# Patient Record
Sex: MALE | Race: WHITE | NOT HISPANIC OR LATINO
[De-identification: names, ages, dates, MRNs, and addresses within clinical notes are randomized per-mention and may not be internally consistent; named-entity substitution may affect disease eponyms.]

---

## 2017-03-01 ENCOUNTER — RECORD ABSTRACTING (OUTPATIENT)
Age: 62
End: 2017-03-01

## 2017-03-01 DIAGNOSIS — N28.9 DISORDER OF KIDNEY AND URETER, UNSPECIFIED: ICD-10-CM

## 2017-03-01 DIAGNOSIS — Z78.9 OTHER SPECIFIED HEALTH STATUS: ICD-10-CM

## 2017-03-01 DIAGNOSIS — Z87.718 PERSONAL HISTORY OF OTHER SPECIFIED (CORRECTED) CONGENITAL MALFORMATIONS OF GENITOURINARY SYSTEM: ICD-10-CM

## 2017-03-01 DIAGNOSIS — R09.89 OTHER SPECIFIED SYMPTOMS AND SIGNS INVOLVING THE CIRCULATORY AND RESPIRATORY SYSTEMS: ICD-10-CM

## 2017-03-01 DIAGNOSIS — Z99.2 DEPENDENCE ON RENAL DIALYSIS: ICD-10-CM

## 2017-03-01 RX ORDER — PREDNISONE 5 MG/1
5 TABLET ORAL
Refills: 0 | Status: ACTIVE | COMMUNITY

## 2017-03-01 RX ORDER — AMIODARONE HYDROCHLORIDE 100 MG/1
100 TABLET ORAL
Refills: 0 | Status: ACTIVE | COMMUNITY

## 2017-04-04 ENCOUNTER — APPOINTMENT (OUTPATIENT)
Dept: ELECTROPHYSIOLOGY | Facility: CLINIC | Age: 62
End: 2017-04-04

## 2017-07-05 ENCOUNTER — APPOINTMENT (OUTPATIENT)
Dept: ELECTROPHYSIOLOGY | Facility: CLINIC | Age: 62
End: 2017-07-05
Payer: MEDICARE

## 2017-07-05 DIAGNOSIS — Z86.79 PERSONAL HISTORY OF OTHER DISEASES OF THE CIRCULATORY SYSTEM: ICD-10-CM

## 2017-07-05 DIAGNOSIS — I47.2 VENTRICULAR TACHYCARDIA: ICD-10-CM

## 2017-07-05 PROCEDURE — 93297 REM INTERROG DEV EVAL ICPMS: CPT

## 2017-07-05 PROCEDURE — 93295 DEV INTERROG REMOTE 1/2/MLT: CPT

## 2017-07-05 PROCEDURE — 93296 REM INTERROG EVL PM/IDS: CPT

## 2017-08-17 ENCOUNTER — INPATIENT (INPATIENT)
Facility: HOSPITAL | Age: 62
LOS: 6 days | Discharge: HOME | End: 2017-08-24
Attending: HOSPITALIST | Admitting: INTERNAL MEDICINE
Payer: MEDICARE

## 2017-08-17 VITALS — WEIGHT: 143.08 LBS | HEIGHT: 61 IN

## 2017-08-17 DIAGNOSIS — I47.1 SUPRAVENTRICULAR TACHYCARDIA: ICD-10-CM

## 2017-08-17 DIAGNOSIS — E27.40 UNSPECIFIED ADRENOCORTICAL INSUFFICIENCY: ICD-10-CM

## 2017-08-17 DIAGNOSIS — I50.20 UNSPECIFIED SYSTOLIC (CONGESTIVE) HEART FAILURE: ICD-10-CM

## 2017-08-17 DIAGNOSIS — Z94.0 KIDNEY TRANSPLANT STATUS: Chronic | ICD-10-CM

## 2017-08-17 DIAGNOSIS — Z95.810 PRESENCE OF AUTOMATIC (IMPLANTABLE) CARDIAC DEFIBRILLATOR: Chronic | ICD-10-CM

## 2017-08-17 DIAGNOSIS — I25.10 ATHEROSCLEROTIC HEART DISEASE OF NATIVE CORONARY ARTERY WITHOUT ANGINA PECTORIS: ICD-10-CM

## 2017-08-17 DIAGNOSIS — L03.115 CELLULITIS OF RIGHT LOWER LIMB: ICD-10-CM

## 2017-08-17 DIAGNOSIS — I10 ESSENTIAL (PRIMARY) HYPERTENSION: ICD-10-CM

## 2017-08-17 DIAGNOSIS — Z98.890 OTHER SPECIFIED POSTPROCEDURAL STATES: Chronic | ICD-10-CM

## 2017-08-17 DIAGNOSIS — I77.0 ARTERIOVENOUS FISTULA, ACQUIRED: Chronic | ICD-10-CM

## 2017-08-17 DIAGNOSIS — E03.9 HYPOTHYROIDISM, UNSPECIFIED: ICD-10-CM

## 2017-08-17 DIAGNOSIS — N18.6 END STAGE RENAL DISEASE: ICD-10-CM

## 2017-08-17 LAB
ALBUMIN SERPL ELPH-MCNC: 3.5 G/DL — SIGNIFICANT CHANGE UP (ref 3.3–5)
ALP SERPL-CCNC: 94 U/L — SIGNIFICANT CHANGE UP (ref 40–120)
ALT FLD-CCNC: 27 U/L — SIGNIFICANT CHANGE UP (ref 12–78)
ANION GAP SERPL CALC-SCNC: 11 MMOL/L — SIGNIFICANT CHANGE UP (ref 5–17)
AST SERPL-CCNC: 21 U/L — SIGNIFICANT CHANGE UP (ref 15–37)
BASOPHILS # BLD AUTO: 0.1 K/UL — SIGNIFICANT CHANGE UP (ref 0–0.2)
BASOPHILS NFR BLD AUTO: 0.8 % — SIGNIFICANT CHANGE UP (ref 0–2)
BILIRUB SERPL-MCNC: 0.8 MG/DL — SIGNIFICANT CHANGE UP (ref 0.2–1.2)
BUN SERPL-MCNC: 48 MG/DL — HIGH (ref 7–23)
CALCIUM SERPL-MCNC: 9.6 MG/DL — SIGNIFICANT CHANGE UP (ref 8.5–10.1)
CHLORIDE SERPL-SCNC: 101 MMOL/L — SIGNIFICANT CHANGE UP (ref 96–108)
CO2 SERPL-SCNC: 26 MMOL/L — SIGNIFICANT CHANGE UP (ref 22–31)
CREAT SERPL-MCNC: 4.75 MG/DL — HIGH (ref 0.5–1.3)
EOSINOPHIL # BLD AUTO: 0.1 K/UL — SIGNIFICANT CHANGE UP (ref 0–0.5)
EOSINOPHIL NFR BLD AUTO: 1 % — SIGNIFICANT CHANGE UP (ref 0–6)
GLUCOSE SERPL-MCNC: 133 MG/DL — HIGH (ref 70–99)
HCT VFR BLD CALC: 34.6 % — LOW (ref 39–50)
HGB BLD-MCNC: 11.1 G/DL — LOW (ref 13–17)
INR BLD: 2.43 RATIO — HIGH (ref 0.88–1.16)
LYMPHOCYTES # BLD AUTO: 1.2 K/UL — SIGNIFICANT CHANGE UP (ref 1–3.3)
LYMPHOCYTES # BLD AUTO: 11.8 % — LOW (ref 13–44)
MCHC RBC-ENTMCNC: 32.2 GM/DL — SIGNIFICANT CHANGE UP (ref 32–36)
MCHC RBC-ENTMCNC: 32.6 PG — SIGNIFICANT CHANGE UP (ref 27–34)
MCV RBC AUTO: 101.2 FL — HIGH (ref 80–100)
MONOCYTES # BLD AUTO: 0.6 K/UL — SIGNIFICANT CHANGE UP (ref 0–0.9)
MONOCYTES NFR BLD AUTO: 6 % — SIGNIFICANT CHANGE UP (ref 2–14)
NEUTROPHILS # BLD AUTO: 8 K/UL — HIGH (ref 1.8–7.4)
NEUTROPHILS NFR BLD AUTO: 80.4 % — HIGH (ref 43–77)
PLATELET # BLD AUTO: 116 K/UL — LOW (ref 150–400)
POTASSIUM SERPL-MCNC: 4.3 MMOL/L — SIGNIFICANT CHANGE UP (ref 3.5–5.3)
POTASSIUM SERPL-SCNC: 4.3 MMOL/L — SIGNIFICANT CHANGE UP (ref 3.5–5.3)
PROT SERPL-MCNC: 7.2 GM/DL — SIGNIFICANT CHANGE UP (ref 6–8.3)
PROTHROM AB SERPL-ACNC: 26.7 SEC — HIGH (ref 9.8–12.7)
RBC # BLD: 3.42 M/UL — LOW (ref 4.2–5.8)
RBC # FLD: 14.8 % — HIGH (ref 10.3–14.5)
SODIUM SERPL-SCNC: 138 MMOL/L — SIGNIFICANT CHANGE UP (ref 135–145)
WBC # BLD: 10 K/UL — SIGNIFICANT CHANGE UP (ref 3.8–10.5)
WBC # FLD AUTO: 10 K/UL — SIGNIFICANT CHANGE UP (ref 3.8–10.5)

## 2017-08-17 PROCEDURE — 99285 EMERGENCY DEPT VISIT HI MDM: CPT

## 2017-08-17 RX ORDER — CINACALCET 30 MG/1
30 TABLET, FILM COATED ORAL
Qty: 0 | Refills: 0 | Status: DISCONTINUED | OUTPATIENT
Start: 2017-08-17 | End: 2017-08-24

## 2017-08-17 RX ORDER — CEFTRIAXONE 500 MG/1
1 INJECTION, POWDER, FOR SOLUTION INTRAMUSCULAR; INTRAVENOUS ONCE
Qty: 0 | Refills: 0 | Status: COMPLETED | OUTPATIENT
Start: 2017-08-17 | End: 2017-08-17

## 2017-08-17 RX ORDER — VANCOMYCIN HCL 1 G
1000 VIAL (EA) INTRAVENOUS ONCE
Qty: 0 | Refills: 0 | Status: COMPLETED | OUTPATIENT
Start: 2017-08-17 | End: 2017-08-17

## 2017-08-17 RX ORDER — LOSARTAN POTASSIUM 100 MG/1
25 TABLET, FILM COATED ORAL DAILY
Qty: 0 | Refills: 0 | Status: DISCONTINUED | OUTPATIENT
Start: 2017-08-17 | End: 2017-08-19

## 2017-08-17 RX ORDER — HEPARIN SODIUM 5000 [USP'U]/ML
5000 INJECTION INTRAVENOUS; SUBCUTANEOUS EVERY 12 HOURS
Qty: 0 | Refills: 0 | Status: DISCONTINUED | OUTPATIENT
Start: 2017-08-17 | End: 2017-08-18

## 2017-08-17 RX ORDER — CEFEPIME 1 G/1
1000 INJECTION, POWDER, FOR SOLUTION INTRAMUSCULAR; INTRAVENOUS EVERY 24 HOURS
Qty: 0 | Refills: 0 | Status: DISCONTINUED | OUTPATIENT
Start: 2017-08-17 | End: 2017-08-18

## 2017-08-17 RX ORDER — MORPHINE SULFATE 50 MG/1
4 CAPSULE, EXTENDED RELEASE ORAL ONCE
Qty: 0 | Refills: 0 | Status: DISCONTINUED | OUTPATIENT
Start: 2017-08-17 | End: 2017-08-17

## 2017-08-17 RX ORDER — SEVELAMER CARBONATE 2400 MG/1
2400 POWDER, FOR SUSPENSION ORAL
Qty: 0 | Refills: 0 | Status: DISCONTINUED | OUTPATIENT
Start: 2017-08-17 | End: 2017-08-24

## 2017-08-17 RX ORDER — WARFARIN SODIUM 2.5 MG/1
2.5 TABLET ORAL ONCE
Qty: 0 | Refills: 0 | Status: COMPLETED | OUTPATIENT
Start: 2017-08-17 | End: 2017-08-17

## 2017-08-17 RX ORDER — ACETAMINOPHEN 500 MG
325 TABLET ORAL EVERY 4 HOURS
Qty: 0 | Refills: 0 | Status: DISCONTINUED | OUTPATIENT
Start: 2017-08-17 | End: 2017-08-24

## 2017-08-17 RX ORDER — HYDROMORPHONE HYDROCHLORIDE 2 MG/ML
0.5 INJECTION INTRAMUSCULAR; INTRAVENOUS; SUBCUTANEOUS EVERY 6 HOURS
Qty: 0 | Refills: 0 | Status: DISCONTINUED | OUTPATIENT
Start: 2017-08-17 | End: 2017-08-19

## 2017-08-17 RX ORDER — AMIODARONE HYDROCHLORIDE 400 MG/1
100 TABLET ORAL DAILY
Qty: 0 | Refills: 0 | Status: DISCONTINUED | OUTPATIENT
Start: 2017-08-17 | End: 2017-08-24

## 2017-08-17 RX ORDER — DOCUSATE SODIUM 100 MG
100 CAPSULE ORAL THREE TIMES A DAY
Qty: 0 | Refills: 0 | Status: DISCONTINUED | OUTPATIENT
Start: 2017-08-17 | End: 2017-08-24

## 2017-08-17 RX ORDER — CARVEDILOL PHOSPHATE 80 MG/1
3.12 CAPSULE, EXTENDED RELEASE ORAL DAILY
Qty: 0 | Refills: 0 | Status: DISCONTINUED | OUTPATIENT
Start: 2017-08-17 | End: 2017-08-24

## 2017-08-17 RX ORDER — OXYCODONE AND ACETAMINOPHEN 5; 325 MG/1; MG/1
1 TABLET ORAL EVERY 4 HOURS
Qty: 0 | Refills: 0 | Status: DISCONTINUED | OUTPATIENT
Start: 2017-08-17 | End: 2017-08-24

## 2017-08-17 RX ORDER — ONDANSETRON 8 MG/1
4 TABLET, FILM COATED ORAL ONCE
Qty: 0 | Refills: 0 | Status: DISCONTINUED | OUTPATIENT
Start: 2017-08-17 | End: 2017-08-17

## 2017-08-17 RX ORDER — ONDANSETRON 8 MG/1
4 TABLET, FILM COATED ORAL EVERY 6 HOURS
Qty: 0 | Refills: 0 | Status: DISCONTINUED | OUTPATIENT
Start: 2017-08-17 | End: 2017-08-24

## 2017-08-17 RX ORDER — CARVEDILOL PHOSPHATE 80 MG/1
6.25 CAPSULE, EXTENDED RELEASE ORAL AT BEDTIME
Qty: 0 | Refills: 0 | Status: DISCONTINUED | OUTPATIENT
Start: 2017-08-17 | End: 2017-08-24

## 2017-08-17 RX ORDER — ONDANSETRON 8 MG/1
4 TABLET, FILM COATED ORAL ONCE
Qty: 0 | Refills: 0 | Status: COMPLETED | OUTPATIENT
Start: 2017-08-17 | End: 2017-08-17

## 2017-08-17 RX ADMIN — MORPHINE SULFATE 4 MILLIGRAM(S): 50 CAPSULE, EXTENDED RELEASE ORAL at 20:10

## 2017-08-17 RX ADMIN — CEFTRIAXONE 100 GRAM(S): 500 INJECTION, POWDER, FOR SOLUTION INTRAMUSCULAR; INTRAVENOUS at 17:50

## 2017-08-17 RX ADMIN — HYDROMORPHONE HYDROCHLORIDE 0.5 MILLIGRAM(S): 2 INJECTION INTRAMUSCULAR; INTRAVENOUS; SUBCUTANEOUS at 23:46

## 2017-08-17 RX ADMIN — MORPHINE SULFATE 4 MILLIGRAM(S): 50 CAPSULE, EXTENDED RELEASE ORAL at 17:50

## 2017-08-17 RX ADMIN — CEFEPIME 100 MILLIGRAM(S): 1 INJECTION, POWDER, FOR SOLUTION INTRAMUSCULAR; INTRAVENOUS at 23:46

## 2017-08-17 RX ADMIN — ONDANSETRON 4 MILLIGRAM(S): 8 TABLET, FILM COATED ORAL at 17:50

## 2017-08-17 RX ADMIN — Medication 250 MILLIGRAM(S): at 20:11

## 2017-08-17 NOTE — H&P ADULT - PROBLEM SELECTOR PLAN 1
- Given Vanc and Ceftriaxone in ED  - ID Consult  - Continue with Broad-Spectrum Antibiotics; Renally dosed  - Follow-up Cultures  - Pain meds prn - Given Vanc and Ceftriaxone in ED  - ID Consult  - Wound Care Consult  - Continue with Broad-Spectrum Antibiotics; Renally dosed  - Follow-up Cultures  - Pain meds prn - Given Vanc and Ceftriaxone in ED  - ID Consult  - Wound Care Consult  - Continue with Broad-Spectrum Antibiotics; Renally dosed  - Follow-up Cultures  Pain meds w anti-nausea as well  - Pain meds prn

## 2017-08-17 NOTE — H&P ADULT - NSHPREVIEWOFSYSTEMS_GEN_ALL_CORE
REVIEW OF SYSTEMS:  CONSTITUTIONAL: No weakness, fevers or chills  EYES/ENT: No visual changes;  No vertigo or throat pain   NECK: No pain or stiffness  RESPIRATORY: No cough, wheezing, hemoptysis; No shortness of breath  CARDIOVASCULAR: No chest pain or palpitations  GASTROINTESTINAL: No abdominal or epigastric pain. No nausea, vomiting, or hematemesis; No diarrhea or constipation. No melena or hematochezia.  GENITOURINARY: No dysuria, frequency or hematuria  NEUROLOGICAL: No numbness or weakness  SKIN: Pain, Swelling, Erythema of the R Ankle  All other review of systems is negative unless indicated above. CONSTITUTIONAL: No weakness, fevers or chills  EYES/ENT: No visual changes;  No vertigo or throat pain   NECK: No pain or stiffness  RESPIRATORY: No cough, wheezing, hemoptysis; No shortness of breath  CARDIOVASCULAR: No chest pain or palpitations  GASTROINTESTINAL: No abdominal or epigastric pain. No nausea, vomiting, or hematemesis; No diarrhea or constipation. No melena or hematochezia.  GENITOURINARY: No dysuria, frequency or hematuria  NEUROLOGICAL: No numbness or weakness  SKIN: Pain, Swelling, Erythema of the R Ankle  All other review of systems is negative unless indicated above.

## 2017-08-17 NOTE — ED STATDOCS - MEDICAL DECISION MAKING DETAILS
pt with wound of foot now with what appears to be a cellulitis.  due to swelling and redness will require abx.  Will coordinate with his ID

## 2017-08-17 NOTE — H&P ADULT - PROBLEM SELECTOR PLAN 3
- s/p AICD  - Continue with BB, ARB  - HD for fluid management - s/p AICD  - Continue with BB, ARB  - Continue with Amiodarone  - HD for fluid management

## 2017-08-17 NOTE — H&P ADULT - ATTENDING COMMENTS
VTE prophylaxis sq hep    Patient seen w Dr. Avitia and hx and physical edited as above.    Cellulitis developed after wound debridement; IV ABx wound consult; family will call wound center in AM to follow up cx results and discuss results of outpt studies    agree w A/P as outlined

## 2017-08-17 NOTE — H&P ADULT - PROBLEM SELECTOR PLAN 7
- Continue Amiodarone - Secondary to history of chronic steroids  - Continue prednisone daily - Secondary to history of chronic steroids  - Continue prednisone daily; will not bolus steroids at this time

## 2017-08-17 NOTE — ED STATDOCS - ATTENDING CONTRIBUTION TO CARE
I performed the initial face to face bedside interview with this patient regarding history of present illness, review of symptoms and past medical, social and family history.  I completed an independent physical examination.  I was the initial provider who evaluated this patient.  The history, review of symptoms and examination was documented by the scribe in my presence and I attest to the accuracy of the documentation.  I have signed out the follow up of any pending tests (i.e. labs, radiological studies) to the NP.  I have discussed the patient’s plan of care and disposition with the NP.

## 2017-08-17 NOTE — H&P ADULT - NSHPPHYSICALEXAM_GEN_ALL_CORE
T(C): 36.7 (08-17-17 @ 20:11)  T(F): 98.1 (08-17-17 @ 20:11), Max: 98.1 (08-17-17 @ 20:11)  HR: 77 (08-17-17 @ 20:11) (77 - 120)  BP: 115/63 (08-17-17 @ 20:11) (115/63 - 126/73)  RR:  (16 - 18)  SpO2:  (96% - 100%)    PHYSICAL EXAM:  GENERAL: NAD, well-groomed, well-developed  HEAD:  NC/AT  EYES: EOMI, PERRLA,  HEENT: Moist mucous membranes  NECK: Supple, No JVD  CNS:  Alert & Oriented X3, Motor Strength 5/5 B/L upper and lower extremities  LUNG: Clear to auscultation bilaterally; No rales, rhonchi, wheezing, or rubs  HEART: RRR; No murmurs, rubs, or gallops  ABDOMEN: +BS, ST/ND/NT  GENITOURINARY- Voiding, Bladder not distended  EXTREMITIES:  Approximately 1x1x0.5cm Ulcer present on Right Lateral Malleolus, with erythema Laterally and upwards, +Serosanguinous drainage, +TTP, +Warmth  MUSCULOSKELTAL- Joints normal ROM, no Muscle or joint tenderness  VAGINAL: deferred  RECTAL: deferred, not indicated  BREAST: deferred T(C): 36.7 (08-17-17 @ 20:11)  T(F): 98.1 (08-17-17 @ 20:11), Max: 98.1 (08-17-17 @ 20:11)  HR: 77 (08-17-17 @ 20:11) (77 - 120)  BP: 115/63 (08-17-17 @ 20:11) (115/63 - 126/73)  RR:  (16 - 18)  SpO2:  (96% - 100%)    PHYSICAL EXAM:  GENERAL: NAD, well-groomed, well-developed  HEAD:  NC/AT  EYES: EOMI, PERRLA,  HEENT: Moist mucous membranes  NECK: Supple, No JVD  CNS:  Alert & Oriented X3, Motor Strength 5/5 B/L upper and lower extremities  LUNG: Clear to auscultation bilaterally; No rales, rhonchi, wheezing, or rubs  HEART: RRR; No murmurs, rubs, or gallops  ABDOMEN: +BS, ST/ND/NT, Palpable Mass in RLQ  GENITOURINARY- Voiding, Bladder not distended  EXTREMITIES:  Approximately 1x1x0.5cm Ulcer present on Right Lateral Malleolus, with erythema Laterally and upwards, +Serosanguinous drainage, +TTP, +Warmth  MUSCULOSKELTAL- Joints normal ROM, no Muscle or joint tenderness  VAGINAL: deferred  RECTAL: deferred, not indicated  BREAST: deferred T(C): 36.7 (08-17-17 @ 20:11)  T(F): 98.1 (08-17-17 @ 20:11), Max: 98.1 (08-17-17 @ 20:11)  HR: 77 (08-17-17 @ 20:11) (77 - 120)  BP: 115/63 (08-17-17 @ 20:11) (115/63 - 126/73)  RR:  (16 - 18)  SpO2:  (96% - 100%)    PHYSICAL EXAM:  GENERAL: NAD, well-groomed, well-developed  HEAD:  NC/AT  EYES: EOMI, PERRLA,  HEENT: Moist mucous membranes  NECK: Supple, No JVD  CNS:  Alert & Oriented X3, Motor Strength 5/5 B/L upper and lower extremities  LUNG: Clear to auscultation bilaterally; No rales, rhonchi, wheezing, or rubs  HEART: RRR; No murmurs, rubs, or gallops  ABDOMEN: +BS, ST/ND/NT, Palpable Mass in RLQ c/w transplanted kidney  GENITOURINARY- Voiding, Bladder not distended  EXTREMITIES:  Approximately 1x1x0.5cm Ulcer present on Right Lateral Malleolus, with erythema Laterally and upwards, +Serosanguinous drainage, +TTP, +Warmth  + induration  MUSCULOSKELTAL- Joints normal ROM, no Muscle or joint tenderness  VASC  distal pulses not palpable (usual for pt)  VAGINAL: deferred  RECTAL: deferred, not indicated  BREAST: deferred

## 2017-08-17 NOTE — H&P ADULT - ASSESSMENT
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

## 2017-08-17 NOTE — ED STATDOCS - PROGRESS NOTE DETAILS
61 yr. old male PMH: Pacemaker, AICD, Dialysis , Hypoparathyroidism presents to ED with worsening pain and swelling in right ankle. Reports he saw Dr. Quintero at [facility] and has been treated in wound care center. Sent for US and CT ankle today and decided to come to the ED to be evaluated by Dr. Srivastava. Seen and examined by attending in Intake. Plan: IV, Labs, X-Ray IV antibiotics and Consult with Dr. Srivastava. Will F/U with results and re evaluate. Alberto NP Dr. Rivera called and consulted.  Labs, Vancomycin and Rocephin IV, and admission as per Dr. Rivera. Alberto BROWN.

## 2017-08-17 NOTE — H&P ADULT - PMH
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

## 2017-08-17 NOTE — H&P ADULT - FAMILY HISTORY
Sibling  Still living? Unknown  Family history of colon cancer, Age at diagnosis: Age Unknown     Mother  Still living? Unknown  Family history of kidney disease, Age at diagnosis: Age Unknown

## 2017-08-17 NOTE — ED STATDOCS - PHYSICAL EXAMINATION
+right ankle swollen medial lateral mal wound that has no drainage. surroudning lymph errythma and swelling, good distal PMS

## 2017-08-17 NOTE — ED STATDOCS - SKIN, MLM
+right ankle swollen medial lateral malleolus wound that has no drainage. +surrounding lymph erythema and swelling. good distal PMS

## 2017-08-17 NOTE — H&P ADULT - NSHPOUTPATIENTPROVIDERS_GEN_ALL_CORE
PCP/Cardio: Dr. Coello  ID: [email or web address]  Nephrology: Siva/Jeff  Wound Care: Dr. Quintero ([facility]

## 2017-08-17 NOTE — H&P ADULT - HISTORY OF PRESENT ILLNESS
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 Initially wound was a scab with surrounding erythema. Patient follows with Dr. Quintero (Wound Care) at [facility] and underwent a wound debridement on 08/15/17. Wound culture and bone culture were obtained at the time of procedure. Post-procedure patient, reports extension of the erythema up the lower extremity, increasing pain, and swelling of the right ankle. Patient contacted ID who ordered a CT of the lower extremities and Lower Extremity Dopplers. Currently denies any fever, chill, night sweats. Came to [facility] for further evaluation. 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 Initially wound was a scab with surrounding erythema. Patient follows with Dr. Quintero (Wound Care) at [facility] and underwent a wound debridement on 08/15/17. Wound culture and bone culture were obtained at the time of procedure. Patient was not treated with antibiotics and prelim culture is NGTD Post-procedure patient, reports extension of the erythema up the lower extremity, increasing pain, and swelling of the right ankle. Patient contacted ID who ordered a CT of the lower extremities and Lower Extremity Dopplers. Currently denies any fever, chill, night sweats. Came to [facility] for further evaluation. 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 Initially wound was a scab with surrounding erythema. Patient follows with Dr. Quintero (Wound Care) at [facility] and underwent a wound debridement on 08/15/17. Wound culture and bone culture were obtained at the time of procedure. Patient was not treated with antibiotics and wife reports that prelim culture is NGTD. Post-procedure patient, reports extension of the erythema up the lower extremity and down the foot, increasing pain, and swelling of the right ankle. Patient contacted ID who ordered a CT of the lower extremities and Lower Extremity Dopplers. Currently denies any fever, chill, night sweats. Came to [facility] for further evaluation. 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       . Initially wound was a scab with surrounding erythema. Patient follows with Dr. Quintero (Wound Care) at [facility] and underwent a wound debridement on 08/15/17. Wound culture and bone culture were obtained at the time of procedure. Patient was not treated with antibiotics and wife reports that prelim culture is NGTD.         Post-procedure patient, reports extension of the erythema up the lower extremity and down the foot, increasing pain, and swelling of the right ankle. Patient contacted ID who ordered a CT of the lower extremities and Lower Extremity Dopplers. Currently denies any fever, chill, night sweats. Came to [facility] for further evaluation.  Radiology results are pending.

## 2017-08-17 NOTE — H&P ADULT - PROBLEM SELECTOR PLAN 4
- s/p Stent (2007)  - Continue BB  - Lipid profile  - Start Statin  - Patient reports that cardiology switched him off anti-platelets inlieu of coumadin  - INR Therapeutic

## 2017-08-17 NOTE — ED STATDOCS - OBJECTIVE STATEMENT
62 y/o male with PMHx of ESRD s/p kidney transplant presents to the ED c/o worsening right ankle pain since last night. +increased redness, swelling to right ankle. Went to infectious disease Dr. Srivastava, had an appointment on Monday morning. Did CT, US, and CXR. Pt follows up with Wound Care at [facility]. Is on Coumadin. Pt is currently on Prednisone.

## 2017-08-17 NOTE — H&P ADULT - PSH
A-V fistula    AICD (automatic cardioverter/defibrillator) present    H/O hernia repair    H/O kidney transplant

## 2017-08-18 DIAGNOSIS — Z29.9 ENCOUNTER FOR PROPHYLACTIC MEASURES, UNSPECIFIED: ICD-10-CM

## 2017-08-18 LAB
ALBUMIN SERPL ELPH-MCNC: 3 G/DL — LOW (ref 3.3–5)
ALP SERPL-CCNC: 88 U/L — SIGNIFICANT CHANGE UP (ref 40–120)
ALT FLD-CCNC: 21 U/L — SIGNIFICANT CHANGE UP (ref 12–78)
ANION GAP SERPL CALC-SCNC: 11 MMOL/L — SIGNIFICANT CHANGE UP (ref 5–17)
AST SERPL-CCNC: 9 U/L — LOW (ref 15–37)
BILIRUB SERPL-MCNC: 0.7 MG/DL — SIGNIFICANT CHANGE UP (ref 0.2–1.2)
BUN SERPL-MCNC: 61 MG/DL — HIGH (ref 7–23)
CALCIUM SERPL-MCNC: 9.5 MG/DL — SIGNIFICANT CHANGE UP (ref 8.4–10.5)
CALCIUM SERPL-MCNC: 9.5 MG/DL — SIGNIFICANT CHANGE UP (ref 8.5–10.1)
CHLORIDE SERPL-SCNC: 100 MMOL/L — SIGNIFICANT CHANGE UP (ref 96–108)
CHOLEST SERPL-MCNC: 155 MG/DL — SIGNIFICANT CHANGE UP (ref 10–199)
CO2 SERPL-SCNC: 26 MMOL/L — SIGNIFICANT CHANGE UP (ref 22–31)
CREAT SERPL-MCNC: 5.51 MG/DL — HIGH (ref 0.5–1.3)
GLUCOSE SERPL-MCNC: 134 MG/DL — HIGH (ref 70–99)
HAV IGM SER-ACNC: SIGNIFICANT CHANGE UP
HBV CORE IGM SER-ACNC: SIGNIFICANT CHANGE UP
HBV SURFACE AG SER-ACNC: SIGNIFICANT CHANGE UP
HCT VFR BLD CALC: 29.5 % — LOW (ref 39–50)
HCV AB S/CO SERPL IA: 1.05 S/CO — SIGNIFICANT CHANGE UP
HCV AB SERPL-IMP: (no result)
HDLC SERPL-MCNC: 49 MG/DL — SIGNIFICANT CHANGE UP (ref 40–125)
HGB BLD-MCNC: 9.5 G/DL — LOW (ref 13–17)
LIPID PNL WITH DIRECT LDL SERPL: 83 MG/DL — SIGNIFICANT CHANGE UP
MCHC RBC-ENTMCNC: 31.8 PG — SIGNIFICANT CHANGE UP (ref 27–34)
MCHC RBC-ENTMCNC: 32.2 GM/DL — SIGNIFICANT CHANGE UP (ref 32–36)
MCV RBC AUTO: 98.8 FL — SIGNIFICANT CHANGE UP (ref 80–100)
PLATELET # BLD AUTO: 119 K/UL — LOW (ref 150–400)
POTASSIUM SERPL-MCNC: 4.8 MMOL/L — SIGNIFICANT CHANGE UP (ref 3.5–5.3)
POTASSIUM SERPL-SCNC: 4.8 MMOL/L — SIGNIFICANT CHANGE UP (ref 3.5–5.3)
PROT SERPL-MCNC: 6.3 GM/DL — SIGNIFICANT CHANGE UP (ref 6–8.3)
PTH-INTACT FLD-MCNC: 292 PG/ML — HIGH (ref 15–65)
RBC # BLD: 2.99 M/UL — LOW (ref 4.2–5.8)
RBC # FLD: 14.5 % — SIGNIFICANT CHANGE UP (ref 10.3–14.5)
SODIUM SERPL-SCNC: 137 MMOL/L — SIGNIFICANT CHANGE UP (ref 135–145)
TOTAL CHOLESTEROL/HDL RATIO MEASUREMENT: 3.2 RATIO — LOW (ref 3.4–9.6)
TRIGL SERPL-MCNC: 115 MG/DL — SIGNIFICANT CHANGE UP (ref 10–149)
TSH SERPL-MCNC: 4.14 UU/ML — HIGH (ref 0.36–3.74)
WBC # BLD: 9.1 K/UL — SIGNIFICANT CHANGE UP (ref 3.8–10.5)
WBC # FLD AUTO: 9.1 K/UL — SIGNIFICANT CHANGE UP (ref 3.8–10.5)

## 2017-08-18 RX ORDER — WARFARIN SODIUM 2.5 MG/1
2.5 TABLET ORAL DAILY
Qty: 0 | Refills: 0 | Status: DISCONTINUED | OUTPATIENT
Start: 2017-08-18 | End: 2017-08-19

## 2017-08-18 RX ORDER — CEFTAZIDIME 6 G/30ML
2 INJECTION, POWDER, FOR SOLUTION INTRAVENOUS
Qty: 0 | Refills: 0 | Status: DISCONTINUED | OUTPATIENT
Start: 2017-08-18 | End: 2017-08-24

## 2017-08-18 RX ORDER — WARFARIN SODIUM 2.5 MG/1
2.5 TABLET ORAL DAILY
Qty: 0 | Refills: 0 | Status: DISCONTINUED | OUTPATIENT
Start: 2017-08-18 | End: 2017-08-18

## 2017-08-18 RX ORDER — DOXERCALCIFEROL 2.5 UG/1
1 CAPSULE ORAL
Qty: 0 | Refills: 0 | Status: DISCONTINUED | OUTPATIENT
Start: 2017-08-18 | End: 2017-08-24

## 2017-08-18 RX ORDER — VANCOMYCIN HCL 1 G
500 VIAL (EA) INTRAVENOUS
Qty: 0 | Refills: 0 | Status: DISCONTINUED | OUTPATIENT
Start: 2017-08-18 | End: 2017-08-24

## 2017-08-18 RX ORDER — ERYTHROPOIETIN 10000 [IU]/ML
10000 INJECTION, SOLUTION INTRAVENOUS; SUBCUTANEOUS
Qty: 0 | Refills: 0 | Status: DISCONTINUED | OUTPATIENT
Start: 2017-08-18 | End: 2017-08-24

## 2017-08-18 RX ORDER — VANCOMYCIN HCL 1 G
125 VIAL (EA) INTRAVENOUS EVERY 6 HOURS
Qty: 0 | Refills: 0 | Status: DISCONTINUED | OUTPATIENT
Start: 2017-08-18 | End: 2017-08-24

## 2017-08-18 RX ADMIN — AMIODARONE HYDROCHLORIDE 100 MILLIGRAM(S): 400 TABLET ORAL at 18:13

## 2017-08-18 RX ADMIN — Medication 100 MILLIGRAM(S): at 16:29

## 2017-08-18 RX ADMIN — ERYTHROPOIETIN 10000 UNIT(S): 10000 INJECTION, SOLUTION INTRAVENOUS; SUBCUTANEOUS at 16:33

## 2017-08-18 RX ADMIN — CARVEDILOL PHOSPHATE 3.12 MILLIGRAM(S): 80 CAPSULE, EXTENDED RELEASE ORAL at 06:08

## 2017-08-18 RX ADMIN — OXYCODONE AND ACETAMINOPHEN 1 TABLET(S): 5; 325 TABLET ORAL at 18:24

## 2017-08-18 RX ADMIN — HYDROMORPHONE HYDROCHLORIDE 0.5 MILLIGRAM(S): 2 INJECTION INTRAMUSCULAR; INTRAVENOUS; SUBCUTANEOUS at 14:35

## 2017-08-18 RX ADMIN — HYDROMORPHONE HYDROCHLORIDE 0.5 MILLIGRAM(S): 2 INJECTION INTRAMUSCULAR; INTRAVENOUS; SUBCUTANEOUS at 06:09

## 2017-08-18 RX ADMIN — CEFTAZIDIME 100 GRAM(S): 6 INJECTION, POWDER, FOR SOLUTION INTRAVENOUS at 18:57

## 2017-08-18 RX ADMIN — Medication 100 MILLIGRAM(S): at 19:56

## 2017-08-18 RX ADMIN — WARFARIN SODIUM 2.5 MILLIGRAM(S): 2.5 TABLET ORAL at 00:33

## 2017-08-18 RX ADMIN — CARVEDILOL PHOSPHATE 6.25 MILLIGRAM(S): 80 CAPSULE, EXTENDED RELEASE ORAL at 00:33

## 2017-08-18 RX ADMIN — Medication 125 MILLIGRAM(S): at 18:16

## 2017-08-18 RX ADMIN — Medication 5 MILLIGRAM(S): at 06:09

## 2017-08-18 RX ADMIN — SEVELAMER CARBONATE 2400 MILLIGRAM(S): 2400 POWDER, FOR SUSPENSION ORAL at 18:15

## 2017-08-18 RX ADMIN — OXYCODONE AND ACETAMINOPHEN 1 TABLET(S): 5; 325 TABLET ORAL at 03:52

## 2017-08-18 RX ADMIN — OXYCODONE AND ACETAMINOPHEN 1 TABLET(S): 5; 325 TABLET ORAL at 22:09

## 2017-08-18 RX ADMIN — LOSARTAN POTASSIUM 25 MILLIGRAM(S): 100 TABLET, FILM COATED ORAL at 06:08

## 2017-08-18 RX ADMIN — DOXERCALCIFEROL 1 MICROGRAM(S): 2.5 CAPSULE ORAL at 16:32

## 2017-08-18 RX ADMIN — Medication 100 MILLIGRAM(S): at 22:10

## 2017-08-18 RX ADMIN — Medication 100 MILLIGRAM(S): at 06:09

## 2017-08-18 RX ADMIN — OXYCODONE AND ACETAMINOPHEN 1 TABLET(S): 5; 325 TABLET ORAL at 10:41

## 2017-08-18 RX ADMIN — CINACALCET 30 MILLIGRAM(S): 30 TABLET, FILM COATED ORAL at 18:12

## 2017-08-18 NOTE — PROGRESS NOTE ADULT - PROBLEM SELECTOR PLAN 4
- s/p Stent (2007)  - Continue BB  - Lipid profile  - Start Statin  - Patient reports that cardiology switched him off anti-platelets inlieu of coumadin  - INR Therapeutic.

## 2017-08-18 NOTE — CONSULT NOTE ADULT - SUBJECTIVE AND OBJECTIVE BOX
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 Initially wound was a scab with surrounding erythema. Patient follows with Dr. Quintero (Wound Care) at [facility] and underwent a wound debridement on 08/15/17. Wound culture and bone culture were obtained at the time of procedure. Patient was not treated with antibiotics and wife reports that prelim culture is NGTD.  Post-procedure patient, reports extension of the erythema up the lower extremity and down the foot, increasing pain, and swelling of the right ankle. Came to [facility] for further evaluation.        PMH: as above  PSH: as above  Meds: per reconciliation sheet, noted below  MEDICATIONS  (STANDING):  predniSONE   Tablet 5 milliGRAM(s) Oral daily  amiodarone    Tablet 100 milliGRAM(s) Oral daily  losartan 25 milliGRAM(s) Oral daily  carvedilol 6.25 milliGRAM(s) Oral at bedtime  cinacalcet 30 milliGRAM(s) Oral <User Schedule>  sevelamer hydrochloride 2400 milliGRAM(s) Oral three times a day with meals  carvedilol 3.125 milliGRAM(s) Oral daily  docusate sodium 100 milliGRAM(s) Oral three times a day  cefepime  IVPB 1000 milliGRAM(s) IV Intermittent every 24 hours  warfarin 2.5 milliGRAM(s) Oral daily    MEDICATIONS  (PRN):  acetaminophen   Tablet. 325 milliGRAM(s) Oral every 4 hours PRN Mild Pain (1 - 3)  ondansetron Injectable 4 milliGRAM(s) IV Push every 6 hours PRN Nausea  oxyCODONE    5 mG/acetaminophen 325 mG 1 Tablet(s) Oral every 4 hours PRN Moderate Pain (4 - 6)  HYDROmorphone  Injectable 0.5 milliGRAM(s) IV Push every 6 hours PRN Severe Pain (7 - 10)    Allergies    No Known Allergies    Intolerances      Social: no smoking, no alcohol, no illegal drugs; no recent travel, no exposure to TB  FAMILY HISTORY:  Family history of kidney disease (Mother)  Family history of colon cancer (Sibling)    ROS: the patient denies fever, no chills, no HA, no dizziness, no sore throat, no blurry vision, no CP, no palpitations, no SOB, no cough, no abdominal pain, no diarrhea, no N/V, no dysuria, no leg pain, has right leg rash, no joint aches, no rectal pain or bleeding, no night sweats    Vital Signs Last 24 Hrs  T(C): 37.1 (18 Aug 2017 10:51), Max: 37.1 (18 Aug 2017 10:51)  T(F): 98.8 (18 Aug 2017 10:51), Max: 98.8 (18 Aug 2017 10:51)  HR: 75 (18 Aug 2017 10:51) (70 - 120)  BP: 107/56 (18 Aug 2017 10:51) (107/56 - 126/73)  BP(mean): 89 (17 Aug 2017 15:34) (89 - 89)  RR: 16 (18 Aug 2017 10:51) (15 - 18)  SpO2: 96% (18 Aug 2017 10:51) (96% - 100%)  Daily Height in cm: 154.94 (17 Aug 2017 14:51)    Daily Weight in k.5 (18 Aug 2017 05:05)    PE:    Constitutional: frail looking  HEENT: NC/AT, EOMI, PERRLA  Neck: supple  Back: no tenderness  Respiratory: clear  Cardiovascular: S1S2 regular, no murmurs  Abdomen: soft, not tender, not distended, positive BS  Genitourinary: deferred  Rectal: deferred  Musculoskeletal: no muscle tenderness, no joint swelling or tenderness  Extremities: right leg erythema, edema and warmth  Neurological: AxOx3, moving all extremities, no focal deficits  Skin: no rashes    Labs:                        11.1   10.0  )-----------( 116      ( 17 Aug 2017 17:27 )             34.6     08-    138  |  101  |  48<H>  ----------------------------<  133<H>  4.3   |  26  |  4.75<H>    Ca    9.6      17 Aug 2017 17:27    TPro  7.2  /  Alb  3.5  /  TBili  0.8  /  DBili  x   /  AST  21  /  ALT  27  /  AlkPhos  94  08-17     LIVER FUNCTIONS - ( 17 Aug 2017 17:27 )  Alb: 3.5 g/dL / Pro: 7.2 gm/dL / ALK PHOS: 94 U/L / ALT: 27 U/L / AST: 21 U/L / GGT: x                 Radiology:    Advanced directives addressed: full resuscitation Patient is a 61y old  Male who presents with a chief complaint of CC: Right Lateral Ankle Pain (17 Aug 2017 21:07)    HPI:  60 y/o male with past h/o HTN, PVD, PCKD, s/p Nephrectomy and 3x Renal Transplant (, , ), ESRD on HD (M,W,F), CAD s/p Stent (), CHF (reported EF<20%) s/p AICD, Adrenal Insufficiency, 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 Initially wound was a scab with surrounding erythema. Patient follows with Dr. Quintero (Wound Care) at [facility] and underwent a wound debridement on 08/15/17. Wound culture and bone culture were obtained at the time of procedure. Patient was not treated with antibiotics and wife reports that prelim culture is NGTD.  Post-procedure patient, reports extension of the erythema up the lower extremity and down the foot, increasing pain, and swelling of the right ankle. Came to [facility] for further evaluation.        PMH: as above  PSH: as above  Meds: per reconciliation sheet, noted below  MEDICATIONS  (STANDING):  predniSONE   Tablet 5 milliGRAM(s) Oral daily  amiodarone    Tablet 100 milliGRAM(s) Oral daily  losartan 25 milliGRAM(s) Oral daily  carvedilol 6.25 milliGRAM(s) Oral at bedtime  cinacalcet 30 milliGRAM(s) Oral <User Schedule>  sevelamer hydrochloride 2400 milliGRAM(s) Oral three times a day with meals  carvedilol 3.125 milliGRAM(s) Oral daily  docusate sodium 100 milliGRAM(s) Oral three times a day  cefepime  IVPB 1000 milliGRAM(s) IV Intermittent every 24 hours  warfarin 2.5 milliGRAM(s) Oral daily    MEDICATIONS  (PRN):  acetaminophen   Tablet. 325 milliGRAM(s) Oral every 4 hours PRN Mild Pain (1 - 3)  ondansetron Injectable 4 milliGRAM(s) IV Push every 6 hours PRN Nausea  oxyCODONE    5 mG/acetaminophen 325 mG 1 Tablet(s) Oral every 4 hours PRN Moderate Pain (4 - 6)  HYDROmorphone  Injectable 0.5 milliGRAM(s) IV Push every 6 hours PRN Severe Pain (7 - 10)    Allergies    No Known Allergies    Intolerances      Social: no smoking, no alcohol, no illegal drugs; no recent travel, no exposure to TB  FAMILY HISTORY:  Family history of kidney disease (Mother)  Family history of colon cancer (Sibling)    ROS: the patient denies fever, no chills, no HA, no dizziness, no sore throat, no blurry vision, no CP, no palpitations, no SOB, no cough, no abdominal pain, no diarrhea, no N/V, no dysuria, no leg pain, has right leg rash, no joint aches, no rectal pain or bleeding, no night sweats    Vital Signs Last 24 Hrs  T(C): 37.1 (18 Aug 2017 10:51), Max: 37.1 (18 Aug 2017 10:51)  T(F): 98.8 (18 Aug 2017 10:51), Max: 98.8 (18 Aug 2017 10:51)  HR: 75 (18 Aug 2017 10:51) (70 - 120)  BP: 107/56 (18 Aug 2017 10:51) (107/56 - 126/73)  BP(mean): 89 (17 Aug 2017 15:34) (89 - 89)  RR: 16 (18 Aug 2017 10:51) (15 - 18)  SpO2: 96% (18 Aug 2017 10:51) (96% - 100%)  Daily Height in cm: 154.94 (17 Aug 2017 14:51)    Daily Weight in k.5 (18 Aug 2017 05:05)    PE:    Constitutional: frail looking  HEENT: NC/AT, EOMI, PERRLA  Neck: supple  Back: no tenderness  Respiratory: clear  Cardiovascular: S1S2 regular, no murmurs  Abdomen: soft, not tender, not distended, positive BS  Genitourinary: deferred  Rectal: deferred  Musculoskeletal: no muscle tenderness, no joint swelling or tenderness  Extremities: right lateral ankle erythema, edema and warmth extending to right lateral foot an right lateral calf area; small ulcer with silver nitrate sponge in place.  Neurological: AxOx3, moving all extremities, no focal deficits  Skin: no rashes    Labs:                        11.1   10.0  )-----------( 116      ( 17 Aug 2017 17:27 )             34.6         138  |  101  |  48<H>  ----------------------------<  133<H>  4.3   |  26  |  4.75<H>    Ca    9.6      17 Aug 2017 17:27    TPro  7.2  /  Alb  3.5  /  TBili  0.8  /  DBili  x   /  AST  21  /  ALT  27  /  AlkPhos  94       LIVER FUNCTIONS - ( 17 Aug 2017 17:27 )  Alb: 3.5 g/dL / Pro: 7.2 gm/dL / ALK PHOS: 94 U/L / ALT: 27 U/L / AST: 21 U/L / GGT: x                 Radiology:    Advanced directives addressed: full resuscitation

## 2017-08-18 NOTE — CONSULT NOTE ADULT - ASSESSMENT
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 Initially wound was a scab with surrounding erythema. Patient follows with Dr. Quintero (Wound Care) at [facility] and underwent a wound debridement on 08/15/17. Wound culture and bone culture were obtained at the time of procedure. Patient was not treated with antibiotics and wife reports that prelim culture is NGTD.  Post-procedure patient, reports extension of the erythema up the lower extremity and down the foot, increasing pain, and swelling of the right ankle. Came to [facility] for further evaluation. 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 Initially wound was a scab with surrounding erythema. Patient follows with Dr. Quintero (Wound Care) at [facility] and underwent a wound debridement on 08/15/17. Wound culture and bone culture were obtained at the time of procedure. Patient was not treated with antibiotics and wife reports that prelim culture is NGTD.  Post-procedure patient, reports extension of the erythema up the lower extremity and down the foot, increasing pain, and swelling of the right ankle. Came to [facility] for further evaluation.    1. Right lateral ankle ulcer s/p bone biopsy. Right ankle cellulitis. Possible underlying lateral ankle acute OM. h/o prior recurrent CDAD. ESRD on   -obtain BC x 2  -he cannot have an MRI duw to PM in place; indium scan may not be helpful at this time since he had the ankle ulcer debrided with bone biopsy several days ago  -start vancomycin 500 mg IV qHD and ceftazidime 2 gm IV q HD  -add vancomycin 125 mg PO q6h for CDAD prophylaxis  -consider plastic evaluation for right ankle ulcer

## 2017-08-18 NOTE — CONSULT NOTE ADULT - SUBJECTIVE AND OBJECTIVE BOX
Patient is a 61y Male whom presented to the hospital with a history of renal transplant (failed), hypothyroid, CM, cad, HTN, PVD, PCKD, ESRD on HD M/W/F here with LE pain. Patietn with ulcer to r leg followed by outpatient podiatry/wound care services. Patient had silver applied he reports, severe pain afterwards so brought in with suspected cellulitis.     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  Clostridium difficile colitis  CAD (coronary artery disease)  Peripheral vascular disease  Hypertension  Polycystic kidney disease  ESRD (end stage renal disease)  AICD (automatic cardioverter/defibrillator) present  H/O hernia repair  A-V fistula  H/O kidney transplant      MEDICATIONS  (STANDING):  predniSONE   Tablet 5 milliGRAM(s) Oral daily  amiodarone    Tablet 100 milliGRAM(s) Oral daily  losartan 25 milliGRAM(s) Oral daily  carvedilol 6.25 milliGRAM(s) Oral at bedtime  cinacalcet 30 milliGRAM(s) Oral <User Schedule>  sevelamer hydrochloride 2400 milliGRAM(s) Oral three times a day with meals  carvedilol 3.125 milliGRAM(s) Oral daily  docusate sodium 100 milliGRAM(s) Oral three times a day  warfarin 2.5 milliGRAM(s) Oral daily  vancomycin    Solution 125 milliGRAM(s) Oral every 6 hours  cefTAZidime  IVPB 2 Gram(s) IV Intermittent <User Schedule>  vancomycin  IVPB 500 milliGRAM(s) IV Intermittent <User Schedule>  doxercalciferol Injectable 1 MICROGram(s) IV Push <User Schedule>  epoetin amee Injectable 00748 Unit(s) IV Push <User Schedule>    MEDICATIONS  (PRN):  acetaminophen   Tablet. 325 milliGRAM(s) Oral every 4 hours PRN Mild Pain (1 - 3)  ondansetron Injectable 4 milliGRAM(s) IV Push every 6 hours PRN Nausea  oxyCODONE    5 mG/acetaminophen 325 mG 1 Tablet(s) Oral every 4 hours PRN Moderate Pain (4 - 6)  HYDROmorphone  Injectable 0.5 milliGRAM(s) IV Push every 6 hours PRN Severe Pain (7 - 10)      Allergies    No Known Allergies    Intolerances        SOCIAL HISTORY:  No cigg/etoh    FAMILY HISTORY:  Family history of kidney disease (Mother)  Family history of colon cancer (Sibling)      REVIEW OF SYSTEMS:    CONSTITUTIONAL: No weakness, fevers or chills  EYES/ENT: No visual changes;  No vertigo or throat pain   NECK: No pain or stiffness  RESPIRATORY: No cough, wheezing, hemoptysis; No shortness of breath  CARDIOVASCULAR: No chest pain or palpitations  GASTROINTESTINAL: No abdominal or epigastric pain. No nausea, vomiting, or hematemesis; No diarrhea or constipation. No melena or hematochezia.  GENITOURINARY: No dysuria, frequency or hematuria  NEUROLOGICAL: No numbness or weakness  SKIN: No itching, burning, rashes, or lesions   All other review of systems is negative unless indicated above.      T(C): , Max: 37.1 (08-18-17 @ 10:51)  T(F): , Max: 98.8 (08-18-17 @ 10:51)  HR: 70 (08-18-17 @ 16:04)  BP: 125/58 (08-18-17 @ 16:04)  BP(mean): --  RR: 16 (08-18-17 @ 16:04)  SpO2: 97% (08-18-17 @ 15:00)  Wt(kg): --    08-17 @ 07:01  -  08-18 @ 07:00  --------------------------------------------------------  IN: 0 mL / OUT: 300 mL / NET: -300 mL          PHYSICAL EXAM:    Constitutional: NAD, well-groomed, well-developed  HEENT: PERRLA, EOMI,  MMM  Neck: No LAD, No JVD  Respiratory: CTAB  Cardiovascular: S1 and S2, RRR  Gastrointestinal: BS+, soft, NT/ND  Extremities: RLE ulcer, bandaid  Neurological: A/O x 3, no focal deficits  Psychiatric: Normal mood, normal affect  : No Amaya  Skin: No rashes  Access: av        LABS:                        9.5    9.1   )-----------( 119      ( 18 Aug 2017 14:00 )             29.5     18 Aug 2017 14:00    137    |  100    |  61     ----------------------------<  134    4.8     |  26     |  5.51   17 Aug 2017 17:27    138    |  101    |  48     ----------------------------<  133    4.3     |  26     |  4.75     Ca    9.5        18 Aug 2017 14:00  Ca    9.6        17 Aug 2017 17:27  Phos  4.6       18 Aug 2017 14:00    TPro  6.3    /  Alb  3.0    /  TBili  0.7    /  DBili  x      /  AST  9      /  ALT  21     /  AlkPhos  88     18 Aug 2017 14:00  TPro  7.2    /  Alb  3.5    /  TBili  0.8    /  DBili  x      /  AST  21     /  ALT  27     /  AlkPhos  94     17 Aug 2017 17:27          Urine Studies:          RADIOLOGY & ADDITIONAL STUDIES:

## 2017-08-18 NOTE — PROGRESS NOTE ADULT - PROBLEM SELECTOR PLAN 1
- Given Vanc and Ceftriaxone in ED  - ID: no MRI due to PM in place; indium scan may not be helpful at this time since he had the ankle ulcer debrided with bone biopsy several days ago  -start vancomycin 500 mg IV qHD and ceftazidime 2 gm IV q HD  -add vancomycin 125 mg PO q6h for CDAD prophylaxis  -consider plastic evaluation for right ankle ulcer  - f/u wound Care Consult  - Continue with Broad-Spectrum Antibiotics; Renally dosed  - Follow-up Cultures  - Pain meds w anti-nausea as well  - Pain meds prn

## 2017-08-18 NOTE — PROGRESS NOTE ADULT - SUBJECTIVE AND OBJECTIVE BOX
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       . Initially wound was a scab with surrounding erythema. Patient follows with Dr. Quintero (Wound Care) at [facility] and underwent a wound debridement on 08/15/17. Wound culture and bone culture were obtained at the time of procedure. Patient was not treated with antibiotics and wife reports that prelim culture is NGTD.         Post-procedure patient, reports extension of the erythema up the lower extremity and down the foot, increasing pain, and swelling of the right ankle. Patient contacted ID who ordered a CT of the lower extremities and Lower Extremity Dopplers. Currently denies any fever, chill, night sweats. Came to [facility] for further evaluation.    SUBJECTIVE: Patient seen and examined this AM. He complained of some pain in his right leg and ankle. Otherwise, no acute complaints and no acute events overnight. Vitals remain within normal limits and he is hemodynamically stable.    REVIEW OF SYSTEMS:    CONSTITUTIONAL: No weakness, fevers or chills  EYES/ENT: No visual changes;  No vertigo or throat pain   NECK: No pain or stiffness  RESPIRATORY: No cough, wheezing, hemoptysis; No shortness of breath  CARDIOVASCULAR: No chest pain or palpitations  GASTROINTESTINAL: No abdominal or epigastric pain. No nausea, vomiting, or hematemesis; No diarrhea or constipation. No melena or hematochezia.  GENITOURINARY: No dysuria, frequency or hematuria  NEUROLOGICAL: No numbness or weakness  SKIN: No itching, burning, rashes, or lesions   All other review of systems is negative unless indicated above    Vital Signs Last 24 Hrs  T(C): 37.1 (18 Aug 2017 17:54), Max: 37.1 (18 Aug 2017 10:51)  T(F): 98.7 (18 Aug 2017 17:54), Max: 98.8 (18 Aug 2017 10:51)  HR: 92 (18 Aug 2017 17:54) (64 - 106)  BP: 118/60 (18 Aug 2017 17:54) (107/56 - 139/62)  BP(mean): --  RR: 17 (18 Aug 2017 17:54) (15 - 18)  SpO2: 100% (18 Aug 2017 17:54) (96% - 100%)    I&O's Summary    17 Aug 2017 07:01  -  18 Aug 2017 07:00  --------------------------------------------------------  IN: 0 mL / OUT: 300 mL / NET: -300 mL    18 Aug 2017 07:01  -  18 Aug 2017 18:46  --------------------------------------------------------  IN: 0 mL / OUT: 250 mL / NET: -250 mL        CAPILLARY BLOOD GLUCOSE          PHYSICAL EXAM:    Constitutional: NAD, awake and alert, well-developed  HEENT: PERR, EOMI, Normal Hearing, MMM  Neck: Soft and supple, No LAD, No JVD  Respiratory: Breath sounds are clear bilaterally, No wheezing, rales or rhonchi  Cardiovascular: S1 and S2, regular rate and rhythm, no Murmurs, gallops or rubs  Gastrointestinal: Bowel Sounds present, soft, nontender, nondistended, no guarding, no rebound  Extremities: No peripheral edema  Vascular: 2+ peripheral pulses  Neurological: A/O x 3, no focal deficits  Musculoskeletal: 5/5 strength b/l upper and lower extremities  Skin: Ulcer on the right lateral ankle with hyperpigmentation around the wound, erythema and warmth detention up the right leg    MEDICATIONS:  MEDICATIONS  (STANDING):  predniSONE   Tablet 5 milliGRAM(s) Oral daily  amiodarone    Tablet 100 milliGRAM(s) Oral daily  losartan 25 milliGRAM(s) Oral daily  carvedilol 6.25 milliGRAM(s) Oral at bedtime  cinacalcet 30 milliGRAM(s) Oral <User Schedule>  sevelamer hydrochloride 2400 milliGRAM(s) Oral three times a day with meals  carvedilol 3.125 milliGRAM(s) Oral daily  docusate sodium 100 milliGRAM(s) Oral three times a day  warfarin 2.5 milliGRAM(s) Oral daily  vancomycin    Solution 125 milliGRAM(s) Oral every 6 hours  cefTAZidime  IVPB 2 Gram(s) IV Intermittent <User Schedule>  vancomycin  IVPB 500 milliGRAM(s) IV Intermittent <User Schedule>  doxercalciferol Injectable 1 MICROGram(s) IV Push <User Schedule>  epoetin amee Injectable 70923 Unit(s) IV Push <User Schedule>      LABS: All Labs Reviewed:                        9.5    9.1   )-----------( 119      ( 18 Aug 2017 14:00 )             29.5     08-18    137  |  100  |  61<H>  ----------------------------<  134<H>  4.8   |  26  |  5.51<H>    Ca    9.5      18 Aug 2017 14:00  Phos  4.6     08-18    TPro  6.3  /  Alb  3.0<L>  /  TBili  0.7  /  DBili  x   /  AST  9<L>  /  ALT  21  /  AlkPhos  88  08-18    PT/INR - ( 18 Aug 2017 14:00 )   PT: 47.8 sec;   INR: 4.30 ratio               Blood Culture:     RADIOLOGY/EKG: pt had radiology done outpatient- uploaded to [facility]    DVT PPX:    ADVANCED DIRECTIVE:    DISPOSITION:

## 2017-08-18 NOTE — CONSULT NOTE ADULT - ASSESSMENT
61 year old male with a history of renal transplant (failed), hypothyroid, CM, cad, HTN, PVD, PCKD, ESRD on HD M/W/F here with LE pain, cellulitis.    ESRD  -HD TIW, M/WF  -Will resume outpatient orders  -K protocol  -UF as tolerated    Anemia  -SACHIN protocol    Cellulitis  -IV abx, ID    thanks, will follow with you

## 2017-08-18 NOTE — PATIENT PROFILE ADULT. - TEACHING/LEARNING LEARNING PREFERENCES
pictorial/individual instruction/skill demonstration/computer/internet/written material/verbal instruction/video/audio/group instruction

## 2017-08-18 NOTE — PROGRESS NOTE ADULT - PROBLEM SELECTOR PLAN 7
- Secondary to history of chronic steroids  - Continue prednisone daily; will not bolus steroids at this time

## 2017-08-18 NOTE — ED ADULT NURSE NOTE - OBJECTIVE STATEMENT
presents to the ED with ankle pain and swelling. was being treated at [facility] wound center but it has been becoming increasingly worse.

## 2017-08-18 NOTE — PROGRESS NOTE ADULT - PROBLEM SELECTOR PLAN 2
- History of PCKD s/p Nephrectomy and 3x Kidney Transplant  - Nephrology: Continue with Scheduled HD on M/W/F  - Continue with Phosphate Binders, and Sensipar

## 2017-08-19 LAB
ANION GAP SERPL CALC-SCNC: 10 MMOL/L — SIGNIFICANT CHANGE UP (ref 5–17)
APTT BLD: 39.7 SEC — HIGH (ref 27.5–37.4)
BUN SERPL-MCNC: 37 MG/DL — HIGH (ref 7–23)
CALCIUM SERPL-MCNC: 8.7 MG/DL — SIGNIFICANT CHANGE UP (ref 8.5–10.1)
CHLORIDE SERPL-SCNC: 98 MMOL/L — SIGNIFICANT CHANGE UP (ref 96–108)
CO2 SERPL-SCNC: 31 MMOL/L — SIGNIFICANT CHANGE UP (ref 22–31)
CREAT SERPL-MCNC: 4.22 MG/DL — HIGH (ref 0.5–1.3)
ERYTHROCYTE [SEDIMENTATION RATE] IN BLOOD: 80 MM/HR — HIGH (ref 0–20)
GLUCOSE SERPL-MCNC: 97 MG/DL — SIGNIFICANT CHANGE UP (ref 70–99)
HCT VFR BLD CALC: 28.7 % — LOW (ref 39–50)
HCT VFR BLD CALC: 30.2 % — LOW (ref 39–50)
HCV RNA FLD QL NAA+PROBE: SIGNIFICANT CHANGE UP
HCV RNA SPEC QL PROBE+SIG AMP: SIGNIFICANT CHANGE UP
HGB BLD-MCNC: 9.1 G/DL — LOW (ref 13–17)
HGB BLD-MCNC: 9.7 G/DL — LOW (ref 13–17)
INR BLD: 4.4 RATIO — HIGH (ref 0.88–1.16)
INR BLD: 4.51 RATIO — HIGH (ref 0.88–1.16)
MCHC RBC-ENTMCNC: 31.6 PG — SIGNIFICANT CHANGE UP (ref 27–34)
MCHC RBC-ENTMCNC: 31.8 GM/DL — LOW (ref 32–36)
MCHC RBC-ENTMCNC: 31.9 GM/DL — LOW (ref 32–36)
MCHC RBC-ENTMCNC: 32 PG — SIGNIFICANT CHANGE UP (ref 27–34)
MCV RBC AUTO: 100.1 FL — HIGH (ref 80–100)
MCV RBC AUTO: 99.2 FL — SIGNIFICANT CHANGE UP (ref 80–100)
PLATELET # BLD AUTO: 107 K/UL — LOW (ref 150–400)
PLATELET # BLD AUTO: 107 K/UL — LOW (ref 150–400)
POTASSIUM SERPL-MCNC: 4 MMOL/L — SIGNIFICANT CHANGE UP (ref 3.5–5.3)
POTASSIUM SERPL-SCNC: 4 MMOL/L — SIGNIFICANT CHANGE UP (ref 3.5–5.3)
PROTHROM AB SERPL-ACNC: 49 SEC — HIGH (ref 9.8–12.7)
PROTHROM AB SERPL-ACNC: 50.2 SEC — HIGH (ref 9.8–12.7)
RBC # BLD: 2.89 M/UL — LOW (ref 4.2–5.8)
RBC # BLD: 3.02 M/UL — LOW (ref 4.2–5.8)
RBC # FLD: 15.2 % — HIGH (ref 10.3–14.5)
RBC # FLD: 15.3 % — HIGH (ref 10.3–14.5)
SODIUM SERPL-SCNC: 139 MMOL/L — SIGNIFICANT CHANGE UP (ref 135–145)
WBC # BLD: 8.7 K/UL — SIGNIFICANT CHANGE UP (ref 3.8–10.5)
WBC # BLD: 9.2 K/UL — SIGNIFICANT CHANGE UP (ref 3.8–10.5)
WBC # FLD AUTO: 8.7 K/UL — SIGNIFICANT CHANGE UP (ref 3.8–10.5)
WBC # FLD AUTO: 9.2 K/UL — SIGNIFICANT CHANGE UP (ref 3.8–10.5)

## 2017-08-19 RX ADMIN — OXYCODONE AND ACETAMINOPHEN 1 TABLET(S): 5; 325 TABLET ORAL at 05:52

## 2017-08-19 RX ADMIN — OXYCODONE AND ACETAMINOPHEN 1 TABLET(S): 5; 325 TABLET ORAL at 11:08

## 2017-08-19 RX ADMIN — Medication 125 MILLIGRAM(S): at 05:55

## 2017-08-19 RX ADMIN — Medication 125 MILLIGRAM(S): at 11:09

## 2017-08-19 RX ADMIN — Medication 100 MILLIGRAM(S): at 21:15

## 2017-08-19 RX ADMIN — AMIODARONE HYDROCHLORIDE 100 MILLIGRAM(S): 400 TABLET ORAL at 05:53

## 2017-08-19 RX ADMIN — SEVELAMER CARBONATE 2400 MILLIGRAM(S): 2400 POWDER, FOR SUSPENSION ORAL at 09:00

## 2017-08-19 RX ADMIN — OXYCODONE AND ACETAMINOPHEN 1 TABLET(S): 5; 325 TABLET ORAL at 20:04

## 2017-08-19 RX ADMIN — Medication 125 MILLIGRAM(S): at 17:09

## 2017-08-19 RX ADMIN — CINACALCET 30 MILLIGRAM(S): 30 TABLET, FILM COATED ORAL at 17:09

## 2017-08-19 RX ADMIN — Medication 125 MILLIGRAM(S): at 00:17

## 2017-08-19 RX ADMIN — HYDROMORPHONE HYDROCHLORIDE 0.5 MILLIGRAM(S): 2 INJECTION INTRAMUSCULAR; INTRAVENOUS; SUBCUTANEOUS at 10:15

## 2017-08-19 RX ADMIN — Medication 5 MILLIGRAM(S): at 05:54

## 2017-08-19 RX ADMIN — SEVELAMER CARBONATE 2400 MILLIGRAM(S): 2400 POWDER, FOR SUSPENSION ORAL at 11:09

## 2017-08-19 RX ADMIN — SEVELAMER CARBONATE 2400 MILLIGRAM(S): 2400 POWDER, FOR SUSPENSION ORAL at 17:09

## 2017-08-19 RX ADMIN — Medication 100 MILLIGRAM(S): at 14:52

## 2017-08-19 RX ADMIN — OXYCODONE AND ACETAMINOPHEN 1 TABLET(S): 5; 325 TABLET ORAL at 18:43

## 2017-08-19 RX ADMIN — OXYCODONE AND ACETAMINOPHEN 1 TABLET(S): 5; 325 TABLET ORAL at 23:16

## 2017-08-19 RX ADMIN — Medication 100 MILLIGRAM(S): at 05:53

## 2017-08-19 RX ADMIN — Medication 125 MILLIGRAM(S): at 23:16

## 2017-08-19 NOTE — PROGRESS NOTE ADULT - ASSESSMENT
61 year old male with a history of renal transplant (failed), hypothyroid, CM, CAD, HTN, PVD, PCKD, ESRD on HD M/W/F here with LE pain, cellulitis.    ESRD  -HD TIW, M/W/F  -K protocol  -UF as tolerated  -Stop arb for now with soft bp    Anemia  -SACHIN protocol  -Coumadin per primary team    Cellulitis  -IV abx, ID  -ecchymosis to leg? ID follow up, medicine follow up    d/c with wife  d/c with RN

## 2017-08-19 NOTE — PROGRESS NOTE ADULT - SUBJECTIVE AND OBJECTIVE BOX
Patient is a 61y Male who reports no complaints overnight. No chest pain or SOB. States hematoma to leg after getting up this AM and putting weight on leg. No pain at all, that is much improved    REVIEW OF SYSTEMS:    CONSTITUTIONAL: No weakness, fevers or chills  RESPIRATORY: No cough, wheezing, hemoptysis; No shortness of breath  CARDIOVASCULAR: No chest pain or palpitations  GENITOURINARY: No dysuria, frequency or hematuria  All other review of systems is negative unless indicated above.    MEDICATIONS  (STANDING):  predniSONE   Tablet 5 milliGRAM(s) Oral daily  amiodarone    Tablet 100 milliGRAM(s) Oral daily  losartan 25 milliGRAM(s) Oral daily  carvedilol 6.25 milliGRAM(s) Oral at bedtime  cinacalcet 30 milliGRAM(s) Oral <User Schedule>  sevelamer hydrochloride 2400 milliGRAM(s) Oral three times a day with meals  carvedilol 3.125 milliGRAM(s) Oral daily  docusate sodium 100 milliGRAM(s) Oral three times a day  vancomycin    Solution 125 milliGRAM(s) Oral every 6 hours  cefTAZidime  IVPB 2 Gram(s) IV Intermittent <User Schedule>  vancomycin  IVPB 500 milliGRAM(s) IV Intermittent <User Schedule>  doxercalciferol Injectable 1 MICROGram(s) IV Push <User Schedule>  epoetin amee Injectable 69670 Unit(s) IV Push <User Schedule>    MEDICATIONS  (PRN):  acetaminophen   Tablet. 325 milliGRAM(s) Oral every 4 hours PRN Mild Pain (1 - 3)  ondansetron Injectable 4 milliGRAM(s) IV Push every 6 hours PRN Nausea  oxyCODONE    5 mG/acetaminophen 325 mG 1 Tablet(s) Oral every 4 hours PRN Moderate Pain (4 - 6)  HYDROmorphone  Injectable 0.5 milliGRAM(s) IV Push every 6 hours PRN Severe Pain (7 - 10)        T(C): , Max: 37.5 (08-19-17 @ 05:40)  T(F): , Max: 99.5 (08-19-17 @ 05:40)  HR: 77 (08-19-17 @ 13:25)  BP: 87/50 (08-19-17 @ 13:25)  BP(mean): --  RR: 18 (08-19-17 @ 13:25)  SpO2: 90% (08-19-17 @ 13:25)  Wt(kg): --    08-18 @ 07:01  -  08-19 @ 07:00  --------------------------------------------------------  IN: 0 mL / OUT: 250 mL / NET: -250 mL          PHYSICAL EXAM:    Constitutional: NAD  HEENT: PERRLA, EOMI,  MMM  Neck: No LAD, No JVD  Respiratory: CTAB  Cardiovascular: S1 and S2, RRR  Gastrointestinal: BS+, soft, NT/ND  Extremities: No peripheral edema. RLE ecchymosis to leg  Neurological: A/O x 3, no focal deficits  Psychiatric: Normal mood, normal affect  : No Amaya  Skin: No rashes  Access: AVF + thrill        LABS:                        9.1    9.2   )-----------( 107      ( 19 Aug 2017 12:18 )             28.7     19 Aug 2017 06:27    139    |  98     |  37     ----------------------------<  97     4.0     |  31     |  4.22   18 Aug 2017 14:00    137    |  100    |  61     ----------------------------<  134    4.8     |  26     |  5.51   17 Aug 2017 17:27    138    |  101    |  48     ----------------------------<  133    4.3     |  26     |  4.75     Ca    8.7        19 Aug 2017 06:27  Ca    9.5        18 Aug 2017 14:00  Ca    9.6        17 Aug 2017 17:27  Phos  4.6       18 Aug 2017 14:00    TPro  6.3    /  Alb  3.0    /  TBili  0.7    /  DBili  x      /  AST  9      /  ALT  21     /  AlkPhos  88     18 Aug 2017 14:00  TPro  7.2    /  Alb  3.5    /  TBili  0.8    /  DBili  x      /  AST  21     /  ALT  27     /  AlkPhos  94     17 Aug 2017 17:27          Urine Studies:          RADIOLOGY & ADDITIONAL STUDIES:

## 2017-08-19 NOTE — PROGRESS NOTE ADULT - PROBLEM SELECTOR PLAN 1
- coumadin held due to increased bleeding at site of cellulitis  - INR 4.4, supratherapeutic  - ESR 80, repeat in 2-3 days  - Given Vanc and Ceftriaxone in ED  - ID: no MRI due to PM in place; indium scan may not be helpful at this time since he had the ankle ulcer debrided with bone biopsy several days ago  -start vancomycin 500 mg IV qHD and ceftazidime 2 gm IV q HD  -add vancomycin 125 mg PO q6h for CDAD prophylaxis  -consider plastic evaluation for right ankle ulcer  - f/u wound Care Consult  - Continue with Broad-Spectrum Antibiotics; Renally dosed  - Cultures- no growth  - Pain meds w anti-nausea as well  - Pain meds prn

## 2017-08-19 NOTE — PROGRESS NOTE ADULT - SUBJECTIVE AND OBJECTIVE BOX
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       . Initially wound was a scab with surrounding erythema. Patient follows with Dr. Quintero (Wound Care) at [facility] and underwent a wound debridement on 08/15/17. Wound culture and bone culture were obtained at the time of procedure. Patient was not treated with antibiotics and wife reports that prelim culture is NGTD.         Post-procedure patient, reports extension of the erythema up the lower extremity and down the foot, increasing pain, and swelling of the right ankle. Patient contacted ID who ordered a CT of the lower extremities and Lower Extremity Dopplers. Currently denies any fever, chill, night sweats. Came to [facility] for further evaluation.    SUBJECTIVE: Patient seen and examined this AM. Cellulitis has worsened. Patient states he went to the bathroom and upon returning to bed he noticed his right leg was more erythematous. However, vitals remain within normal limits and he is hemodynamically stable.    REVIEW OF SYSTEMS:    CONSTITUTIONAL: No weakness, fevers or chills  EYES/ENT: No visual changes;  No vertigo or throat pain   NECK: No pain or stiffness  RESPIRATORY: No cough, wheezing, hemoptysis; No shortness of breath  CARDIOVASCULAR: No chest pain or palpitations  GASTROINTESTINAL: No abdominal or epigastric pain. No nausea, vomiting, or hematemesis; No diarrhea or constipation. No melena or hematochezia.  GENITOURINARY: No dysuria, frequency or hematuria  NEUROLOGICAL: No numbness or weakness  SKIN: No itching, burning, rashes, or lesions   All other review of systems is negative unless indicated above    Vital Signs Last 24 Hrs  T(C): 37.1 (18 Aug 2017 17:54), Max: 37.1 (18 Aug 2017 10:51)  T(F): 98.7 (18 Aug 2017 17:54), Max: 98.8 (18 Aug 2017 10:51)  HR: 92 (18 Aug 2017 17:54) (64 - 106)  BP: 118/60 (18 Aug 2017 17:54) (107/56 - 139/62)  BP(mean): --  RR: 17 (18 Aug 2017 17:54) (15 - 18)  SpO2: 100% (18 Aug 2017 17:54) (96% - 100%)    I&O's Summary    17 Aug 2017 07:01  -  18 Aug 2017 07:00  --------------------------------------------------------  IN: 0 mL / OUT: 300 mL / NET: -300 mL    18 Aug 2017 07:01  -  18 Aug 2017 18:46  --------------------------------------------------------  IN: 0 mL / OUT: 250 mL / NET: -250 mL        CAPILLARY BLOOD GLUCOSE          PHYSICAL EXAM:    Constitutional: NAD, awake and alert, well-developed  HEENT: PERR, EOMI, Normal Hearing, MMM  Neck: Soft and supple, No LAD, No JVD  Respiratory: Breath sounds are clear bilaterally, No wheezing, rales or rhonchi  Cardiovascular: S1 and S2, regular rate and rhythm, no Murmurs, gallops or rubs  Gastrointestinal: Bowel Sounds present, soft, nontender, nondistended, no guarding, no rebound  Extremities: No peripheral edema  Vascular: 2+ peripheral pulses  Neurological: A/O x 3, no focal deficits  Musculoskeletal: 5/5 strength b/l upper and lower extremities  Skin: Ulcer on the right lateral ankle with hyperpigmentation around the wound, increased erythema and warmth CHCF up the right leg    MEDICATIONS:  MEDICATIONS  (STANDING):  predniSONE   Tablet 5 milliGRAM(s) Oral daily  amiodarone    Tablet 100 milliGRAM(s) Oral daily  losartan 25 milliGRAM(s) Oral daily  carvedilol 6.25 milliGRAM(s) Oral at bedtime  cinacalcet 30 milliGRAM(s) Oral <User Schedule>  sevelamer hydrochloride 2400 milliGRAM(s) Oral three times a day with meals  carvedilol 3.125 milliGRAM(s) Oral daily  docusate sodium 100 milliGRAM(s) Oral three times a day  warfarin 2.5 milliGRAM(s) Oral daily  vancomycin    Solution 125 milliGRAM(s) Oral every 6 hours  cefTAZidime  IVPB 2 Gram(s) IV Intermittent <User Schedule>  vancomycin  IVPB 500 milliGRAM(s) IV Intermittent <User Schedule>  doxercalciferol Injectable 1 MICROGram(s) IV Push <User Schedule>  epoetin amee Injectable [id number] Unit(s) IV Push <User Schedule>      LABS: All Labs Reviewed:                        9.5    9.1   )-----------( 119      ( 18 Aug 2017 14:00 )             29.5     08-18    137  |  100  |  61<H>  ----------------------------<  134<H>  4.8   |  26  |  5.51<H>    Ca    9.5      18 Aug 2017 14:00  Phos  4.6     08-18    TPro  6.3  /  Alb  3.0<L>  /  TBili  0.7  /  DBili  x   /  AST  9<L>  /  ALT  21  /  AlkPhos  88  08-18    PT/INR - ( 18 Aug 2017 14:00 )   PT: 47.8 sec;   INR: 4.30 ratio               Blood Culture: no growth    RADIOLOGY/EKG: [facility]    DVT PPX: coumadin held    ADVANCED DIRECTIVE:    DISPOSITION:

## 2017-08-20 LAB
ANION GAP SERPL CALC-SCNC: 10 MMOL/L — SIGNIFICANT CHANGE UP (ref 5–17)
APTT BLD: 41.4 SEC — HIGH (ref 27.5–37.4)
BUN SERPL-MCNC: 53 MG/DL — HIGH (ref 7–23)
CALCIUM SERPL-MCNC: 9.2 MG/DL — SIGNIFICANT CHANGE UP (ref 8.5–10.1)
CHLORIDE SERPL-SCNC: 98 MMOL/L — SIGNIFICANT CHANGE UP (ref 96–108)
CO2 SERPL-SCNC: 28 MMOL/L — SIGNIFICANT CHANGE UP (ref 22–31)
CREAT SERPL-MCNC: 5.82 MG/DL — HIGH (ref 0.5–1.3)
GLUCOSE SERPL-MCNC: 102 MG/DL — HIGH (ref 70–99)
HCT VFR BLD CALC: 28.5 % — LOW (ref 39–50)
HGB BLD-MCNC: 9.4 G/DL — LOW (ref 13–17)
INR BLD: 5.77 RATIO — CRITICAL HIGH (ref 0.88–1.16)
MCHC RBC-ENTMCNC: 33.1 GM/DL — SIGNIFICANT CHANGE UP (ref 32–36)
MCHC RBC-ENTMCNC: 33.1 PG — SIGNIFICANT CHANGE UP (ref 27–34)
MCV RBC AUTO: 99.8 FL — SIGNIFICANT CHANGE UP (ref 80–100)
PLATELET # BLD AUTO: 100 K/UL — LOW (ref 150–400)
POTASSIUM SERPL-MCNC: 4.2 MMOL/L — SIGNIFICANT CHANGE UP (ref 3.5–5.3)
POTASSIUM SERPL-SCNC: 4.2 MMOL/L — SIGNIFICANT CHANGE UP (ref 3.5–5.3)
PROTHROM AB SERPL-ACNC: 64.6 SEC — HIGH (ref 9.8–12.7)
RBC # BLD: 2.86 M/UL — LOW (ref 4.2–5.8)
RBC # FLD: 14.4 % — SIGNIFICANT CHANGE UP (ref 10.3–14.5)
SODIUM SERPL-SCNC: 136 MMOL/L — SIGNIFICANT CHANGE UP (ref 135–145)
WBC # BLD: 8.2 K/UL — SIGNIFICANT CHANGE UP (ref 3.8–10.5)
WBC # FLD AUTO: 8.2 K/UL — SIGNIFICANT CHANGE UP (ref 3.8–10.5)

## 2017-08-20 RX ADMIN — Medication 125 MILLIGRAM(S): at 17:38

## 2017-08-20 RX ADMIN — Medication 5 MILLIGRAM(S): at 06:24

## 2017-08-20 RX ADMIN — Medication 100 MILLIGRAM(S): at 06:23

## 2017-08-20 RX ADMIN — Medication 125 MILLIGRAM(S): at 12:03

## 2017-08-20 RX ADMIN — Medication 100 MILLIGRAM(S): at 13:53

## 2017-08-20 RX ADMIN — OXYCODONE AND ACETAMINOPHEN 1 TABLET(S): 5; 325 TABLET ORAL at 23:51

## 2017-08-20 RX ADMIN — OXYCODONE AND ACETAMINOPHEN 1 TABLET(S): 5; 325 TABLET ORAL at 08:56

## 2017-08-20 RX ADMIN — CARVEDILOL PHOSPHATE 6.25 MILLIGRAM(S): 80 CAPSULE, EXTENDED RELEASE ORAL at 21:57

## 2017-08-20 RX ADMIN — OXYCODONE AND ACETAMINOPHEN 1 TABLET(S): 5; 325 TABLET ORAL at 21:57

## 2017-08-20 RX ADMIN — Medication 100 MILLIGRAM(S): at 21:57

## 2017-08-20 RX ADMIN — CARVEDILOL PHOSPHATE 3.12 MILLIGRAM(S): 80 CAPSULE, EXTENDED RELEASE ORAL at 06:23

## 2017-08-20 RX ADMIN — SEVELAMER CARBONATE 2400 MILLIGRAM(S): 2400 POWDER, FOR SUSPENSION ORAL at 08:56

## 2017-08-20 RX ADMIN — Medication 125 MILLIGRAM(S): at 06:21

## 2017-08-20 RX ADMIN — SEVELAMER CARBONATE 2400 MILLIGRAM(S): 2400 POWDER, FOR SUSPENSION ORAL at 17:38

## 2017-08-20 RX ADMIN — OXYCODONE AND ACETAMINOPHEN 1 TABLET(S): 5; 325 TABLET ORAL at 01:00

## 2017-08-20 RX ADMIN — OXYCODONE AND ACETAMINOPHEN 1 TABLET(S): 5; 325 TABLET ORAL at 14:35

## 2017-08-20 RX ADMIN — AMIODARONE HYDROCHLORIDE 100 MILLIGRAM(S): 400 TABLET ORAL at 06:22

## 2017-08-20 RX ADMIN — Medication 125 MILLIGRAM(S): at 23:53

## 2017-08-20 RX ADMIN — SEVELAMER CARBONATE 2400 MILLIGRAM(S): 2400 POWDER, FOR SUSPENSION ORAL at 12:03

## 2017-08-20 NOTE — PROGRESS NOTE ADULT - PROBLEM SELECTOR PLAN 1
- coumadin held due to increased bleeding at site of cellulitis  - INR 4.4-->5.77, supratherapeutic  - ESR 80, repeat tomorrow   - Given Vanc and Ceftriaxone in ED  - ID: no MRI due to PM in place; indium scan may not be helpful at this time since he had the ankle ulcer debrided with bone biopsy several days ago  -start vancomycin 500 mg IV qHD and ceftazidime 2 gm IV q HD  -add vancomycin 125 mg PO q6h for CDAD prophylaxis  -consider plastic evaluation for right ankle ulcer  - f/u wound Care Consult  - Continue with Broad-Spectrum Antibiotics; Renally dosed  - Cultures- no growth  - Pain meds w anti-nausea as well  - Pain meds prn

## 2017-08-20 NOTE — PROGRESS NOTE ADULT - ATTENDING COMMENTS
Patient seen and examined with Lisbet Carlton and Petty Heard on the Family Medicine Teaching Service.  Agree with history, physical, labs and plan which were reviewed in detail.
Patient seen and examined with Lisbet Riojas and Edward Kayserian on the Family Medicine Teaching Service.  Agree with history, physical, labs and plan which were reviewed in detail.
Patient seen and examined with Lisbet Carlton, Carter Riojas, Petty Heard, Edward Kayserian and Singh Walls on the Family Medicine Teaching Service.  Agree with history, physical, labs and plan which were reviewed in detail.

## 2017-08-20 NOTE — PROGRESS NOTE ADULT - SUBJECTIVE AND OBJECTIVE BOX
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       . Initially wound was a scab with surrounding erythema. Patient follows with Dr. Quintero (Wound Care) at [facility] and underwent a wound debridement on 08/15/17. Wound culture and bone culture were obtained at the time of procedure. Patient was not treated with antibiotics and wife reports that prelim culture is NGTD.         Post-procedure patient, reports extension of the erythema up the lower extremity and down the foot, increasing pain, and swelling of the right ankle. Patient contacted ID who ordered a CT of the lower extremities and Lower Extremity Dopplers. Currently denies any fever, chill, night sweats. Came to [facility] for further evaluation.    SUBJECTIVE: Patient seen and examined this AM. Cellulitis has worsened. Patient states he went to the bathroom and upon returning to bed he noticed his right leg was more erythematous. However, vitals remain within normal limits and he is hemodynamically stable.    8/20 Pt was seen and examed at bedside, states the leg feels better. No more progression of LLE cellulitis, stable. Ecchymosis of LLE stable. Denies of any fever, chills, CP, SOB, palpitation. No overnight event.      REVIEW OF SYSTEMS:    CONSTITUTIONAL: No weakness, fevers or chills  EYES/ENT: No visual changes;  No vertigo or throat pain   NECK: No pain or stiffness  RESPIRATORY: No cough, wheezing, hemoptysis; No shortness of breath  CARDIOVASCULAR: No chest pain or palpitations  GASTROINTESTINAL: No abdominal or epigastric pain. No nausea, vomiting, or hematemesis; No diarrhea or constipation. No melena or hematochezia.  GENITOURINARY: No dysuria, frequency or hematuria  NEUROLOGICAL: No numbness or weakness  SKIN: No itching, burning, rashes, or lesions   All other review of systems is negative unless indicated above        PHYSICAL EXAM:  Vital Signs Last 24 Hrs  T(C): 36.8 (20 Aug 2017 11:27), Max: 37.4 (19 Aug 2017 20:53)  T(F): 98.2 (20 Aug 2017 11:27), Max: 99.4 (19 Aug 2017 20:53)  HR: 63 (20 Aug 2017 11:27) (63 - 76)  BP: 98/46 (20 Aug 2017 11:27) (98/46 - 111/62)  BP(mean): --  RR: 16 (20 Aug 2017 11:27) (16 - 17)  SpO2: 96% (20 Aug 2017 11:27) (96% - 99%)    Constitutional: NAD, awake and alert, well-developed  HEENT: PERR, EOMI, Normal Hearing, MMM  Neck: Soft and supple, No LAD, No JVD  Respiratory: Breath sounds are clear bilaterally, No wheezing, rales or rhonchi  Cardiovascular: S1 and S2, regular rate and rhythm, no Murmurs, gallops or rubs  Gastrointestinal: Bowel Sounds present, soft, nontender, nondistended, no guarding, no rebound  Extremities: No peripheral edema  Vascular: 2+ peripheral pulses  Neurological: A/O x 3, no focal deficits  Musculoskeletal: 5/5 strength b/l upper and lower extremities  Skin: Ulcer on the right lateral ankle with hyperpigmentation around the wound, increased erythema and warmth alf up the right leg    MEDICATIONS:  MEDICATIONS  (STANDING):  predniSONE   Tablet 5 milliGRAM(s) Oral daily  amiodarone    Tablet 100 milliGRAM(s) Oral daily  losartan 25 milliGRAM(s) Oral daily  carvedilol 6.25 milliGRAM(s) Oral at bedtime  cinacalcet 30 milliGRAM(s) Oral <User Schedule>  sevelamer hydrochloride 2400 milliGRAM(s) Oral three times a day with meals  carvedilol 3.125 milliGRAM(s) Oral daily  docusate sodium 100 milliGRAM(s) Oral three times a day  warfarin 2.5 milliGRAM(s) Oral daily  vancomycin    Solution 125 milliGRAM(s) Oral every 6 hours  cefTAZidime  IVPB 2 Gram(s) IV Intermittent <User Schedule>  vancomycin  IVPB 500 milliGRAM(s) IV Intermittent <User Schedule>  doxercalciferol Injectable 1 MICROGram(s) IV Push <User Schedule>  epoetin amee Injectable 55925 Unit(s) IV Push <User Schedule>      LABS: All Labs Reviewed:  Lab Results:                                            9.4                   Neurophils% (auto):   x      (08-20 @ 07:35):    8.2  )-----------(100          Lymphocytes% (auto):  x                                             28.5                   Eosinphils% (auto):   x        Manual%: Neutrophils x    ; Lymphocytes x    ; Eosinophils x    ; Bands%: x    ; Blasts x         Differential:	[] Automated		[] Manual    08-20    136  |  98  |  53<H>  ----------------------------<  102<H>  4.2   |  28  |  5.82<H>    Ca    9.2      20 Aug 2017 07:35      PT/INR - ( 20 Aug 2017 07:35 )   PT: 64.6 sec;   INR: 5.77 ratio         PTT - ( 20 Aug 2017 07:35 )  PTT:41.4 sec                   Blood Culture: no growth    RADIOLOGY/EKG: pt had radiology done outpatient- uploaded to [place] PACS    DVT PPX: coumadin held    ADVANCED DIRECTIVE:    DISPOSITION:

## 2017-08-21 LAB
ADD ON TEST-SPECIMEN IN LAB: SIGNIFICANT CHANGE UP
ANION GAP SERPL CALC-SCNC: 10 MMOL/L — SIGNIFICANT CHANGE UP (ref 5–17)
APTT BLD: 41.7 SEC — HIGH (ref 27.5–37.4)
APTT BLD: 46.6 SEC — HIGH (ref 27.5–37.4)
BUN SERPL-MCNC: 66 MG/DL — HIGH (ref 7–23)
CALCIUM SERPL-MCNC: 9 MG/DL — SIGNIFICANT CHANGE UP (ref 8.5–10.1)
CHLORIDE SERPL-SCNC: 100 MMOL/L — SIGNIFICANT CHANGE UP (ref 96–108)
CO2 SERPL-SCNC: 24 MMOL/L — SIGNIFICANT CHANGE UP (ref 22–31)
CREAT SERPL-MCNC: 6.72 MG/DL — HIGH (ref 0.5–1.3)
ERYTHROCYTE [SEDIMENTATION RATE] IN BLOOD: 91 MM/HR — HIGH (ref 0–20)
GLUCOSE SERPL-MCNC: 113 MG/DL — HIGH (ref 70–99)
HCT VFR BLD CALC: 28 % — LOW (ref 39–50)
HGB BLD-MCNC: 9.3 G/DL — LOW (ref 13–17)
INR BLD: 3.73 RATIO — HIGH (ref 0.88–1.16)
INR BLD: 7.77 RATIO — CRITICAL HIGH (ref 0.88–1.16)
MCHC RBC-ENTMCNC: 31.9 PG — SIGNIFICANT CHANGE UP (ref 27–34)
MCHC RBC-ENTMCNC: 33.1 GM/DL — SIGNIFICANT CHANGE UP (ref 32–36)
MCV RBC AUTO: 96.2 FL — SIGNIFICANT CHANGE UP (ref 80–100)
PLATELET # BLD AUTO: 110 K/UL — LOW (ref 150–400)
POTASSIUM SERPL-MCNC: 4.9 MMOL/L — SIGNIFICANT CHANGE UP (ref 3.5–5.3)
POTASSIUM SERPL-SCNC: 4.9 MMOL/L — SIGNIFICANT CHANGE UP (ref 3.5–5.3)
PROTHROM AB SERPL-ACNC: 41.4 SEC — HIGH (ref 9.8–12.7)
PROTHROM AB SERPL-ACNC: 87.5 SEC — HIGH (ref 9.8–12.7)
RBC # BLD: 2.91 M/UL — LOW (ref 4.2–5.8)
RBC # FLD: 14.6 % — HIGH (ref 10.3–14.5)
SODIUM SERPL-SCNC: 134 MMOL/L — LOW (ref 135–145)
VANCOMYCIN TROUGH SERPL-MCNC: 7.1 UG/ML — LOW (ref 10–20)
WBC # BLD: 8.6 K/UL — SIGNIFICANT CHANGE UP (ref 3.8–10.5)
WBC # FLD AUTO: 8.6 K/UL — SIGNIFICANT CHANGE UP (ref 3.8–10.5)

## 2017-08-21 RX ORDER — PHYTONADIONE (VIT K1) 5 MG
5 TABLET ORAL ONCE
Qty: 0 | Refills: 0 | Status: COMPLETED | OUTPATIENT
Start: 2017-08-21 | End: 2017-08-21

## 2017-08-21 RX ADMIN — OXYCODONE AND ACETAMINOPHEN 1 TABLET(S): 5; 325 TABLET ORAL at 22:01

## 2017-08-21 RX ADMIN — Medication 125 MILLIGRAM(S): at 20:28

## 2017-08-21 RX ADMIN — Medication 100 MILLIGRAM(S): at 22:01

## 2017-08-21 RX ADMIN — Medication 100 MILLIGRAM(S): at 06:26

## 2017-08-21 RX ADMIN — ERYTHROPOIETIN 10000 UNIT(S): 10000 INJECTION, SOLUTION INTRAVENOUS; SUBCUTANEOUS at 18:16

## 2017-08-21 RX ADMIN — CARVEDILOL PHOSPHATE 6.25 MILLIGRAM(S): 80 CAPSULE, EXTENDED RELEASE ORAL at 22:02

## 2017-08-21 RX ADMIN — SEVELAMER CARBONATE 2400 MILLIGRAM(S): 2400 POWDER, FOR SUSPENSION ORAL at 07:50

## 2017-08-21 RX ADMIN — OXYCODONE AND ACETAMINOPHEN 1 TABLET(S): 5; 325 TABLET ORAL at 13:00

## 2017-08-21 RX ADMIN — DOXERCALCIFEROL 1 MICROGRAM(S): 2.5 CAPSULE ORAL at 18:15

## 2017-08-21 RX ADMIN — Medication 125 MILLIGRAM(S): at 11:30

## 2017-08-21 RX ADMIN — Medication 125 MILLIGRAM(S): at 06:25

## 2017-08-21 RX ADMIN — Medication 5 MILLIGRAM(S): at 06:25

## 2017-08-21 RX ADMIN — SEVELAMER CARBONATE 2400 MILLIGRAM(S): 2400 POWDER, FOR SUSPENSION ORAL at 13:53

## 2017-08-21 RX ADMIN — OXYCODONE AND ACETAMINOPHEN 1 TABLET(S): 5; 325 TABLET ORAL at 06:30

## 2017-08-21 RX ADMIN — Medication 100 MILLIGRAM(S): at 13:54

## 2017-08-21 RX ADMIN — Medication 5 MILLIGRAM(S): at 10:49

## 2017-08-21 RX ADMIN — AMIODARONE HYDROCHLORIDE 100 MILLIGRAM(S): 400 TABLET ORAL at 06:25

## 2017-08-21 RX ADMIN — OXYCODONE AND ACETAMINOPHEN 1 TABLET(S): 5; 325 TABLET ORAL at 22:31

## 2017-08-21 RX ADMIN — Medication 100 MILLIGRAM(S): at 18:11

## 2017-08-21 RX ADMIN — SEVELAMER CARBONATE 2400 MILLIGRAM(S): 2400 POWDER, FOR SUSPENSION ORAL at 20:25

## 2017-08-21 NOTE — PROGRESS NOTE ADULT - SUBJECTIVE AND OBJECTIVE BOX
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), Hx of CDiff, Hx of SVT, presenting with Right Lateral Ankle Pain, Swelling, Redness x 3 months that dramatically increased over past 24-48 hours prior to admission     PHYSICAL EXAM:  Vital Signs Last 24 Hrs  T(C): 36.4 (21 Aug 2017 15:39), Max: 37.1 (21 Aug 2017 13:15)  T(F): 97.6 (21 Aug 2017 15:39), Max: 98.8 (21 Aug 2017 14:38)  HR: 64 (21 Aug 2017 16:06) (63 - 74)  BP: 105/58 (21 Aug 2017 16:06) (105/56 - 123/65)  BP(mean): --  RR: 17 (21 Aug 2017 16:06) (16 - 17)  SpO2: 99% (21 Aug 2017 15:16) (96% - 99%)  general- comfortable   -rs-aeeb,cta  -cvs-s1s2 normal   -p/a-soft,bs+  -extremity- no asymmetrical swelling noted, ulcer and redness noted on leg   -cns- non focal         A/P    #cellulitis- abx, check doppler study- arterial doppler     #dvt pr

## 2017-08-21 NOTE — PROGRESS NOTE ADULT - SUBJECTIVE AND OBJECTIVE BOX
Patient is a 61y Male who reports no complaints overnight.  seen at HD     required FFP due to INR 7 this AM     no sob or cp     SBP ~ 116    Uf goal  ~ 2 Liters     REVIEW OF SYSTEMS:    CONSTITUTIONAL: No weakness, fevers or chills  RESPIRATORY: No cough, wheezing, hemoptysis; No shortness of breath  CARDIOVASCULAR: No chest pain or palpitations  GENITOURINARY: No dysuria, frequency or hematuria  All other review of systems is negative unless indicated above.    MEDICATIONS  (STANDING):  predniSONE   Tablet 5 milliGRAM(s) Oral daily  amiodarone    Tablet 100 milliGRAM(s) Oral daily  carvedilol 6.25 milliGRAM(s) Oral at bedtime  cinacalcet 30 milliGRAM(s) Oral <User Schedule>  sevelamer hydrochloride 2400 milliGRAM(s) Oral three times a day with meals  carvedilol 3.125 milliGRAM(s) Oral daily  docusate sodium 100 milliGRAM(s) Oral three times a day  vancomycin    Solution 125 milliGRAM(s) Oral every 6 hours  cefTAZidime  IVPB 2 Gram(s) IV Intermittent <User Schedule>  vancomycin  IVPB 500 milliGRAM(s) IV Intermittent <User Schedule>  doxercalciferol Injectable 1 MICROGram(s) IV Push <User Schedule>  epoetin amee Injectable 42271 Unit(s) IV Push <User Schedule>      ICU Vital Signs Last 24 Hrs  T(C): 36.4 (21 Aug 2017 15:39), Max: 37.1 (21 Aug 2017 13:15)  T(F): 97.6 (21 Aug 2017 15:39), Max: 98.8 (21 Aug 2017 14:38)  HR: 64 (21 Aug 2017 18:10) (57 - 74)  BP: 120/55 (21 Aug 2017 18:10) (105/56 - 123/65)  BP(mean): --  ABP: --  ABP(mean): --  RR: 17 (21 Aug 2017 18:10) (16 - 17)  SpO2: 99% (21 Aug 2017 15:16) (96% - 99%)            PHYSICAL EXAM:    Constitutional: NAD  HEENT: PERRLA, EOMI,  MMM  Neck: No LAD, No JVD  Respiratory: CTAB  Cardiovascular: S1 and S2, RRR  Gastrointestinal: BS+, soft, NT/ND  Extremities: No peripheral edema. RLE ecchymosis to leg w petechia  Neurological: A/O x 3, no focal deficits  Psychiatric: Normal mood, normal affect  : No Amaya  Skin: No rashes  Access: AVF + thrill        LABS:                                      9.3    8.6   )-----------( 110      ( 21 Aug 2017 05:22 )             28.0                         9.4    8.2   )-----------( 100      ( 20 Aug 2017 07:35 )             28.5     134    |  100    |  66     ----------------------------<  113       21 Aug 2017 05:22  4.9     |  24     |  6.72     136    |  98     |  53     ----------------------------<  102       20 Aug 2017 07:35  4.2     |  28     |  5.82     139    |  98     |  37     ----------------------------<  97        19 Aug 2017 06:27  4.0     |  31     |  4.22     Ca    9.0        21 Aug 2017 05:22  Ca    9.2        20 Aug 2017 07:35    Phos  5.0       21 Aug 2017 05:22  Phos  4.6       18 Aug 2017 14:00    Prothrombin Time and INR, Plasma in AM (08.21.17 @ 15:30)    Prothrombin Time, Plasma: 41.4: Effective March 21st, the reference range for PT has changed. sec    INR: 3.73 ratio    Prothrombin Time and INR, Plasma in AM (08.21.17 @ 05:22)    Prothrombin Time, Plasma: 87.5: Effective March 21st, the reference range for PT has changed. sec    INR: 7.77: Test Name:INR  Called by:Eve  Called to:[email or web address] RN  Read back 2 Pt IDs:y Read back values:y Date/Tm:08/21/17 06:58 ratio        Urine Studies:          RADIOLOGY & ADDITIONAL STUDIES:

## 2017-08-21 NOTE — PROGRESS NOTE ADULT - ASSESSMENT
61 year old male with a history of renal transplant (failed), hypothyroid, CM, CAD, HTN, PVD, PCKD, ESRD on HD M/W/F here with LE pain, cellulitis.    ESRD  -completed HD TIW, M/W/F  -K protocol  -UF as tolerated  -holding ARB    Anemia  -SACHIN protocol    Coumadin Coagulopathy   -INR elevated - given FFP per primary team - monitor     Cellulitis  -IV abx, ID    d/w medical resident

## 2017-08-21 NOTE — PROGRESS NOTE ADULT - SUBJECTIVE AND OBJECTIVE BOX
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 Initially wound was a scab with surrounding erythema. Patient follows with Dr. Quintero (Wound Care) at [facility] and underwent a wound debridement on 08/15/17. Wound culture and bone culture were obtained at the time of procedure. Patient was not treated with antibiotics and wife reports that prelim culture is NGTD.  Post-procedure patient, reports extension of the erythema up the lower extremity and down the foot, increasing pain, and swelling of the right ankle. Came to [facility] for further evaluation.    Events noted: INR high  No fever or chills    MEDICATIONS  (STANDING):  predniSONE   Tablet 5 milliGRAM(s) Oral daily  amiodarone    Tablet 100 milliGRAM(s) Oral daily  carvedilol 6.25 milliGRAM(s) Oral at bedtime  cinacalcet 30 milliGRAM(s) Oral <User Schedule>  sevelamer hydrochloride 2400 milliGRAM(s) Oral three times a day with meals  carvedilol 3.125 milliGRAM(s) Oral daily  docusate sodium 100 milliGRAM(s) Oral three times a day  vancomycin    Solution 125 milliGRAM(s) Oral every 6 hours  cefTAZidime  IVPB 2 Gram(s) IV Intermittent <User Schedule>  vancomycin  IVPB 500 milliGRAM(s) IV Intermittent <User Schedule>  doxercalciferol Injectable 1 MICROGram(s) IV Push <User Schedule>  epoetin amee Injectable 39707 Unit(s) IV Push <User Schedule>    MEDICATIONS  (PRN):  acetaminophen   Tablet. 325 milliGRAM(s) Oral every 4 hours PRN Mild Pain (1 - 3)  ondansetron Injectable 4 milliGRAM(s) IV Push every 6 hours PRN Nausea  oxyCODONE    5 mG/acetaminophen 325 mG 1 Tablet(s) Oral every 4 hours PRN Moderate Pain (4 - 6)  HYDROmorphone  Injectable 0.5 milliGRAM(s) IV Push every 6 hours PRN Severe Pain (7 - 10)      Vital Signs Last 24 Hrs  T(C): 37.1 (21 Aug 2017 13:15), Max: 37.1 (21 Aug 2017 13:15)  T(F): 98.7 (21 Aug 2017 13:15), Max: 98.7 (21 Aug 2017 13:15)  HR: 71 (21 Aug 2017 13:15) (63 - 74)  BP: 105/56 (21 Aug 2017 13:15) (105/56 - 123/65)  BP(mean): --  RR: 16 (21 Aug 2017 13:15) (16 - 17)  SpO2: 98% (21 Aug 2017 11:45) (96% - 99%)    Physical Exam:    Constitutional: frail looking  HEENT: NC/AT, EOMI, PERRLA  Neck: supple  Back: no tenderness  Respiratory: clear  Cardiovascular: S1S2 regular, no murmurs  Abdomen: soft, not tender, not distended, positive BS  Genitourinary: deferred  Rectal: deferred  Musculoskeletal: no muscle tenderness, no joint swelling or tenderness  Extremities: right lateral ankle erythema, edema and warmth extending to right lateral foot an right lateral calf area; small ulcer with silver nitrate sponge in place.  Neurological: AxOx3, moving all extremities, no focal deficits  Skin: no rashes    Labs:                        11.1   10.0  )-----------( 116      ( 17 Aug 2017 17:27 )             34.6     08-17    138  |  101  |  48<H>  ----------------------------<  133<H>  4.3   |  26  |  4.75<H>    Ca    9.6      17 Aug 2017 17:27    TPro  7.2  /  Alb  3.5  /  TBili  0.8  /  DBili  x   /  AST  21  /  ALT  27  /  AlkPhos  94  08-17     LIVER FUNCTIONS - ( 17 Aug 2017 17:27 )  Alb: 3.5 g/dL / Pro: 7.2 gm/dL / ALK PHOS: 94 U/L / ALT: 27 U/L / AST: 21 U/L / GGT: x                 Radiology:    Advanced directives addressed: full resuscitation

## 2017-08-21 NOTE — PROGRESS NOTE ADULT - ASSESSMENT
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 Initially wound was a scab with surrounding erythema. Patient follows with Dr. Quintero (Wound Care) at [facility] and underwent a wound debridement on 08/15/17. Wound culture and bone culture were obtained at the time of procedure. Patient was not treated with antibiotics and wife reports that prelim culture is NGTD.  Post-procedure patient, reports extension of the erythema up the lower extremity and down the foot, increasing pain, and swelling of the right ankle. Came to [facility] for further evaluation.    1. Right lateral ankle ulcer s/p bone biopsy. Right ankle cellulitis. Possible underlying lateral ankle acute OM. h/o prior recurrent CDAD. ESRD on   -obtain BC x 2  -he cannot have an MRI duw to PM in place; indium scan may not be helpful at this time since he had the ankle ulcer debrided with bone biopsy several days ago  -on vancomycin 500 mg IV qHD and ceftazidime 2 gm IV q HD # 3  -on vancomycin 125 mg PO q6h for CDAD prophylaxis  -tolerating abx well so far; no side effects noted  -continue abx coverage  -local wound care  -monitor temps  -f/u CBC  -supportive care  2. Other issues:   -care per medicine

## 2017-08-21 NOTE — PROGRESS NOTE ADULT - SUBJECTIVE AND OBJECTIVE BOX
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       . Initially wound was a scab with surrounding erythema. Patient follows with Dr. Quintero (Wound Care) at [facility] and underwent a wound debridement on 08/15/17. Wound culture and bone culture were obtained at the time of procedure. Patient was not treated with antibiotics and wife reports that prelim culture is NGTD.         Post-procedure patient, reports extension of the erythema up the lower extremity and down the foot, increasing pain, and swelling of the right ankle. Patient contacted ID who ordered a CT of the lower extremities and Lower Extremity Dopplers. Currently denies any fever, chill, night sweats. Came to [facility] for further evaluation.    SUBJECTIVE: Patient seen and examined this AM. Cellulitis has worsened. Patient states he went to the bathroom and upon returning to bed he noticed his right leg was more erythematous. However, vitals remain within normal limits and he is hemodynamically stable.    8/21 Pt was seen and examed at bedside, states the leg feels better. No more progression of LLE cellulitis, stable. Ecchymosis of LLE stable. Denies of any fever, chills, CP, SOB, palpitation. No overnight event. Patient's INR climbed to 7.77 at which time HD refused him (due to high INR). Patient was reversed with FFP and vitamin K 5 mg PO.      REVIEW OF SYSTEMS:    CONSTITUTIONAL: No weakness, fevers or chills  EYES/ENT: No visual changes;  No vertigo or throat pain   NECK: No pain or stiffness  RESPIRATORY: No cough, wheezing, hemoptysis; No shortness of breath  CARDIOVASCULAR: No chest pain or palpitations  GASTROINTESTINAL: No abdominal or epigastric pain. No nausea, vomiting, or hematemesis; No diarrhea or constipation. No melena or hematochezia.  GENITOURINARY: No dysuria, frequency or hematuria  NEUROLOGICAL: No numbness or weakness  SKIN: No itching, burning, rashes, or lesions   All other review of systems is negative unless indicated above        PHYSICAL EXAM:  Vital Signs Last 24 Hrs  T(C): 36.8 (20 Aug 2017 11:27), Max: 37.4 (19 Aug 2017 20:53)  T(F): 98.2 (20 Aug 2017 11:27), Max: 99.4 (19 Aug 2017 20:53)  HR: 63 (20 Aug 2017 11:27) (63 - 76)  BP: 98/46 (20 Aug 2017 11:27) (98/46 - 111/62)  BP(mean): --  RR: 16 (20 Aug 2017 11:27) (16 - 17)  SpO2: 96% (20 Aug 2017 11:27) (96% - 99%)    Constitutional: NAD, awake and alert, well-developed  HEENT: PERR, EOMI, Normal Hearing, MMM  Neck: Soft and supple, No LAD, No JVD  Respiratory: Breath sounds are clear bilaterally, No wheezing, rales or rhonchi  Cardiovascular: S1 and S2, regular rate and rhythm, no Murmurs, gallops or rubs  Gastrointestinal: Bowel Sounds present, soft, nontender, nondistended, no guarding, no rebound  Extremities: No peripheral edema  Vascular: 2+ peripheral pulses  Neurological: A/O x 3, no focal deficits  Musculoskeletal: 5/5 strength b/l upper and lower extremities  Skin: Ulcer on the right lateral ankle with hyperpigmentation around the wound, increased erythema and warmth shelter up the right leg    MEDICATIONS:  MEDICATIONS  (STANDING):  predniSONE   Tablet 5 milliGRAM(s) Oral daily  amiodarone    Tablet 100 milliGRAM(s) Oral daily  losartan 25 milliGRAM(s) Oral daily  carvedilol 6.25 milliGRAM(s) Oral at bedtime  cinacalcet 30 milliGRAM(s) Oral <User Schedule>  sevelamer hydrochloride 2400 milliGRAM(s) Oral three times a day with meals  carvedilol 3.125 milliGRAM(s) Oral daily  docusate sodium 100 milliGRAM(s) Oral three times a day  warfarin 2.5 milliGRAM(s) Oral daily  vancomycin    Solution 125 milliGRAM(s) Oral every 6 hours  cefTAZidime  IVPB 2 Gram(s) IV Intermittent <User Schedule>  vancomycin  IVPB 500 milliGRAM(s) IV Intermittent <User Schedule>  doxercalciferol Injectable 1 MICROGram(s) IV Push <User Schedule>  epoetin amee Injectable [id number] Unit(s) IV Push <User Schedule>      LABS: All Labs Reviewed:  Lab Results:                                            9.4                   Neurophils% (auto):   x      (08-20 @ 07:35):    8.2  )-----------(100          Lymphocytes% (auto):  x                                             28.5                   Eosinphils% (auto):   x        Manual%: Neutrophils x    ; Lymphocytes x    ; Eosinophils x    ; Bands%: x    ; Blasts x         Differential:	[] Automated		[] Manual    08-20    136  |  98  |  53<H>  ----------------------------<  102<H>  4.2   |  28  |  5.82<H>    Ca    9.2      20 Aug 2017 07:35      PT/INR - ( 20 Aug 2017 07:35 )   PT: 64.6 sec;   INR: 5.77 ratio         PTT - ( 20 Aug 2017 07:35 )  PTT:41.4 sec                   Blood Culture: no growth    RADIOLOGY/EKG: pt had radiology done outpatient- uploaded to [place] PACS    DVT PPX: coumadin held    ADVANCED DIRECTIVE:    DISPOSITION:

## 2017-08-21 NOTE — PROVIDER CONTACT NOTE (OTHER) - ACTION/TREATMENT ORDERED:
dr fernando stated that vanco through can be drawn at 1600 during hemodialysis and give vanco on last hour of tx

## 2017-08-22 DIAGNOSIS — I82.409 ACUTE EMBOLISM AND THROMBOSIS OF UNSPECIFIED DEEP VEINS OF UNSPECIFIED LOWER EXTREMITY: ICD-10-CM

## 2017-08-22 LAB
ANION GAP SERPL CALC-SCNC: 8 MMOL/L — SIGNIFICANT CHANGE UP (ref 5–17)
APTT BLD: 31.9 SEC — SIGNIFICANT CHANGE UP (ref 27.5–37.4)
APTT BLD: 35.5 SEC — SIGNIFICANT CHANGE UP (ref 27.5–37.4)
BUN SERPL-MCNC: 37 MG/DL — HIGH (ref 7–23)
CALCIUM SERPL-MCNC: 9.3 MG/DL — SIGNIFICANT CHANGE UP (ref 8.5–10.1)
CHLORIDE SERPL-SCNC: 100 MMOL/L — SIGNIFICANT CHANGE UP (ref 96–108)
CO2 SERPL-SCNC: 30 MMOL/L — SIGNIFICANT CHANGE UP (ref 22–31)
CREAT SERPL-MCNC: 4.36 MG/DL — HIGH (ref 0.5–1.3)
GLUCOSE SERPL-MCNC: 127 MG/DL — HIGH (ref 70–99)
HCT VFR BLD CALC: 26.7 % — LOW (ref 39–50)
HCT VFR BLD CALC: 27.8 % — LOW (ref 39–50)
HGB BLD-MCNC: 8.9 G/DL — LOW (ref 13–17)
HGB BLD-MCNC: 9.2 G/DL — LOW (ref 13–17)
INR BLD: 1.84 RATIO — HIGH (ref 0.88–1.16)
MCHC RBC-ENTMCNC: 32.7 PG — SIGNIFICANT CHANGE UP (ref 27–34)
MCHC RBC-ENTMCNC: 32.8 PG — SIGNIFICANT CHANGE UP (ref 27–34)
MCHC RBC-ENTMCNC: 33.1 GM/DL — SIGNIFICANT CHANGE UP (ref 32–36)
MCHC RBC-ENTMCNC: 33.4 GM/DL — SIGNIFICANT CHANGE UP (ref 32–36)
MCV RBC AUTO: 98.4 FL — SIGNIFICANT CHANGE UP (ref 80–100)
MCV RBC AUTO: 98.8 FL — SIGNIFICANT CHANGE UP (ref 80–100)
PLATELET # BLD AUTO: 110 K/UL — LOW (ref 150–400)
PLATELET # BLD AUTO: 122 K/UL — LOW (ref 150–400)
POTASSIUM SERPL-MCNC: 3.8 MMOL/L — SIGNIFICANT CHANGE UP (ref 3.5–5.3)
POTASSIUM SERPL-SCNC: 3.8 MMOL/L — SIGNIFICANT CHANGE UP (ref 3.5–5.3)
PROTHROM AB SERPL-ACNC: 20.1 SEC — HIGH (ref 9.8–12.7)
RBC # BLD: 2.71 M/UL — LOW (ref 4.2–5.8)
RBC # BLD: 2.81 M/UL — LOW (ref 4.2–5.8)
RBC # FLD: 14.1 % — SIGNIFICANT CHANGE UP (ref 10.3–14.5)
RBC # FLD: 14.9 % — HIGH (ref 10.3–14.5)
SODIUM SERPL-SCNC: 138 MMOL/L — SIGNIFICANT CHANGE UP (ref 135–145)
WBC # BLD: 7.1 K/UL — SIGNIFICANT CHANGE UP (ref 3.8–10.5)
WBC # BLD: 7.9 K/UL — SIGNIFICANT CHANGE UP (ref 3.8–10.5)
WBC # FLD AUTO: 7.1 K/UL — SIGNIFICANT CHANGE UP (ref 3.8–10.5)
WBC # FLD AUTO: 7.9 K/UL — SIGNIFICANT CHANGE UP (ref 3.8–10.5)

## 2017-08-22 PROCEDURE — 99222 1ST HOSP IP/OBS MODERATE 55: CPT

## 2017-08-22 PROCEDURE — 93926 LOWER EXTREMITY STUDY: CPT | Mod: 26,RT

## 2017-08-22 RX ORDER — HEPARIN SODIUM 5000 [USP'U]/ML
INJECTION INTRAVENOUS; SUBCUTANEOUS
Qty: 25000 | Refills: 0 | Status: DISCONTINUED | OUTPATIENT
Start: 2017-08-22 | End: 2017-08-22

## 2017-08-22 RX ORDER — WARFARIN SODIUM 2.5 MG/1
1 TABLET ORAL DAILY
Qty: 0 | Refills: 0 | Status: DISCONTINUED | OUTPATIENT
Start: 2017-08-22 | End: 2017-08-24

## 2017-08-22 RX ORDER — HEPARIN SODIUM 5000 [USP'U]/ML
5000 INJECTION INTRAVENOUS; SUBCUTANEOUS EVERY 8 HOURS
Qty: 0 | Refills: 0 | Status: DISCONTINUED | OUTPATIENT
Start: 2017-08-22 | End: 2017-08-23

## 2017-08-22 RX ORDER — HEPARIN SODIUM 5000 [USP'U]/ML
5000 INJECTION INTRAVENOUS; SUBCUTANEOUS ONCE
Qty: 0 | Refills: 0 | Status: DISCONTINUED | OUTPATIENT
Start: 2017-08-22 | End: 2017-08-22

## 2017-08-22 RX ADMIN — SEVELAMER CARBONATE 2400 MILLIGRAM(S): 2400 POWDER, FOR SUSPENSION ORAL at 08:14

## 2017-08-22 RX ADMIN — Medication 125 MILLIGRAM(S): at 17:19

## 2017-08-22 RX ADMIN — Medication 125 MILLIGRAM(S): at 23:22

## 2017-08-22 RX ADMIN — Medication 125 MILLIGRAM(S): at 13:28

## 2017-08-22 RX ADMIN — Medication 125 MILLIGRAM(S): at 06:35

## 2017-08-22 RX ADMIN — AMIODARONE HYDROCHLORIDE 100 MILLIGRAM(S): 400 TABLET ORAL at 06:36

## 2017-08-22 RX ADMIN — SEVELAMER CARBONATE 2400 MILLIGRAM(S): 2400 POWDER, FOR SUSPENSION ORAL at 13:28

## 2017-08-22 RX ADMIN — HEPARIN SODIUM 5000 UNIT(S): 5000 INJECTION INTRAVENOUS; SUBCUTANEOUS at 21:17

## 2017-08-22 RX ADMIN — OXYCODONE AND ACETAMINOPHEN 1 TABLET(S): 5; 325 TABLET ORAL at 10:20

## 2017-08-22 RX ADMIN — HEPARIN SODIUM 1100 UNIT(S)/HR: 5000 INJECTION INTRAVENOUS; SUBCUTANEOUS at 11:02

## 2017-08-22 RX ADMIN — Medication 125 MILLIGRAM(S): at 01:41

## 2017-08-22 RX ADMIN — CINACALCET 30 MILLIGRAM(S): 30 TABLET, FILM COATED ORAL at 08:14

## 2017-08-22 RX ADMIN — WARFARIN SODIUM 1 MILLIGRAM(S): 2.5 TABLET ORAL at 21:17

## 2017-08-22 RX ADMIN — Medication 100 MILLIGRAM(S): at 13:29

## 2017-08-22 RX ADMIN — OXYCODONE AND ACETAMINOPHEN 1 TABLET(S): 5; 325 TABLET ORAL at 16:39

## 2017-08-22 RX ADMIN — SEVELAMER CARBONATE 2400 MILLIGRAM(S): 2400 POWDER, FOR SUSPENSION ORAL at 17:19

## 2017-08-22 RX ADMIN — OXYCODONE AND ACETAMINOPHEN 1 TABLET(S): 5; 325 TABLET ORAL at 06:35

## 2017-08-22 RX ADMIN — Medication 100 MILLIGRAM(S): at 21:17

## 2017-08-22 RX ADMIN — Medication 5 MILLIGRAM(S): at 06:36

## 2017-08-22 RX ADMIN — Medication 100 MILLIGRAM(S): at 06:36

## 2017-08-22 RX ADMIN — CEFTAZIDIME 100 GRAM(S): 6 INJECTION, POWDER, FOR SOLUTION INTRAVENOUS at 01:40

## 2017-08-22 NOTE — PROGRESS NOTE ADULT - PROBLEM SELECTOR PLAN 5
- BP Stable  - Continue BP meds. - s/p Stent (2007)  - Continue BB  - Lipid profile  - Start Statin  - Patient reports that cardiology switched him off anti-platelets inlieu of coumadin  - INR Therapeutic.

## 2017-08-22 NOTE — CONSULT NOTE ADULT - ASSESSMENT
62 yo male with H/O CHF with EF < 20%, PVD, CAD with stents, AICD, SVT, on coumadin , developed OM of Right ankle, supratherapeutic INR due to antibiotics. reversed with 2 units FFP and Vit K 5mg po, INR now 1.84        Resume coumadin 1 mg po daily, adjust per INR    cont hep gtt until INR > 2.0    Daily Pt/INR, CBC, BMP    thank you for the consult, will follow

## 2017-08-22 NOTE — PROGRESS NOTE ADULT - SUBJECTIVE AND OBJECTIVE BOX
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 Initially wound was a scab with surrounding erythema. Patient follows with Dr. Quintero (Wound Care) at [facility] and underwent a wound debridement on 08/15/17. Wound culture and bone culture were obtained at the time of procedure. Patient was not treated with antibiotics and wife reports that prelim culture is NGTD.  Post-procedure patient, reports extension of the erythema up the lower extremity and down the foot, increasing pain, and swelling of the right ankle. Came to [facility] for further evaluation.    Mild left ankle pain  No fever or chills    MEDICATIONS  (STANDING):  predniSONE   Tablet 5 milliGRAM(s) Oral daily  amiodarone    Tablet 100 milliGRAM(s) Oral daily  carvedilol 6.25 milliGRAM(s) Oral at bedtime  cinacalcet 30 milliGRAM(s) Oral <User Schedule>  sevelamer hydrochloride 2400 milliGRAM(s) Oral three times a day with meals  carvedilol 3.125 milliGRAM(s) Oral daily  docusate sodium 100 milliGRAM(s) Oral three times a day  vancomycin    Solution 125 milliGRAM(s) Oral every 6 hours  cefTAZidime  IVPB 2 Gram(s) IV Intermittent <User Schedule>  vancomycin  IVPB 500 milliGRAM(s) IV Intermittent <User Schedule>  doxercalciferol Injectable 1 MICROGram(s) IV Push <User Schedule>  epoetin amee Injectable 86098 Unit(s) IV Push <User Schedule>  heparin  Infusion.  Unit(s)/Hr (11 mL/Hr) IV Continuous <Continuous>  heparin  Infusion.  Unit(s)/Hr (11 mL/Hr) IV Continuous <Continuous>  heparin  Injectable 5000 Unit(s) IV Push once    MEDICATIONS  (PRN):  acetaminophen   Tablet. 325 milliGRAM(s) Oral every 4 hours PRN Mild Pain (1 - 3)  ondansetron Injectable 4 milliGRAM(s) IV Push every 6 hours PRN Nausea  oxyCODONE    5 mG/acetaminophen 325 mG 1 Tablet(s) Oral every 4 hours PRN Moderate Pain (4 - 6)  HYDROmorphone  Injectable 0.5 milliGRAM(s) IV Push every 6 hours PRN Severe Pain (7 - 10)      Vital Signs Last 24 Hrs  T(C): 36.5 (22 Aug 2017 11:23), Max: 37.2 (21 Aug 2017 21:43)  T(F): 97.7 (22 Aug 2017 11:23), Max: 98.9 (21 Aug 2017 21:43)  HR: 67 (22 Aug 2017 11:23) (57 - 89)  BP: 95/59 (22 Aug 2017 11:23) (95/59 - 123/63)  BP(mean): --  RR: 15 (22 Aug 2017 11:23) (15 - 18)  SpO2: 100% (22 Aug 2017 11:23) (95% - 100%)    Physical Exam:      Constitutional: frail looking  HEENT: NC/AT, EOMI, PERRLA  Neck: supple  Back: no tenderness  Respiratory: clear  Cardiovascular: S1S2 regular, no murmurs  Abdomen: soft, not tender, not distended, positive BS  Genitourinary: deferred  Rectal: deferred  Musculoskeletal: no muscle tenderness, no joint swelling or tenderness  Extremities: right lateral ankle erythema, edema and warmth extending to right lateral foot an right lateral calf area; small ulcer with silver nitrate sponge in place.  Neurological: AxOx3, moving all extremities, no focal deficits  Skin: no rashes    Labs:                        8.9    7.1   )-----------( 110      ( 22 Aug 2017 08:31 )             26.7     08-22    138  |  100  |  37<H>  ----------------------------<  127<H>  3.8   |  30  |  4.36<H>    Ca    9.3      22 Aug 2017 08:31  Phos  5.0     08-21    TPro  x   /  Alb  2.5<L>  /  TBili  x   /  DBili  x   /  AST  x   /  ALT  x   /  AlkPhos  x   08-21       Vancomycin Level, Trough: 7.1 ug/mL (08-21 @ 16:10)      Cultures:                         11.1   10.0  )-----------( 116      ( 17 Aug 2017 17:27 )             34.6     08-17    138  |  101  |  48<H>  ----------------------------<  133<H>  4.3   |  26  |  4.75<H>    Ca    9.6      17 Aug 2017 17:27    TPro  7.2  /  Alb  3.5  /  TBili  0.8  /  DBili  x   /  AST  21  /  ALT  27  /  AlkPhos  94  08-17     LIVER FUNCTIONS - ( 17 Aug 2017 17:27 )  Alb: 3.5 g/dL / Pro: 7.2 gm/dL / ALK PHOS: 94 U/L / ALT: 27 U/L / AST: 21 U/L / GGT: x           Culture - Blood (08.17.17 @ 17:27)    Specimen Source: .Blood None    Culture Results:   No growth to date.          Radiology:    Advanced directives addressed: full resuscitation

## 2017-08-22 NOTE — CONSULT NOTE ADULT - SUBJECTIVE AND OBJECTIVE BOX
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       . Initially wound was a scab with surrounding erythema. Patient follows with Dr. Quintero (Wound Care) at [facility] and underwent a wound debridement on 08/15/17. Wound culture and bone culture were obtained at the time of procedure. Patient was not treated with antibiotics and wife reports that prelim culture is NGTD.         Post-procedure patient, reports extension of the erythema up the lower extremity and down the foot, increasing pain, and swelling of the right ankle. Patient contacted ID who ordered a CT of the lower extremities and Lower Extremity Dopplers. Currently denies any fever, chill, night sweats. Came to [facility] for further evaluation.  Radiology results are pending. (17 Aug 2017 21:07)      Patient is a 62y old  Male who presents with a chief complaint of CC: Right Lateral Ankle Pain (17 Aug 2017 21:07)      Consulted by Dr. Riojas   for VTE prophylaxis, risk stratification, and anticoagulation management.    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  Clostridium difficile colitis  CAD (coronary artery disease)  Peripheral vascular disease  Hypertension  Polycystic kidney disease  ESRD (end stage renal disease)  AICD (automatic cardioverter/defibrillator) present  H/O hernia repair  A-V fistula  H/O kidney transplant          CrCl: 15.9      IMPROVE VTE Risk Score: #2        IMPROVE Bleeding Risk Score #5    Falls Risk:   High ( x )  Mod (  )  Low (  )      FAMILY HISTORY:  Family history of kidney disease (Mother)  Family history of colon cancer (Sibling)    Denies any personal or familial history of clotting or bleeding disorders.    Allergies    No Known Allergies    Intolerances        REVIEW OF SYSTEMS    (  )Fever	     (  )Constipation	(  )SOB				(  )Headache	(  )Dysuria  (  )Chills	     (  )Melena	(  )Dyspnea present on exertion	                    (  )Dizziness                    (  )Polyuria  (  )Nausea	     (  )Hematochezia	(  )Cough			                    (  )Syncope   	(  )Hematuria  (  )Vomiting    (  )Chest Pain	(  )Wheezing			(  )Weakness  (  )Diarrhea     (  )Palpitations	(  )Anorexia			(  )Myalgia    All other review of systems negative: Yes          PHYSICAL EXAM:    Constitutional: Appears Well    Neurological: A& O x 3    Skin: Warm    Respiratory and Thorax: normal effort; Breath sounds: normal; No rales/wheezing/rhonchi  	  Cardiovascular: S1, S2, regular, NMBR	    Gastrointestinal: BS + x 4Q, nontender	    Genitourinary:  Bladder nondistended, nontender    Musculoskeletal:   General Right:   no muscle/joint tenderness,   normal tone, no joint swelling,   ROM: limited  erythmic from ankle laterally to 1/2 way up leg	    General Left:   no muscle/joint tenderness,   normal tone, no joint swelling,   ROM: full      Lower extrems:   Right: no calf tenderness              negative dustin's sign               + pedal pulses    Left:   no calf tenderness              negative dusitn's sign               + pedal pulses                          8.9    7.1   )-----------( 110      ( 22 Aug 2017 08:31 )             26.7       08-22    138  |  100  |  37<H>  ----------------------------<  127<H>  3.8   |  30  |  4.36<H>    Ca    9.3      22 Aug 2017 08:31  Phos  5.0     08-21    TPro  x   /  Alb  2.5<L>  /  TBili  x   /  DBili  x   /  AST  x   /  ALT  x   /  AlkPhos  x   08-21      PT/INR - ( 22 Aug 2017 08:31 )   PT: 20.1 sec;   INR: 1.84 ratio         PTT - ( 22 Aug 2017 08:31 )  PTT:31.9 sec				    MEDICATIONS  (STANDING):  predniSONE   Tablet 5 milliGRAM(s) Oral daily  amiodarone    Tablet 100 milliGRAM(s) Oral daily  carvedilol 6.25 milliGRAM(s) Oral at bedtime  cinacalcet 30 milliGRAM(s) Oral <User Schedule>  sevelamer hydrochloride 2400 milliGRAM(s) Oral three times a day with meals  carvedilol 3.125 milliGRAM(s) Oral daily  docusate sodium 100 milliGRAM(s) Oral three times a day  vancomycin    Solution 125 milliGRAM(s) Oral every 6 hours  cefTAZidime  IVPB 2 Gram(s) IV Intermittent <User Schedule>  vancomycin  IVPB 500 milliGRAM(s) IV Intermittent <User Schedule>  doxercalciferol Injectable 1 MICROGram(s) IV Push <User Schedule>  epoetin amee Injectable 46210 Unit(s) IV Push <User Schedule>  heparin  Infusion.  Unit(s)/Hr IV Continuous <Continuous>  heparin  Infusion.  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       . Initially wound was a scab with surrounding erythema. Patient follows with Dr. Quintero (Wound Care) at [facility] and underwent a wound debridement on 08/15/17. Wound culture and bone culture were obtained at the time of procedure. Patient was not treated with antibiotics and wife reports that prelim culture is NGTD.         Post-procedure patient, reports extension of the erythema up the lower extremity and down the foot, increasing pain, and swelling of the right ankle. Patient contacted ID who ordered a CT of the lower extremities and Lower Extremity Dopplers. Currently denies any fever, chill, night sweats. Came to [facility] for further evaluation.  Radiology results are pending. (17 Aug 2017 21:07)      INR on admission 2.43, normal coumadin dose is 2.5 mg,  received coumadin 2.5 mg po x 2 dose, started on vancomycin and fortaz IVPB  Inr inc to > 7.0, received 2 units FFP and 5 mg of Vit K to reverse INR    Patient is a 62y old  Male who presents with a chief complaint of CC: Right Lateral Ankle Pain (17 Aug 2017 21:07)      Consulted by Dr. Riojas   for VTE prophylaxis, risk stratification, and anticoagulation management.    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 Right anterior tibial arteries are well-seen.   Biphasic waveforms in the dorsalis pedis artery. There is no evidence for   stenosis or thrombosis.    IMPRESSION: No evidence of significant arterial occlusive disease of the   right lower extremity.        CrCl: 15.9      IMPROVE VTE Risk Score: #2        IMPROVE Bleeding Risk Score #5    Falls Risk:   High ( x )  Mod (  )  Low (  )      FAMILY HISTORY:  Family history of kidney disease (Mother)  Family history of colon cancer (Sibling)    Denies any personal or familial history of clotting or bleeding disorders.    Allergies    No Known Allergies    Intolerances        REVIEW OF SYSTEMS    (  )Fever	     (  )Constipation	(  )SOB				(  )Headache	(  )Dysuria  (  )Chills	     (  )Melena	(  )Dyspnea present on exertion	                    (  )Dizziness                    (  )Polyuria  (  )Nausea	     (  )Hematochezia	(  )Cough			                    (  )Syncope   	(  )Hematuria  (  )Vomiting    (  )Chest Pain	(  )Wheezing			(  )Weakness  (  )Diarrhea     (  )Palpitations	(  )Anorexia			(  )Myalgia    All other review of systems negative: Yes          PHYSICAL EXAM:    Constitutional: Appears Well    Neurological: A& O x 3    Skin: Warm    Respiratory and Thorax: normal effort; Breath sounds: normal; No rales/wheezing/rhonchi  	  Cardiovascular: S1, S2, regular, NMBR	    Gastrointestinal: BS + x 4Q, nontender	    Genitourinary:  Bladder nondistended, nontender    Musculoskeletal:   General Right:   no muscle/joint tenderness,   normal tone, no joint swelling,   ROM: limited  erythmic from ankle laterally to 1/2 way up leg	    General Left:   no muscle/joint tenderness,   normal tone, no joint swelling,   ROM: full      Lower extrems:   Right: no calf tenderness              negative dustin's sign               + pedal pulses    Left:   no calf tenderness              negative dustin's sign               + pedal pulses                          8.9    7.1   )-----------( 110      ( 22 Aug 2017 08:31 )             26.7       08-22    138  |  100  |  37<H>  ----------------------------<  127<H>  3.8   |  30  |  4.36<H>    Ca    9.3      22 Aug 2017 08:31  Phos  5.0     08-21    TPro  x   /  Alb  2.5<L>  /  TBili  x   /  DBili  x   /  AST  x   /  ALT  x   /  AlkPhos  x   08-21      PT/INR - ( 22 Aug 2017 08:31 )   PT: 20.1 sec;   INR: 1.84 ratio         PTT - ( 22 Aug 2017 08:31 )  PTT:31.9 sec				    MEDICATIONS  (STANDING):  predniSONE   Tablet 5 milliGRAM(s) Oral daily  amiodarone    Tablet 100 milliGRAM(s) Oral daily  carvedilol 6.25 milliGRAM(s) Oral at bedtime  cinacalcet 30 milliGRAM(s) Oral <User Schedule>  sevelamer hydrochloride 2400 milliGRAM(s) Oral three times a day with meals  carvedilol 3.125 milliGRAM(s) Oral daily  docusate sodium 100 milliGRAM(s) Oral three times a day  vancomycin    Solution 125 milliGRAM(s) Oral every 6 hours  cefTAZidime  IVPB 2 Gram(s) IV Intermittent <User Schedule>  vancomycin  IVPB 500 milliGRAM(s) IV Intermittent <User Schedule>  doxercalciferol Injectable 1 MICROGram(s) IV Push <User Schedule>  epoetin amee Injectable 34068 Unit(s) IV Push <User Schedule>  heparin  Infusion.  Unit(s)/Hr IV Continuous <Continuous>  heparin  Infusion.  Unit(s)/Hr IV Continuous <Continuous>  heparin  Injectable 5000 Unit(s) IV Push once          DVT Prophylaxis:  LMWH                   (  )  Heparin SQ           (  )  Coumadin             (  )  Xarelto                  (  )  Eliquis                   (  )  Venodynes           (  )  Ambulation          (  )  UFH                       (  )  Contraindicated  (  )

## 2017-08-22 NOTE — PROGRESS NOTE ADULT - PROBLEM SELECTOR PLAN 3
- s/p AICD  - Continue with BB, ARB  - Continue with Amiodarone  - HD for fluid management. - History of PCKD s/p Nephrectomy and 3x Kidney Transplant  - Nephrology: Continue with Scheduled HD on M/W/F  - Continue with Phosphate Binders, and Sensipar

## 2017-08-22 NOTE — PROGRESS NOTE ADULT - SUBJECTIVE AND OBJECTIVE BOX
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       . Initially wound was a scab with surrounding erythema. Patient follows with Dr. Quintero (Wound Care) at [facility] and underwent a wound debridement on 08/15/17. Wound culture and bone culture were obtained at the time of procedure. Patient was not treated with antibiotics and wife reports that prelim culture is NGTD.         Post-procedure patient, reports extension of the erythema up the lower extremity and down the foot, increasing pain, and swelling of the right ankle. Patient contacted ID who ordered a CT of the lower extremities and Lower Extremity Dopplers. Currently denies any fever, chill, night sweats. Came to [facility] for further evaluation.    SUBJECTIVE: Patient seen and examined this AM. Cellulitis has worsened. Patient states he went to the bathroom and upon returning to bed he noticed his right leg was more erythematous. However, vitals remain within normal limits and he is hemodynamically stable.    8/21 Pt was seen and examed at bedside, states the leg feels better. No more progression of LLE cellulitis, stable. Ecchymosis of LLE stable. Denies of any fever, chills, CP, SOB, palpitation. No overnight event. Patient's INR climbed to 7.77 at which time HD refused him (due to high INR). Patient was reversed with FFP and vitamin K 5 mg PO.      8/22 Pt was seen and examined. States his leg feels better. INR became subtherapeutic to 1.84. Will start Coumadin 2.5 mg tonight.     REVIEW OF SYSTEMS:    CONSTITUTIONAL: No weakness, fevers or chills  EYES/ENT: No visual changes;  No vertigo or throat pain   NECK: No pain or stiffness  RESPIRATORY: No cough, wheezing, hemoptysis; No shortness of breath  CARDIOVASCULAR: No chest pain or palpitations  GASTROINTESTINAL: No abdominal or epigastric pain. No nausea, vomiting, or hematemesis; No diarrhea or constipation. No melena or hematochezia.  GENITOURINARY: No dysuria, frequency or hematuria  NEUROLOGICAL: No numbness or weakness  SKIN: No itching, burning, rashes, or lesions   All other review of systems is negative unless indicated above        PHYSICAL EXAM:  Vital Signs Last 24 Hrs  T(C): 36.8 (20 Aug 2017 11:27), Max: 37.4 (19 Aug 2017 20:53)  T(F): 98.2 (20 Aug 2017 11:27), Max: 99.4 (19 Aug 2017 20:53)  HR: 63 (20 Aug 2017 11:27) (63 - 76)  BP: 98/46 (20 Aug 2017 11:27) (98/46 - 111/62)  BP(mean): --  RR: 16 (20 Aug 2017 11:27) (16 - 17)  SpO2: 96% (20 Aug 2017 11:27) (96% - 99%)    Constitutional: NAD, awake and alert, well-developed  HEENT: PERR, EOMI, Normal Hearing, MMM  Neck: Soft and supple, No LAD, No JVD  Respiratory: Breath sounds are clear bilaterally, No wheezing, rales or rhonchi  Cardiovascular: S1 and S2, regular rate and rhythm, no Murmurs, gallops or rubs  Gastrointestinal: Bowel Sounds present, soft, nontender, nondistended, no guarding, no rebound  Extremities: No peripheral edema  Vascular: 2+ peripheral pulses  Neurological: A/O x 3, no focal deficits  Musculoskeletal: 5/5 strength b/l upper and lower extremities  Skin: Ulcer on the right lateral ankle with hyperpigmentation around the wound, increased erythema and warmth detention up the right leg    MEDICATIONS:  MEDICATIONS  (STANDING):  predniSONE   Tablet 5 milliGRAM(s) Oral daily  amiodarone    Tablet 100 milliGRAM(s) Oral daily  losartan 25 milliGRAM(s) Oral daily  carvedilol 6.25 milliGRAM(s) Oral at bedtime  cinacalcet 30 milliGRAM(s) Oral <User Schedule>  sevelamer hydrochloride 2400 milliGRAM(s) Oral three times a day with meals  carvedilol 3.125 milliGRAM(s) Oral daily  docusate sodium 100 milliGRAM(s) Oral three times a day  warfarin 2.5 milliGRAM(s) Oral daily  vancomycin    Solution 125 milliGRAM(s) Oral every 6 hours  cefTAZidime  IVPB 2 Gram(s) IV Intermittent <User Schedule>  vancomycin  IVPB 500 milliGRAM(s) IV Intermittent <User Schedule>  doxercalciferol Injectable 1 MICROGram(s) IV Push <User Schedule>  epoetin amee Injectable 20449 Unit(s) IV Push <User Schedule>      LABS: All Labs Reviewed:  Lab Results:                                            9.4                   Neurophils% (auto):   x      (08-20 @ 07:35):    8.2  )-----------(100          Lymphocytes% (auto):  x                                             28.5                   Eosinphils% (auto):   x        Manual%: Neutrophils x    ; Lymphocytes x    ; Eosinophils x    ; Bands%: x    ; Blasts x         Differential:	[] Automated		[] Manual    08-20    136  |  98  |  53<H>  ----------------------------<  102<H>  4.2   |  28  |  5.82<H>    Ca    9.2      20 Aug 2017 07:35      PT/INR - ( 20 Aug 2017 07:35 )   PT: 64.6 sec;   INR: 5.77 ratio         PTT - ( 20 Aug 2017 07:35 )  PTT:41.4 sec                   Blood Culture: no growth    RADIOLOGY/EKG: pt had radiology done outpatient- uploaded to [facility] PACS    DVT PPX: coumadin held    ADVANCED DIRECTIVE:    DISPOSITION: CHIEF COMPLAINT: RLE cellulitis    61 year old male with past medical history of HTN, PVD, PCKD, s/p Nephrectomy and 3x Renal Transplant (1989, 1995, 1997), ESRD on HD (M,W,F), CAD s/p Stent (2007), CHF (reported EF<20%) s/p AICD, Adrenal Insufficiency, 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       . Initially wound was a scab with surrounding erythema. Patient follows with Dr. Quintero (Wound Care) at [facility] and underwent a wound debridement on 08/15/17. Wound culture and bone culture were obtained at the time of procedure. Patient was not treated with antibiotics and wife reports that prelim culture is NGTD.         Post-procedure patient, reports extension of the erythema up the lower extremity and down the foot, increasing pain, and swelling of the right ankle. Patient contacted ID who ordered a CT of the lower extremities and Lower Extremity Dopplers. Currently denies any fever, chill, night sweats. Came to [facility] for further evaluation.    SUBJECTIVE: Patient seen and examined this AM. Cellulitis has worsened. Patient states he went to the bathroom and upon returning to bed he noticed his right leg was more erythematous. However, vitals remain within normal limits and he is hemodynamically stable.    8/21 Pt was seen and examed at bedside, states the leg feels better. No more progression of LLE cellulitis, stable. Ecchymosis of LLE stable. Denies of any fever, chills, CP, SOB, palpitation. No overnight event. Patient's INR climbed to 7.77 at which time HD refused him (due to high INR). Patient was reversed with FFP and vitamin K 5 mg PO.      8/22 Pt was seen and examined. States his leg feels better. INR became subtherapeutic to 1.84. Will start Coumadin 2.5 mg tonight.     REVIEW OF SYSTEMS:    CONSTITUTIONAL: No weakness, fevers or chills  EYES/ENT: No visual changes;  No vertigo or throat pain   NECK: No pain or stiffness  RESPIRATORY: No cough, wheezing, hemoptysis; No shortness of breath  CARDIOVASCULAR: No chest pain or palpitations  GASTROINTESTINAL: No abdominal or epigastric pain. No nausea, vomiting, or hematemesis; No diarrhea or constipation. No melena or hematochezia.  GENITOURINARY: No dysuria, frequency or hematuria  NEUROLOGICAL: No numbness or weakness  SKIN: No itching, burning, rashes, or lesions   All other review of systems is negative unless indicated above        PHYSICAL EXAM:  Vital Signs Last 24 Hrs  T(C): 36.8 (20 Aug 2017 11:27), Max: 37.4 (19 Aug 2017 20:53)  T(F): 98.2 (20 Aug 2017 11:27), Max: 99.4 (19 Aug 2017 20:53)  HR: 63 (20 Aug 2017 11:27) (63 - 76)  BP: 98/46 (20 Aug 2017 11:27) (98/46 - 111/62)  BP(mean): --  RR: 16 (20 Aug 2017 11:27) (16 - 17)  SpO2: 96% (20 Aug 2017 11:27) (96% - 99%)    Constitutional: NAD, awake and alert, well-developed  HEENT: PERR, EOMI, Normal Hearing, MMM  Neck: Soft and supple, No LAD, No JVD  Respiratory: Breath sounds are clear bilaterally, No wheezing, rales or rhonchi  Cardiovascular: S1 and S2, regular rate and rhythm, no Murmurs, gallops or rubs  Gastrointestinal: Bowel Sounds present, soft, nontender, nondistended, no guarding, no rebound  Extremities: No peripheral edema  Vascular: 2+ peripheral pulses  Neurological: A/O x 3, no focal deficits  Musculoskeletal: 5/5 strength b/l upper and lower extremities  Skin: Ulcer on the right lateral ankle with hyperpigmentation around the wound, increased erythema and warmth USP up the right leg    MEDICATIONS:  MEDICATIONS  (STANDING):  predniSONE   Tablet 5 milliGRAM(s) Oral daily  amiodarone    Tablet 100 milliGRAM(s) Oral daily  losartan 25 milliGRAM(s) Oral daily  carvedilol 6.25 milliGRAM(s) Oral at bedtime  cinacalcet 30 milliGRAM(s) Oral <User Schedule>  sevelamer hydrochloride 2400 milliGRAM(s) Oral three times a day with meals  carvedilol 3.125 milliGRAM(s) Oral daily  docusate sodium 100 milliGRAM(s) Oral three times a day  warfarin 2.5 milliGRAM(s) Oral daily  vancomycin    Solution 125 milliGRAM(s) Oral every 6 hours  cefTAZidime  IVPB 2 Gram(s) IV Intermittent <User Schedule>  vancomycin  IVPB 500 milliGRAM(s) IV Intermittent <User Schedule>  doxercalciferol Injectable 1 MICROGram(s) IV Push <User Schedule>  epoetin amee Injectable 73498 Unit(s) IV Push <User Schedule>      LABS: All Labs Reviewed:  Lab Results:                                            9.4                   Neurophils% (auto):   x      (08-20 @ 07:35):    8.2  )-----------(100          Lymphocytes% (auto):  x                                             28.5                   Eosinphils% (auto):   x        Manual%: Neutrophils x    ; Lymphocytes x    ; Eosinophils x    ; Bands%: x    ; Blasts x         Differential:	[] Automated		[] Manual    08-20    136  |  98  |  53<H>  ----------------------------<  102<H>  4.2   |  28  |  5.82<H>    Ca    9.2      20 Aug 2017 07:35      PT/INR - ( 20 Aug 2017 07:35 )   PT: 64.6 sec;   INR: 5.77 ratio         PTT - ( 20 Aug 2017 07:35 )  PTT:41.4 sec                   Blood Culture: no growth    RADIOLOGY/EKG: pt had radiology done outpatient- uploaded to [facility] PACS    < from: US Duplex Arterial Lower Ext [facility], Right (08.22.17 @ 11:41) >  EXAM:  US DPLX LWR EXT ARTS LTD RT                            PROCEDURE DATE:  08/22/2017          INTERPRETATION:  EXAMINATION DATE: August 22, 2017 at 1119 hours.  CLINICAL INFORMATION: Nonhealing osteomyelitis.    TECHNIQUE: Sonographic evaluation of the right lower extremity arteries   was performed.     FINDINGS: The common femoral, superficial femoral, popliteal, peroneal,   posterior tibial arteries are patent and demonstrate normal color-flow   and triphasic wave. Right anterior tibial arteries are well-seen.   Biphasic waveforms in the dorsalis pedis artery. There is no evidence for   stenosis or thrombosis.    IMPRESSION: No evidence of significant arterial occlusive disease of the   right lower extremity.    < end of copied text >    DVT PPX: resume coumadin (8/22)    ADVANCED DIRECTIVE:    DISPOSITION:

## 2017-08-22 NOTE — PROGRESS NOTE ADULT - PROBLEM SELECTOR PLAN 7
- Secondary to history of chronic steroids  - Continue prednisone daily; will not bolus steroids at this time TSH level: 4.14

## 2017-08-22 NOTE — PROGRESS NOTE ADULT - PROBLEM SELECTOR PLAN 2
- History of PCKD s/p Nephrectomy and 3x Kidney Transplant  - Nephrology: Continue with Scheduled HD on M/W/F  - Continue with Phosphate Binders, and Sensipar -pt found to have left sided DVT  -on heparin gtt (8/21)  -aPTT in AM -pt found to have left sided DVT  -on heparin gtt (8/21)  - appreciate anticoagulation team recs: resume Coumadin 1 mg this evening, adjust per INR and cont hep gtt until INR > 2.0  - initially supratherapeutic to 7.77, INR now 1.84  - s/p vit K 5 mg PO and FFP for reversal

## 2017-08-22 NOTE — PROGRESS NOTE ADULT - ASSESSMENT
61 year old male with a history of renal transplant (failed), hypothyroid, CM, CAD, HTN, PVD, PCKD, ESRD on HD M/W/F here with LE pain, cellulitis.    ESRD  -continue with HD M/W/F  -K protocol  -UF as tolerated  -holding ARB with soft BP    Anemia  -SACHIN protocol    Cellulitis  -IV abx x 5 more weeks  -Fortaz 2 gr q hd and vanco 500 mg qhd as per ID    d/c with Dr. Srivastava  D/c with wife 61 year old male with a history of renal transplant (failed), hypothyroid, CM, CAD, HTN, PVD, PCKD, ESRD on HD M/W/F here with LE pain, cellulitis.    ESRD  -continue with HD M/W/F  -K protocol  -UF as tolerated  -holding ARB with soft BP    Anemia  -SACHIN protocol    Cellulitis  -IV abx x 5 more weeks  -Fortaz 2 gr q hd and vanco 500 mg qhd as per ID    d/c with Dr. Srivastava  D/c with wife  Spoike with Parra at [facility] and ordered abx as above to keep x 5 weeks with vanco trough

## 2017-08-22 NOTE — PROGRESS NOTE ADULT - SUBJECTIVE AND OBJECTIVE BOX
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), Hx of CDiff, Hx of SVT, presenting with Right Lateral Ankle Pain, Swelling, Redness x 3 months that dramatically increased over past 24-48 hours prior to admission     PHYSICAL EXAM:  Vital Signs Last 24 Hrs  T(C): 36.5 (22 Aug 2017 11:23), Max: 37.2 (21 Aug 2017 21:43)  T(F): 97.7 (22 Aug 2017 11:23), Max: 98.9 (21 Aug 2017 21:43)  HR: 67 (22 Aug 2017 11:23) (57 - 89)  BP: 95/59 (22 Aug 2017 11:23) (95/59 - 123/63)  BP(mean): --  RR: 15 (22 Aug 2017 11:23) (15 - 18)  SpO2: 100% (22 Aug 2017 11:23) (95% - 100%)  general- comfortable   -rs-aeeb,cta  -cvs-s1s2 normal   -p/a-soft,bs+  -extremity- no asymmetrical swelling noted, ulcer and redness noted on leg   -cns- non focal         A/P    #cellulitis and underlying OM -clinically -abx, check doppler study- arterial doppler     #dvt pr

## 2017-08-22 NOTE — ADVANCED PRACTICE NURSE CONSULT - ASSESSMENT
This is a 61 year old male that was admitted to the hospital on 8/17/2017 for cellulitis to right ankle. PMH- HTN, PVD, PCKD, s/p Nephrectomy and 3x Renal Transplant (1989, 1995, 1997), ESRD on HD (M,W,F), CAD s/p Stent (2007), CHF (reported EF<20%) s/p AICD, Adrenal Insufficiency, Hx of Cryptococcal Meningitis (while on immunosuppression therapy), Hx of CDiff, Hx of SVT.    Wife present at the bedside and reports that patient is seen in outpatient wound clinic where a bone biopsy was performed. Wound noted to progressively become worse and painful.     Wound located on right lateral malleolus and measures 1.5cmx1.5cmx0.2cm. Wound bed deep red in color. Periwound with erythema. No odor noted. Extremity warm to touch. Wound previously cleansed by RN and foam dressing applied. Patient being treated with multiple antibiotics at this time for osteomyelitis.  Would recommend continuing foam dressing and changing every 2-3 days.  Patient remains in bed eating his lunch.

## 2017-08-22 NOTE — PROGRESS NOTE ADULT - PROBLEM SELECTOR PLAN 1
- coumadin held due to increased bleeding at site of cellulitis  - INR 4.4-->5.77, supratherapeutic  - ESR 80, repeat tomorrow   - Given Vanc and Ceftriaxone in ED  - ID: no MRI due to PM in place; indium scan may not be helpful at this time since he had the ankle ulcer debrided with bone biopsy several days ago  -start vancomycin 500 mg IV qHD and ceftazidime 2 gm IV q HD  -add vancomycin 125 mg PO q6h for CDAD prophylaxis  -consider plastic evaluation for right ankle ulcer  - f/u wound Care Consult  - Continue with Broad-Spectrum Antibiotics; Renally dosed  - Cultures- no growth  - Pain meds w anti-nausea as well  - Pain meds prn - resume Coumadin 2.5 mg this evening, d/t INR: 1.84 (8/22)  - initially supratherapeutic to 7.77  - s/p vit K 5 mg PO and FFP for reversal  - ESR 80 --> 91  - ID: 6 weeks antibiotics ordered for patient  -vancomycin 500 mg IV qHD and ceftazidime 2 gm IV q HD  -vancomycin 125 mg PO q6h for CDAD prophylaxis  -pt found to have osteomyelitis (wife called Wound Care center)  - appreciate wound Care recs: change foam dressing every 2 days  - Cultures- no growth  - Pain meds prn - ESR 80 --> 91  - ID: 6 weeks antibiotics ordered for patient  -vancomycin 500 mg IV qHD and ceftazidime 2 gm IV q HD  -vancomycin 125 mg PO q6h for CDAD prophylaxis  -pt found to have osteomyelitis (wife called Wound Care center)  - appreciate wound Care recs: change foam dressing every 2 days  - Cultures- no growth  - Pain meds prn

## 2017-08-22 NOTE — PROGRESS NOTE ADULT - PROBLEM SELECTOR PLAN 4
- s/p Stent (2007)  - Continue BB  - Lipid profile  - Start Statin  - Patient reports that cardiology switched him off anti-platelets inlieu of coumadin  - INR Therapeutic. - s/p AICD  - Continue with BB, ARB  - Continue with Amiodarone  - HD for fluid management.

## 2017-08-22 NOTE — PROGRESS NOTE ADULT - SUBJECTIVE AND OBJECTIVE BOX
Patient is a 62y Male who reports no complaints overnight. Pain stable, still difficult to walk.    REVIEW OF SYSTEMS:    CONSTITUTIONAL: Stable weakness, no fevers or chills  RESPIRATORY: No cough, wheezing, hemoptysis; No shortness of breath  CARDIOVASCULAR: No chest pain or palpitations  GENITOURINARY: No dysuria, frequency or hematuria  All other review of systems is negative unless indicated above.    MEDICATIONS  (STANDING):  predniSONE   Tablet 5 milliGRAM(s) Oral daily  amiodarone    Tablet 100 milliGRAM(s) Oral daily  carvedilol 6.25 milliGRAM(s) Oral at bedtime  cinacalcet 30 milliGRAM(s) Oral <User Schedule>  sevelamer hydrochloride 2400 milliGRAM(s) Oral three times a day with meals  carvedilol 3.125 milliGRAM(s) Oral daily  docusate sodium 100 milliGRAM(s) Oral three times a day  vancomycin    Solution 125 milliGRAM(s) Oral every 6 hours  cefTAZidime  IVPB 2 Gram(s) IV Intermittent <User Schedule>  vancomycin  IVPB 500 milliGRAM(s) IV Intermittent <User Schedule>  doxercalciferol Injectable 1 MICROGram(s) IV Push <User Schedule>  epoetin amee Injectable 76095 Unit(s) IV Push <User Schedule>  heparin  Infusion.  Unit(s)/Hr (11 mL/Hr) IV Continuous <Continuous>  heparin  Infusion.  Unit(s)/Hr (11 mL/Hr) IV Continuous <Continuous>  heparin  Injectable 5000 Unit(s) IV Push once  warfarin 1 milliGRAM(s) Oral daily    MEDICATIONS  (PRN):  acetaminophen   Tablet. 325 milliGRAM(s) Oral every 4 hours PRN Mild Pain (1 - 3)  ondansetron Injectable 4 milliGRAM(s) IV Push every 6 hours PRN Nausea  oxyCODONE    5 mG/acetaminophen 325 mG 1 Tablet(s) Oral every 4 hours PRN Moderate Pain (4 - 6)  HYDROmorphone  Injectable 0.5 milliGRAM(s) IV Push every 6 hours PRN Severe Pain (7 - 10)        T(C): , Max: 37.2 (08-21-17 @ 21:43)  T(F): , Max: 98.9 (08-21-17 @ 21:43)  HR: 67 (08-22-17 @ 11:23)  BP: 95/59 (08-22-17 @ 11:23)  BP(mean): --  RR: 15 (08-22-17 @ 11:23)  SpO2: 100% (08-22-17 @ 11:23)  Wt(kg): --    08-21 @ 07:01  -  08-22 @ 07:00  --------------------------------------------------------  IN: 611 mL / OUT: 300 mL / NET: 311 mL          PHYSICAL EXAM:    Constitutional: NAD  HEENT: PERRLA, EOMI,  MMM  Neck: No LAD, No JVD  Respiratory: CTAB  Cardiovascular: S1 and S2, RRR  Gastrointestinal: BS+, soft, NT/ND  Extremities: RLE wound, dsg  Neurological: A/O x 3, no focal deficits  Psychiatric: Normal mood, normal affect  : No Amaya  Skin: No rashes  Access: R [facility]        LABS:                        8.9    7.1   )-----------( 110      ( 22 Aug 2017 08:31 )             26.7     22 Aug 2017 08:31    138    |  100    |  37     ----------------------------<  127    3.8     |  30     |  4.36   21 Aug 2017 05:22    134    |  100    |  66     ----------------------------<  113    4.9     |  24     |  6.72   20 Aug 2017 07:35    136    |  98     |  53     ----------------------------<  102    4.2     |  28     |  5.82   19 Aug 2017 06:27    139    |  98     |  37     ----------------------------<  97     4.0     |  31     |  4.22     Ca    9.3        22 Aug 2017 08:31  Ca    9.0        21 Aug 2017 05:22  Ca    9.2        20 Aug 2017 07:35  Ca    8.7        19 Aug 2017 06:27  Phos  5.0       21 Aug 2017 05:22    TPro  x      /  Alb  2.5    /  TBili  x      /  DBili  x      /  AST  x      /  ALT  x      /  AlkPhos  x      21 Aug 2017 05:22          Urine Studies:          RADIOLOGY & ADDITIONAL STUDIES:

## 2017-08-22 NOTE — PROGRESS NOTE ADULT - ASSESSMENT
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 Initially wound was a scab with surrounding erythema. Patient follows with Dr. Quintero (Wound Care) at [facility] and underwent a wound debridement on 08/15/17. Wound culture and bone culture were obtained at the time of procedure. Patient was not treated with antibiotics and wife reports that prelim culture is NGTD.  Post-procedure patient, reports extension of the erythema up the lower extremity and down the foot, increasing pain, and swelling of the right ankle. Came to [facility] for further evaluation.    1. Right lateral ankle ulcer s/p bone biopsy. Right ankle cellulitis. Probable underlying lateral ankle acute OM. h/o prior recurrent CDAD. ESRD on   -f/u BC x 2  -he cannot have an MRI duw to PM in place; indium scan may not be helpful at this time since he had the ankle ulcer debrided with bone biopsy several days ago  -on vancomycin 500 mg IV qHD and ceftazidime 2 gm IV q HD # 4  -on vancomycin 125 mg PO q6h for CDAD prophylaxis  -tolerating abx well so far; no side effects noted  -plan to keep on IV abx at HD for 6 weeks  -continue abx coverage  -local wound care per wound care MD  -monitor temps  -f/u CBC  -supportive care  2. Other issues:   -care per medicine

## 2017-08-22 NOTE — PROGRESS NOTE ADULT - PROBLEM SELECTOR PLAN 8
Coumadin - Secondary to history of chronic steroids  - Continue prednisone daily; will not bolus steroids at this time

## 2017-08-23 ENCOUNTER — TRANSCRIPTION ENCOUNTER (OUTPATIENT)
Age: 62
End: 2017-08-23

## 2017-08-23 LAB
ADD ON TEST-SPECIMEN IN LAB: SIGNIFICANT CHANGE UP
ALBUMIN SERPL ELPH-MCNC: 2.6 G/DL — LOW (ref 3.3–5)
ANION GAP SERPL CALC-SCNC: 11 MMOL/L — SIGNIFICANT CHANGE UP (ref 5–17)
APTT BLD: 33.6 SEC — SIGNIFICANT CHANGE UP (ref 27.5–37.4)
BUN SERPL-MCNC: 50 MG/DL — HIGH (ref 7–23)
CALCIUM SERPL-MCNC: 9 MG/DL — SIGNIFICANT CHANGE UP (ref 8.5–10.1)
CHLORIDE SERPL-SCNC: 99 MMOL/L — SIGNIFICANT CHANGE UP (ref 96–108)
CO2 SERPL-SCNC: 25 MMOL/L — SIGNIFICANT CHANGE UP (ref 22–31)
CREAT SERPL-MCNC: 5.6 MG/DL — HIGH (ref 0.5–1.3)
CULTURE RESULTS: SIGNIFICANT CHANGE UP
CULTURE RESULTS: SIGNIFICANT CHANGE UP
GLUCOSE SERPL-MCNC: 162 MG/DL — HIGH (ref 70–99)
HCT VFR BLD CALC: 27.2 % — LOW (ref 39–50)
HGB BLD-MCNC: 8.8 G/DL — LOW (ref 13–17)
INR BLD: 2.13 RATIO — HIGH (ref 0.88–1.16)
MCHC RBC-ENTMCNC: 31.8 PG — SIGNIFICANT CHANGE UP (ref 27–34)
MCHC RBC-ENTMCNC: 32.6 GM/DL — SIGNIFICANT CHANGE UP (ref 32–36)
MCV RBC AUTO: 97.6 FL — SIGNIFICANT CHANGE UP (ref 80–100)
PHOSPHATE SERPL-MCNC: 3.8 MG/DL — SIGNIFICANT CHANGE UP (ref 2.5–4.5)
PLATELET # BLD AUTO: 112 K/UL — LOW (ref 150–400)
POTASSIUM SERPL-MCNC: 4.2 MMOL/L — SIGNIFICANT CHANGE UP (ref 3.5–5.3)
POTASSIUM SERPL-SCNC: 4.2 MMOL/L — SIGNIFICANT CHANGE UP (ref 3.5–5.3)
PROTHROM AB SERPL-ACNC: 23.4 SEC — HIGH (ref 9.8–12.7)
RBC # BLD: 2.79 M/UL — LOW (ref 4.2–5.8)
RBC # FLD: 14.4 % — SIGNIFICANT CHANGE UP (ref 10.3–14.5)
SODIUM SERPL-SCNC: 135 MMOL/L — SIGNIFICANT CHANGE UP (ref 135–145)
SPECIMEN SOURCE: SIGNIFICANT CHANGE UP
SPECIMEN SOURCE: SIGNIFICANT CHANGE UP
WBC # BLD: 7.7 K/UL — SIGNIFICANT CHANGE UP (ref 3.8–10.5)
WBC # FLD AUTO: 7.7 K/UL — SIGNIFICANT CHANGE UP (ref 3.8–10.5)

## 2017-08-23 PROCEDURE — 99233 SBSQ HOSP IP/OBS HIGH 50: CPT

## 2017-08-23 RX ORDER — WARFARIN SODIUM 2.5 MG/1
1 TABLET ORAL
Qty: 30 | Refills: 0 | OUTPATIENT
Start: 2017-08-23 | End: 2017-09-22

## 2017-08-23 RX ORDER — WARFARIN SODIUM 2.5 MG/1
2.5 TABLET ORAL
Qty: 0 | Refills: 0 | COMMUNITY

## 2017-08-23 RX ORDER — WARFARIN SODIUM 2.5 MG/1
0.5 TABLET ORAL
Qty: 0 | Refills: 0 | COMMUNITY

## 2017-08-23 RX ADMIN — Medication 100 MILLIGRAM(S): at 13:47

## 2017-08-23 RX ADMIN — AMIODARONE HYDROCHLORIDE 100 MILLIGRAM(S): 400 TABLET ORAL at 05:29

## 2017-08-23 RX ADMIN — HEPARIN SODIUM 5000 UNIT(S): 5000 INJECTION INTRAVENOUS; SUBCUTANEOUS at 05:34

## 2017-08-23 RX ADMIN — Medication 100 MILLIGRAM(S): at 05:29

## 2017-08-23 RX ADMIN — Medication 100 MILLIGRAM(S): at 10:37

## 2017-08-23 RX ADMIN — Medication 125 MILLIGRAM(S): at 23:44

## 2017-08-23 RX ADMIN — SEVELAMER CARBONATE 2400 MILLIGRAM(S): 2400 POWDER, FOR SUSPENSION ORAL at 18:15

## 2017-08-23 RX ADMIN — SEVELAMER CARBONATE 2400 MILLIGRAM(S): 2400 POWDER, FOR SUSPENSION ORAL at 13:48

## 2017-08-23 RX ADMIN — OXYCODONE AND ACETAMINOPHEN 1 TABLET(S): 5; 325 TABLET ORAL at 02:26

## 2017-08-23 RX ADMIN — CINACALCET 30 MILLIGRAM(S): 30 TABLET, FILM COATED ORAL at 18:45

## 2017-08-23 RX ADMIN — OXYCODONE AND ACETAMINOPHEN 1 TABLET(S): 5; 325 TABLET ORAL at 14:39

## 2017-08-23 RX ADMIN — WARFARIN SODIUM 1 MILLIGRAM(S): 2.5 TABLET ORAL at 22:22

## 2017-08-23 RX ADMIN — DOXERCALCIFEROL 1 MICROGRAM(S): 2.5 CAPSULE ORAL at 10:30

## 2017-08-23 RX ADMIN — Medication 5 MILLIGRAM(S): at 05:35

## 2017-08-23 RX ADMIN — Medication 100 MILLIGRAM(S): at 22:22

## 2017-08-23 RX ADMIN — Medication 125 MILLIGRAM(S): at 18:44

## 2017-08-23 RX ADMIN — ERYTHROPOIETIN 10000 UNIT(S): 10000 INJECTION, SOLUTION INTRAVENOUS; SUBCUTANEOUS at 10:31

## 2017-08-23 RX ADMIN — OXYCODONE AND ACETAMINOPHEN 1 TABLET(S): 5; 325 TABLET ORAL at 22:23

## 2017-08-23 RX ADMIN — Medication 125 MILLIGRAM(S): at 05:35

## 2017-08-23 RX ADMIN — Medication 125 MILLIGRAM(S): at 13:47

## 2017-08-23 RX ADMIN — CEFTAZIDIME 100 GRAM(S): 6 INJECTION, POWDER, FOR SOLUTION INTRAVENOUS at 18:43

## 2017-08-23 RX ADMIN — OXYCODONE AND ACETAMINOPHEN 1 TABLET(S): 5; 325 TABLET ORAL at 08:31

## 2017-08-23 NOTE — PROGRESS NOTE ADULT - SUBJECTIVE AND OBJECTIVE BOX
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       . Initially wound was a scab with surrounding erythema. Patient follows with Dr. Quintero (Wound Care) at [facility] and underwent a wound debridement on 08/15/17. Wound culture and bone culture were obtained at the time of procedure. Patient was not treated with antibiotics and wife reports that prelim culture is NGTD.         Post-procedure patient, reports extension of the erythema up the lower extremity and down the foot, increasing pain, and swelling of the right ankle. Patient contacted ID who ordered a CT of the lower extremities and Lower Extremity Dopplers. Currently denies any fever, chill, night sweats. Came to [facility] for further evaluation.    SUBJECTIVE: Patient seen and examined this AM. Cellulitis has worsened. Patient states he went to the bathroom and upon returning to bed he noticed his right leg was more erythematous. However, vitals remain within normal limits and he is hemodynamically stable.    8/21 Pt was seen and examed at bedside, states the leg feels better. No more progression of LLE cellulitis, stable. Ecchymosis of LLE stable. Denies of any fever, chills, CP, SOB, palpitation. No overnight event. Patient's INR climbed to 7.77 at which time HD refused him (due to high INR). Patient was reversed with FFP and vitamin K 5 mg PO.      8/22 Pt was seen and examined. States his leg feels better. INR became subtherapeutic to 1.84. Will start Coumadin 2.5 mg tonight.     8/23 Pt was seen and examined. States pain in his leg upon ambulation. Started on Coumadin 1 mg yesterday, instead of 2.5 mg. INR in therapeutic range. Pending discharge with home HD set up for Friday, however, wife wants pain addressed further prior to discharge.    REVIEW OF SYSTEMS:    CONSTITUTIONAL: No weakness, fevers or chills  EYES/ENT: No visual changes;  No vertigo or throat pain   NECK: No pain or stiffness  RESPIRATORY: No cough, wheezing, hemoptysis; No shortness of breath  CARDIOVASCULAR: No chest pain or palpitations  GASTROINTESTINAL: No abdominal or epigastric pain. No nausea, vomiting, or hematemesis; No diarrhea or constipation. No melena or hematochezia.  GENITOURINARY: No dysuria, frequency or hematuria  NEUROLOGICAL: Pain in the right lower extremity; No numbness or weakness  SKIN: No itching, burning, rashes, or lesions   All other review of systems is negative unless indicated above        PHYSICAL EXAM:  Vital Signs Last 24 Hrs  T(C): 36.8 (20 Aug 2017 11:27), Max: 37.4 (19 Aug 2017 20:53)  T(F): 98.2 (20 Aug 2017 11:27), Max: 99.4 (19 Aug 2017 20:53)  HR: 63 (20 Aug 2017 11:27) (63 - 76)  BP: 98/46 (20 Aug 2017 11:27) (98/46 - 111/62)  BP(mean): --  RR: 16 (20 Aug 2017 11:27) (16 - 17)  SpO2: 96% (20 Aug 2017 11:27) (96% - 99%)    Constitutional: NAD, awake and alert, well-developed  HEENT: PERR, EOMI, Normal Hearing, MMM  Neck: Soft and supple, No LAD, No JVD  Respiratory: Breath sounds are clear bilaterally, No wheezing, rales or rhonchi  Cardiovascular: S1 and S2, regular rate and rhythm, no Murmurs, gallops or rubs  Gastrointestinal: Bowel Sounds present, soft, nontender, nondistended, no guarding, no rebound  Extremities: No peripheral edema  Vascular: 2+ peripheral pulses  Neurological: A/O x 3, no focal deficits  Musculoskeletal: 5/5 strength b/l upper and lower extremities  Skin: Ulcer on the right lateral ankle with hyperpigmentation around the wound, decreased erythema and warmth up the right lower extremity    MEDICATIONS:  MEDICATIONS  (STANDING):  predniSONE   Tablet 5 milliGRAM(s) Oral daily  amiodarone    Tablet 100 milliGRAM(s) Oral daily  losartan 25 milliGRAM(s) Oral daily  carvedilol 6.25 milliGRAM(s) Oral at bedtime  cinacalcet 30 milliGRAM(s) Oral <User Schedule>  sevelamer hydrochloride 2400 milliGRAM(s) Oral three times a day with meals  carvedilol 3.125 milliGRAM(s) Oral daily  docusate sodium 100 milliGRAM(s) Oral three times a day  warfarin 2.5 milliGRAM(s) Oral daily  vancomycin    Solution 125 milliGRAM(s) Oral every 6 hours  cefTAZidime  IVPB 2 Gram(s) IV Intermittent <User Schedule>  vancomycin  IVPB 500 milliGRAM(s) IV Intermittent <User Schedule>  doxercalciferol Injectable 1 MICROGram(s) IV Push <User Schedule>  epoetin amee Injectable 03197 Unit(s) IV Push <User Schedule>      LABS: All Labs Reviewed:  Lab Results:                                            9.4                   Neurophils% (auto):   x      (08-20 @ 07:35):    8.2  )-----------(100          Lymphocytes% (auto):  x                                             28.5                   Eosinphils% (auto):   x        Manual%: Neutrophils x    ; Lymphocytes x    ; Eosinophils x    ; Bands%: x    ; Blasts x         Differential:	[] Automated		[] Manual    08-20    136  |  98  |  53<H>  ----------------------------<  102<H>  4.2   |  28  |  5.82<H>    Ca    9.2      20 Aug 2017 07:35      PT/INR - ( 20 Aug 2017 07:35 )   PT: 64.6 sec;   INR: 5.77 ratio         PTT - ( 20 Aug 2017 07:35 )  PTT:41.4 sec        Blood Culture: no growth    RADIOLOGY/EKG: pt had radiology done outpatient- uploaded to [facility] P[facility]    < from: US Duplex Arterial Lower Ext Ltd, Right (08.22.17 @ 11:41) >  EXAM:  US DPLX LWR EXT ARTS LTD RT                            PROCEDURE DATE:  08/22/2017          INTERPRETATION:  EXAMINATION DATE: August 22, 2017 at 1119 hours.  CLINICAL INFORMATION: Nonhealing osteomyelitis.    TECHNIQUE: Sonographic evaluation of the right lower extremity arteries   was performed.     FINDINGS: The common femoral, superficial femoral, popliteal, peroneal,   posterior tibial arteries are patent and demonstrate normal color-flow   and triphasic wave. Right anterior tibial arteries are well-seen.   Biphasic waveforms in the dorsalis pedis artery. There is no evidence for   stenosis or thrombosis.    IMPRESSION: No evidence of significant arterial occlusive disease of the   right lower extremity.    < end of copied text >    DVT PPX: resume coumadin (8/22)    ADVANCED DIRECTIVE:    DISPOSITION:

## 2017-08-23 NOTE — DISCHARGE NOTE ADULT - SECONDARY DIAGNOSIS.
Cellulitis of lower extremity ESRD (end stage renal disease) Hypertension Hypothyroidism CAD (coronary artery disease) Adrenal insufficiency

## 2017-08-23 NOTE — PROGRESS NOTE ADULT - PROBLEM SELECTOR PLAN 2
-pt found to have left sided DVT  -on heparin gtt (8/21)  - appreciate anticoagulation team recs: resume Coumadin 1 mg this evening, adjust per INR and cont hep gtt until INR > 2.0  - INR currently therapeutic  - s/p vit K 5 mg PO and FFP for reversal

## 2017-08-23 NOTE — DISCHARGE NOTE ADULT - MEDICATION SUMMARY - MEDICATIONS TO TAKE
I will START or STAY ON the medications listed below when I get home from the hospital:    predniSONE 5 mg oral tablet  -- 1 tab(s) by mouth once a day FOR ADRENAL INSUFFICIENCY   -- Indication: For Adrenal insufficiency    oxycodone-acetaminophen 5mg-325mg oral tablet  -- 1 tab(s) by mouth every 6 hours, As Needed  -- Indication: For osteomyelitis    losartan 25 mg oral tablet  -- 1 tab(s) by mouth once a day (at bedtime)  -- Indication: For Hypertension    amiodarone 200 mg oral tablet  -- 0.5 tab(s) by mouth once a day  -- Indication: For SVT (supraventricular tachycardia)    warfarin 1 mg oral tablet  -- 1 tab(s) by mouth once a day  -- Indication: For CAD (coronary artery disease)    Coreg 3.125 mg oral tablet  -- 1 tab(s) by mouth once a day  -- Indication: For CAD (coronary artery disease)    Coreg 6.25 mg oral tablet  -- 1 tab(s) by mouth once a day (at bedtime)  -- Indication: For CAD (coronary artery disease)    Sensipar 30 mg oral tablet  -- 1 tab(s) by mouth 4 times a week MONDAY WEDNESDAY FRIDAY AND SATURDAY AT NIGHT  -- Indication: For ESRD (end stage renal disease)    Renvela 800 mg oral tablet  -- 3 tab(s) by mouth 3 times a day (with meals)  -- Indication: For ESRD (end stage renal disease)    omeprazole 20 mg oral delayed release capsule  -- 1 cap(s) by mouth once a day  -- Indication: For Prophylactic measure

## 2017-08-23 NOTE — PROGRESS NOTE ADULT - SUBJECTIVE AND OBJECTIVE BOX
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       . Initially wound was a scab with surrounding erythema. Patient follows with Dr. Quintero (Wound Care) at [facility] and underwent a wound debridement on 08/15/17. Wound culture and bone culture were obtained at the time of procedure. Patient was not treated with antibiotics and wife reports that prelim culture is NGTD.         Post-procedure patient, reports extension of the erythema up the lower extremity and down the foot, increasing pain, and swelling of the right ankle. Patient contacted ID who ordered a CT of the lower extremities and Lower Extremity Dopplers. Currently denies any fever, chill, night sweats. Came to [facility] for further evaluation.  Radiology results are pending. (17 Aug 2017 21:07)      INR on admission 2.43, normal coumadin dose is 2.5 mg,  received coumadin 2.5 mg po x 2 dose, started on vancomycin and fortaz IVPB  Inr inc to > 7.0, received 2 units FFP and 5 mg of Vit K to reverse INR    Patient is a 62y old  Male who presents with a chief complaint of CC: Right Lateral Ankle Pain (17 Aug 2017 21:07)      Consulted by Dr. Riojas   for VTE prophylaxis, risk stratification, and anticoagulation management.    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 Right anterior tibial arteries are well-seen.   Biphasic waveforms in the dorsalis pedis artery. There is no evidence for   stenosis or thrombosis.    IMPRESSION: No evidence of significant arterial occlusive disease of the   right lower extremity.      CrCl: 15.9    IMPROVE VTE Risk Score: #2    IMPROVE Bleeding Risk Score #5    Falls Risk:   High ( x )  Mod (  )  Low (  )      FAMILY HISTORY:  Family history of kidney disease (Mother)  Family history of colon cancer (Sibling)    Denies any personal or familial history of clotting or bleeding disorders.    Allergies    No Known Allergies    Intolerances        REVIEW OF SYSTEMS    (  )Fever	     (  )Constipation	(  )SOB				(  )Headache	(  )Dysuria  (  )Chills	     (  )Melena	(  )Dyspnea present on exertion	                    (  )Dizziness                    (  )Polyuria  (  )Nausea	     (  )Hematochezia	(  )Cough			                    (  )Syncope   	(  )Hematuria  (  )Vomiting    (  )Chest Pain	(  )Wheezing			(  )Weakness  (  )Diarrhea     (  )Palpitations	(  )Anorexia			(  )Myalgia    All other review of systems negative: Yes          PHYSICAL EXAM:    Constitutional: Appears Well    Neurological: A& O x 3    Skin: Warm    Respiratory and Thorax: normal effort; Breath sounds: normal; No rales/wheezing/rhonchi  	  Cardiovascular: S1, S2, regular, NMBR	    Gastrointestinal: BS + x 4Q, nontender	    Genitourinary:  Bladder nondistended, nontender    Musculoskeletal:   General Right:   no muscle/joint tenderness,   normal tone, no joint swelling,   ROM: limited  erythmic from ankle laterally to 1/2 way up leg	    General Left:   no muscle/joint tenderness,   normal tone, no joint swelling,   ROM: full      Lower extrems:   Right: no calf tenderness              negative dustin's sign               + pedal pulses    Left:   no calf tenderness              negative dustin's sign               + pedal pulses                           8.8    7.7   )-----------( 112      ( 23 Aug 2017 08:30 )             27.2       08-23    135  |  99  |  50<H>  ----------------------------<  162<H>  4.2   |  25  |  5.60<H>    Ca    9.0      23 Aug 2017 08:30  Phos  3.8     08-23    TPro  x   /  Alb  2.6<L>  /  TBili  x   /  DBili  x   /  AST  x   /  ALT  x   /  AlkPhos  x   08-23      PT/INR - ( 23 Aug 2017 08:30 )   PT: 23.4 sec;   INR: 2.13 ratio       PT/INR - ( 22 Aug 2017 08:31 )   PT: 20.1 sec;   INR: 1.84 ratio         MEDICATIONS  (STANDING):  predniSONE   Tablet 5 milliGRAM(s) Oral daily  amiodarone    Tablet 100 milliGRAM(s) Oral daily  carvedilol 6.25 milliGRAM(s) Oral at bedtime  cinacalcet 30 milliGRAM(s) Oral <User Schedule>  sevelamer hydrochloride 2400 milliGRAM(s) Oral three times a day with meals  carvedilol 3.125 milliGRAM(s) Oral daily  docusate sodium 100 milliGRAM(s) Oral three times a day  vancomycin    Solution 125 milliGRAM(s) Oral every 6 hours  cefTAZidime  IVPB 2 Gram(s) IV Intermittent <User Schedule>  vancomycin  IVPB 500 milliGRAM(s) IV Intermittent <User Schedule>  doxercalciferol Injectable 1 MICROGram(s) IV Push <User Schedule>  epoetin amee Injectable 52577 Unit(s) IV Push <User Schedule>  warfarin 1 milliGRAM(s) Oral daily      DVT Prophylaxis:  LMWH                   (  )  Heparin SQ           (  )  Coumadin             (x  )  Xarelto                  (  )  Eliquis                   (  )  Venodynes           (  )  Ambulation          ( x )  UFH                       (  )  Contraindicated  (  )

## 2017-08-23 NOTE — DISCHARGE NOTE ADULT - CARE PLAN
Principal Discharge DX:	Ankle ulcer  Goal:	reduce severity of infection at the site  Secondary Diagnosis:	Cellulitis of lower extremity  Goal:	reduce erythema and tenderness  Secondary Diagnosis:	ESRD (end stage renal disease)  Goal:	maintain adequate kidney function Principal Discharge DX:	Ankle ulcer  Goal:	reduce severity of infection at the site  Instructions for follow-up, activity and diet:	Continue IV antibiotics at hemodialysis for 6 weeks after discharge.  Follow up with your infectious disease physician, Dr. Srivastava, to monitor any changes in the wound.  Secondary Diagnosis:	Cellulitis of lower extremity  Goal:	reduce erythema and tenderness  Secondary Diagnosis:	ESRD (end stage renal disease)  Goal:	maintain adequate kidney function Principal Discharge DX:	Ankle ulcer  Goal:	reduce severity of infection at the site  Instructions for follow-up, activity and diet:	Continue IV antibiotics at hemodialysis for 6 weeks after discharge.  Follow up with your infectious disease physician, Dr. Srivastava, to monitor any changes in the wound.  Secondary Diagnosis:	Cellulitis of lower extremity  Goal:	reduce erythema and tenderness  Instructions for follow-up, activity and diet:	Continue IV antibiotics for 6 weeks after discharge.  Follow up with your infectious disease physician, Dr. Srivastava, to monitor any changes to the skin.  Seek medical attention if you notice significantly increased swelling or redness at the site.  Secondary Diagnosis:	ESRD (end stage renal disease)  Goal:	maintain adequate kidney function  Instructions for follow-up, activity and diet:	Follow up with your nephrologist, Dr. Aguilar, to monitor your creatinine.  Maintain a stable blood pressure.  Continue hemodialysis on Monday, Wednesday and Friday as before. Principal Discharge DX:	Osteomyelitis  Goal:	reduce severity of infection at the site  Instructions for follow-up, activity and diet:	Continue IV antibiotics at hemodialysis for 6 weeks after discharge.  Follow up with your infectious disease physician, Dr. Srivastava, to monitor any changes in the wound.  Secondary Diagnosis:	Cellulitis of lower extremity  Goal:	reduce erythema and tenderness  Instructions for follow-up, activity and diet:	Continue IV antibiotics for 6 weeks after discharge.  Follow up with your infectious disease physician, Dr. Srivastava, to monitor any changes to the skin.  Seek medical attention if you notice significantly increased swelling or redness at the site.  Secondary Diagnosis:	ESRD (end stage renal disease)  Goal:	continue with current dialysis regimen  Instructions for follow-up, activity and diet:	Follow up with your nephrologist, Dr. Aguilar, to monitor your creatinine.  Maintain a stable blood pressure.  Continue hemodialysis on Monday, Wednesday and Friday as previously done prior to admission.  Secondary Diagnosis:	Hypertension  Goal:	blood pressure in the normotensive range  Instructions for follow-up, activity and diet:	take prescribed blood pressure as directed  Secondary Diagnosis:	Hypothyroidism  Goal:	prevention of sequelae of disease  Instructions for follow-up, activity and diet:	take prescribed medication as directed  Secondary Diagnosis:	CAD (coronary artery disease)  Goal:	prevention of acute coronary syndrome  Instructions for follow-up, activity and diet:	take prescribed medications as directed  Secondary Diagnosis:	Adrenal insufficiency  Goal:	take prescribed prednisone as directed  Instructions for follow-up, activity and diet:	take prescribed prednisone as directed  regular follow up of INR as an outpatient

## 2017-08-23 NOTE — PROGRESS NOTE ADULT - PROBLEM SELECTOR PLAN 1
-pending discharge  - ID: Fortaz 2 gr q hd and vanco 500 mg qhd ordered for pt for HD (for 6 weeks)  -vancomycin 500 mg IV qHD and ceftazidime 2 gm IV q HD  -vancomycin 125 mg PO q6h for CDAD prophylaxis  -pt found to have osteomyelitis (wife called Wound Care center)  - appreciate wound Care recs: change foam dressing every 2 days  - Pain meds prn

## 2017-08-23 NOTE — DISCHARGE NOTE ADULT - CARE PROVIDERS DIRECT ADDRESSES
,DirectAddress_Unknown,DirectAddress_Unknown,[email or web address],DirectAddress_Unknown

## 2017-08-23 NOTE — PROGRESS NOTE ADULT - ASSESSMENT
62 yo male with H/O CHF with EF < 20%, PVD, CAD with stents, AICD, SVT, on coumadin , developed OM of Right ankle, supratherapeutic INR due to antibiotics. reversed with 2 units FFP and Vit K 5mg po, INR now 1.84      Plan:  ::Continue Coumadin 1 mg po daily, adjust per INR  ::Daily Pt/INR, CBC, BMP  :;Enc ambulation    Will continue to follow.

## 2017-08-23 NOTE — DISCHARGE NOTE ADULT - MEDICATION SUMMARY - MEDICATIONS TO STOP TAKING
I will STOP taking the medications listed below when I get home from the hospital:    warfarin 2.5 mg oral tablet  -- 2.5 milligram(s) by mouth 6 times a week (DAILY EXCEPT SUNDAY)    warfarin 2.5 mg oral tablet  -- 0.5 tab(s) by mouth once a week SUNDAY

## 2017-08-23 NOTE — DISCHARGE NOTE ADULT - PLAN OF CARE
reduce severity of infection at the site reduce erythema and tenderness maintain adequate kidney function Continue IV antibiotics at hemodialysis for 6 weeks after discharge.  Follow up with your infectious disease physician, Dr. Srivastava, to monitor any changes in the wound. Follow up with your nephrologist, Dr. Aguilar, to monitor your creatinine.  Maintain a stable blood pressure.  Continue hemodialysis on Monday, Wednesday and Friday as before. Continue IV antibiotics for 6 weeks after discharge.  Follow up with your infectious disease physician, Dr. Srivastava, to monitor any changes to the skin.  Seek medical attention if you notice significantly increased swelling or redness at the site. blood pressure in the normotensive range take prescribed blood pressure as directed take prescribed prednisone as directed take prescribed prednisone as directed  regular follow up of INR as an outpatient prevention of acute coronary syndrome take prescribed medications as directed prevention of sequelae of disease take prescribed medication as directed continue with current dialysis regimen Follow up with your nephrologist, Dr. Aguilar, to monitor your creatinine.  Maintain a stable blood pressure.  Continue hemodialysis on Monday, Wednesday and Friday as previously done prior to admission.

## 2017-08-23 NOTE — DISCHARGE NOTE ADULT - CARE PROVIDER_API CALL
Cesar Coello), Cardiovascular Disease; Internal Medicine  [address] [place] [address]  Phone: [phone]  Fax: [phone]    Raymond Srivastava), Infectious Disease; Internal Medicine  [address] [facility] [address], [place] [address]  Phone: [phone]  Fax: [phone]    Adrian Aguilar), Internal Medicine; Nephrology  [address] [place] [address]  Phone: [phone]  Fax: [phone]    [facility]  [address]  [place] [address]  Phone: [phone]  Fax: (       -

## 2017-08-23 NOTE — PROGRESS NOTE ADULT - SUBJECTIVE AND OBJECTIVE BOX
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

## 2017-08-23 NOTE — PROGRESS NOTE ADULT - ASSESSMENT
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 Initially wound was a scab with surrounding erythema. Patient follows with Dr. Quintero (Wound Care) at [facility] and underwent a wound debridement on 08/15/17. Wound culture and bone culture were obtained at the time of procedure. Patient was not treated with antibiotics and wife reports that prelim culture is NGTD.  Post-procedure patient, reports extension of the erythema up the lower extremity and down the foot, increasing pain, and swelling of the right ankle. Came to [facility] for further evaluation.    1. Right lateral ankle ulcer s/p bone biopsy. Right ankle cellulitis improving. Probable underlying lateral ankle acute OM. h/o prior recurrent CDAD. ESRD on HD.  -BC x 2 are negative to date  -right lower leg erythema is improving  -on vancomycin 500 mg IV qHD and ceftazidime 2 gm IV q HD # 5  -on vancomycin 125 mg PO q6h for CDAD prophylaxis  -tolerating abx well so far; no side effects noted  -plan to keep on IV abx at HD for 6 weeks  -continue abx coverage  -local wound care per wound care MD  -monitor temps  -f/u CBC  -supportive care  2. Other issues:   -care per medicine

## 2017-08-23 NOTE — DISCHARGE NOTE ADULT - PROVIDER TOKENS
TOKEN:'[id number]',TOKEN:'[id number]',TOKEN:'[id number]',FREE:[LAST:[alfonso],FIRST:[montiel],PHONE:[phone]],FAX:[(   )    -],ADDRESS:[[place] [address]  [place] [address]]]

## 2017-08-23 NOTE — PROGRESS NOTE ADULT - SUBJECTIVE AND OBJECTIVE BOX
Patient is a 62y Male who reports no complaints overnight. Seen at HD, pain stable but controlled.    REVIEW OF SYSTEMS:    CONSTITUTIONAL: No weakness, fevers or chills  RESPIRATORY: No cough, wheezing, hemoptysis; No shortness of breath  CARDIOVASCULAR: No chest pain or palpitations  GENITOURINARY: No dysuria, frequency or hematuria  All other review of systems is negative unless indicated above.    MEDICATIONS  (STANDING):  predniSONE   Tablet 5 milliGRAM(s) Oral daily  amiodarone    Tablet 100 milliGRAM(s) Oral daily  carvedilol 6.25 milliGRAM(s) Oral at bedtime  cinacalcet 30 milliGRAM(s) Oral <User Schedule>  sevelamer hydrochloride 2400 milliGRAM(s) Oral three times a day with meals  carvedilol 3.125 milliGRAM(s) Oral daily  docusate sodium 100 milliGRAM(s) Oral three times a day  vancomycin    Solution 125 milliGRAM(s) Oral every 6 hours  cefTAZidime  IVPB 2 Gram(s) IV Intermittent <User Schedule>  vancomycin  IVPB 500 milliGRAM(s) IV Intermittent <User Schedule>  doxercalciferol Injectable 1 MICROGram(s) IV Push <User Schedule>  epoetin amee Injectable 90832 Unit(s) IV Push <User Schedule>  warfarin 1 milliGRAM(s) Oral daily    MEDICATIONS  (PRN):  acetaminophen   Tablet. 325 milliGRAM(s) Oral every 4 hours PRN Mild Pain (1 - 3)  ondansetron Injectable 4 milliGRAM(s) IV Push every 6 hours PRN Nausea  oxyCODONE    5 mG/acetaminophen 325 mG 1 Tablet(s) Oral every 4 hours PRN Moderate Pain (4 - 6)  HYDROmorphone  Injectable 0.5 milliGRAM(s) IV Push every 6 hours PRN Severe Pain (7 - 10)        T(C): , Max: 36.6 (08-22-17 @ 21:21)  T(F): , Max: 97.9 (08-22-17 @ 21:21)  HR: 59 (08-23-17 @ 10:42)  BP: 109/55 (08-23-17 @ 10:42)  BP(mean): --  RR: 16 (08-23-17 @ 10:42)  SpO2: 95% (08-23-17 @ 05:27)  Wt(kg): --        PHYSICAL EXAM:    Constitutional: NAD,  HEENT: PERRLA, EOMI,  MMM  Neck: No LAD, No JVD  Respiratory: CTAB  Cardiovascular: S1 and S2, RRR  Gastrointestinal: BS+, soft, NT/ND  Extremities: No peripheral edema, R le ecchy slight better.  Neurological: A/O x 3, no focal deficits  Psychiatric: Normal mood, normal affect  : No Amaya  Skin: No rashes  Access: AVF R        LABS:                        8.8    7.7   )-----------( 112      ( 23 Aug 2017 08:30 )             27.2     23 Aug 2017 08:30    135    |  99     |  50     ----------------------------<  162    4.2     |  25     |  5.60   22 Aug 2017 08:31    138    |  100    |  37     ----------------------------<  127    3.8     |  30     |  4.36   21 Aug 2017 05:22    134    |  100    |  66     ----------------------------<  113    4.9     |  24     |  6.72   20 Aug 2017 07:35    136    |  98     |  53     ----------------------------<  102    4.2     |  28     |  5.82     Ca    9.0        23 Aug 2017 08:30  Ca    9.3        22 Aug 2017 08:31  Ca    9.0        21 Aug 2017 05:22  Ca    9.2        20 Aug 2017 07:35  Phos  3.8       23 Aug 2017 08:30  Phos  5.0       21 Aug 2017 05:22    TPro  x      /  Alb  2.6    /  TBili  x      /  DBili  x      /  AST  x      /  ALT  x      /  AlkPhos  x      23 Aug 2017 08:30  TPro  x      /  Alb  2.5    /  TBili  x      /  DBili  x      /  AST  x      /  ALT  x      /  AlkPhos  x      21 Aug 2017 05:22          Urine Studies:          RADIOLOGY & ADDITIONAL STUDIES:

## 2017-08-23 NOTE — PROGRESS NOTE ADULT - PROBLEM SELECTOR PLAN 3
- History of PCKD s/p Nephrectomy and 3x Kidney Transplant  - appreciate Nephrology recs: Continue with Scheduled HD on M/W/F, folow K protocol and UF as tolerated  -held ARB with soft BP for now  - Continue with Phosphate Binders, and Sensipar

## 2017-08-23 NOTE — DISCHARGE NOTE ADULT - PATIENT PORTAL LINK FT
“You can access the [facility] Patient Portal, offered by [facility], by registering with the following website: [email or web address]”

## 2017-08-23 NOTE — PROGRESS NOTE ADULT - ASSESSMENT
61 year old male with a history of renal transplant (failed), hypothyroid, CM, CAD, HTN, PVD, PCKD, ESRD on HD M/W/F here with LE pain suspected osteomyelitis.    ESRD  -continue with HD M/W/F, complete treatement now as ordered  -K protocol  -UF as tolerated  -held ARB with soft BP for now    Anemia  -SACHIN protocol    Cellulitis  -IV abx x 5 more weeks per ID  -Fortaz 2 gr q hd and vanco 500 mg qhd as per ID  -Outpatient HD is aware and order placed to start with HD upon discharge    d/c with inpatient HD staff

## 2017-08-23 NOTE — DISCHARGE NOTE ADULT - HOSPITAL COURSE
62 yo M admitted for right lower extremity cellulitis. He was given renally doses broad spectrum antibiotics. He was followed by infectious disease and wound care. His normal dialysis schedule was followed during admission. A day following admission his INR became supratherapeutic at 4.40 while on coumadin for a history of AFib. At this time, coumadin was held 60 yo M admitted for right lower extremity cellulitis. He was given renally doses broad spectrum antibiotics. He was followed by infectious disease and wound care. His normal dialysis schedule was followed during admission. A day following admission his INR became supratherapeutic at 4.40 while on coumadin for a history of AFib. At this time, coumadin was held. However, his INR continued to rise and he was reversed with 5 mg of Vitamin K by mouth and 1 unit of fresh frozen plasma. Clinically, his cellulitis improved and his INR downtrended to 2.17 at which time he was medically cleared for discharge. 60 yo M admitted for an infected ankle ulcer. He was given renally doses broad spectrum antibiotics. He was also treated for a right lower extremity cellulitis. He was followed by infectious disease and wound care. Wound care recommended keeping the ulcer wound moist with a dressing change every 2 days. He had imaging done at [facility] which was reviewed and proved a diagnosis of osteomyelitis at the site of the ulcer. A day following admission his INR became supratherapeutic at 4.40 while on coumadin for a history of AFib. At this time, coumadin was held. However, his INR continued to rise and he was reversed with 5 mg of Vitamin K by mouth and 1 unit of fresh frozen plasma. His normal dialysis schedule was followed during admission. Clinically, his cellulitis improved and his INR downtrended to 2.17 at which time he was medically cleared for discharge. 61 year old Male presented with an infected right  ankle ulcer that was later found to be Osteomyelitis. Pt was given renally dosed with the appropriate antibiotics and was also treated for a superimposed right lower extremity cellulitis. He was followed by infectious disease and wound care. Wound care recommended keeping the ulcer wound moist with  dressing changes every 2 days. He had imaging done at [facility] which was reviewed and proved to osteomyelitis at the site of the ulcer based on a biopsy. A day following admission his INR became supratherapeutic at 4.40 while on coumadin for a history of CAD. At this time, coumadin was held. Pt's INR continued to rise and he was reversed with 5 mg of Vitamin K by mouth and 1 unit of fresh frozen plasma. His normal dialysis schedule was followed during admission. Clinically, his cellulitis improved and his INR downtrended to 2.17 at which time he was medically cleared for discharge. Will be discharged on coumadin 1mg and instructed to follow up with PMD for regular monitoring of INR 61 year old Male presented with an infected right  ankle ulcer that was later found to be Osteomyelitis. Pt was given renally dosed with the appropriate antibiotics and was also treated for a superimposed right lower extremity cellulitis. He was followed by infectious disease and wound care. Wound care recommended keeping the ulcer wound moist with  dressing changes every 2 days. He had imaging done at [facility] which was reviewed and proved to osteomyelitis at the site of the ulcer based on a biopsy. A day following admission his INR became supratherapeutic at 4.40 while on coumadin for a history of A-flutter. At this time, coumadin was held. Pt's INR continued to rise and he was reversed with 5 mg of Vitamin K by mouth and 1 unit of fresh frozen plasma. His normal dialysis schedule was followed during admission. Clinically, his cellulitis improved and his INR downtrended to 2.17 at which time he was medically cleared for discharge. Will be discharged on coumadin 1mg and instructed to follow up with PMD for regular monitoring of INR

## 2017-08-24 VITALS
HEART RATE: 74 BPM | DIASTOLIC BLOOD PRESSURE: 62 MMHG | SYSTOLIC BLOOD PRESSURE: 104 MMHG | OXYGEN SATURATION: 99 % | RESPIRATION RATE: 20 BRPM | TEMPERATURE: 98 F

## 2017-08-24 LAB
ANION GAP SERPL CALC-SCNC: 10 MMOL/L — SIGNIFICANT CHANGE UP (ref 5–17)
APTT BLD: 36.3 SEC — SIGNIFICANT CHANGE UP (ref 27.5–37.4)
BUN SERPL-MCNC: 33 MG/DL — HIGH (ref 7–23)
CALCIUM SERPL-MCNC: 9.7 MG/DL — SIGNIFICANT CHANGE UP (ref 8.5–10.1)
CHLORIDE SERPL-SCNC: 99 MMOL/L — SIGNIFICANT CHANGE UP (ref 96–108)
CO2 SERPL-SCNC: 28 MMOL/L — SIGNIFICANT CHANGE UP (ref 22–31)
CREAT SERPL-MCNC: 3.94 MG/DL — HIGH (ref 0.5–1.3)
GLUCOSE SERPL-MCNC: 104 MG/DL — HIGH (ref 70–99)
HCT VFR BLD CALC: 29.4 % — LOW (ref 39–50)
HGB BLD-MCNC: 9.7 G/DL — LOW (ref 13–17)
INR BLD: 2.48 RATIO — HIGH (ref 0.88–1.16)
MCHC RBC-ENTMCNC: 32.8 PG — SIGNIFICANT CHANGE UP (ref 27–34)
MCHC RBC-ENTMCNC: 33.1 GM/DL — SIGNIFICANT CHANGE UP (ref 32–36)
MCV RBC AUTO: 99.1 FL — SIGNIFICANT CHANGE UP (ref 80–100)
PLATELET # BLD AUTO: 112 K/UL — LOW (ref 150–400)
POTASSIUM SERPL-MCNC: 3.9 MMOL/L — SIGNIFICANT CHANGE UP (ref 3.5–5.3)
POTASSIUM SERPL-SCNC: 3.9 MMOL/L — SIGNIFICANT CHANGE UP (ref 3.5–5.3)
PROTHROM AB SERPL-ACNC: 27.3 SEC — HIGH (ref 9.8–12.7)
RBC # BLD: 2.96 M/UL — LOW (ref 4.2–5.8)
RBC # FLD: 14.3 % — SIGNIFICANT CHANGE UP (ref 10.3–14.5)
SODIUM SERPL-SCNC: 137 MMOL/L — SIGNIFICANT CHANGE UP (ref 135–145)
WBC # BLD: 7.2 K/UL — SIGNIFICANT CHANGE UP (ref 3.8–10.5)
WBC # FLD AUTO: 7.2 K/UL — SIGNIFICANT CHANGE UP (ref 3.8–10.5)

## 2017-08-24 PROCEDURE — 99233 SBSQ HOSP IP/OBS HIGH 50: CPT

## 2017-08-24 RX ORDER — WARFARIN SODIUM 2.5 MG/1
1 TABLET ORAL
Qty: 0 | Refills: 0 | COMMUNITY

## 2017-08-24 RX ORDER — VANCOMYCIN HCL 1 G
1 VIAL (EA) INTRAVENOUS
Qty: 120 | Refills: 0 | OUTPATIENT
Start: 2017-08-24 | End: 2017-09-23

## 2017-08-24 RX ORDER — WARFARIN SODIUM 2.5 MG/1
1 TABLET ORAL
Qty: 0 | Refills: 0 | COMMUNITY
Start: 2017-08-24

## 2017-08-24 RX ORDER — WARFARIN SODIUM 2.5 MG/1
1 TABLET ORAL
Qty: 30 | Refills: 0 | OUTPATIENT
Start: 2017-08-24 | End: 2017-09-23

## 2017-08-24 RX ADMIN — OXYCODONE AND ACETAMINOPHEN 1 TABLET(S): 5; 325 TABLET ORAL at 04:43

## 2017-08-24 RX ADMIN — Medication 5 MILLIGRAM(S): at 05:49

## 2017-08-24 RX ADMIN — SEVELAMER CARBONATE 2400 MILLIGRAM(S): 2400 POWDER, FOR SUSPENSION ORAL at 09:57

## 2017-08-24 RX ADMIN — OXYCODONE AND ACETAMINOPHEN 1 TABLET(S): 5; 325 TABLET ORAL at 13:02

## 2017-08-24 RX ADMIN — SEVELAMER CARBONATE 2400 MILLIGRAM(S): 2400 POWDER, FOR SUSPENSION ORAL at 13:03

## 2017-08-24 RX ADMIN — AMIODARONE HYDROCHLORIDE 100 MILLIGRAM(S): 400 TABLET ORAL at 05:49

## 2017-08-24 RX ADMIN — Medication 100 MILLIGRAM(S): at 05:49

## 2017-08-24 RX ADMIN — Medication 125 MILLIGRAM(S): at 05:50

## 2017-08-24 RX ADMIN — Medication 125 MILLIGRAM(S): at 13:03

## 2017-08-24 RX ADMIN — OXYCODONE AND ACETAMINOPHEN 1 TABLET(S): 5; 325 TABLET ORAL at 06:38

## 2017-08-24 NOTE — PHYSICAL THERAPY INITIAL EVALUATION ADULT - DISCHARGE DISPOSITION, PT EVAL
home/no skilled PT needs/Pt. will be d/c from PT at present time due to performing mobility independently. Pt. requires pain management to improve mobility. Ice pack provided to R lateral ankle at end of session to reduce swelling and decrease pain.

## 2017-08-24 NOTE — PROGRESS NOTE ADULT - ASSESSMENT
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 Initially wound was a scab with surrounding erythema. Patient follows with Dr. Quintero (Wound Care) at [facility] and underwent a wound debridement on 08/15/17. Wound culture and bone culture were obtained at the time of procedure. Patient was not treated with antibiotics and wife reports that prelim culture is NGTD.  Post-procedure patient, reports extension of the erythema up the lower extremity and down the foot, increasing pain, and swelling of the right ankle. Came to [facility] for further evaluation.    1. Right lateral ankle ulcer s/p bone biopsy. Right ankle cellulitis improving. Probable underlying lateral ankle acute OM. h/o prior recurrent CDAD. ESRD on HD.  -BC x 2 are negative to date  -right lower leg erythema is improving  -on vancomycin 500 mg IV qHD and ceftazidime 2 gm IV q HD # 6  -on vancomycin 125 mg PO q6h for CDAD prophylaxis  -tolerating abx well so far; no side effects noted  -plan to keep on IV abx at HD for 6 weeks  -continue abx coverage  -local wound care per wound care MD  -case d/w Dr. Aguilar  -monitor temps  -f/u CBC  -supportive care  2. Other issues:   -care per medicine

## 2017-08-24 NOTE — PHYSICAL THERAPY INITIAL EVALUATION ADULT - GAIT DISTANCE, PT EVAL
Pt. refused to ambulate further at present time due to fear of increasing pain in R ankle. Pt. reports pt. ambulated to bathroom and back independently w/ RW this am and pain in R ankle increased. Pt. was medicated for pain this am. Pain increases when RLE in dependent position./5 feet

## 2017-08-24 NOTE — PHYSICAL THERAPY INITIAL EVALUATION ADULT - ADDITIONAL COMMENTS
Pt. resides in a two story house w/ wife. Pt. reports pt. can remain on one level when pt. returns home. PTA, pt. was an independent community ambulator w/out an AD.

## 2017-08-24 NOTE — PHYSICAL THERAPY INITIAL EVALUATION ADULT - PERTINENT HX OF CURRENT PROBLEM, REHAB EVAL
Pt. presents w/ R lateral ankle pain, welling and redness x3 months w/ increase in pain within past few days. Pt. underwent wound debridement on 8/15/17.

## 2017-08-24 NOTE — PROGRESS NOTE ADULT - PROBLEM SELECTOR PROBLEM 1
Cellulitis of right lower extremity

## 2017-08-24 NOTE — PROGRESS NOTE ADULT - SUBJECTIVE AND OBJECTIVE BOX
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 Initially wound was a scab with surrounding erythema. Patient follows with Dr. Quintero (Wound Care) at [facility] and underwent a wound debridement on 08/15/17. Wound culture and bone culture were obtained at the time of procedure. Patient was not treated with antibiotics and wife reports that prelim culture is NGTD.  Post-procedure patient, reports extension of the erythema up the lower extremity and down the foot, increasing pain, and swelling of the right ankle. Came to [facility] for further evaluation.    Mild left ankle pain  No fever or chills  No new complaints    MEDICATIONS  (STANDING):  predniSONE   Tablet 5 milliGRAM(s) Oral daily  amiodarone    Tablet 100 milliGRAM(s) Oral daily  carvedilol 6.25 milliGRAM(s) Oral at bedtime  cinacalcet 30 milliGRAM(s) Oral <User Schedule>  sevelamer hydrochloride 2400 milliGRAM(s) Oral three times a day with meals  carvedilol 3.125 milliGRAM(s) Oral daily  docusate sodium 100 milliGRAM(s) Oral three times a day  vancomycin    Solution 125 milliGRAM(s) Oral every 6 hours  cefTAZidime  IVPB 2 Gram(s) IV Intermittent <User Schedule>  vancomycin  IVPB 500 milliGRAM(s) IV Intermittent <User Schedule>  doxercalciferol Injectable 1 MICROGram(s) IV Push <User Schedule>  epoetin amee Injectable 51056 Unit(s) IV Push <User Schedule>  warfarin 1 milliGRAM(s) Oral daily    MEDICATIONS  (PRN):  acetaminophen   Tablet. 325 milliGRAM(s) Oral every 4 hours PRN Mild Pain (1 - 3)  ondansetron Injectable 4 milliGRAM(s) IV Push every 6 hours PRN Nausea  oxyCODONE    5 mG/acetaminophen 325 mG 1 Tablet(s) Oral every 4 hours PRN Moderate Pain (4 - 6)  HYDROmorphone  Injectable 0.5 milliGRAM(s) IV Push every 6 hours PRN Severe Pain (7 - 10)      Vital Signs Last 24 Hrs  T(C): 36.8 (24 Aug 2017 04:38), Max: 36.8 (23 Aug 2017 21:02)  T(F): 98.3 (24 Aug 2017 04:38), Max: 98.3 (23 Aug 2017 21:02)  HR: 80 (24 Aug 2017 04:38) (50 - 80)  BP: 104/60 (24 Aug 2017 04:38) (96/54 - 111/48)  BP(mean): --  RR: 17 (24 Aug 2017 04:38) (16 - 18)  SpO2: 98% (24 Aug 2017 04:38) (98% - 98%)    Physical Exam:      Constitutional: frail looking  HEENT: NC/AT, EOMI, PERRLA  Neck: supple  Back: no tenderness  Respiratory: clear  Cardiovascular: S1S2 regular, no murmurs  Abdomen: soft, not tender, not distended, positive BS  Genitourinary: deferred  Rectal: deferred  Musculoskeletal: no muscle tenderness, no joint swelling or tenderness  Extremities: right lateral ankle erythema, edema and warmth extending to right lateral foot an right lateral calf area - improving; small ulcer with scant discharge  Neurological: AxOx3, moving all extremities, no focal deficits  Skin: no rashes    Labs:                        9.7    7.2   )-----------( 112      ( 24 Aug 2017 07:12 )             29.4     08-24    137  |  99  |  33<H>  ----------------------------<  104<H>  3.9   |  28  |  3.94<H>    Ca    9.7      24 Aug 2017 07:12  Phos  3.8     08-23    TPro  x   /  Alb  2.6<L>  /  TBili  x   /  DBili  x   /  AST  x   /  ALT  x   /  AlkPhos  x   08-23           Cultures:                         8.9    7.1   )-----------( 110      ( 22 Aug 2017 08:31 )             26.7     08-22    138  |  100  |  37<H>  ----------------------------<  127<H>  3.8   |  30  |  4.36<H>    Ca    9.3      22 Aug 2017 08:31  Phos  5.0     08-21    TPro  x   /  Alb  2.5<L>  /  TBili  x   /  DBili  x   /  AST  x   /  ALT  x   /  AlkPhos  x   08-21       Vancomycin Level, Trough: 7.1 ug/mL (08-21 @ 16:10)      Cultures:                         11.1   10.0  )-----------( 116      ( 17 Aug 2017 17:27 )             34.6     08-17    138  |  101  |  48<H>  ----------------------------<  133<H>  4.3   |  26  |  4.75<H>    Ca    9.6      17 Aug 2017 17:27    TPro  7.2  /  Alb  3.5  /  TBili  0.8  /  DBili  x   /  AST  21  /  ALT  27  /  AlkPhos  94  08-17     LIVER FUNCTIONS - ( 17 Aug 2017 17:27 )  Alb: 3.5 g/dL / Pro: 7.2 gm/dL / ALK PHOS: 94 U/L / ALT: 27 U/L / AST: 21 U/L / GGT: x           Culture - Blood (08.17.17 @ 17:27)    Specimen Source: .Blood None    Culture Results:   No growth to date.          Radiology:    Advanced directives addressed: full resuscitation

## 2017-08-24 NOTE — PROGRESS NOTE ADULT - SUBJECTIVE AND OBJECTIVE BOX
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       . Initially wound was a scab with surrounding erythema. Patient follows with Dr. Quintero (Wound Care) at [facility] and underwent a wound debridement on 08/15/17. Wound culture and bone culture were obtained at the time of procedure. Patient was not treated with antibiotics and wife reports that prelim culture is NGTD.         Post-procedure patient, reports extension of the erythema up the lower extremity and down the foot, increasing pain, and swelling of the right ankle. Patient contacted ID who ordered a CT of the lower extremities and Lower Extremity Dopplers. Currently denies any fever, chill, night sweats. Came to [facility] for further evaluation.    SUBJECTIVE: Patient seen and examined this AM. Cellulitis has worsened. Patient states he went to the bathroom and upon returning to bed he noticed his right leg was more erythematous. However, vitals remain within normal limits and he is hemodynamically stable.    8/21 Pt was seen and examed at bedside, states the leg feels better. No more progression of LLE cellulitis, stable. Ecchymosis of LLE stable. Denies of any fever, chills, CP, SOB, palpitation. No overnight event. Patient's INR climbed to 7.77 at which time HD refused him (due to high INR). Patient was reversed with FFP and vitamin K 5 mg PO.      8/22 Pt was seen and examined. States his leg feels better. INR became subtherapeutic to 1.84. Will start Coumadin 2.5 mg tonight.     8/23 Pt was seen and examined. States pain in his leg upon ambulation. Started on Coumadin 1 mg yesterday, instead of 2.5 mg. INR in therapeutic range. Pending discharge with home HD set up for Friday, however, wife wants pain addressed further prior to discharge.    8/24 Pt was seen and examed at bedside. Pt states he has pain at his ulcer site upon ambulation. Percoset has been helping with pain. Stable to discharge today.     REVIEW OF SYSTEMS:    CONSTITUTIONAL: No weakness, fevers or chills  EYES/ENT: No visual changes;  No vertigo or throat pain   NECK: No pain or stiffness  RESPIRATORY: No cough, wheezing, hemoptysis; No shortness of breath  CARDIOVASCULAR: No chest pain or palpitations  GASTROINTESTINAL: No abdominal or epigastric pain. No nausea, vomiting, or hematemesis; No diarrhea or constipation. No melena or hematochezia.  GENITOURINARY: No dysuria, frequency or hematuria  NEUROLOGICAL: Pain in the right lower extremity; No numbness or weakness  SKIN: No itching, burning, rashes, or lesions   All other review of systems is negative unless indicated above        PHYSICAL EXAM:  Vital Signs Last 24 Hrs  T(C): 36.8 (24 Aug 2017 11:39), Max: 36.8 (23 Aug 2017 21:02)  T(F): 98.2 (24 Aug 2017 11:39), Max: 98.3 (23 Aug 2017 21:02)  HR: 74 (24 Aug 2017 11:39) (50 - 80)  BP: 104/62 (24 Aug 2017 11:39) (96/54 - 104/62)  BP(mean): --  RR: 20 (24 Aug 2017 11:39) (17 - 20)  SpO2: 99% (24 Aug 2017 11:39) (98% - 99%)    Constitutional: NAD, awake and alert, well-developed  HEENT: PERR, EOMI, Normal Hearing, MMM  Neck: Soft and supple, No LAD, No JVD  Respiratory: Breath sounds are clear bilaterally, No wheezing, rales or rhonchi  Cardiovascular: S1 and S2, regular rate and rhythm, no Murmurs, gallops or rubs  Gastrointestinal: Bowel Sounds present, soft, nontender, nondistended, no guarding, no rebound  Extremities: No peripheral edema  Vascular: 2+ peripheral pulses  Neurological: A/O x 3, no focal deficits  Musculoskeletal: 5/5 strength b/l upper and lower extremities  Skin: Ulcer on the right lateral ankle with hyperpigmentation around the wound, decreased erythema and warmth up the right lower extremity    MEDICATIONS:  MEDICATIONS  (STANDING):  predniSONE   Tablet 5 milliGRAM(s) Oral daily  amiodarone    Tablet 100 milliGRAM(s) Oral daily  losartan 25 milliGRAM(s) Oral daily  carvedilol 6.25 milliGRAM(s) Oral at bedtime  cinacalcet 30 milliGRAM(s) Oral <User Schedule>  sevelamer hydrochloride 2400 milliGRAM(s) Oral three times a day with meals  carvedilol 3.125 milliGRAM(s) Oral daily  docusate sodium 100 milliGRAM(s) Oral three times a day  warfarin 2.5 milliGRAM(s) Oral daily  vancomycin    Solution 125 milliGRAM(s) Oral every 6 hours  cefTAZidime  IVPB 2 Gram(s) IV Intermittent <User Schedule>  vancomycin  IVPB 500 milliGRAM(s) IV Intermittent <User Schedule>  doxercalciferol Injectable 1 MICROGram(s) IV Push <User Schedule>  epoetin amee Injectable 29209 Unit(s) IV Push <User Schedule>      Lab Results:                                            9.7                   Neurophils% (auto):   x      (08-24 @ 07:12):    7.2  )-----------(112          Lymphocytes% (auto):  x                                             29.4                   Eosinphils% (auto):   x        Manual%: Neutrophils x    ; Lymphocytes x    ; Eosinophils x    ; Bands%: x    ; Blasts x         Differential:	[] Automated		[] Manual    08-24    137  |  99  |  33<H>  ----------------------------<  104<H>  3.9   |  28  |  3.94<H>    Ca    9.7      24 Aug 2017 07:12  Phos  3.8     08-23    TPro  x   /  Alb  2.6<L>  /  TBili  x   /  DBili  x   /  AST  x   /  ALT  x   /  AlkPhos  x   08-23    PT/INR - ( 24 Aug 2017 09:40 )   PT: 27.3 sec;   INR: 2.48 ratio         PTT - ( 24 Aug 2017 09:40 )  PTT:36.3 sec        Blood Culture: no growth    RADIOLOGY/EKG: pt had radiology done outpatient- uploaded to [email or web address]    < from: US Duplex Arterial Lower Ext Ltd, Right (08.22.17 @ 11:41) >  EXAM:  US DPLX LWR EXT ARTS LTD RT                            PROCEDURE DATE:  08/22/2017          INTERPRETATION:  EXAMINATION DATE: August 22, 2017 at 1119 hours.  CLINICAL INFORMATION: Nonhealing osteomyelitis.    TECHNIQUE: Sonographic evaluation of the right lower extremity arteries   was performed.     FINDINGS: The common femoral, superficial femoral, popliteal, peroneal,   posterior tibial arteries are patent and demonstrate normal color-flow   and triphasic wave. Right anterior tibial arteries are well-seen.   Biphasic waveforms in the dorsalis pedis artery. There is no evidence for   stenosis or thrombosis.    IMPRESSION: No evidence of significant arterial occlusive disease of the   right lower extremity.    < end of copied text >    DVT PPX: resume coumadin (8/22)    ADVANCED DIRECTIVE:    DISPOSITION:

## 2017-08-24 NOTE — PROGRESS NOTE ADULT - SUBJECTIVE AND OBJECTIVE BOX
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       . Initially wound was a scab with surrounding erythema. Patient follows with Dr. Quintero (Wound Care) at [facility] and underwent a wound debridement on 08/15/17. Wound culture and bone culture were obtained at the time of procedure. Patient was not treated with antibiotics and wife reports that prelim culture is NGTD.         Post-procedure patient, reports extension of the erythema up the lower extremity and down the foot, increasing pain, and swelling of the right ankle. Patient contacted ID who ordered a CT of the lower extremities and Lower Extremity Dopplers. Currently denies any fever, chill, night sweats. Came to [facility] for further evaluation.  Radiology results are pending. (17 Aug 2017 21:07)      INR on admission 2.43, normal coumadin dose is 2.5 mg,  received coumadin 2.5 mg po x 2 dose, started on vancomycin and fortaz IVPB  Inr inc to > 7.0, received 2 units FFP and 5 mg of Vit K to reverse INR    Patient is a 62y old  Male who presents with a chief complaint of CC: Right Lateral Ankle Pain (17 Aug 2017 21:07)      Consulted by Dr. Riojas   for VTE prophylaxis, risk stratification, and anticoagulation management.    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 Right anterior tibial arteries are well-seen.   Biphasic waveforms in the dorsalis pedis artery. There is no evidence for   stenosis or thrombosis.    IMPRESSION: No evidence of significant arterial occlusive disease of the   right lower extremity.      CrCl: 15.9    IMPROVE VTE Risk Score: #2    IMPROVE Bleeding Risk Score #5    Falls Risk:   High ( x )  Mod (  )  Low (  )      FAMILY HISTORY:  Family history of kidney disease (Mother)  Family history of colon cancer (Sibling)    Denies any personal or familial history of clotting or bleeding disorders.    Allergies    No Known Allergies    Intolerances        REVIEW OF SYSTEMS    (  )Fever	     (  )Constipation	(  )SOB				(  )Headache	(  )Dysuria  (  )Chills	     (  )Melena	(  )Dyspnea present on exertion	                    (  )Dizziness                    (  )Polyuria  (  )Nausea	     (  )Hematochezia	(  )Cough			                    (  )Syncope   	(  )Hematuria  (  )Vomiting    (  )Chest Pain	(  )Wheezing			(  )Weakness  (  )Diarrhea     (  )Palpitations	(  )Anorexia			(  )Myalgia    All other review of systems negative: Yes          PHYSICAL EXAM:    Constitutional: Appears Well    Neurological: A& O x 3    Skin: Warm    Respiratory and Thorax: normal effort; Breath sounds: normal; No rales/wheezing/rhonchi  	  Cardiovascular: S1, S2, regular, NMBR	    Gastrointestinal: BS + x 4Q, nontender	    Genitourinary:  Bladder nondistended, nontender    Musculoskeletal:   General Right:   no muscle/joint tenderness,   normal tone, no joint swelling,   ROM: limited  erythmic from ankle laterally to 1/2 way up leg	    General Left:   no muscle/joint tenderness,   normal tone, no joint swelling,   ROM: full      Lower extrems:   Right: no calf tenderness              negative dustin's sign               + pedal pulses    Left:   no calf tenderness              negative dustin's sign               + pedal pulses                           8.8    7.7   )-----------( 112      ( 23 Aug 2017 08:30 )             27.2       08-23    135  |  99  |  50<H>  ----------------------------<  162<H>  4.2   |  25  |  5.60<H>    Ca    9.0      23 Aug 2017 08:30  Phos  3.8     08-23    TPro  x   /  Alb  2.6<L>  /  TBili  x   /  DBili  x   /  AST  x   /  ALT  x   /  AlkPhos  x   08-23      PT/INR - ( 23 Aug 2017 08:30 )   PT: 23.4 sec;   INR: 2.13 ratio       PT/INR - ( 22 Aug 2017 08:31 )   PT: 20.1 sec;   INR: 1.84 ratio         MEDICATIONS  (STANDING):  predniSONE   Tablet 5 milliGRAM(s) Oral daily  amiodarone    Tablet 100 milliGRAM(s) Oral daily  carvedilol 6.25 milliGRAM(s) Oral at bedtime  cinacalcet 30 milliGRAM(s) Oral <User Schedule>  sevelamer hydrochloride 2400 milliGRAM(s) Oral three times a day with meals  carvedilol 3.125 milliGRAM(s) Oral daily  docusate sodium 100 milliGRAM(s) Oral three times a day  vancomycin    Solution 125 milliGRAM(s) Oral every 6 hours  cefTAZidime  IVPB 2 Gram(s) IV Intermittent <User Schedule>  vancomycin  IVPB 500 milliGRAM(s) IV Intermittent <User Schedule>  doxercalciferol Injectable 1 MICROGram(s) IV Push <User Schedule>  epoetin amee Injectable 72612 Unit(s) IV Push <User Schedule>  warfarin 1 milliGRAM(s) Oral daily      DVT Prophylaxis:  LMWH                   (  )  Heparin SQ           (  )  Coumadin             (x  )  Xarelto                  (  )  Eliquis                   (  )  Venodynes           (  )  Ambulation          ( x )  UFH                       (  )  Contraindicated  (  ) HPI  HPI:  61 year old male with past medical history of HTN, PVD, PCKD, s/p Nephrectomy and 3x Renal Transplant (1989, 1995, 1997), ESRD on HD (M,W,F), CAD s/p Stent (2007), CHF (reported EF<20%) s/p AICD, Adrenal Insufficiency, 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       . Initially wound was a scab with surrounding erythema. Patient follows with Dr. Quintero (Wound Care) at [facility] and underwent a wound debridement on 08/15/17. Wound culture and bone culture were obtained at the time of procedure. Patient was not treated with antibiotics and wife reports that prelim culture is NGTD.         Post-procedure patient, reports extension of the erythema up the lower extremity and down the foot, increasing pain, and swelling of the right ankle. Patient contacted ID who ordered a CT of the lower extremities and Lower Extremity Dopplers. Currently denies any fever, chill, night sweats. Came to [facility] for further evaluation.  Radiology results are pending. (17 Aug 2017 21:07)      INR on admission 2.43, normal coumadin dose is 2.5 mg,  received coumadin 2.5 mg po x 2 dose, started on vancomycin and fortaz IVPB  Inr inc to > 7.0, received 2 units FFP and 5 mg of Vit K to reverse INR    Patient is a 62y old  Male who presents with a chief complaint of CC: Right Lateral Ankle Pain (17 Aug 2017 21:07)      Consulted by Dr. Riojas   for VTE prophylaxis, risk stratification, and anticoagulation management.    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 Right anterior tibial arteries are well-seen.   Biphasic waveforms in the dorsalis pedis artery. There is no evidence for   stenosis or thrombosis.    IMPRESSION: No evidence of significant arterial occlusive disease of the   right lower extremity.      CrCl: 15.9    IMPROVE VTE Risk Score: #2    IMPROVE Bleeding Risk Score #5    Falls Risk:   High ( x )  Mod (  )  Low (  )      FAMILY HISTORY:  Family history of kidney disease (Mother)  Family history of colon cancer (Sibling)    Denies any personal or familial history of clotting or bleeding disorders.    Allergies    No Known Allergies    Intolerances        REVIEW OF SYSTEMS    (  )Fever	     (  )Constipation	(  )SOB				(  )Headache	(  )Dysuria  (  )Chills	     (  )Melena	(  )Dyspnea present on exertion	                    (  )Dizziness                    (  )Polyuria  (  )Nausea	     (  )Hematochezia	(  )Cough			                    (  )Syncope   	(  )Hematuria  (  )Vomiting    (  )Chest Pain	(  )Wheezing			(  )Weakness  (  )Diarrhea     (  )Palpitations	(  )Anorexia			(  )Myalgia    All other review of systems negative: Yes          PHYSICAL EXAM:    Constitutional: Appears Well    Neurological: A& O x 3    Skin: Warm    Respiratory and Thorax: normal effort; Breath sounds: normal; No rales/wheezing/rhonchi  	  Cardiovascular: S1, S2, regular, NMBR	    Gastrointestinal: BS + x 4Q, nontender	    Genitourinary:  Bladder nondistended, nontender    Musculoskeletal:   General Right:   no muscle/joint tenderness,   normal tone, no joint swelling,   ROM: limited  erythmic from ankle laterally to 1/2 way up leg	    General Left:   no muscle/joint tenderness,   normal tone, no joint swelling,   ROM: full      Lower extrems:   Right: no calf tenderness              negative dustin's sign               + pedal pulses    Left:   no calf tenderness              negative dustin's sign               + pedal pulses                          9.7    7.2   )-----------( 112      ( 24 Aug 2017 07:12 )             29.4       08-24    137  |  99  |  33<H>  ----------------------------<  104<H>  3.9   |  28  |  3.94<H>    Ca    9.7      24 Aug 2017 07:12  Phos  3.8     08-23    TPro  x   /  Alb  2.6<L>  /  TBili  x   /  DBili  x   /  AST  x   /  ALT  x   /  AlkPhos  x   08-23           PTT - ( 24 Aug 2017 09:40 )  PTT:36.3 sec                       8.8    7.7   )-----------( 112      ( 23 Aug 2017 08:30 )             27.2       08-23    135  |  99  |  50<H>  ----------------------------<  162<H>  4.2   |  25  |  5.60<H>    Ca    9.0      23 Aug 2017 08:30  Phos  3.8     08-23    TPro  x   /  Alb  2.6<L>  /  TBili  x   /  DBili  x   /  AST  x   /  ALT  x   /  AlkPhos  x   08-23    PT/INR - ( 24 Aug 2017 09:40 )   PT: 27.3 sec;   INR: 2.48 ratio      PT/INR - ( 23 Aug 2017 08:30 )   PT: 23.4 sec;   INR: 2.13 ratio       PT/INR - ( 22 Aug 2017 08:31 )   PT: 20.1 sec;   INR: 1.84 ratio       MEDICATIONS  (STANDING):  predniSONE   Tablet 5 milliGRAM(s) Oral daily  amiodarone    Tablet 100 milliGRAM(s) Oral daily  carvedilol 6.25 milliGRAM(s) Oral at bedtime  cinacalcet 30 milliGRAM(s) Oral <User Schedule>  sevelamer hydrochloride 2400 milliGRAM(s) Oral three times a day with meals  carvedilol 3.125 milliGRAM(s) Oral daily  docusate sodium 100 milliGRAM(s) Oral three times a day  vancomycin    Solution 125 milliGRAM(s) Oral every 6 hours  cefTAZidime  IVPB 2 Gram(s) IV Intermittent <User Schedule>  vancomycin  IVPB 500 milliGRAM(s) IV Intermittent <User Schedule>  doxercalciferol Injectable 1 MICROGram(s) IV Push <User Schedule>  epoetin amee Injectable 18063 Unit(s) IV Push <User Schedule>  warfarin 1 milliGRAM(s) Oral daily    DVT Prophylaxis:  LMWH                   (  )  Heparin SQ           (  )  Coumadin             (x  )  Xarelto                  (  )  Eliquis                   (  )  Venodynes           (  )  Ambulation          ( x )  UFH                       (  )  Contraindicated  (  )

## 2017-08-24 NOTE — PROGRESS NOTE ADULT - PROBLEM SELECTOR PLAN 1
-pending discharge  - ID: Fortaz 2 gr q hd and vanco 500 mg qhd ordered for pt for HD (for 6 weeks)  -vancomycin 500 mg IV qHD and ceftazidime 2 gm IV q HD  -vancomycin 125 mg PO q6h for CDAD prophylaxis  -pt found to have osteomyelitis (wife called Wound Care center)  - appreciate wound Care recs: change foam dressing every 2 days  - Pain meds prn  -will change foam dressing q2 days at home by self.   -follow up with ID Dr. Srivastava within 1 week   -follow up with PCP within 1 week

## 2017-08-24 NOTE — PROGRESS NOTE ADULT - PROBLEM SELECTOR PLAN 3
- s/p AICD  - Continue with BB, ARB  - Continue with Amiodarone  - HD for fluid management.  -abx set up for 6 weeks for OM.

## 2017-08-24 NOTE — PHYSICAL THERAPY INITIAL EVALUATION ADULT - GENERAL OBSERVATIONS, REHAB EVAL
Pt. received supine in bed on w/ RLE elevated and bandage on lateral ankle. R ankle swollen. Pt. reports swelling and pain has decreased since admission. Pt. denies falling.

## 2017-08-24 NOTE — PROGRESS NOTE ADULT - SUBJECTIVE AND OBJECTIVE BOX
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), Hx of CDiff, Hx of SVT, presenting with Right Lateral Ankle Pain, Swelling, Redness x 3 months that dramatically increased over past 24-48 hours prior to admission     PHYSICAL EXAM:  Vital Signs Last 24 Hrs  T(C): 36.8 (24 Aug 2017 11:39), Max: 36.8 (23 Aug 2017 21:02)  T(F): 98.2 (24 Aug 2017 11:39), Max: 98.3 (23 Aug 2017 21:02)  HR: 74 (24 Aug 2017 11:39) (50 - 80)  BP: 104/62 (24 Aug 2017 11:39) (96/54 - 104/62)  BP(mean): --  RR: 20 (24 Aug 2017 11:39) (17 - 20)  SpO2: 99% (24 Aug 2017 11:39) (98% - 99%)  general- comfortable   -rs-aeeb,cta  -cvs-s1s2 normal   -p/a-soft,bs+  -extremity- no asymmetrical swelling noted, ulcer and redness noted on leg   -cns- non focal         A/P    #cellulitis and underlying OM -clinically -abx during HD to complete course of abx     #d/c today     #time spent more than 30 minutes

## 2017-08-24 NOTE — PHYSICAL THERAPY INITIAL EVALUATION ADULT - ACTIVE RANGE OF MOTION EXAMINATION, REHAB EVAL
mirta. upper extremity Active ROM was WNL (within normal limits)/bilateral lower extremity Active ROM was WNL (within normal limits)

## 2017-08-24 NOTE — PROGRESS NOTE ADULT - ASSESSMENT
62 yo male with H/O CHF with EF < 20%, PVD, CAD with stents, AICD, SVT, on coumadin , developed OM of Right ankle, supratherapeutic INR due to antibiotics. reversed with 2 units FFP and Vit K 5mg po, INR now 1.84      Plan:  ::Continue Coumadin 1 mg po daily, adjust per INR  ::Daily Pt/INR, CBC, BMP  :;Enc ambulation    Will continue to follow. 60 yo male with H/O CHF with EF < 20%, PVD, CAD with stents, AICD, SVT, on coumadin , developed OM of Right ankle, supratherapeutic INR due to antibiotics. reversed with 2 units FFP and Vit K 5mg po, INR now 1.84      Plan: going home today  ::Continue Coumadin 1 mg po daily, adjust per INR will see dr Coello on Monday, aware that he will need frequent INR monitoring due to home IVABS  script for coumadin electronically sent to pharmacy  ::Daily Pt/INR, CBC, BMP  :;Enc ambulation    Will continue to follow.

## 2017-08-29 DIAGNOSIS — T45.515A ADVERSE EFFECT OF ANTICOAGULANTS, INITIAL ENCOUNTER: ICD-10-CM

## 2017-08-29 DIAGNOSIS — M86.8X7 OTHER OSTEOMYELITIS, ANKLE AND FOOT: ICD-10-CM

## 2017-08-29 DIAGNOSIS — I47.1 SUPRAVENTRICULAR TACHYCARDIA: ICD-10-CM

## 2017-08-29 DIAGNOSIS — E03.9 HYPOTHYROIDISM, UNSPECIFIED: ICD-10-CM

## 2017-08-29 DIAGNOSIS — Z95.810 PRESENCE OF AUTOMATIC (IMPLANTABLE) CARDIAC DEFIBRILLATOR: ICD-10-CM

## 2017-08-29 DIAGNOSIS — I50.20 UNSPECIFIED SYSTOLIC (CONGESTIVE) HEART FAILURE: ICD-10-CM

## 2017-08-29 DIAGNOSIS — L03.115 CELLULITIS OF RIGHT LOWER LIMB: ICD-10-CM

## 2017-08-29 DIAGNOSIS — I73.9 PERIPHERAL VASCULAR DISEASE, UNSPECIFIED: ICD-10-CM

## 2017-08-29 DIAGNOSIS — E27.3 DRUG-INDUCED ADRENOCORTICAL INSUFFICIENCY: ICD-10-CM

## 2017-08-29 DIAGNOSIS — T86.12 KIDNEY TRANSPLANT FAILURE: ICD-10-CM

## 2017-08-29 DIAGNOSIS — N18.6 END STAGE RENAL DISEASE: ICD-10-CM

## 2017-08-29 DIAGNOSIS — Q61.3 POLYCYSTIC KIDNEY, UNSPECIFIED: ICD-10-CM

## 2017-08-29 DIAGNOSIS — Z99.2 DEPENDENCE ON RENAL DIALYSIS: ICD-10-CM

## 2017-08-29 DIAGNOSIS — I13.2 HYPERTENSIVE HEART AND CHRONIC KIDNEY DISEASE WITH HEART FAILURE AND WITH STAGE 5 CHRONIC KIDNEY DISEASE, OR END STAGE RENAL DISEASE: ICD-10-CM

## 2017-08-29 DIAGNOSIS — I25.10 ATHEROSCLEROTIC HEART DISEASE OF NATIVE CORONARY ARTERY WITHOUT ANGINA PECTORIS: ICD-10-CM

## 2017-08-29 DIAGNOSIS — R79.1 ABNORMAL COAGULATION PROFILE: ICD-10-CM

## 2017-08-29 DIAGNOSIS — T38.0X5A ADVERSE EFFECT OF GLUCOCORTICOIDS AND SYNTHETIC ANALOGUES, INITIAL ENCOUNTER: ICD-10-CM

## 2017-08-29 DIAGNOSIS — D64.9 ANEMIA, UNSPECIFIED: ICD-10-CM
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
## 2017-08-31 DIAGNOSIS — M86.9 OSTEOMYELITIS, UNSPECIFIED: ICD-10-CM

## 2017-09-02 PROBLEM — Z86.79 HISTORY OF LEFT BUNDLE BRANCH BLOCK (LBBB): Status: RESOLVED | Noted: 2017-03-01 | Resolved: 2017-09-02

## 2017-09-02 PROBLEM — I47.2 WIDE-COMPLEX TACHYCARDIA: Status: RESOLVED | Noted: 2017-03-01 | Resolved: 2017-09-02

## 2017-10-03 ENCOUNTER — APPOINTMENT (OUTPATIENT)
Dept: ELECTROPHYSIOLOGY | Facility: CLINIC | Age: 62
End: 2017-10-03
Payer: MEDICARE

## 2017-10-03 PROBLEM — N18.6 END STAGE RENAL DISEASE: Chronic | Status: ACTIVE | Noted: 2017-08-17

## 2017-10-03 PROCEDURE — 93290 INTERROG DEV EVAL ICPMS IP: CPT | Mod: 26

## 2017-10-03 PROCEDURE — 93284 PRGRMG EVAL IMPLANTABLE DFB: CPT

## 2017-10-23 ENCOUNTER — APPOINTMENT (OUTPATIENT)
Dept: ELECTROPHYSIOLOGY | Facility: CLINIC | Age: 62
End: 2017-10-23
Payer: MEDICARE

## 2017-10-23 PROCEDURE — 93296 REM INTERROG EVL PM/IDS: CPT

## 2017-10-23 PROCEDURE — 93295 DEV INTERROG REMOTE 1/2/MLT: CPT

## 2017-10-23 PROCEDURE — 93297 REM INTERROG DEV EVAL ICPMS: CPT

## 2017-11-08 ENCOUNTER — APPOINTMENT (OUTPATIENT)
Dept: ELECTROPHYSIOLOGY | Facility: CLINIC | Age: 62
End: 2017-11-08
Payer: MEDICARE

## 2017-11-08 VITALS
WEIGHT: 142 LBS | HEART RATE: 56 BPM | HEIGHT: 72 IN | BODY MASS INDEX: 19.23 KG/M2 | SYSTOLIC BLOOD PRESSURE: 100 MMHG | DIASTOLIC BLOOD PRESSURE: 56 MMHG

## 2017-11-08 PROCEDURE — 93284 PRGRMG EVAL IMPLANTABLE DFB: CPT

## 2017-11-08 PROCEDURE — 93290 INTERROG DEV EVAL ICPMS IP: CPT | Mod: 26

## 2017-12-11 ENCOUNTER — APPOINTMENT (OUTPATIENT)
Dept: ELECTROPHYSIOLOGY | Facility: CLINIC | Age: 62
End: 2017-12-11
Payer: MEDICARE

## 2017-12-11 PROCEDURE — 93295 DEV INTERROG REMOTE 1/2/MLT: CPT

## 2017-12-11 PROCEDURE — 93296 REM INTERROG EVL PM/IDS: CPT

## 2017-12-11 PROCEDURE — 93297 REM INTERROG DEV EVAL ICPMS: CPT

## 2017-12-12 ENCOUNTER — APPOINTMENT (OUTPATIENT)
Dept: ELECTROPHYSIOLOGY | Facility: CLINIC | Age: 62
End: 2017-12-12
Payer: MEDICARE

## 2017-12-12 VITALS
HEIGHT: 72 IN | SYSTOLIC BLOOD PRESSURE: 103 MMHG | HEART RATE: 69 BPM | OXYGEN SATURATION: 97 % | DIASTOLIC BLOOD PRESSURE: 56 MMHG | BODY MASS INDEX: 19.23 KG/M2 | WEIGHT: 142 LBS

## 2017-12-12 PROCEDURE — 93290 INTERROG DEV EVAL ICPMS IP: CPT | Mod: 26

## 2017-12-12 PROCEDURE — 93284 PRGRMG EVAL IMPLANTABLE DFB: CPT

## 2018-01-01 ENCOUNTER — INPATIENT (INPATIENT)
Facility: HOSPITAL | Age: 63
LOS: 4 days | Discharge: HOME | End: 2018-11-26
Attending: FAMILY MEDICINE | Admitting: FAMILY MEDICINE
Payer: MEDICARE

## 2018-01-01 ENCOUNTER — TRANSCRIPTION ENCOUNTER (OUTPATIENT)
Age: 63
End: 2018-01-01

## 2018-01-01 ENCOUNTER — EMERGENCY (EMERGENCY)
Facility: HOSPITAL | Age: 63
LOS: 0 days | Discharge: HOME | End: 2018-10-17
Attending: EMERGENCY MEDICINE | Admitting: EMERGENCY MEDICINE
Payer: MEDICARE

## 2018-01-01 ENCOUNTER — APPOINTMENT (OUTPATIENT)
Dept: ELECTROPHYSIOLOGY | Facility: CLINIC | Age: 63
End: 2018-01-01
Payer: MEDICARE

## 2018-01-01 ENCOUNTER — INPATIENT (INPATIENT)
Facility: HOSPITAL | Age: 63
LOS: 7 days | Discharge: HOME | End: 2018-12-13
Attending: INTERNAL MEDICINE | Admitting: INTERNAL MEDICINE
Payer: MEDICARE

## 2018-01-01 ENCOUNTER — INPATIENT (INPATIENT)
Facility: HOSPITAL | Age: 63
LOS: 5 days | Discharge: HOME | End: 2018-12-27
Attending: HOSPITALIST | Admitting: HOSPITALIST
Payer: MEDICARE

## 2018-01-01 ENCOUNTER — APPOINTMENT (OUTPATIENT)
Dept: ELECTROPHYSIOLOGY | Facility: CLINIC | Age: 63
End: 2018-01-01

## 2018-01-01 VITALS
BODY MASS INDEX: 18.96 KG/M2 | HEART RATE: 68 BPM | DIASTOLIC BLOOD PRESSURE: 70 MMHG | WEIGHT: 140 LBS | HEIGHT: 72 IN | SYSTOLIC BLOOD PRESSURE: 113 MMHG

## 2018-01-01 VITALS — HEIGHT: 72 IN | WEIGHT: 139.99 LBS

## 2018-01-01 VITALS — HEIGHT: 72 IN | WEIGHT: 141.98 LBS

## 2018-01-01 VITALS
SYSTOLIC BLOOD PRESSURE: 128 MMHG | DIASTOLIC BLOOD PRESSURE: 96 MMHG | TEMPERATURE: 98 F | HEART RATE: 77 BPM | OXYGEN SATURATION: 98 %

## 2018-01-01 VITALS
SYSTOLIC BLOOD PRESSURE: 96 MMHG | HEART RATE: 70 BPM | DIASTOLIC BLOOD PRESSURE: 54 MMHG | TEMPERATURE: 97 F | RESPIRATION RATE: 18 BRPM | OXYGEN SATURATION: 97 %

## 2018-01-01 VITALS — HEIGHT: 72 IN | WEIGHT: 141.1 LBS

## 2018-01-01 VITALS — WEIGHT: 145.06 LBS

## 2018-01-01 VITALS — WEIGHT: 139.99 LBS | HEIGHT: 72 IN

## 2018-01-01 VITALS — WEIGHT: 146.61 LBS

## 2018-01-01 DIAGNOSIS — K83.8 OTHER SPECIFIED DISEASES OF BILIARY TRACT: ICD-10-CM

## 2018-01-01 DIAGNOSIS — Z41.9 ENCOUNTER FOR PROCEDURE FOR PURPOSES OTHER THAN REMEDYING HEALTH STATE, UNSPECIFIED: Chronic | ICD-10-CM
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
## 2018-01-01 DIAGNOSIS — Z98.890 OTHER SPECIFIED POSTPROCEDURAL STATES: Chronic | ICD-10-CM

## 2018-01-01 DIAGNOSIS — I25.10 ATHEROSCLEROTIC HEART DISEASE OF NATIVE CORONARY ARTERY WITHOUT ANGINA PECTORIS: ICD-10-CM

## 2018-01-01 DIAGNOSIS — E20.9 HYPOPARATHYROIDISM, UNSPECIFIED: ICD-10-CM

## 2018-01-01 DIAGNOSIS — Z99.2 DEPENDENCE ON RENAL DIALYSIS: ICD-10-CM

## 2018-01-01 DIAGNOSIS — N18.6 END STAGE RENAL DISEASE: ICD-10-CM

## 2018-01-01 DIAGNOSIS — I50.22 CHRONIC SYSTOLIC (CONGESTIVE) HEART FAILURE: ICD-10-CM

## 2018-01-01 DIAGNOSIS — I48.91 UNSPECIFIED ATRIAL FIBRILLATION: ICD-10-CM

## 2018-01-01 DIAGNOSIS — R79.1 ABNORMAL COAGULATION PROFILE: ICD-10-CM

## 2018-01-01 DIAGNOSIS — E83.51 HYPOCALCEMIA: ICD-10-CM

## 2018-01-01 DIAGNOSIS — R10.9 UNSPECIFIED ABDOMINAL PAIN: ICD-10-CM

## 2018-01-01 DIAGNOSIS — R10.13 EPIGASTRIC PAIN: ICD-10-CM

## 2018-01-01 DIAGNOSIS — E88.9 METABOLIC DISORDER, UNSPECIFIED: ICD-10-CM

## 2018-01-01 DIAGNOSIS — D68.9 COAGULATION DEFECT, UNSPECIFIED: ICD-10-CM

## 2018-01-01 DIAGNOSIS — D64.9 ANEMIA, UNSPECIFIED: ICD-10-CM

## 2018-01-01 DIAGNOSIS — I73.9 PERIPHERAL VASCULAR DISEASE, UNSPECIFIED: ICD-10-CM

## 2018-01-01 DIAGNOSIS — E27.40 UNSPECIFIED ADRENOCORTICAL INSUFFICIENCY: ICD-10-CM

## 2018-01-01 DIAGNOSIS — Z87.19 PERSONAL HISTORY OF OTHER DISEASES OF THE DIGESTIVE SYSTEM: ICD-10-CM

## 2018-01-01 DIAGNOSIS — Z95.5 PRESENCE OF CORONARY ANGIOPLASTY IMPLANT AND GRAFT: ICD-10-CM

## 2018-01-01 DIAGNOSIS — E87.6 HYPOKALEMIA: ICD-10-CM

## 2018-01-01 DIAGNOSIS — Z98.61 CORONARY ANGIOPLASTY STATUS: ICD-10-CM

## 2018-01-01 DIAGNOSIS — Z95.810 PRESENCE OF AUTOMATIC (IMPLANTABLE) CARDIAC DEFIBRILLATOR: ICD-10-CM

## 2018-01-01 DIAGNOSIS — K86.89 OTHER SPECIFIED DISEASES OF PANCREAS: ICD-10-CM

## 2018-01-01 DIAGNOSIS — Z94.0 KIDNEY TRANSPLANT STATUS: ICD-10-CM

## 2018-01-01 DIAGNOSIS — Z95.810 PRESENCE OF AUTOMATIC (IMPLANTABLE) CARDIAC DEFIBRILLATOR: Chronic | ICD-10-CM

## 2018-01-01 DIAGNOSIS — Z79.52 LONG TERM (CURRENT) USE OF SYSTEMIC STEROIDS: ICD-10-CM

## 2018-01-01 DIAGNOSIS — D63.1 ANEMIA IN CHRONIC KIDNEY DISEASE: ICD-10-CM

## 2018-01-01 DIAGNOSIS — R18.8 OTHER ASCITES: ICD-10-CM

## 2018-01-01 DIAGNOSIS — Z94.0 KIDNEY TRANSPLANT STATUS: Chronic | ICD-10-CM

## 2018-01-01 DIAGNOSIS — Z79.01 LONG TERM (CURRENT) USE OF ANTICOAGULANTS: ICD-10-CM

## 2018-01-01 DIAGNOSIS — E03.9 HYPOTHYROIDISM, UNSPECIFIED: ICD-10-CM

## 2018-01-01 DIAGNOSIS — I77.0 ARTERIOVENOUS FISTULA, ACQUIRED: Chronic | ICD-10-CM

## 2018-01-01 DIAGNOSIS — R65.21 SEVERE SEPSIS WITH SEPTIC SHOCK: ICD-10-CM

## 2018-01-01 DIAGNOSIS — A41.9 SEPSIS, UNSPECIFIED ORGANISM: ICD-10-CM

## 2018-01-01 DIAGNOSIS — E43 UNSPECIFIED SEVERE PROTEIN-CALORIE MALNUTRITION: ICD-10-CM

## 2018-01-01 DIAGNOSIS — Z95.5 PRESENCE OF CORONARY ANGIOPLASTY IMPLANT AND GRAFT: Chronic | ICD-10-CM

## 2018-01-01 DIAGNOSIS — Z79.2 LONG TERM (CURRENT) USE OF ANTIBIOTICS: ICD-10-CM

## 2018-01-01 DIAGNOSIS — I12.0 HYPERTENSIVE CHRONIC KIDNEY DISEASE WITH STAGE 5 CHRONIC KIDNEY DISEASE OR END STAGE RENAL DISEASE: ICD-10-CM

## 2018-01-01 DIAGNOSIS — I50.9 HEART FAILURE, UNSPECIFIED: ICD-10-CM

## 2018-01-01 DIAGNOSIS — K42.9 UMBILICAL HERNIA WITHOUT OBSTRUCTION OR GANGRENE: ICD-10-CM

## 2018-01-01 DIAGNOSIS — A41.59 OTHER GRAM-NEGATIVE SEPSIS: ICD-10-CM

## 2018-01-01 DIAGNOSIS — I13.2 HYPERTENSIVE HEART AND CHRONIC KIDNEY DISEASE WITH HEART FAILURE AND WITH STAGE 5 CHRONIC KIDNEY DISEASE, OR END STAGE RENAL DISEASE: ICD-10-CM

## 2018-01-01 DIAGNOSIS — K31.89 OTHER DISEASES OF STOMACH AND DUODENUM: ICD-10-CM
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
## 2018-01-01 DIAGNOSIS — R09.81 NASAL CONGESTION: ICD-10-CM

## 2018-01-01 DIAGNOSIS — Q61.3 POLYCYSTIC KIDNEY, UNSPECIFIED: ICD-10-CM

## 2018-01-01 DIAGNOSIS — K74.60 UNSPECIFIED CIRRHOSIS OF LIVER: ICD-10-CM

## 2018-01-01 DIAGNOSIS — J01.81 OTHER ACUTE RECURRENT SINUSITIS: ICD-10-CM

## 2018-01-01 DIAGNOSIS — I42.0 DILATED CARDIOMYOPATHY: ICD-10-CM

## 2018-01-01 DIAGNOSIS — K76.89 OTHER SPECIFIED DISEASES OF LIVER: ICD-10-CM

## 2018-01-01 DIAGNOSIS — K44.9 DIAPHRAGMATIC HERNIA WITHOUT OBSTRUCTION OR GANGRENE: ICD-10-CM

## 2018-01-01 DIAGNOSIS — K76.6 PORTAL HYPERTENSION: ICD-10-CM

## 2018-01-01 DIAGNOSIS — T45.7X5A ADVERSE EFFECT OF ANTICOAGULANT ANTAGONISTS, VITAMIN K AND OTHER COAGULANTS, INITIAL ENCOUNTER: ICD-10-CM

## 2018-01-01 DIAGNOSIS — K80.20 CALCULUS OF GALLBLADDER WITHOUT CHOLECYSTITIS WITHOUT OBSTRUCTION: ICD-10-CM

## 2018-01-01 DIAGNOSIS — S51.812A LACERATION WITHOUT FOREIGN BODY OF LEFT FOREARM, INITIAL ENCOUNTER: ICD-10-CM

## 2018-01-01 DIAGNOSIS — I13.0 HYPERTENSIVE HEART AND CHRONIC KIDNEY DISEASE WITH HEART FAILURE AND STAGE 1 THROUGH STAGE 4 CHRONIC KIDNEY DISEASE, OR UNSPECIFIED CHRONIC KIDNEY DISEASE: ICD-10-CM

## 2018-01-01 DIAGNOSIS — K52.9 NONINFECTIVE GASTROENTERITIS AND COLITIS, UNSPECIFIED: ICD-10-CM

## 2018-01-01 LAB
-  AMIKACIN: SIGNIFICANT CHANGE UP
-  AMPICILLIN/SULBACTAM: SIGNIFICANT CHANGE UP
-  AMPICILLIN: SIGNIFICANT CHANGE UP
-  AZTREONAM: SIGNIFICANT CHANGE UP
-  CEFAZOLIN: SIGNIFICANT CHANGE UP
-  CEFEPIME: SIGNIFICANT CHANGE UP
-  CEFOXITIN: SIGNIFICANT CHANGE UP
-  CEFTRIAXONE: SIGNIFICANT CHANGE UP
-  CIPROFLOXACIN: SIGNIFICANT CHANGE UP
-  ERTAPENEM: SIGNIFICANT CHANGE UP
-  GENTAMICIN: SIGNIFICANT CHANGE UP
-  IMIPENEM: SIGNIFICANT CHANGE UP
-  K. PNEUMONIAE GROUP: SIGNIFICANT CHANGE UP
-  LEVOFLOXACIN: SIGNIFICANT CHANGE UP
-  MEROPENEM: SIGNIFICANT CHANGE UP
-  PIPERACILLIN/TAZOBACTAM: SIGNIFICANT CHANGE UP
-  TOBRAMYCIN: SIGNIFICANT CHANGE UP
-  TRIMETHOPRIM/SULFAMETHOXAZOLE: SIGNIFICANT CHANGE UP
ADD ON TEST-SPECIMEN IN LAB: SIGNIFICANT CHANGE UP
ALBUMIN SERPL ELPH-MCNC: 1.8 G/DL — LOW (ref 3.3–5)
ALBUMIN SERPL ELPH-MCNC: 1.9 G/DL — LOW (ref 3.3–5)
ALBUMIN SERPL ELPH-MCNC: 2 G/DL — LOW (ref 3.3–5)
ALBUMIN SERPL ELPH-MCNC: 2.1 G/DL — LOW (ref 3.3–5)
ALBUMIN SERPL ELPH-MCNC: 2.1 G/DL — LOW (ref 3.3–5)
ALBUMIN SERPL ELPH-MCNC: 2.3 G/DL — LOW (ref 3.3–5)
ALBUMIN SERPL ELPH-MCNC: 2.4 G/DL — LOW (ref 3.3–5)
ALBUMIN SERPL ELPH-MCNC: 2.5 G/DL — LOW (ref 3.3–5)
ALBUMIN SERPL ELPH-MCNC: 2.8 G/DL — LOW (ref 3.3–5)
ALBUMIN SERPL ELPH-MCNC: 2.9 G/DL — LOW (ref 3.3–5)
ALP SERPL-CCNC: 103 U/L — SIGNIFICANT CHANGE UP (ref 40–120)
ALP SERPL-CCNC: 120 U/L — SIGNIFICANT CHANGE UP (ref 40–120)
ALP SERPL-CCNC: 169 U/L — HIGH (ref 40–120)
ALP SERPL-CCNC: 65 U/L — SIGNIFICANT CHANGE UP (ref 40–120)
ALP SERPL-CCNC: 88 U/L — SIGNIFICANT CHANGE UP (ref 40–120)
ALP SERPL-CCNC: 88 U/L — SIGNIFICANT CHANGE UP (ref 40–120)
ALT FLD-CCNC: 13 U/L — SIGNIFICANT CHANGE UP (ref 12–78)
ALT FLD-CCNC: 15 U/L — SIGNIFICANT CHANGE UP (ref 12–78)
ALT FLD-CCNC: 16 U/L — SIGNIFICANT CHANGE UP (ref 12–78)
ALT FLD-CCNC: 18 U/L — SIGNIFICANT CHANGE UP (ref 12–78)
ALT FLD-CCNC: 19 U/L — SIGNIFICANT CHANGE UP (ref 12–78)
ALT FLD-CCNC: 19 U/L — SIGNIFICANT CHANGE UP (ref 12–78)
ANION GAP SERPL CALC-SCNC: 10 MMOL/L — SIGNIFICANT CHANGE UP (ref 5–17)
ANION GAP SERPL CALC-SCNC: 11 MMOL/L — SIGNIFICANT CHANGE UP (ref 5–17)
ANION GAP SERPL CALC-SCNC: 12 MMOL/L — SIGNIFICANT CHANGE UP (ref 5–17)
ANION GAP SERPL CALC-SCNC: 13 MMOL/L — SIGNIFICANT CHANGE UP (ref 5–17)
ANION GAP SERPL CALC-SCNC: 14 MMOL/L — SIGNIFICANT CHANGE UP (ref 5–17)
ANION GAP SERPL CALC-SCNC: 14 MMOL/L — SIGNIFICANT CHANGE UP (ref 5–17)
ANION GAP SERPL CALC-SCNC: 15 MMOL/L — SIGNIFICANT CHANGE UP (ref 5–17)
ANION GAP SERPL CALC-SCNC: 16 MMOL/L — SIGNIFICANT CHANGE UP (ref 5–17)
ANION GAP SERPL CALC-SCNC: 8 MMOL/L — SIGNIFICANT CHANGE UP (ref 5–17)
ANION GAP SERPL CALC-SCNC: 9 MMOL/L — SIGNIFICANT CHANGE UP (ref 5–17)
ANISOCYTOSIS BLD QL: SLIGHT — SIGNIFICANT CHANGE UP
APPEARANCE UR: CLEAR — SIGNIFICANT CHANGE UP
APTT BLD: 26.5 SEC — LOW (ref 27.5–36.3)
APTT BLD: 28.1 SEC — SIGNIFICANT CHANGE UP (ref 27.5–36.3)
APTT BLD: 28.5 SEC — SIGNIFICANT CHANGE UP (ref 27.5–36.3)
APTT BLD: 38.5 SEC — HIGH (ref 27.5–36.3)
APTT BLD: 39.8 SEC — HIGH (ref 27.5–36.3)
APTT BLD: 45.4 SEC — HIGH (ref 27.5–37.4)
APTT BLD: 53.1 SEC — HIGH (ref 27.5–36.3)
AST SERPL-CCNC: 10 U/L — LOW (ref 15–37)
AST SERPL-CCNC: 11 U/L — LOW (ref 15–37)
AST SERPL-CCNC: 13 U/L — LOW (ref 15–37)
AST SERPL-CCNC: 13 U/L — LOW (ref 15–37)
AST SERPL-CCNC: 17 U/L — SIGNIFICANT CHANGE UP (ref 15–37)
AST SERPL-CCNC: 25 U/L — SIGNIFICANT CHANGE UP (ref 15–37)
BACTERIA # UR AUTO: ABNORMAL
BACTERIA # UR AUTO: ABNORMAL
BASO STIPL BLD QL SMEAR: PRESENT — SIGNIFICANT CHANGE UP
BASOPHILS # BLD AUTO: 0 K/UL — SIGNIFICANT CHANGE UP (ref 0–0.2)
BASOPHILS # BLD AUTO: 0.01 K/UL — SIGNIFICANT CHANGE UP (ref 0–0.2)
BASOPHILS # BLD AUTO: 0.02 K/UL — SIGNIFICANT CHANGE UP (ref 0–0.2)
BASOPHILS # BLD AUTO: 0.03 K/UL — SIGNIFICANT CHANGE UP (ref 0–0.2)
BASOPHILS # BLD AUTO: 0.04 K/UL — SIGNIFICANT CHANGE UP (ref 0–0.2)
BASOPHILS NFR BLD AUTO: 0 % — SIGNIFICANT CHANGE UP (ref 0–2)
BASOPHILS NFR BLD AUTO: 0.1 % — SIGNIFICANT CHANGE UP (ref 0–2)
BASOPHILS NFR BLD AUTO: 0.2 % — SIGNIFICANT CHANGE UP (ref 0–2)
BASOPHILS NFR BLD AUTO: 0.4 % — SIGNIFICANT CHANGE UP (ref 0–2)
BASOPHILS NFR BLD AUTO: 0.4 % — SIGNIFICANT CHANGE UP (ref 0–2)
BILIRUB DIRECT SERPL-MCNC: 0.3 MG/DL — HIGH (ref 0–0.2)
BILIRUB DIRECT SERPL-MCNC: 0.3 MG/DL — HIGH (ref 0–0.2)
BILIRUB INDIRECT FLD-MCNC: 0.3 MG/DL — SIGNIFICANT CHANGE UP (ref 0.2–1)
BILIRUB INDIRECT FLD-MCNC: 0.3 MG/DL — SIGNIFICANT CHANGE UP (ref 0.2–1)
BILIRUB SERPL-MCNC: 0.6 MG/DL — SIGNIFICANT CHANGE UP (ref 0.2–1.2)
BILIRUB SERPL-MCNC: 0.9 MG/DL — SIGNIFICANT CHANGE UP (ref 0.2–1.2)
BILIRUB SERPL-MCNC: 1 MG/DL — SIGNIFICANT CHANGE UP (ref 0.2–1.2)
BILIRUB SERPL-MCNC: 1.4 MG/DL — HIGH (ref 0.2–1.2)
BILIRUB UR-MCNC: NEGATIVE — SIGNIFICANT CHANGE UP
BLD GP AB SCN SERPL QL: SIGNIFICANT CHANGE UP
BUN SERPL-MCNC: 14 MG/DL — SIGNIFICANT CHANGE UP (ref 7–23)
BUN SERPL-MCNC: 18 MG/DL — SIGNIFICANT CHANGE UP (ref 7–23)
BUN SERPL-MCNC: 22 MG/DL — SIGNIFICANT CHANGE UP (ref 7–23)
BUN SERPL-MCNC: 23 MG/DL — SIGNIFICANT CHANGE UP (ref 7–23)
BUN SERPL-MCNC: 28 MG/DL — HIGH (ref 7–23)
BUN SERPL-MCNC: 29 MG/DL — HIGH (ref 7–23)
BUN SERPL-MCNC: 33 MG/DL — HIGH (ref 7–23)
BUN SERPL-MCNC: 41 MG/DL — HIGH (ref 7–23)
BUN SERPL-MCNC: 44 MG/DL — HIGH (ref 7–23)
BUN SERPL-MCNC: 44 MG/DL — HIGH (ref 7–23)
BUN SERPL-MCNC: 46 MG/DL — HIGH (ref 7–23)
BUN SERPL-MCNC: 46 MG/DL — HIGH (ref 7–23)
BUN SERPL-MCNC: 47 MG/DL — HIGH (ref 7–23)
BUN SERPL-MCNC: 50 MG/DL — HIGH (ref 7–23)
BUN SERPL-MCNC: 65 MG/DL — HIGH (ref 7–23)
BUN SERPL-MCNC: 67 MG/DL — HIGH (ref 7–23)
BUN SERPL-MCNC: 70 MG/DL — HIGH (ref 7–23)
BUN SERPL-MCNC: 75 MG/DL — HIGH (ref 7–23)
BUN SERPL-MCNC: 80 MG/DL — HIGH (ref 7–23)
BUN SERPL-MCNC: 88 MG/DL — HIGH (ref 7–23)
CA-I BLD-SCNC: 0.97 MMOL/L — LOW (ref 1.12–1.3)
CALCIUM SERPL-MCNC: 7.3 MG/DL — LOW (ref 8.5–10.1)
CALCIUM SERPL-MCNC: 7.6 MG/DL — LOW (ref 8.5–10.1)
CALCIUM SERPL-MCNC: 7.6 MG/DL — LOW (ref 8.5–10.1)
CALCIUM SERPL-MCNC: 7.7 MG/DL — LOW (ref 8.5–10.1)
CALCIUM SERPL-MCNC: 7.8 MG/DL — LOW (ref 8.5–10.1)
CALCIUM SERPL-MCNC: 7.8 MG/DL — LOW (ref 8.5–10.1)
CALCIUM SERPL-MCNC: 8 MG/DL — LOW (ref 8.5–10.1)
CALCIUM SERPL-MCNC: 8.1 MG/DL — LOW (ref 8.5–10.1)
CALCIUM SERPL-MCNC: 8.3 MG/DL — LOW (ref 8.5–10.1)
CALCIUM SERPL-MCNC: 8.4 MG/DL — LOW (ref 8.5–10.1)
CALCIUM SERPL-MCNC: 8.6 MG/DL — SIGNIFICANT CHANGE UP (ref 8.5–10.1)
CHLORIDE SERPL-SCNC: 100 MMOL/L — SIGNIFICANT CHANGE UP (ref 96–108)
CHLORIDE SERPL-SCNC: 100 MMOL/L — SIGNIFICANT CHANGE UP (ref 96–108)
CHLORIDE SERPL-SCNC: 101 MMOL/L — SIGNIFICANT CHANGE UP (ref 96–108)
CHLORIDE SERPL-SCNC: 102 MMOL/L — SIGNIFICANT CHANGE UP (ref 96–108)
CHLORIDE SERPL-SCNC: 103 MMOL/L — SIGNIFICANT CHANGE UP (ref 96–108)
CHLORIDE SERPL-SCNC: 92 MMOL/L — LOW (ref 96–108)
CHLORIDE SERPL-SCNC: 92 MMOL/L — LOW (ref 96–108)
CHLORIDE SERPL-SCNC: 93 MMOL/L — LOW (ref 96–108)
CHLORIDE SERPL-SCNC: 95 MMOL/L — LOW (ref 96–108)
CHLORIDE SERPL-SCNC: 96 MMOL/L — SIGNIFICANT CHANGE UP (ref 96–108)
CHLORIDE SERPL-SCNC: 97 MMOL/L — SIGNIFICANT CHANGE UP (ref 96–108)
CHLORIDE SERPL-SCNC: 98 MMOL/L — SIGNIFICANT CHANGE UP (ref 96–108)
CHLORIDE SERPL-SCNC: 99 MMOL/L — SIGNIFICANT CHANGE UP (ref 96–108)
CK SERPL-CCNC: 34 U/L — SIGNIFICANT CHANGE UP (ref 26–308)
CMV DNA CSF QL NAA+PROBE: SIGNIFICANT CHANGE UP
CMV DNA SPEC NAA+PROBE-LOG#: SIGNIFICANT CHANGE UP LOGIU/ML
CO2 SERPL-SCNC: 23 MMOL/L — SIGNIFICANT CHANGE UP (ref 22–31)
CO2 SERPL-SCNC: 23 MMOL/L — SIGNIFICANT CHANGE UP (ref 22–31)
CO2 SERPL-SCNC: 24 MMOL/L — SIGNIFICANT CHANGE UP (ref 22–31)
CO2 SERPL-SCNC: 26 MMOL/L — SIGNIFICANT CHANGE UP (ref 22–31)
CO2 SERPL-SCNC: 26 MMOL/L — SIGNIFICANT CHANGE UP (ref 22–31)
CO2 SERPL-SCNC: 27 MMOL/L — SIGNIFICANT CHANGE UP (ref 22–31)
CO2 SERPL-SCNC: 27 MMOL/L — SIGNIFICANT CHANGE UP (ref 22–31)
CO2 SERPL-SCNC: 28 MMOL/L — SIGNIFICANT CHANGE UP (ref 22–31)
CO2 SERPL-SCNC: 28 MMOL/L — SIGNIFICANT CHANGE UP (ref 22–31)
CO2 SERPL-SCNC: 30 MMOL/L — SIGNIFICANT CHANGE UP (ref 22–31)
CO2 SERPL-SCNC: 30 MMOL/L — SIGNIFICANT CHANGE UP (ref 22–31)
CO2 SERPL-SCNC: 31 MMOL/L — SIGNIFICANT CHANGE UP (ref 22–31)
CO2 SERPL-SCNC: 31 MMOL/L — SIGNIFICANT CHANGE UP (ref 22–31)
CO2 SERPL-SCNC: 32 MMOL/L — HIGH (ref 22–31)
CO2 SERPL-SCNC: 32 MMOL/L — HIGH (ref 22–31)
CO2 SERPL-SCNC: 34 MMOL/L — HIGH (ref 22–31)
CO2 SERPL-SCNC: 34 MMOL/L — HIGH (ref 22–31)
CO2 SERPL-SCNC: 36 MMOL/L — HIGH (ref 22–31)
COLOR SPEC: YELLOW — SIGNIFICANT CHANGE UP
COMMENT - URINE: SIGNIFICANT CHANGE UP
COMMENT - URINE: SIGNIFICANT CHANGE UP
CREAT SERPL-MCNC: 2.61 MG/DL — HIGH (ref 0.5–1.3)
CREAT SERPL-MCNC: 2.61 MG/DL — HIGH (ref 0.5–1.3)
CREAT SERPL-MCNC: 3.08 MG/DL — HIGH (ref 0.5–1.3)
CREAT SERPL-MCNC: 3.11 MG/DL — HIGH (ref 0.5–1.3)
CREAT SERPL-MCNC: 3.43 MG/DL — HIGH (ref 0.5–1.3)
CREAT SERPL-MCNC: 3.58 MG/DL — HIGH (ref 0.5–1.3)
CREAT SERPL-MCNC: 3.72 MG/DL — HIGH (ref 0.5–1.3)
CREAT SERPL-MCNC: 4.23 MG/DL — HIGH (ref 0.5–1.3)
CREAT SERPL-MCNC: 4.42 MG/DL — HIGH (ref 0.5–1.3)
CREAT SERPL-MCNC: 4.61 MG/DL — HIGH (ref 0.5–1.3)
CREAT SERPL-MCNC: 4.8 MG/DL — HIGH (ref 0.5–1.3)
CREAT SERPL-MCNC: 4.81 MG/DL — HIGH (ref 0.5–1.3)
CREAT SERPL-MCNC: 4.91 MG/DL — HIGH (ref 0.5–1.3)
CREAT SERPL-MCNC: 5.07 MG/DL — HIGH (ref 0.5–1.3)
CREAT SERPL-MCNC: 5.38 MG/DL — HIGH (ref 0.5–1.3)
CREAT SERPL-MCNC: 5.41 MG/DL — HIGH (ref 0.5–1.3)
CREAT SERPL-MCNC: 5.62 MG/DL — HIGH (ref 0.5–1.3)
CREAT SERPL-MCNC: 5.72 MG/DL — HIGH (ref 0.5–1.3)
CREAT SERPL-MCNC: 6.18 MG/DL — HIGH (ref 0.5–1.3)
CREAT SERPL-MCNC: 6.88 MG/DL — HIGH (ref 0.5–1.3)
CRYPTOC AG FLD QL: NEGATIVE — SIGNIFICANT CHANGE UP
CULTURE RESULTS: NO GROWTH — SIGNIFICANT CHANGE UP
CULTURE RESULTS: SIGNIFICANT CHANGE UP
DACRYOCYTES BLD QL SMEAR: SLIGHT — SIGNIFICANT CHANGE UP
DIFF PNL FLD: ABNORMAL
DIFF PNL FLD: NEGATIVE — SIGNIFICANT CHANGE UP
EOSINOPHIL # BLD AUTO: 0 K/UL — SIGNIFICANT CHANGE UP (ref 0–0.5)
EOSINOPHIL # BLD AUTO: 0.02 K/UL — SIGNIFICANT CHANGE UP (ref 0–0.5)
EOSINOPHIL # BLD AUTO: 0.03 K/UL — SIGNIFICANT CHANGE UP (ref 0–0.5)
EOSINOPHIL # BLD AUTO: 0.04 K/UL — SIGNIFICANT CHANGE UP (ref 0–0.5)
EOSINOPHIL # BLD AUTO: 0.05 K/UL — SIGNIFICANT CHANGE UP (ref 0–0.5)
EOSINOPHIL # BLD AUTO: 0.07 K/UL — SIGNIFICANT CHANGE UP (ref 0–0.5)
EOSINOPHIL # BLD AUTO: 0.07 K/UL — SIGNIFICANT CHANGE UP (ref 0–0.5)
EOSINOPHIL NFR BLD AUTO: 0 % — SIGNIFICANT CHANGE UP (ref 0–6)
EOSINOPHIL NFR BLD AUTO: 0.2 % — SIGNIFICANT CHANGE UP (ref 0–6)
EOSINOPHIL NFR BLD AUTO: 0.4 % — SIGNIFICANT CHANGE UP (ref 0–6)
EOSINOPHIL NFR BLD AUTO: 0.6 % — SIGNIFICANT CHANGE UP (ref 0–6)
EOSINOPHIL NFR BLD AUTO: 0.7 % — SIGNIFICANT CHANGE UP (ref 0–6)
EOSINOPHIL NFR BLD AUTO: 0.7 % — SIGNIFICANT CHANGE UP (ref 0–6)
EOSINOPHIL NFR BLD AUTO: 0.8 % — SIGNIFICANT CHANGE UP (ref 0–6)
EPI CELLS # UR: SIGNIFICANT CHANGE UP
FERRITIN SERPL-MCNC: 1486 NG/ML — HIGH (ref 30–400)
GLUCOSE BLDC GLUCOMTR-MCNC: 124 MG/DL — HIGH (ref 70–99)
GLUCOSE BLDC GLUCOMTR-MCNC: 127 MG/DL — HIGH (ref 70–99)
GLUCOSE BLDC GLUCOMTR-MCNC: 128 MG/DL — HIGH (ref 70–99)
GLUCOSE BLDC GLUCOMTR-MCNC: 138 MG/DL — HIGH (ref 70–99)
GLUCOSE BLDC GLUCOMTR-MCNC: 149 MG/DL — HIGH (ref 70–99)
GLUCOSE BLDC GLUCOMTR-MCNC: 152 MG/DL — HIGH (ref 70–99)
GLUCOSE BLDC GLUCOMTR-MCNC: 162 MG/DL — HIGH (ref 70–99)
GLUCOSE BLDC GLUCOMTR-MCNC: 164 MG/DL — HIGH (ref 70–99)
GLUCOSE BLDC GLUCOMTR-MCNC: 165 MG/DL — HIGH (ref 70–99)
GLUCOSE BLDC GLUCOMTR-MCNC: 194 MG/DL — HIGH (ref 70–99)
GLUCOSE BLDC GLUCOMTR-MCNC: 196 MG/DL — HIGH (ref 70–99)
GLUCOSE BLDC GLUCOMTR-MCNC: 208 MG/DL — HIGH (ref 70–99)
GLUCOSE BLDC GLUCOMTR-MCNC: 210 MG/DL — HIGH (ref 70–99)
GLUCOSE BLDC GLUCOMTR-MCNC: 258 MG/DL — HIGH (ref 70–99)
GLUCOSE BLDC GLUCOMTR-MCNC: 267 MG/DL — HIGH (ref 70–99)
GLUCOSE BLDC GLUCOMTR-MCNC: 326 MG/DL — HIGH (ref 70–99)
GLUCOSE SERPL-MCNC: 106 MG/DL — HIGH (ref 70–99)
GLUCOSE SERPL-MCNC: 108 MG/DL — HIGH (ref 70–99)
GLUCOSE SERPL-MCNC: 110 MG/DL — HIGH (ref 70–99)
GLUCOSE SERPL-MCNC: 111 MG/DL — HIGH (ref 70–99)
GLUCOSE SERPL-MCNC: 116 MG/DL — HIGH (ref 70–99)
GLUCOSE SERPL-MCNC: 118 MG/DL — HIGH (ref 70–99)
GLUCOSE SERPL-MCNC: 135 MG/DL — HIGH (ref 70–99)
GLUCOSE SERPL-MCNC: 144 MG/DL — HIGH (ref 70–99)
GLUCOSE SERPL-MCNC: 147 MG/DL — HIGH (ref 70–99)
GLUCOSE SERPL-MCNC: 149 MG/DL — HIGH (ref 70–99)
GLUCOSE SERPL-MCNC: 183 MG/DL — HIGH (ref 70–99)
GLUCOSE SERPL-MCNC: 184 MG/DL — HIGH (ref 70–99)
GLUCOSE SERPL-MCNC: 200 MG/DL — HIGH (ref 70–99)
GLUCOSE SERPL-MCNC: 206 MG/DL — HIGH (ref 70–99)
GLUCOSE SERPL-MCNC: 249 MG/DL — HIGH (ref 70–99)
GLUCOSE SERPL-MCNC: 252 MG/DL — HIGH (ref 70–99)
GLUCOSE SERPL-MCNC: 267 MG/DL — HIGH (ref 70–99)
GLUCOSE SERPL-MCNC: 275 MG/DL — HIGH (ref 70–99)
GLUCOSE SERPL-MCNC: 279 MG/DL — HIGH (ref 70–99)
GLUCOSE SERPL-MCNC: 82 MG/DL — SIGNIFICANT CHANGE UP (ref 70–99)
GLUCOSE UR QL: 100 MG/DL
GLUCOSE UR QL: 250 MG/DL
GLUCOSE UR QL: 50 MG/DL
GLUCOSE UR QL: NEGATIVE MG/DL — SIGNIFICANT CHANGE UP
GRAM STN FLD: SIGNIFICANT CHANGE UP
HAV IGM SER-ACNC: SIGNIFICANT CHANGE UP
HBA1C BLD-MCNC: 6.1 % — HIGH (ref 4–5.6)
HBV CORE IGM SER-ACNC: SIGNIFICANT CHANGE UP
HBV SURFACE AG SER-ACNC: SIGNIFICANT CHANGE UP
HCT VFR BLD CALC: 24.7 % — LOW (ref 39–50)
HCT VFR BLD CALC: 24.8 % — LOW (ref 39–50)
HCT VFR BLD CALC: 25.3 % — LOW (ref 39–50)
HCT VFR BLD CALC: 25.5 % — LOW (ref 39–50)
HCT VFR BLD CALC: 25.6 % — LOW (ref 39–50)
HCT VFR BLD CALC: 26 % — LOW (ref 39–50)
HCT VFR BLD CALC: 26.5 % — LOW (ref 39–50)
HCT VFR BLD CALC: 27.5 % — LOW (ref 39–50)
HCT VFR BLD CALC: 28 % — LOW (ref 39–50)
HCT VFR BLD CALC: 28.2 % — LOW (ref 39–50)
HCT VFR BLD CALC: 28.3 % — LOW (ref 39–50)
HCT VFR BLD CALC: 28.5 % — LOW (ref 39–50)
HCT VFR BLD CALC: 28.6 % — LOW (ref 39–50)
HCT VFR BLD CALC: 28.6 % — LOW (ref 39–50)
HCT VFR BLD CALC: 28.7 % — LOW (ref 39–50)
HCT VFR BLD CALC: 28.9 % — LOW (ref 39–50)
HCT VFR BLD CALC: 29 % — LOW (ref 39–50)
HCT VFR BLD CALC: 29.5 % — LOW (ref 39–50)
HCT VFR BLD CALC: 29.6 % — LOW (ref 39–50)
HCT VFR BLD CALC: 29.7 % — LOW (ref 39–50)
HCT VFR BLD CALC: 30.3 % — LOW (ref 39–50)
HCT VFR BLD CALC: 31.3 % — LOW (ref 39–50)
HCT VFR BLD CALC: 31.5 % — LOW (ref 39–50)
HCV AB S/CO SERPL IA: 0.08 S/CO — SIGNIFICANT CHANGE UP
HCV AB S/CO SERPL IA: 0.11 S/CO — SIGNIFICANT CHANGE UP
HCV AB S/CO SERPL IA: 0.11 S/CO — SIGNIFICANT CHANGE UP
HCV AB SERPL-IMP: SIGNIFICANT CHANGE UP
HGB BLD-MCNC: 7.5 G/DL — LOW (ref 13–17)
HGB BLD-MCNC: 7.5 G/DL — LOW (ref 13–17)
HGB BLD-MCNC: 7.6 G/DL — LOW (ref 13–17)
HGB BLD-MCNC: 7.7 G/DL — LOW (ref 13–17)
HGB BLD-MCNC: 7.7 G/DL — LOW (ref 13–17)
HGB BLD-MCNC: 8 G/DL — LOW (ref 13–17)
HGB BLD-MCNC: 8.1 G/DL — LOW (ref 13–17)
HGB BLD-MCNC: 8.3 G/DL — LOW (ref 13–17)
HGB BLD-MCNC: 8.5 G/DL — LOW (ref 13–17)
HGB BLD-MCNC: 8.5 G/DL — LOW (ref 13–17)
HGB BLD-MCNC: 8.6 G/DL — LOW (ref 13–17)
HGB BLD-MCNC: 8.8 G/DL — LOW (ref 13–17)
HGB BLD-MCNC: 8.8 G/DL — LOW (ref 13–17)
HGB BLD-MCNC: 8.9 G/DL — LOW (ref 13–17)
HGB BLD-MCNC: 9 G/DL — LOW (ref 13–17)
HGB BLD-MCNC: 9.1 G/DL — LOW (ref 13–17)
HGB BLD-MCNC: 9.2 G/DL — LOW (ref 13–17)
HGB BLD-MCNC: 9.3 G/DL — LOW (ref 13–17)
HGB BLD-MCNC: 9.8 G/DL — LOW (ref 13–17)
HGB BLD-MCNC: 9.9 G/DL — LOW (ref 13–17)
HYALINE CASTS # UR AUTO: ABNORMAL /LPF
HYALINE CASTS # UR AUTO: NEGATIVE /LPF — SIGNIFICANT CHANGE UP
IMM GRANULOCYTES NFR BLD AUTO: 0.4 % — SIGNIFICANT CHANGE UP (ref 0–1.5)
IMM GRANULOCYTES NFR BLD AUTO: 0.4 % — SIGNIFICANT CHANGE UP (ref 0–1.5)
IMM GRANULOCYTES NFR BLD AUTO: 0.5 % — SIGNIFICANT CHANGE UP (ref 0–1.5)
IMM GRANULOCYTES NFR BLD AUTO: 0.5 % — SIGNIFICANT CHANGE UP (ref 0–1.5)
IMM GRANULOCYTES NFR BLD AUTO: 0.6 % — SIGNIFICANT CHANGE UP (ref 0–1.5)
IMM GRANULOCYTES NFR BLD AUTO: 0.6 % — SIGNIFICANT CHANGE UP (ref 0–1.5)
IMM GRANULOCYTES NFR BLD AUTO: 0.7 % — SIGNIFICANT CHANGE UP (ref 0–1.5)
INR BLD: 1.26 RATIO — HIGH (ref 0.88–1.16)
INR BLD: 1.29 RATIO — HIGH (ref 0.88–1.16)
INR BLD: 1.59 RATIO — HIGH (ref 0.88–1.16)
INR BLD: 1.61 RATIO — HIGH (ref 0.88–1.16)
INR BLD: 1.69 RATIO — HIGH (ref 0.88–1.16)
INR BLD: 1.71 RATIO — HIGH (ref 0.88–1.16)
INR BLD: 1.72 RATIO — HIGH (ref 0.88–1.16)
INR BLD: 1.8 RATIO — HIGH (ref 0.88–1.16)
INR BLD: 1.89 RATIO — HIGH (ref 0.88–1.16)
INR BLD: 2.16 RATIO — HIGH (ref 0.88–1.16)
INR BLD: 3.59 RATIO — HIGH (ref 0.88–1.16)
INR BLD: 4.17 RATIO — HIGH (ref 0.88–1.16)
INR BLD: 4.36 RATIO — HIGH (ref 0.88–1.16)
INR BLD: 4.37 RATIO — HIGH (ref 0.88–1.16)
INR BLD: 5.39 RATIO — CRITICAL HIGH (ref 0.88–1.16)
INR BLD: 5.73 RATIO — CRITICAL HIGH (ref 0.88–1.16)
INR BLD: 8.4 RATIO — CRITICAL HIGH (ref 0.88–1.16)
INR BLD: 9.72 RATIO — CRITICAL HIGH (ref 0.88–1.16)
IRON SATN MFR SERPL: 16 % — SIGNIFICANT CHANGE UP (ref 16–55)
IRON SATN MFR SERPL: 29 UG/DL — LOW (ref 45–165)
KETONES UR-MCNC: NEGATIVE — SIGNIFICANT CHANGE UP
LACTATE SERPL-SCNC: 1 MMOL/L — SIGNIFICANT CHANGE UP (ref 0.7–2)
LACTATE SERPL-SCNC: 1.6 MMOL/L — SIGNIFICANT CHANGE UP (ref 0.7–2)
LACTATE SERPL-SCNC: 1.9 MMOL/L — SIGNIFICANT CHANGE UP (ref 0.7–2)
LACTATE SERPL-SCNC: 1.9 MMOL/L — SIGNIFICANT CHANGE UP (ref 0.7–2)
LACTATE SERPL-SCNC: 2 MMOL/L — SIGNIFICANT CHANGE UP (ref 0.7–2)
LACTATE SERPL-SCNC: 2.1 MMOL/L — HIGH (ref 0.7–2)
LACTATE SERPL-SCNC: 2.5 MMOL/L — HIGH (ref 0.7–2)
LEUKOCYTE ESTERASE UR-ACNC: ABNORMAL
LEUKOCYTE ESTERASE UR-ACNC: ABNORMAL
LEUKOCYTE ESTERASE UR-ACNC: NEGATIVE — SIGNIFICANT CHANGE UP
LEUKOCYTE ESTERASE UR-ACNC: NEGATIVE — SIGNIFICANT CHANGE UP
LG PLATELETS BLD QL AUTO: SLIGHT — SIGNIFICANT CHANGE UP
LIDOCAIN IGE QN: 81 U/L — SIGNIFICANT CHANGE UP (ref 73–393)
LYMPHOCYTES # BLD AUTO: 0.63 K/UL — LOW (ref 1–3.3)
LYMPHOCYTES # BLD AUTO: 0.65 K/UL — LOW (ref 1–3.3)
LYMPHOCYTES # BLD AUTO: 0.68 K/UL — LOW (ref 1–3.3)
LYMPHOCYTES # BLD AUTO: 0.74 K/UL — LOW (ref 1–3.3)
LYMPHOCYTES # BLD AUTO: 0.74 K/UL — LOW (ref 1–3.3)
LYMPHOCYTES # BLD AUTO: 0.79 K/UL — LOW (ref 1–3.3)
LYMPHOCYTES # BLD AUTO: 0.82 K/UL — LOW (ref 1–3.3)
LYMPHOCYTES # BLD AUTO: 11.3 % — LOW (ref 13–44)
LYMPHOCYTES # BLD AUTO: 6.6 % — LOW (ref 13–44)
LYMPHOCYTES # BLD AUTO: 7.3 % — LOW (ref 13–44)
LYMPHOCYTES # BLD AUTO: 8.5 % — LOW (ref 13–44)
LYMPHOCYTES # BLD AUTO: 8.7 % — LOW (ref 13–44)
LYMPHOCYTES # BLD AUTO: 8.9 % — LOW (ref 13–44)
LYMPHOCYTES # BLD AUTO: 9.2 % — LOW (ref 13–44)
MAGNESIUM SERPL-MCNC: 1.5 MG/DL — LOW (ref 1.6–2.6)
MAGNESIUM SERPL-MCNC: 1.6 MG/DL — SIGNIFICANT CHANGE UP (ref 1.6–2.6)
MAGNESIUM SERPL-MCNC: 1.8 MG/DL — SIGNIFICANT CHANGE UP (ref 1.6–2.6)
MAGNESIUM SERPL-MCNC: 1.9 MG/DL — SIGNIFICANT CHANGE UP (ref 1.6–2.6)
MAGNESIUM SERPL-MCNC: 1.9 MG/DL — SIGNIFICANT CHANGE UP (ref 1.6–2.6)
MAGNESIUM SERPL-MCNC: 2 MG/DL — SIGNIFICANT CHANGE UP (ref 1.6–2.6)
MANUAL SMEAR VERIFICATION: SIGNIFICANT CHANGE UP
MCHC RBC-ENTMCNC: 28.3 PG — SIGNIFICANT CHANGE UP (ref 27–34)
MCHC RBC-ENTMCNC: 28.4 PG — SIGNIFICANT CHANGE UP (ref 27–34)
MCHC RBC-ENTMCNC: 28.5 PG — SIGNIFICANT CHANGE UP (ref 27–34)
MCHC RBC-ENTMCNC: 28.7 PG — SIGNIFICANT CHANGE UP (ref 27–34)
MCHC RBC-ENTMCNC: 28.8 PG — SIGNIFICANT CHANGE UP (ref 27–34)
MCHC RBC-ENTMCNC: 28.9 PG — SIGNIFICANT CHANGE UP (ref 27–34)
MCHC RBC-ENTMCNC: 29.1 PG — SIGNIFICANT CHANGE UP (ref 27–34)
MCHC RBC-ENTMCNC: 29.3 PG — SIGNIFICANT CHANGE UP (ref 27–34)
MCHC RBC-ENTMCNC: 29.5 PG — SIGNIFICANT CHANGE UP (ref 27–34)
MCHC RBC-ENTMCNC: 29.6 GM/DL — LOW (ref 32–36)
MCHC RBC-ENTMCNC: 29.6 PG — SIGNIFICANT CHANGE UP (ref 27–34)
MCHC RBC-ENTMCNC: 29.7 GM/DL — LOW (ref 32–36)
MCHC RBC-ENTMCNC: 29.7 GM/DL — LOW (ref 32–36)
MCHC RBC-ENTMCNC: 29.9 PG — SIGNIFICANT CHANGE UP (ref 27–34)
MCHC RBC-ENTMCNC: 30 GM/DL — LOW (ref 32–36)
MCHC RBC-ENTMCNC: 30.1 GM/DL — LOW (ref 32–36)
MCHC RBC-ENTMCNC: 30.1 GM/DL — LOW (ref 32–36)
MCHC RBC-ENTMCNC: 30.1 PG — SIGNIFICANT CHANGE UP (ref 27–34)
MCHC RBC-ENTMCNC: 30.2 GM/DL — LOW (ref 32–36)
MCHC RBC-ENTMCNC: 30.3 GM/DL — LOW (ref 32–36)
MCHC RBC-ENTMCNC: 30.3 PG — SIGNIFICANT CHANGE UP (ref 27–34)
MCHC RBC-ENTMCNC: 30.5 GM/DL — LOW (ref 32–36)
MCHC RBC-ENTMCNC: 30.5 PG — SIGNIFICANT CHANGE UP (ref 27–34)
MCHC RBC-ENTMCNC: 30.6 GM/DL — LOW (ref 32–36)
MCHC RBC-ENTMCNC: 30.6 PG — SIGNIFICANT CHANGE UP (ref 27–34)
MCHC RBC-ENTMCNC: 30.7 GM/DL — LOW (ref 32–36)
MCHC RBC-ENTMCNC: 30.8 GM/DL — LOW (ref 32–36)
MCHC RBC-ENTMCNC: 31.1 PG — SIGNIFICANT CHANGE UP (ref 27–34)
MCHC RBC-ENTMCNC: 31.2 GM/DL — LOW (ref 32–36)
MCHC RBC-ENTMCNC: 31.3 GM/DL — LOW (ref 32–36)
MCHC RBC-ENTMCNC: 31.3 PG — SIGNIFICANT CHANGE UP (ref 27–34)
MCHC RBC-ENTMCNC: 31.4 GM/DL — LOW (ref 32–36)
MCHC RBC-ENTMCNC: 31.4 GM/DL — LOW (ref 32–36)
MCHC RBC-ENTMCNC: 31.4 PG — SIGNIFICANT CHANGE UP (ref 27–34)
MCHC RBC-ENTMCNC: 31.5 PG — SIGNIFICANT CHANGE UP (ref 27–34)
MCV RBC AUTO: 100.7 FL — HIGH (ref 80–100)
MCV RBC AUTO: 104.4 FL — HIGH (ref 80–100)
MCV RBC AUTO: 105.1 FL — HIGH (ref 80–100)
MCV RBC AUTO: 105.7 FL — HIGH (ref 80–100)
MCV RBC AUTO: 92.2 FL — SIGNIFICANT CHANGE UP (ref 80–100)
MCV RBC AUTO: 92.2 FL — SIGNIFICANT CHANGE UP (ref 80–100)
MCV RBC AUTO: 92.7 FL — SIGNIFICANT CHANGE UP (ref 80–100)
MCV RBC AUTO: 93.4 FL — SIGNIFICANT CHANGE UP (ref 80–100)
MCV RBC AUTO: 93.4 FL — SIGNIFICANT CHANGE UP (ref 80–100)
MCV RBC AUTO: 93.8 FL — SIGNIFICANT CHANGE UP (ref 80–100)
MCV RBC AUTO: 94.6 FL — SIGNIFICANT CHANGE UP (ref 80–100)
MCV RBC AUTO: 95.3 FL — SIGNIFICANT CHANGE UP (ref 80–100)
MCV RBC AUTO: 96.5 FL — SIGNIFICANT CHANGE UP (ref 80–100)
MCV RBC AUTO: 96.9 FL — SIGNIFICANT CHANGE UP (ref 80–100)
MCV RBC AUTO: 96.9 FL — SIGNIFICANT CHANGE UP (ref 80–100)
MCV RBC AUTO: 97.2 FL — SIGNIFICANT CHANGE UP (ref 80–100)
MCV RBC AUTO: 97.7 FL — SIGNIFICANT CHANGE UP (ref 80–100)
MCV RBC AUTO: 98.1 FL — SIGNIFICANT CHANGE UP (ref 80–100)
MCV RBC AUTO: 98.5 FL — SIGNIFICANT CHANGE UP (ref 80–100)
MCV RBC AUTO: 99 FL — SIGNIFICANT CHANGE UP (ref 80–100)
MCV RBC AUTO: 99 FL — SIGNIFICANT CHANGE UP (ref 80–100)
MCV RBC AUTO: 99.6 FL — SIGNIFICANT CHANGE UP (ref 80–100)
MCV RBC AUTO: 99.7 FL — SIGNIFICANT CHANGE UP (ref 80–100)
METHOD TYPE: SIGNIFICANT CHANGE UP
METHOD TYPE: SIGNIFICANT CHANGE UP
MICROCYTES BLD QL: SLIGHT — SIGNIFICANT CHANGE UP
MONOCYTES # BLD AUTO: 0.45 K/UL — SIGNIFICANT CHANGE UP (ref 0–0.9)
MONOCYTES # BLD AUTO: 0.51 K/UL — SIGNIFICANT CHANGE UP (ref 0–0.9)
MONOCYTES # BLD AUTO: 0.54 K/UL — SIGNIFICANT CHANGE UP (ref 0–0.9)
MONOCYTES # BLD AUTO: 0.64 K/UL — SIGNIFICANT CHANGE UP (ref 0–0.9)
MONOCYTES # BLD AUTO: 0.71 K/UL — SIGNIFICANT CHANGE UP (ref 0–0.9)
MONOCYTES # BLD AUTO: 0.73 K/UL — SIGNIFICANT CHANGE UP (ref 0–0.9)
MONOCYTES # BLD AUTO: 0.79 K/UL — SIGNIFICANT CHANGE UP (ref 0–0.9)
MONOCYTES NFR BLD AUTO: 6.3 % — SIGNIFICANT CHANGE UP (ref 2–14)
MONOCYTES NFR BLD AUTO: 6.4 % — SIGNIFICANT CHANGE UP (ref 2–14)
MONOCYTES NFR BLD AUTO: 7 % — SIGNIFICANT CHANGE UP (ref 2–14)
MONOCYTES NFR BLD AUTO: 7.1 % — SIGNIFICANT CHANGE UP (ref 2–14)
MONOCYTES NFR BLD AUTO: 7.5 % — SIGNIFICANT CHANGE UP (ref 2–14)
MONOCYTES NFR BLD AUTO: 8.4 % — SIGNIFICANT CHANGE UP (ref 2–14)
MONOCYTES NFR BLD AUTO: 8.6 % — SIGNIFICANT CHANGE UP (ref 2–14)
NEUTROPHILS # BLD AUTO: 5.86 K/UL — SIGNIFICANT CHANGE UP (ref 1.8–7.4)
NEUTROPHILS # BLD AUTO: 5.89 K/UL — SIGNIFICANT CHANGE UP (ref 1.8–7.4)
NEUTROPHILS # BLD AUTO: 6.35 K/UL — SIGNIFICANT CHANGE UP (ref 1.8–7.4)
NEUTROPHILS # BLD AUTO: 6.9 K/UL — SIGNIFICANT CHANGE UP (ref 1.8–7.4)
NEUTROPHILS # BLD AUTO: 7.05 K/UL — SIGNIFICANT CHANGE UP (ref 1.8–7.4)
NEUTROPHILS # BLD AUTO: 7.72 K/UL — HIGH (ref 1.8–7.4)
NEUTROPHILS # BLD AUTO: 9.7 K/UL — HIGH (ref 1.8–7.4)
NEUTROPHILS NFR BLD AUTO: 80.5 % — HIGH (ref 43–77)
NEUTROPHILS NFR BLD AUTO: 81.1 % — HIGH (ref 43–77)
NEUTROPHILS NFR BLD AUTO: 82.4 % — HIGH (ref 43–77)
NEUTROPHILS NFR BLD AUTO: 82.7 % — HIGH (ref 43–77)
NEUTROPHILS NFR BLD AUTO: 83.2 % — HIGH (ref 43–77)
NEUTROPHILS NFR BLD AUTO: 83.5 % — HIGH (ref 43–77)
NEUTROPHILS NFR BLD AUTO: 86.4 % — HIGH (ref 43–77)
NITRITE UR-MCNC: NEGATIVE — SIGNIFICANT CHANGE UP
NRBC # BLD: 0 /100 WBCS — SIGNIFICANT CHANGE UP (ref 0–0)
ORGANISM # SPEC MICROSCOPIC CNT: SIGNIFICANT CHANGE UP
OVALOCYTES BLD QL SMEAR: SLIGHT — SIGNIFICANT CHANGE UP
PH UR: 7 — SIGNIFICANT CHANGE UP (ref 5–8)
PH UR: 8 — SIGNIFICANT CHANGE UP (ref 5–8)
PH UR: 9 — HIGH (ref 5–8)
PH UR: 9 — HIGH (ref 5–8)
PHOSPHATE SERPL-MCNC: 2.8 MG/DL — SIGNIFICANT CHANGE UP (ref 2.5–4.5)
PHOSPHATE SERPL-MCNC: 2.9 MG/DL — SIGNIFICANT CHANGE UP (ref 2.5–4.5)
PHOSPHATE SERPL-MCNC: 3.3 MG/DL — SIGNIFICANT CHANGE UP (ref 2.5–4.5)
PHOSPHATE SERPL-MCNC: 3.3 MG/DL — SIGNIFICANT CHANGE UP (ref 2.5–4.5)
PHOSPHATE SERPL-MCNC: 3.4 MG/DL — SIGNIFICANT CHANGE UP (ref 2.5–4.5)
PHOSPHATE SERPL-MCNC: 3.4 MG/DL — SIGNIFICANT CHANGE UP (ref 2.5–4.5)
PHOSPHATE SERPL-MCNC: 3.6 MG/DL — SIGNIFICANT CHANGE UP (ref 2.5–4.5)
PHOSPHATE SERPL-MCNC: 3.9 MG/DL — SIGNIFICANT CHANGE UP (ref 2.5–4.5)
PHOSPHATE SERPL-MCNC: 4.3 MG/DL — SIGNIFICANT CHANGE UP (ref 2.5–4.5)
PHOSPHATE SERPL-MCNC: 4.3 MG/DL — SIGNIFICANT CHANGE UP (ref 2.5–4.5)
PHOSPHATE SERPL-MCNC: 4.8 MG/DL — HIGH (ref 2.5–4.5)
PHOSPHATE SERPL-MCNC: 4.9 MG/DL — HIGH (ref 2.5–4.5)
PLAT MORPH BLD: ABNORMAL
PLATELET # BLD AUTO: 105 K/UL — LOW (ref 150–400)
PLATELET # BLD AUTO: 107 K/UL — LOW (ref 150–400)
PLATELET # BLD AUTO: 107 K/UL — LOW (ref 150–400)
PLATELET # BLD AUTO: 109 K/UL — LOW (ref 150–400)
PLATELET # BLD AUTO: 115 K/UL — LOW (ref 150–400)
PLATELET # BLD AUTO: 117 K/UL — LOW (ref 150–400)
PLATELET # BLD AUTO: 119 K/UL — LOW (ref 150–400)
PLATELET # BLD AUTO: 120 K/UL — LOW (ref 150–400)
PLATELET # BLD AUTO: 120 K/UL — LOW (ref 150–400)
PLATELET # BLD AUTO: 121 K/UL — LOW (ref 150–400)
PLATELET # BLD AUTO: 123 K/UL — LOW (ref 150–400)
PLATELET # BLD AUTO: 128 K/UL — LOW (ref 150–400)
PLATELET # BLD AUTO: 130 K/UL — LOW (ref 150–400)
PLATELET # BLD AUTO: 130 K/UL — LOW (ref 150–400)
PLATELET # BLD AUTO: 136 K/UL — LOW (ref 150–400)
PLATELET # BLD AUTO: 136 K/UL — LOW (ref 150–400)
PLATELET # BLD AUTO: 141 K/UL — LOW (ref 150–400)
PLATELET # BLD AUTO: 147 K/UL — LOW (ref 150–400)
PLATELET # BLD AUTO: 163 K/UL — SIGNIFICANT CHANGE UP (ref 150–400)
PLATELET # BLD AUTO: 75 K/UL — LOW (ref 150–400)
PLATELET # BLD AUTO: 83 K/UL — LOW (ref 150–400)
PLATELET # BLD AUTO: 86 K/UL — LOW (ref 150–400)
PLATELET # BLD AUTO: 98 K/UL — LOW (ref 150–400)
POIKILOCYTOSIS BLD QL AUTO: SLIGHT — SIGNIFICANT CHANGE UP
POLYCHROMASIA BLD QL SMEAR: SLIGHT — SIGNIFICANT CHANGE UP
POTASSIUM SERPL-MCNC: 3.5 MMOL/L — SIGNIFICANT CHANGE UP (ref 3.5–5.3)
POTASSIUM SERPL-MCNC: 3.5 MMOL/L — SIGNIFICANT CHANGE UP (ref 3.5–5.3)
POTASSIUM SERPL-MCNC: 3.6 MMOL/L — SIGNIFICANT CHANGE UP (ref 3.5–5.3)
POTASSIUM SERPL-MCNC: 3.7 MMOL/L — SIGNIFICANT CHANGE UP (ref 3.5–5.3)
POTASSIUM SERPL-MCNC: 3.8 MMOL/L — SIGNIFICANT CHANGE UP (ref 3.5–5.3)
POTASSIUM SERPL-MCNC: 3.8 MMOL/L — SIGNIFICANT CHANGE UP (ref 3.5–5.3)
POTASSIUM SERPL-MCNC: 4 MMOL/L — SIGNIFICANT CHANGE UP (ref 3.5–5.3)
POTASSIUM SERPL-MCNC: 4.2 MMOL/L — SIGNIFICANT CHANGE UP (ref 3.5–5.3)
POTASSIUM SERPL-MCNC: 4.2 MMOL/L — SIGNIFICANT CHANGE UP (ref 3.5–5.3)
POTASSIUM SERPL-MCNC: 4.4 MMOL/L — SIGNIFICANT CHANGE UP (ref 3.5–5.3)
POTASSIUM SERPL-MCNC: 4.5 MMOL/L — SIGNIFICANT CHANGE UP (ref 3.5–5.3)
POTASSIUM SERPL-MCNC: 4.5 MMOL/L — SIGNIFICANT CHANGE UP (ref 3.5–5.3)
POTASSIUM SERPL-MCNC: 4.7 MMOL/L — SIGNIFICANT CHANGE UP (ref 3.5–5.3)
POTASSIUM SERPL-MCNC: 5.2 MMOL/L — SIGNIFICANT CHANGE UP (ref 3.5–5.3)
POTASSIUM SERPL-SCNC: 3.5 MMOL/L — SIGNIFICANT CHANGE UP (ref 3.5–5.3)
POTASSIUM SERPL-SCNC: 3.5 MMOL/L — SIGNIFICANT CHANGE UP (ref 3.5–5.3)
POTASSIUM SERPL-SCNC: 3.6 MMOL/L — SIGNIFICANT CHANGE UP (ref 3.5–5.3)
POTASSIUM SERPL-SCNC: 3.7 MMOL/L — SIGNIFICANT CHANGE UP (ref 3.5–5.3)
POTASSIUM SERPL-SCNC: 3.8 MMOL/L — SIGNIFICANT CHANGE UP (ref 3.5–5.3)
POTASSIUM SERPL-SCNC: 3.8 MMOL/L — SIGNIFICANT CHANGE UP (ref 3.5–5.3)
POTASSIUM SERPL-SCNC: 4 MMOL/L — SIGNIFICANT CHANGE UP (ref 3.5–5.3)
POTASSIUM SERPL-SCNC: 4.2 MMOL/L — SIGNIFICANT CHANGE UP (ref 3.5–5.3)
POTASSIUM SERPL-SCNC: 4.2 MMOL/L — SIGNIFICANT CHANGE UP (ref 3.5–5.3)
POTASSIUM SERPL-SCNC: 4.4 MMOL/L — SIGNIFICANT CHANGE UP (ref 3.5–5.3)
POTASSIUM SERPL-SCNC: 4.5 MMOL/L — SIGNIFICANT CHANGE UP (ref 3.5–5.3)
POTASSIUM SERPL-SCNC: 4.5 MMOL/L — SIGNIFICANT CHANGE UP (ref 3.5–5.3)
POTASSIUM SERPL-SCNC: 4.7 MMOL/L — SIGNIFICANT CHANGE UP (ref 3.5–5.3)
POTASSIUM SERPL-SCNC: 5.2 MMOL/L — SIGNIFICANT CHANGE UP (ref 3.5–5.3)
PROT SERPL-MCNC: 6 GM/DL — SIGNIFICANT CHANGE UP (ref 6–8.3)
PROT SERPL-MCNC: 6.1 GM/DL — SIGNIFICANT CHANGE UP (ref 6–8.3)
PROT SERPL-MCNC: 6.2 GM/DL — SIGNIFICANT CHANGE UP (ref 6–8.3)
PROT SERPL-MCNC: 6.3 GM/DL — SIGNIFICANT CHANGE UP (ref 6–8.3)
PROT SERPL-MCNC: 6.3 GM/DL — SIGNIFICANT CHANGE UP (ref 6–8.3)
PROT SERPL-MCNC: 6.9 GM/DL — SIGNIFICANT CHANGE UP (ref 6–8.3)
PROT UR-MCNC: 100 MG/DL
PROTHROM AB SERPL-ACNC: 115.8 SEC — HIGH (ref 10–12.9)
PROTHROM AB SERPL-ACNC: 14.1 SEC — HIGH (ref 10–12.9)
PROTHROM AB SERPL-ACNC: 14.4 SEC — HIGH (ref 10–12.9)
PROTHROM AB SERPL-ACNC: 17.9 SEC — HIGH (ref 10–12.9)
PROTHROM AB SERPL-ACNC: 18.1 SEC — HIGH (ref 10–12.9)
PROTHROM AB SERPL-ACNC: 19.1 SEC — HIGH (ref 10–12.9)
PROTHROM AB SERPL-ACNC: 19.3 SEC — HIGH (ref 10–12.9)
PROTHROM AB SERPL-ACNC: 19.4 SEC — HIGH (ref 10–12.9)
PROTHROM AB SERPL-ACNC: 20.4 SEC — HIGH (ref 10–12.9)
PROTHROM AB SERPL-ACNC: 21.4 SEC — HIGH (ref 10–12.9)
PROTHROM AB SERPL-ACNC: 24.5 SEC — HIGH (ref 10–12.9)
PROTHROM AB SERPL-ACNC: 41.4 SEC — HIGH (ref 10–12.9)
PROTHROM AB SERPL-ACNC: 48.4 SEC — HIGH (ref 10–12.9)
PROTHROM AB SERPL-ACNC: 50.7 SEC — HIGH (ref 10–12.9)
PROTHROM AB SERPL-ACNC: 50.8 SEC — HIGH (ref 10–12.9)
PROTHROM AB SERPL-ACNC: 60.2 SEC — HIGH (ref 9.8–12.7)
PROTHROM AB SERPL-ACNC: 67.1 SEC — HIGH (ref 10–12.9)
PROTHROM AB SERPL-ACNC: 99.6 SEC — HIGH (ref 10–12.9)
RAPID RVP RESULT: SIGNIFICANT CHANGE UP
RAPID RVP RESULT: SIGNIFICANT CHANGE UP
RBC # BLD: 2.56 M/UL — LOW (ref 4.2–5.8)
RBC # BLD: 2.6 M/UL — LOW (ref 4.2–5.8)
RBC # BLD: 2.6 M/UL — LOW (ref 4.2–5.8)
RBC # BLD: 2.61 M/UL — LOW (ref 4.2–5.8)
RBC # BLD: 2.66 M/UL — LOW (ref 4.2–5.8)
RBC # BLD: 2.68 M/UL — LOW (ref 4.2–5.8)
RBC # BLD: 2.72 M/UL — LOW (ref 4.2–5.8)
RBC # BLD: 2.73 M/UL — LOW (ref 4.2–5.8)
RBC # BLD: 2.8 M/UL — LOW (ref 4.2–5.8)
RBC # BLD: 2.8 M/UL — LOW (ref 4.2–5.8)
RBC # BLD: 2.83 M/UL — LOW (ref 4.2–5.8)
RBC # BLD: 2.84 M/UL — LOW (ref 4.2–5.8)
RBC # BLD: 2.84 M/UL — LOW (ref 4.2–5.8)
RBC # BLD: 2.88 M/UL — LOW (ref 4.2–5.8)
RBC # BLD: 2.92 M/UL — LOW (ref 4.2–5.8)
RBC # BLD: 2.93 M/UL — LOW (ref 4.2–5.8)
RBC # BLD: 2.96 M/UL — LOW (ref 4.2–5.8)
RBC # BLD: 3.02 M/UL — LOW (ref 4.2–5.8)
RBC # BLD: 3.14 M/UL — LOW (ref 4.2–5.8)
RBC # BLD: 3.18 M/UL — LOW (ref 4.2–5.8)
RBC # BLD: 3.2 M/UL — LOW (ref 4.2–5.8)
RBC # BLD: 3.35 M/UL — LOW (ref 4.2–5.8)
RBC # BLD: 3.36 M/UL — LOW (ref 4.2–5.8)
RBC # FLD: 15.9 % — HIGH (ref 10.3–14.5)
RBC # FLD: 16 % — HIGH (ref 10.3–14.5)
RBC # FLD: 16.1 % — HIGH (ref 10.3–14.5)
RBC # FLD: 16.3 % — HIGH (ref 10.3–14.5)
RBC # FLD: 16.3 % — HIGH (ref 10.3–14.5)
RBC # FLD: 16.4 % — HIGH (ref 10.3–14.5)
RBC # FLD: 16.4 % — HIGH (ref 10.3–14.5)
RBC # FLD: 16.7 % — HIGH (ref 10.3–14.5)
RBC # FLD: 16.8 % — HIGH (ref 10.3–14.5)
RBC # FLD: 16.9 % — HIGH (ref 10.3–14.5)
RBC # FLD: 17.1 % — HIGH (ref 10.3–14.5)
RBC # FLD: 17.1 % — HIGH (ref 10.3–14.5)
RBC # FLD: 17.2 % — HIGH (ref 10.3–14.5)
RBC # FLD: 17.2 % — HIGH (ref 10.3–14.5)
RBC # FLD: 17.3 % — HIGH (ref 10.3–14.5)
RBC # FLD: 17.6 % — HIGH (ref 10.3–14.5)
RBC # FLD: 17.8 % — HIGH (ref 10.3–14.5)
RBC BLD AUTO: ABNORMAL
RBC CASTS # UR COMP ASSIST: ABNORMAL /HPF (ref 0–4)
RBC CASTS # UR COMP ASSIST: NEGATIVE /HPF — SIGNIFICANT CHANGE UP (ref 0–4)
RBC CASTS # UR COMP ASSIST: SIGNIFICANT CHANGE UP /HPF (ref 0–4)
SCHISTOCYTES BLD QL AUTO: SLIGHT — SIGNIFICANT CHANGE UP
SODIUM SERPL-SCNC: 133 MMOL/L — LOW (ref 135–145)
SODIUM SERPL-SCNC: 135 MMOL/L — SIGNIFICANT CHANGE UP (ref 135–145)
SODIUM SERPL-SCNC: 136 MMOL/L — SIGNIFICANT CHANGE UP (ref 135–145)
SODIUM SERPL-SCNC: 136 MMOL/L — SIGNIFICANT CHANGE UP (ref 135–145)
SODIUM SERPL-SCNC: 137 MMOL/L — SIGNIFICANT CHANGE UP (ref 135–145)
SODIUM SERPL-SCNC: 138 MMOL/L — SIGNIFICANT CHANGE UP (ref 135–145)
SODIUM SERPL-SCNC: 139 MMOL/L — SIGNIFICANT CHANGE UP (ref 135–145)
SODIUM SERPL-SCNC: 140 MMOL/L — SIGNIFICANT CHANGE UP (ref 135–145)
SODIUM SERPL-SCNC: 142 MMOL/L — SIGNIFICANT CHANGE UP (ref 135–145)
SP GR SPEC: 1 — LOW (ref 1.01–1.02)
SP GR SPEC: 1.01 — SIGNIFICANT CHANGE UP (ref 1.01–1.02)
SPECIMEN SOURCE: SIGNIFICANT CHANGE UP
TIBC SERPL-MCNC: 176 UG/DL — LOW (ref 220–430)
TROPONIN I SERPL-MCNC: 0.03 NG/ML — SIGNIFICANT CHANGE UP (ref 0.01–0.04)
TROPONIN I SERPL-MCNC: 0.03 NG/ML — SIGNIFICANT CHANGE UP (ref 0.01–0.04)
TSH SERPL-MCNC: 4.66 UU/ML — SIGNIFICANT CHANGE UP (ref 0.34–4.82)
TYPE + AB SCN PNL BLD: SIGNIFICANT CHANGE UP
UIBC SERPL-MCNC: 147 UG/DL — SIGNIFICANT CHANGE UP (ref 110–370)
UROBILINOGEN FLD QL: NEGATIVE MG/DL — SIGNIFICANT CHANGE UP
WBC # BLD: 10.12 K/UL — SIGNIFICANT CHANGE UP (ref 3.8–10.5)
WBC # BLD: 10.79 K/UL — HIGH (ref 3.8–10.5)
WBC # BLD: 11.23 K/UL — HIGH (ref 3.8–10.5)
WBC # BLD: 4.98 K/UL — SIGNIFICANT CHANGE UP (ref 3.8–10.5)
WBC # BLD: 6.01 K/UL — SIGNIFICANT CHANGE UP (ref 3.8–10.5)
WBC # BLD: 6.17 K/UL — SIGNIFICANT CHANGE UP (ref 3.8–10.5)
WBC # BLD: 6.24 K/UL — SIGNIFICANT CHANGE UP (ref 3.8–10.5)
WBC # BLD: 6.48 K/UL — SIGNIFICANT CHANGE UP (ref 3.8–10.5)
WBC # BLD: 6.75 K/UL — SIGNIFICANT CHANGE UP (ref 3.8–10.5)
WBC # BLD: 7 K/UL — SIGNIFICANT CHANGE UP (ref 3.8–10.5)
WBC # BLD: 7.06 K/UL — SIGNIFICANT CHANGE UP (ref 3.8–10.5)
WBC # BLD: 7.27 K/UL — SIGNIFICANT CHANGE UP (ref 3.8–10.5)
WBC # BLD: 7.31 K/UL — SIGNIFICANT CHANGE UP (ref 3.8–10.5)
WBC # BLD: 7.36 K/UL — SIGNIFICANT CHANGE UP (ref 3.8–10.5)
WBC # BLD: 7.59 K/UL — SIGNIFICANT CHANGE UP (ref 3.8–10.5)
WBC # BLD: 7.63 K/UL — SIGNIFICANT CHANGE UP (ref 3.8–10.5)
WBC # BLD: 7.68 K/UL — SIGNIFICANT CHANGE UP (ref 3.8–10.5)
WBC # BLD: 8.2 K/UL — SIGNIFICANT CHANGE UP (ref 3.8–10.5)
WBC # BLD: 8.5 K/UL — SIGNIFICANT CHANGE UP (ref 3.8–10.5)
WBC # BLD: 8.56 K/UL — SIGNIFICANT CHANGE UP (ref 3.8–10.5)
WBC # BLD: 8.87 K/UL — SIGNIFICANT CHANGE UP (ref 3.8–10.5)
WBC # BLD: 9.33 K/UL — SIGNIFICANT CHANGE UP (ref 3.8–10.5)
WBC # BLD: 9.35 K/UL — SIGNIFICANT CHANGE UP (ref 3.8–10.5)
WBC # FLD AUTO: 10.12 K/UL — SIGNIFICANT CHANGE UP (ref 3.8–10.5)
WBC # FLD AUTO: 10.79 K/UL — HIGH (ref 3.8–10.5)
WBC # FLD AUTO: 11.23 K/UL — HIGH (ref 3.8–10.5)
WBC # FLD AUTO: 4.98 K/UL — SIGNIFICANT CHANGE UP (ref 3.8–10.5)
WBC # FLD AUTO: 6.01 K/UL — SIGNIFICANT CHANGE UP (ref 3.8–10.5)
WBC # FLD AUTO: 6.17 K/UL — SIGNIFICANT CHANGE UP (ref 3.8–10.5)
WBC # FLD AUTO: 6.24 K/UL — SIGNIFICANT CHANGE UP (ref 3.8–10.5)
WBC # FLD AUTO: 6.48 K/UL — SIGNIFICANT CHANGE UP (ref 3.8–10.5)
WBC # FLD AUTO: 6.75 K/UL — SIGNIFICANT CHANGE UP (ref 3.8–10.5)
WBC # FLD AUTO: 7 K/UL — SIGNIFICANT CHANGE UP (ref 3.8–10.5)
WBC # FLD AUTO: 7.06 K/UL — SIGNIFICANT CHANGE UP (ref 3.8–10.5)
WBC # FLD AUTO: 7.27 K/UL — SIGNIFICANT CHANGE UP (ref 3.8–10.5)
WBC # FLD AUTO: 7.31 K/UL — SIGNIFICANT CHANGE UP (ref 3.8–10.5)
WBC # FLD AUTO: 7.36 K/UL — SIGNIFICANT CHANGE UP (ref 3.8–10.5)
WBC # FLD AUTO: 7.59 K/UL — SIGNIFICANT CHANGE UP (ref 3.8–10.5)
WBC # FLD AUTO: 7.63 K/UL — SIGNIFICANT CHANGE UP (ref 3.8–10.5)
WBC # FLD AUTO: 7.68 K/UL — SIGNIFICANT CHANGE UP (ref 3.8–10.5)
WBC # FLD AUTO: 8.2 K/UL — SIGNIFICANT CHANGE UP (ref 3.8–10.5)
WBC # FLD AUTO: 8.5 K/UL — SIGNIFICANT CHANGE UP (ref 3.8–10.5)
WBC # FLD AUTO: 8.56 K/UL — SIGNIFICANT CHANGE UP (ref 3.8–10.5)
WBC # FLD AUTO: 8.87 K/UL — SIGNIFICANT CHANGE UP (ref 3.8–10.5)
WBC # FLD AUTO: 9.33 K/UL — SIGNIFICANT CHANGE UP (ref 3.8–10.5)
WBC # FLD AUTO: 9.35 K/UL — SIGNIFICANT CHANGE UP (ref 3.8–10.5)
WBC UR QL: SIGNIFICANT CHANGE UP

## 2018-01-01 PROCEDURE — 99232 SBSQ HOSP IP/OBS MODERATE 35: CPT

## 2018-01-01 PROCEDURE — 74176 CT ABD & PELVIS W/O CONTRAST: CPT | Mod: 26

## 2018-01-01 PROCEDURE — 71045 X-RAY EXAM CHEST 1 VIEW: CPT | Mod: 26

## 2018-01-01 PROCEDURE — 99285 EMERGENCY DEPT VISIT HI MDM: CPT

## 2018-01-01 PROCEDURE — 93284 PRGRMG EVAL IMPLANTABLE DFB: CPT

## 2018-01-01 PROCEDURE — 99223 1ST HOSP IP/OBS HIGH 75: CPT

## 2018-01-01 PROCEDURE — 74177 CT ABD & PELVIS W/CONTRAST: CPT | Mod: 26

## 2018-01-01 PROCEDURE — 93297 REM INTERROG DEV EVAL ICPMS: CPT

## 2018-01-01 PROCEDURE — 71260 CT THORAX DX C+: CPT | Mod: 26

## 2018-01-01 PROCEDURE — 93010 ELECTROCARDIOGRAM REPORT: CPT

## 2018-01-01 PROCEDURE — 76700 US EXAM ABDOM COMPLETE: CPT | Mod: 26

## 2018-01-01 PROCEDURE — 78226 HEPATOBILIARY SYSTEM IMAGING: CPT | Mod: 26

## 2018-01-01 PROCEDURE — 93010 ELECTROCARDIOGRAM REPORT: CPT | Mod: 76

## 2018-01-01 PROCEDURE — 99291 CRITICAL CARE FIRST HOUR: CPT

## 2018-01-01 PROCEDURE — 71250 CT THORAX DX C-: CPT | Mod: 26

## 2018-01-01 PROCEDURE — 93296 REM INTERROG EVL PM/IDS: CPT

## 2018-01-01 PROCEDURE — 93295 DEV INTERROG REMOTE 1/2/MLT: CPT

## 2018-01-01 RX ORDER — DEXTROSE 50 % IN WATER 50 %
12.5 SYRINGE (ML) INTRAVENOUS ONCE
Qty: 0 | Refills: 0 | Status: DISCONTINUED | OUTPATIENT
Start: 2018-01-01 | End: 2018-01-01

## 2018-01-01 RX ORDER — CEFEPIME 1 G/1
1000 INJECTION, POWDER, FOR SOLUTION INTRAMUSCULAR; INTRAVENOUS EVERY 24 HOURS
Qty: 0 | Refills: 0 | Status: DISCONTINUED | OUTPATIENT
Start: 2018-01-01 | End: 2018-01-01

## 2018-01-01 RX ORDER — ERYTHROPOIETIN 10000 [IU]/ML
2000 INJECTION, SOLUTION INTRAVENOUS; SUBCUTANEOUS ONCE
Qty: 0 | Refills: 0 | Status: COMPLETED | OUTPATIENT
Start: 2018-01-01 | End: 2018-01-01

## 2018-01-01 RX ORDER — PHYTONADIONE (VIT K1) 5 MG
1 TABLET ORAL ONCE
Qty: 0 | Refills: 0 | Status: COMPLETED | OUTPATIENT
Start: 2018-01-01 | End: 2018-01-01

## 2018-01-01 RX ORDER — WARFARIN SODIUM 2.5 MG/1
0.5 TABLET ORAL
Qty: 15 | Refills: 0 | OUTPATIENT
Start: 2018-01-01 | End: 2019-01-01

## 2018-01-01 RX ORDER — VANCOMYCIN HCL 1 G
125 VIAL (EA) INTRAVENOUS ONCE
Qty: 0 | Refills: 0 | Status: COMPLETED | OUTPATIENT
Start: 2018-01-01 | End: 2018-01-01

## 2018-01-01 RX ORDER — DEXTROSE 50 % IN WATER 50 %
25 SYRINGE (ML) INTRAVENOUS ONCE
Qty: 0 | Refills: 0 | Status: DISCONTINUED | OUTPATIENT
Start: 2018-01-01 | End: 2018-01-01

## 2018-01-01 RX ORDER — VANCOMYCIN HCL 1 G
1000 VIAL (EA) INTRAVENOUS ONCE
Qty: 0 | Refills: 0 | Status: COMPLETED | OUTPATIENT
Start: 2018-01-01 | End: 2018-01-01

## 2018-01-01 RX ORDER — LOSARTAN POTASSIUM 100 MG/1
25 TABLET, FILM COATED ORAL DAILY
Qty: 0 | Refills: 0 | Status: DISCONTINUED | OUTPATIENT
Start: 2018-01-01 | End: 2018-01-01

## 2018-01-01 RX ORDER — SODIUM CHLORIDE 9 MG/ML
500 INJECTION INTRAMUSCULAR; INTRAVENOUS; SUBCUTANEOUS ONCE
Qty: 0 | Refills: 0 | Status: COMPLETED | OUTPATIENT
Start: 2018-01-01 | End: 2018-01-01

## 2018-01-01 RX ORDER — CEFEPIME 1 G/1
1000 INJECTION, POWDER, FOR SOLUTION INTRAMUSCULAR; INTRAVENOUS DAILY
Qty: 0 | Refills: 0 | Status: DISCONTINUED | OUTPATIENT
Start: 2018-01-01 | End: 2018-01-01

## 2018-01-01 RX ORDER — PHENYLEPHRINE HYDROCHLORIDE 10 MG/ML
0.5 INJECTION INTRAVENOUS
Qty: 40 | Refills: 0 | Status: DISCONTINUED | OUTPATIENT
Start: 2018-01-01 | End: 2018-01-01

## 2018-01-01 RX ORDER — CINACALCET 30 MG/1
1 TABLET, FILM COATED ORAL
Qty: 0 | Refills: 0 | COMMUNITY

## 2018-01-01 RX ORDER — LOSARTAN POTASSIUM 100 MG/1
0.5 TABLET, FILM COATED ORAL
Qty: 0 | Refills: 0 | COMMUNITY

## 2018-01-01 RX ORDER — SODIUM CHLORIDE 9 MG/ML
1000 INJECTION, SOLUTION INTRAVENOUS
Qty: 0 | Refills: 0 | Status: DISCONTINUED | OUTPATIENT
Start: 2018-01-01 | End: 2018-01-01

## 2018-01-01 RX ORDER — ERYTHROPOIETIN 10000 [IU]/ML
8000 INJECTION, SOLUTION INTRAVENOUS; SUBCUTANEOUS
Qty: 0 | Refills: 0 | Status: DISCONTINUED | OUTPATIENT
Start: 2018-01-01 | End: 2018-01-01

## 2018-01-01 RX ORDER — CARVEDILOL PHOSPHATE 80 MG/1
1 CAPSULE, EXTENDED RELEASE ORAL
Qty: 0 | Refills: 0 | COMMUNITY

## 2018-01-01 RX ORDER — FAMOTIDINE 10 MG/ML
20 INJECTION INTRAVENOUS ONCE
Qty: 0 | Refills: 0 | Status: COMPLETED | OUTPATIENT
Start: 2018-01-01 | End: 2018-01-01

## 2018-01-01 RX ORDER — VANCOMYCIN HCL 1 G
125 VIAL (EA) INTRAVENOUS EVERY 6 HOURS
Qty: 0 | Refills: 0 | Status: DISCONTINUED | OUTPATIENT
Start: 2018-01-01 | End: 2018-01-01

## 2018-01-01 RX ORDER — PANTOPRAZOLE SODIUM 20 MG/1
1 TABLET, DELAYED RELEASE ORAL
Qty: 30 | Refills: 0 | OUTPATIENT
Start: 2018-01-01 | End: 2019-01-01

## 2018-01-01 RX ORDER — WARFARIN SODIUM 2.5 MG/1
3 TABLET ORAL ONCE
Qty: 0 | Refills: 0 | Status: COMPLETED | OUTPATIENT
Start: 2018-01-01 | End: 2018-01-01

## 2018-01-01 RX ORDER — PIPERACILLIN AND TAZOBACTAM 4; .5 G/20ML; G/20ML
3.38 INJECTION, POWDER, LYOPHILIZED, FOR SOLUTION INTRAVENOUS ONCE
Qty: 0 | Refills: 0 | Status: COMPLETED | OUTPATIENT
Start: 2018-01-01 | End: 2018-01-01

## 2018-01-01 RX ORDER — TRAMADOL HYDROCHLORIDE 50 MG/1
50 TABLET ORAL ONCE
Qty: 0 | Refills: 0 | Status: DISCONTINUED | OUTPATIENT
Start: 2018-01-01 | End: 2018-01-01

## 2018-01-01 RX ORDER — CEFUROXIME AXETIL 250 MG
250 TABLET ORAL EVERY 12 HOURS
Qty: 0 | Refills: 0 | Status: DISCONTINUED | OUTPATIENT
Start: 2018-01-01 | End: 2018-01-01

## 2018-01-01 RX ORDER — PANTOPRAZOLE SODIUM 20 MG/1
8 TABLET, DELAYED RELEASE ORAL
Qty: 80 | Refills: 0 | Status: DISCONTINUED | OUTPATIENT
Start: 2018-01-01 | End: 2018-01-01

## 2018-01-01 RX ORDER — ERYTHROPOIETIN 10000 [IU]/ML
10000 INJECTION, SOLUTION INTRAVENOUS; SUBCUTANEOUS ONCE
Qty: 0 | Refills: 0 | Status: COMPLETED | OUTPATIENT
Start: 2018-01-01 | End: 2018-01-01

## 2018-01-01 RX ORDER — WARFARIN SODIUM 2.5 MG/1
3 TABLET ORAL ONCE
Qty: 0 | Refills: 0 | Status: DISCONTINUED | OUTPATIENT
Start: 2018-01-01 | End: 2018-01-01

## 2018-01-01 RX ORDER — PANTOPRAZOLE SODIUM 20 MG/1
1 TABLET, DELAYED RELEASE ORAL
Qty: 0 | Refills: 0 | COMMUNITY
Start: 2018-01-01 | End: 2019-01-01

## 2018-01-01 RX ORDER — CEFUROXIME AXETIL 250 MG
1 TABLET ORAL
Qty: 8 | Refills: 0 | OUTPATIENT
Start: 2018-01-01

## 2018-01-01 RX ORDER — PANTOPRAZOLE SODIUM 20 MG/1
40 TABLET, DELAYED RELEASE ORAL
Qty: 0 | Refills: 0 | Status: DISCONTINUED | OUTPATIENT
Start: 2018-01-01 | End: 2018-01-01

## 2018-01-01 RX ORDER — ACETAMINOPHEN 500 MG
1000 TABLET ORAL ONCE
Qty: 0 | Refills: 0 | Status: COMPLETED | OUTPATIENT
Start: 2018-01-01 | End: 2018-01-01

## 2018-01-01 RX ORDER — OXYCODONE HYDROCHLORIDE 5 MG/1
1 TABLET ORAL
Qty: 20 | Refills: 0 | OUTPATIENT
Start: 2018-01-01 | End: 2018-01-01

## 2018-01-01 RX ORDER — SEVELAMER CARBONATE 2400 MG/1
800 POWDER, FOR SUSPENSION ORAL
Qty: 0 | Refills: 0 | Status: DISCONTINUED | OUTPATIENT
Start: 2018-01-01 | End: 2018-01-01

## 2018-01-01 RX ORDER — INSULIN LISPRO 100/ML
VIAL (ML) SUBCUTANEOUS
Qty: 0 | Refills: 0 | Status: DISCONTINUED | OUTPATIENT
Start: 2018-01-01 | End: 2018-01-01

## 2018-01-01 RX ORDER — AMIODARONE HYDROCHLORIDE 400 MG/1
200 TABLET ORAL DAILY
Qty: 0 | Refills: 0 | Status: DISCONTINUED | OUTPATIENT
Start: 2018-01-01 | End: 2018-01-01

## 2018-01-01 RX ORDER — ACETAMINOPHEN 500 MG
650 TABLET ORAL EVERY 6 HOURS
Qty: 0 | Refills: 0 | Status: DISCONTINUED | OUTPATIENT
Start: 2018-01-01 | End: 2018-01-01

## 2018-01-01 RX ORDER — HYDROCORTISONE 20 MG
50 TABLET ORAL EVERY 8 HOURS
Qty: 0 | Refills: 0 | Status: COMPLETED | OUTPATIENT
Start: 2018-01-01 | End: 2018-01-01

## 2018-01-01 RX ORDER — WARFARIN SODIUM 2.5 MG/1
1 TABLET ORAL ONCE
Qty: 0 | Refills: 0 | Status: COMPLETED | OUTPATIENT
Start: 2018-01-01 | End: 2018-01-01

## 2018-01-01 RX ORDER — HYDROCORTISONE 20 MG
50 TABLET ORAL EVERY 6 HOURS
Qty: 0 | Refills: 0 | Status: DISCONTINUED | OUTPATIENT
Start: 2018-01-01 | End: 2018-01-01

## 2018-01-01 RX ORDER — OXYCODONE HYDROCHLORIDE 5 MG/1
5 TABLET ORAL EVERY 6 HOURS
Qty: 0 | Refills: 0 | Status: DISCONTINUED | OUTPATIENT
Start: 2018-01-01 | End: 2018-01-01

## 2018-01-01 RX ORDER — POLYETHYLENE GLYCOL 3350 17 G/17G
17 POWDER, FOR SOLUTION ORAL DAILY
Qty: 0 | Refills: 0 | Status: DISCONTINUED | OUTPATIENT
Start: 2018-01-01 | End: 2018-01-01

## 2018-01-01 RX ORDER — PANTOPRAZOLE SODIUM 20 MG/1
80 TABLET, DELAYED RELEASE ORAL ONCE
Qty: 0 | Refills: 0 | Status: COMPLETED | OUTPATIENT
Start: 2018-01-01 | End: 2018-01-01

## 2018-01-01 RX ORDER — DOCUSATE SODIUM 100 MG
100 CAPSULE ORAL THREE TIMES A DAY
Qty: 0 | Refills: 0 | Status: DISCONTINUED | OUTPATIENT
Start: 2018-01-01 | End: 2018-01-01

## 2018-01-01 RX ORDER — LOSARTAN POTASSIUM 100 MG/1
1 TABLET, FILM COATED ORAL
Qty: 0 | Refills: 0 | COMMUNITY

## 2018-01-01 RX ORDER — CEFUROXIME AXETIL 250 MG
1 TABLET ORAL
Qty: 20 | Refills: 0 | OUTPATIENT
Start: 2018-01-01 | End: 2018-01-01

## 2018-01-01 RX ORDER — LACTOBACILLUS ACIDOPHILUS 100MM CELL
1 CAPSULE ORAL DAILY
Qty: 0 | Refills: 0 | Status: DISCONTINUED | OUTPATIENT
Start: 2018-01-01 | End: 2018-01-01

## 2018-01-01 RX ORDER — ERYTHROPOIETIN 10000 [IU]/ML
2000 INJECTION, SOLUTION INTRAVENOUS; SUBCUTANEOUS
Qty: 0 | Refills: 0 | Status: DISCONTINUED | OUTPATIENT
Start: 2018-01-01 | End: 2018-01-01

## 2018-01-01 RX ORDER — CEFEPIME 1 G/1
2000 INJECTION, POWDER, FOR SOLUTION INTRAMUSCULAR; INTRAVENOUS ONCE
Qty: 0 | Refills: 0 | Status: COMPLETED | OUTPATIENT
Start: 2018-01-01 | End: 2018-01-01

## 2018-01-01 RX ORDER — CINACALCET 30 MG/1
30 TABLET, FILM COATED ORAL
Qty: 0 | Refills: 0 | Status: DISCONTINUED | OUTPATIENT
Start: 2018-01-01 | End: 2018-01-01

## 2018-01-01 RX ORDER — HYDROCORTISONE 20 MG
50 TABLET ORAL EVERY 8 HOURS
Qty: 0 | Refills: 0 | Status: DISCONTINUED | OUTPATIENT
Start: 2018-01-01 | End: 2018-01-01

## 2018-01-01 RX ORDER — AMIODARONE HYDROCHLORIDE 400 MG/1
100 TABLET ORAL DAILY
Qty: 0 | Refills: 0 | Status: DISCONTINUED | OUTPATIENT
Start: 2018-01-01 | End: 2018-01-01

## 2018-01-01 RX ORDER — LACTOBACILLUS ACIDOPHILUS 100MM CELL
1 CAPSULE ORAL
Qty: 30 | Refills: 0 | OUTPATIENT
Start: 2018-01-01

## 2018-01-01 RX ORDER — ERYTHROPOIETIN 10000 [IU]/ML
3000 INJECTION, SOLUTION INTRAVENOUS; SUBCUTANEOUS
Qty: 0 | Refills: 0 | Status: DISCONTINUED | OUTPATIENT
Start: 2018-01-01 | End: 2018-01-01

## 2018-01-01 RX ORDER — SEVELAMER CARBONATE 2400 MG/1
2400 POWDER, FOR SUSPENSION ORAL
Qty: 0 | Refills: 0 | Status: DISCONTINUED | OUTPATIENT
Start: 2018-01-01 | End: 2018-01-01

## 2018-01-01 RX ORDER — CARVEDILOL PHOSPHATE 80 MG/1
1 CAPSULE, EXTENDED RELEASE ORAL
Qty: 60 | Refills: 0 | OUTPATIENT
Start: 2018-01-01 | End: 2019-01-01

## 2018-01-01 RX ORDER — DEXTROSE 50 % IN WATER 50 %
15 SYRINGE (ML) INTRAVENOUS ONCE
Qty: 0 | Refills: 0 | Status: DISCONTINUED | OUTPATIENT
Start: 2018-01-01 | End: 2018-01-01

## 2018-01-01 RX ORDER — PANTOPRAZOLE SODIUM 20 MG/1
40 TABLET, DELAYED RELEASE ORAL DAILY
Qty: 0 | Refills: 0 | Status: DISCONTINUED | OUTPATIENT
Start: 2018-01-01 | End: 2018-01-01

## 2018-01-01 RX ORDER — WARFARIN SODIUM 2.5 MG/1
2 TABLET ORAL ONCE
Qty: 0 | Refills: 0 | Status: DISCONTINUED | OUTPATIENT
Start: 2018-01-01 | End: 2018-01-01

## 2018-01-01 RX ORDER — GLUCAGON INJECTION, SOLUTION 0.5 MG/.1ML
1 INJECTION, SOLUTION SUBCUTANEOUS ONCE
Qty: 0 | Refills: 0 | Status: DISCONTINUED | OUTPATIENT
Start: 2018-01-01 | End: 2018-01-01

## 2018-01-01 RX ORDER — PHYTONADIONE (VIT K1) 5 MG
2.5 TABLET ORAL ONCE
Qty: 0 | Refills: 0 | Status: COMPLETED | OUTPATIENT
Start: 2018-01-01 | End: 2018-01-01

## 2018-01-01 RX ORDER — LOSARTAN POTASSIUM 100 MG/1
1 TABLET, FILM COATED ORAL
Qty: 30 | Refills: 0 | OUTPATIENT
Start: 2018-01-01 | End: 2019-01-01

## 2018-01-01 RX ORDER — CEFUROXIME AXETIL 250 MG
250 TABLET ORAL EVERY 12 HOURS
Qty: 0 | Refills: 0 | Status: COMPLETED | OUTPATIENT
Start: 2018-01-01 | End: 2018-01-01

## 2018-01-01 RX ORDER — CEFEPIME 1 G/1
INJECTION, POWDER, FOR SOLUTION INTRAMUSCULAR; INTRAVENOUS
Qty: 0 | Refills: 0 | Status: DISCONTINUED | OUTPATIENT
Start: 2018-01-01 | End: 2018-01-01

## 2018-01-01 RX ORDER — HYDROCORTISONE 20 MG
25 TABLET ORAL EVERY 12 HOURS
Qty: 0 | Refills: 0 | Status: DISCONTINUED | OUTPATIENT
Start: 2018-01-01 | End: 2018-01-01

## 2018-01-01 RX ORDER — SITAGLIPTIN 50 MG/1
1 TABLET, FILM COATED ORAL
Qty: 30 | Refills: 0 | OUTPATIENT
Start: 2018-01-01

## 2018-01-01 RX ORDER — PIPERACILLIN AND TAZOBACTAM 4; .5 G/20ML; G/20ML
3.38 INJECTION, POWDER, LYOPHILIZED, FOR SOLUTION INTRAVENOUS EVERY 12 HOURS
Qty: 0 | Refills: 0 | Status: DISCONTINUED | OUTPATIENT
Start: 2018-01-01 | End: 2018-01-01

## 2018-01-01 RX ORDER — HYDROCORTISONE 20 MG
100 TABLET ORAL ONCE
Qty: 0 | Refills: 0 | Status: COMPLETED | OUTPATIENT
Start: 2018-01-01 | End: 2018-01-01

## 2018-01-01 RX ORDER — MIDODRINE HYDROCHLORIDE 2.5 MG/1
2.5 TABLET ORAL THREE TIMES A DAY
Qty: 0 | Refills: 0 | Status: DISCONTINUED | OUTPATIENT
Start: 2018-01-01 | End: 2018-01-01

## 2018-01-01 RX ORDER — SODIUM CHLORIDE 9 MG/ML
3 INJECTION INTRAMUSCULAR; INTRAVENOUS; SUBCUTANEOUS ONCE
Qty: 0 | Refills: 0 | Status: COMPLETED | OUTPATIENT
Start: 2018-01-01 | End: 2018-01-01

## 2018-01-01 RX ORDER — OMEPRAZOLE 10 MG/1
1 CAPSULE, DELAYED RELEASE ORAL
Qty: 0 | Refills: 0 | COMMUNITY

## 2018-01-01 RX ORDER — MORPHINE SULFATE 50 MG/1
2 CAPSULE, EXTENDED RELEASE ORAL ONCE
Qty: 0 | Refills: 0 | Status: DISCONTINUED | OUTPATIENT
Start: 2018-01-01 | End: 2018-01-01

## 2018-01-01 RX ORDER — WARFARIN SODIUM 2.5 MG/1
5 TABLET ORAL ONCE
Qty: 0 | Refills: 0 | Status: COMPLETED | OUTPATIENT
Start: 2018-01-01 | End: 2018-01-01

## 2018-01-01 RX ORDER — CEFUROXIME AXETIL 250 MG
1 TABLET ORAL
Qty: 0 | Refills: 0 | COMMUNITY
Start: 2018-01-01 | End: 2018-01-01

## 2018-01-01 RX ORDER — WARFARIN SODIUM 2.5 MG/1
1.5 TABLET ORAL DAILY
Qty: 0 | Refills: 0 | Status: DISCONTINUED | OUTPATIENT
Start: 2018-01-01 | End: 2018-01-01

## 2018-01-01 RX ORDER — CEFEPIME 1 G/1
1000 INJECTION, POWDER, FOR SOLUTION INTRAMUSCULAR; INTRAVENOUS ONCE
Qty: 0 | Refills: 0 | Status: COMPLETED | OUTPATIENT
Start: 2018-01-01 | End: 2018-01-01

## 2018-01-01 RX ORDER — SODIUM CHLORIDE 9 MG/ML
1000 INJECTION INTRAMUSCULAR; INTRAVENOUS; SUBCUTANEOUS
Qty: 0 | Refills: 0 | Status: DISCONTINUED | OUTPATIENT
Start: 2018-01-01 | End: 2018-01-01

## 2018-01-01 RX ORDER — VANCOMYCIN HCL 1 G
1 VIAL (EA) INTRAVENOUS
Qty: 0 | Refills: 0 | COMMUNITY
Start: 2018-01-01 | End: 2018-01-01

## 2018-01-01 RX ORDER — WARFARIN SODIUM 2.5 MG/1
1 TABLET ORAL DAILY
Qty: 0 | Refills: 0 | Status: DISCONTINUED | OUTPATIENT
Start: 2018-01-01 | End: 2018-01-01

## 2018-01-01 RX ORDER — HYDROCORTISONE 20 MG
100 TABLET ORAL EVERY 8 HOURS
Qty: 0 | Refills: 0 | Status: DISCONTINUED | OUTPATIENT
Start: 2018-01-01 | End: 2018-01-01

## 2018-01-01 RX ORDER — LANOLIN ALCOHOL/MO/W.PET/CERES
3 CREAM (GRAM) TOPICAL ONCE
Qty: 0 | Refills: 0 | Status: COMPLETED | OUTPATIENT
Start: 2018-01-01 | End: 2018-01-01

## 2018-01-01 RX ORDER — HYDROCORTISONE 20 MG
25 TABLET ORAL EVERY 6 HOURS
Qty: 0 | Refills: 0 | Status: DISCONTINUED | OUTPATIENT
Start: 2018-01-01 | End: 2018-01-01

## 2018-01-01 RX ORDER — CARVEDILOL PHOSPHATE 80 MG/1
3.12 CAPSULE, EXTENDED RELEASE ORAL EVERY 12 HOURS
Qty: 0 | Refills: 0 | Status: DISCONTINUED | OUTPATIENT
Start: 2018-01-01 | End: 2018-01-01

## 2018-01-01 RX ORDER — ERYTHROPOIETIN 10000 [IU]/ML
10000 INJECTION, SOLUTION INTRAVENOUS; SUBCUTANEOUS
Qty: 0 | Refills: 0 | Status: DISCONTINUED | OUTPATIENT
Start: 2018-01-01 | End: 2018-01-01

## 2018-01-01 RX ORDER — ERYTHROPOIETIN 10000 [IU]/ML
3000 INJECTION, SOLUTION INTRAVENOUS; SUBCUTANEOUS ONCE
Qty: 0 | Refills: 0 | Status: COMPLETED | OUTPATIENT
Start: 2018-01-01 | End: 2018-01-01

## 2018-01-01 RX ORDER — VANCOMYCIN HCL 1 G
1 VIAL (EA) INTRAVENOUS
Qty: 40 | Refills: 0 | OUTPATIENT
Start: 2018-01-01 | End: 2018-01-01

## 2018-01-01 RX ORDER — CINACALCET 30 MG/1
60 TABLET, FILM COATED ORAL
Qty: 0 | Refills: 0 | Status: DISCONTINUED | OUTPATIENT
Start: 2018-01-01 | End: 2018-01-01

## 2018-01-01 RX ORDER — WARFARIN SODIUM 2.5 MG/1
0.5 TABLET ORAL DAILY
Qty: 0 | Refills: 0 | Status: DISCONTINUED | OUTPATIENT
Start: 2018-01-01 | End: 2018-01-01

## 2018-01-01 RX ORDER — WARFARIN SODIUM 2.5 MG/1
1 TABLET ORAL
Qty: 30 | Refills: 0 | OUTPATIENT
Start: 2018-01-01 | End: 2019-01-01

## 2018-01-01 RX ORDER — SEVELAMER CARBONATE 2400 MG/1
800 POWDER, FOR SUSPENSION ORAL THREE TIMES A DAY
Qty: 0 | Refills: 0 | Status: DISCONTINUED | OUTPATIENT
Start: 2018-01-01 | End: 2018-01-01

## 2018-01-01 RX ORDER — ACETAMINOPHEN 500 MG
650 TABLET ORAL ONCE
Qty: 0 | Refills: 0 | Status: COMPLETED | OUTPATIENT
Start: 2018-01-01 | End: 2018-01-01

## 2018-01-01 RX ORDER — ONDANSETRON 8 MG/1
4 TABLET, FILM COATED ORAL EVERY 4 HOURS
Qty: 0 | Refills: 0 | Status: DISCONTINUED | OUTPATIENT
Start: 2018-01-01 | End: 2018-01-01

## 2018-01-01 RX ADMIN — Medication 125 MILLIGRAM(S): at 17:22

## 2018-01-01 RX ADMIN — SEVELAMER CARBONATE 800 MILLIGRAM(S): 2400 POWDER, FOR SUSPENSION ORAL at 14:41

## 2018-01-01 RX ADMIN — Medication 1 TABLET(S): at 13:21

## 2018-01-01 RX ADMIN — CEFEPIME 100 MILLIGRAM(S): 1 INJECTION, POWDER, FOR SOLUTION INTRAMUSCULAR; INTRAVENOUS at 00:55

## 2018-01-01 RX ADMIN — Medication 125 MILLIGRAM(S): at 05:21

## 2018-01-01 RX ADMIN — PIPERACILLIN AND TAZOBACTAM 25 GRAM(S): 4; .5 INJECTION, POWDER, LYOPHILIZED, FOR SOLUTION INTRAVENOUS at 06:10

## 2018-01-01 RX ADMIN — Medication 100 MILLIGRAM(S): at 18:22

## 2018-01-01 RX ADMIN — OXYCODONE HYDROCHLORIDE 5 MILLIGRAM(S): 5 TABLET ORAL at 10:04

## 2018-01-01 RX ADMIN — Medication 25 MILLIGRAM(S): at 11:40

## 2018-01-01 RX ADMIN — OXYCODONE HYDROCHLORIDE 5 MILLIGRAM(S): 5 TABLET ORAL at 03:46

## 2018-01-01 RX ADMIN — Medication 25 MILLIGRAM(S): at 17:29

## 2018-01-01 RX ADMIN — Medication 250 MILLIGRAM(S): at 00:08

## 2018-01-01 RX ADMIN — ERYTHROPOIETIN 8000 UNIT(S): 10000 INJECTION, SOLUTION INTRAVENOUS; SUBCUTANEOUS at 10:15

## 2018-01-01 RX ADMIN — SEVELAMER CARBONATE 800 MILLIGRAM(S): 2400 POWDER, FOR SUSPENSION ORAL at 18:05

## 2018-01-01 RX ADMIN — Medication 1 MILLIGRAM(S): at 13:28

## 2018-01-01 RX ADMIN — PANTOPRAZOLE SODIUM 80 MILLIGRAM(S): 20 TABLET, DELAYED RELEASE ORAL at 18:36

## 2018-01-01 RX ADMIN — AMIODARONE HYDROCHLORIDE 100 MILLIGRAM(S): 400 TABLET ORAL at 06:34

## 2018-01-01 RX ADMIN — AMIODARONE HYDROCHLORIDE 100 MILLIGRAM(S): 400 TABLET ORAL at 05:21

## 2018-01-01 RX ADMIN — OXYCODONE HYDROCHLORIDE 5 MILLIGRAM(S): 5 TABLET ORAL at 06:32

## 2018-01-01 RX ADMIN — Medication 125 MILLIGRAM(S): at 17:58

## 2018-01-01 RX ADMIN — CINACALCET 60 MILLIGRAM(S): 30 TABLET, FILM COATED ORAL at 13:19

## 2018-01-01 RX ADMIN — Medication 125 MILLIGRAM(S): at 14:42

## 2018-01-01 RX ADMIN — Medication 100 MILLIGRAM(S): at 23:07

## 2018-01-01 RX ADMIN — PIPERACILLIN AND TAZOBACTAM 25 GRAM(S): 4; .5 INJECTION, POWDER, LYOPHILIZED, FOR SOLUTION INTRAVENOUS at 17:28

## 2018-01-01 RX ADMIN — PANTOPRAZOLE SODIUM 40 MILLIGRAM(S): 20 TABLET, DELAYED RELEASE ORAL at 17:46

## 2018-01-01 RX ADMIN — SEVELAMER CARBONATE 2400 MILLIGRAM(S): 2400 POWDER, FOR SUSPENSION ORAL at 08:26

## 2018-01-01 RX ADMIN — Medication 125 MILLIGRAM(S): at 23:01

## 2018-01-01 RX ADMIN — SODIUM CHLORIDE 500 MILLILITER(S): 9 INJECTION INTRAMUSCULAR; INTRAVENOUS; SUBCUTANEOUS at 13:54

## 2018-01-01 RX ADMIN — PIPERACILLIN AND TAZOBACTAM 25 GRAM(S): 4; .5 INJECTION, POWDER, LYOPHILIZED, FOR SOLUTION INTRAVENOUS at 05:49

## 2018-01-01 RX ADMIN — Medication 50 MILLIGRAM(S): at 05:02

## 2018-01-01 RX ADMIN — SEVELAMER CARBONATE 800 MILLIGRAM(S): 2400 POWDER, FOR SUSPENSION ORAL at 23:06

## 2018-01-01 RX ADMIN — Medication 1000 MILLIGRAM(S): at 01:08

## 2018-01-01 RX ADMIN — Medication 1 TABLET(S): at 13:26

## 2018-01-01 RX ADMIN — Medication 125 MILLIGRAM(S): at 22:52

## 2018-01-01 RX ADMIN — Medication 100 MILLIGRAM(S): at 05:54

## 2018-01-01 RX ADMIN — SEVELAMER CARBONATE 800 MILLIGRAM(S): 2400 POWDER, FOR SUSPENSION ORAL at 09:01

## 2018-01-01 RX ADMIN — Medication 50 MILLIGRAM(S): at 13:16

## 2018-01-01 RX ADMIN — Medication 125 MILLIGRAM(S): at 20:20

## 2018-01-01 RX ADMIN — Medication 1 TABLET(S): at 11:58

## 2018-01-01 RX ADMIN — Medication 125 MILLIGRAM(S): at 23:06

## 2018-01-01 RX ADMIN — Medication 650 MILLIGRAM(S): at 01:30

## 2018-01-01 RX ADMIN — PANTOPRAZOLE SODIUM 40 MILLIGRAM(S): 20 TABLET, DELAYED RELEASE ORAL at 17:34

## 2018-01-01 RX ADMIN — Medication 1 TABLET(S): at 18:25

## 2018-01-01 RX ADMIN — AMIODARONE HYDROCHLORIDE 200 MILLIGRAM(S): 400 TABLET ORAL at 05:09

## 2018-01-01 RX ADMIN — SEVELAMER CARBONATE 2400 MILLIGRAM(S): 2400 POWDER, FOR SUSPENSION ORAL at 18:31

## 2018-01-01 RX ADMIN — AMIODARONE HYDROCHLORIDE 200 MILLIGRAM(S): 400 TABLET ORAL at 05:29

## 2018-01-01 RX ADMIN — Medication 125 MILLIGRAM(S): at 15:57

## 2018-01-01 RX ADMIN — Medication 650 MILLIGRAM(S): at 00:06

## 2018-01-01 RX ADMIN — Medication 125 MILLIGRAM(S): at 05:02

## 2018-01-01 RX ADMIN — Medication 125 MILLIGRAM(S): at 18:38

## 2018-01-01 RX ADMIN — SEVELAMER CARBONATE 800 MILLIGRAM(S): 2400 POWDER, FOR SUSPENSION ORAL at 08:40

## 2018-01-01 RX ADMIN — Medication 1 TABLET(S): at 12:36

## 2018-01-01 RX ADMIN — SEVELAMER CARBONATE 800 MILLIGRAM(S): 2400 POWDER, FOR SUSPENSION ORAL at 18:38

## 2018-01-01 RX ADMIN — Medication 100 MILLIGRAM(S): at 13:26

## 2018-01-01 RX ADMIN — OXYCODONE HYDROCHLORIDE 5 MILLIGRAM(S): 5 TABLET ORAL at 18:52

## 2018-01-01 RX ADMIN — Medication 5 MILLIGRAM(S): at 06:21

## 2018-01-01 RX ADMIN — PIPERACILLIN AND TAZOBACTAM 25 GRAM(S): 4; .5 INJECTION, POWDER, LYOPHILIZED, FOR SOLUTION INTRAVENOUS at 05:02

## 2018-01-01 RX ADMIN — PANTOPRAZOLE SODIUM 40 MILLIGRAM(S): 20 TABLET, DELAYED RELEASE ORAL at 06:15

## 2018-01-01 RX ADMIN — Medication 125 MILLIGRAM(S): at 06:22

## 2018-01-01 RX ADMIN — PHENYLEPHRINE HYDROCHLORIDE 11.91 MICROGRAM(S)/KG/MIN: 10 INJECTION INTRAVENOUS at 21:08

## 2018-01-01 RX ADMIN — MIDODRINE HYDROCHLORIDE 2.5 MILLIGRAM(S): 2.5 TABLET ORAL at 21:06

## 2018-01-01 RX ADMIN — CINACALCET 30 MILLIGRAM(S): 30 TABLET, FILM COATED ORAL at 12:16

## 2018-01-01 RX ADMIN — SODIUM CHLORIDE 1000 MILLILITER(S): 9 INJECTION INTRAMUSCULAR; INTRAVENOUS; SUBCUTANEOUS at 12:53

## 2018-01-01 RX ADMIN — SODIUM CHLORIDE 500 MILLILITER(S): 9 INJECTION INTRAMUSCULAR; INTRAVENOUS; SUBCUTANEOUS at 05:32

## 2018-01-01 RX ADMIN — CEFEPIME 1000 MILLIGRAM(S): 1 INJECTION, POWDER, FOR SOLUTION INTRAMUSCULAR; INTRAVENOUS at 12:09

## 2018-01-01 RX ADMIN — Medication 250 MILLIGRAM(S): at 15:04

## 2018-01-01 RX ADMIN — Medication 20 MILLIGRAM(S): at 17:14

## 2018-01-01 RX ADMIN — Medication 650 MILLIGRAM(S): at 14:53

## 2018-01-01 RX ADMIN — SEVELAMER CARBONATE 800 MILLIGRAM(S): 2400 POWDER, FOR SUSPENSION ORAL at 05:02

## 2018-01-01 RX ADMIN — Medication 1: at 08:40

## 2018-01-01 RX ADMIN — Medication 100 MILLIGRAM(S): at 12:53

## 2018-01-01 RX ADMIN — Medication 1000 MILLIGRAM(S): at 01:47

## 2018-01-01 RX ADMIN — Medication 125 MILLIGRAM(S): at 05:31

## 2018-01-01 RX ADMIN — Medication 100 MILLIGRAM(S): at 21:35

## 2018-01-01 RX ADMIN — OXYCODONE HYDROCHLORIDE 5 MILLIGRAM(S): 5 TABLET ORAL at 11:03

## 2018-01-01 RX ADMIN — Medication 50 MILLIGRAM(S): at 23:05

## 2018-01-01 RX ADMIN — Medication 125 MILLIGRAM(S): at 05:09

## 2018-01-01 RX ADMIN — OXYCODONE HYDROCHLORIDE 5 MILLIGRAM(S): 5 TABLET ORAL at 01:51

## 2018-01-01 RX ADMIN — SEVELAMER CARBONATE 800 MILLIGRAM(S): 2400 POWDER, FOR SUSPENSION ORAL at 13:20

## 2018-01-01 RX ADMIN — SEVELAMER CARBONATE 800 MILLIGRAM(S): 2400 POWDER, FOR SUSPENSION ORAL at 14:45

## 2018-01-01 RX ADMIN — PHENYLEPHRINE HYDROCHLORIDE 11.91 MICROGRAM(S)/KG/MIN: 10 INJECTION INTRAVENOUS at 05:32

## 2018-01-01 RX ADMIN — PANTOPRAZOLE SODIUM 10 MG/HR: 20 TABLET, DELAYED RELEASE ORAL at 21:50

## 2018-01-01 RX ADMIN — AMIODARONE HYDROCHLORIDE 200 MILLIGRAM(S): 400 TABLET ORAL at 05:02

## 2018-01-01 RX ADMIN — PANTOPRAZOLE SODIUM 10 MG/HR: 20 TABLET, DELAYED RELEASE ORAL at 20:25

## 2018-01-01 RX ADMIN — Medication 125 MILLIGRAM(S): at 05:32

## 2018-01-01 RX ADMIN — AMIODARONE HYDROCHLORIDE 100 MILLIGRAM(S): 400 TABLET ORAL at 17:56

## 2018-01-01 RX ADMIN — Medication 125 MILLIGRAM(S): at 17:29

## 2018-01-01 RX ADMIN — CEFEPIME 1000 MILLIGRAM(S): 1 INJECTION, POWDER, FOR SOLUTION INTRAMUSCULAR; INTRAVENOUS at 12:35

## 2018-01-01 RX ADMIN — LOSARTAN POTASSIUM 25 MILLIGRAM(S): 100 TABLET, FILM COATED ORAL at 06:19

## 2018-01-01 RX ADMIN — OXYCODONE HYDROCHLORIDE 5 MILLIGRAM(S): 5 TABLET ORAL at 14:01

## 2018-01-01 RX ADMIN — Medication 125 MILLIGRAM(S): at 11:02

## 2018-01-01 RX ADMIN — PANTOPRAZOLE SODIUM 40 MILLIGRAM(S): 20 TABLET, DELAYED RELEASE ORAL at 05:02

## 2018-01-01 RX ADMIN — Medication 1 TABLET(S): at 11:40

## 2018-01-01 RX ADMIN — Medication 125 MILLIGRAM(S): at 06:11

## 2018-01-01 RX ADMIN — Medication 125 MILLIGRAM(S): at 18:58

## 2018-01-01 RX ADMIN — OXYCODONE HYDROCHLORIDE 5 MILLIGRAM(S): 5 TABLET ORAL at 16:45

## 2018-01-01 RX ADMIN — ERYTHROPOIETIN 10000 UNIT(S): 10000 INJECTION, SOLUTION INTRAVENOUS; SUBCUTANEOUS at 10:27

## 2018-01-01 RX ADMIN — POLYETHYLENE GLYCOL 3350 17 GRAM(S): 17 POWDER, FOR SOLUTION ORAL at 14:43

## 2018-01-01 RX ADMIN — Medication 125 MILLIGRAM(S): at 00:26

## 2018-01-01 RX ADMIN — ERYTHROPOIETIN 2000 UNIT(S): 10000 INJECTION, SOLUTION INTRAVENOUS; SUBCUTANEOUS at 14:01

## 2018-01-01 RX ADMIN — SEVELAMER CARBONATE 2400 MILLIGRAM(S): 2400 POWDER, FOR SUSPENSION ORAL at 09:12

## 2018-01-01 RX ADMIN — SEVELAMER CARBONATE 800 MILLIGRAM(S): 2400 POWDER, FOR SUSPENSION ORAL at 08:23

## 2018-01-01 RX ADMIN — Medication 125 MILLIGRAM(S): at 23:04

## 2018-01-01 RX ADMIN — WARFARIN SODIUM 1 MILLIGRAM(S): 2.5 TABLET ORAL at 21:38

## 2018-01-01 RX ADMIN — Medication 50 MILLIGRAM(S): at 13:27

## 2018-01-01 RX ADMIN — Medication 125 MILLIGRAM(S): at 18:39

## 2018-01-01 RX ADMIN — PIPERACILLIN AND TAZOBACTAM 25 GRAM(S): 4; .5 INJECTION, POWDER, LYOPHILIZED, FOR SOLUTION INTRAVENOUS at 18:59

## 2018-01-01 RX ADMIN — Medication 25 MILLIGRAM(S): at 00:28

## 2018-01-01 RX ADMIN — CEFEPIME 1000 MILLIGRAM(S): 1 INJECTION, POWDER, FOR SOLUTION INTRAMUSCULAR; INTRAVENOUS at 17:03

## 2018-01-01 RX ADMIN — Medication 125 MILLIGRAM(S): at 05:43

## 2018-01-01 RX ADMIN — SODIUM CHLORIDE 75 MILLILITER(S): 9 INJECTION INTRAMUSCULAR; INTRAVENOUS; SUBCUTANEOUS at 10:24

## 2018-01-01 RX ADMIN — PANTOPRAZOLE SODIUM 40 MILLIGRAM(S): 20 TABLET, DELAYED RELEASE ORAL at 05:09

## 2018-01-01 RX ADMIN — Medication 20 MILLIGRAM(S): at 05:09

## 2018-01-01 RX ADMIN — SEVELAMER CARBONATE 2400 MILLIGRAM(S): 2400 POWDER, FOR SUSPENSION ORAL at 18:12

## 2018-01-01 RX ADMIN — Medication 400 MILLIGRAM(S): at 14:26

## 2018-01-01 RX ADMIN — Medication 100 MILLIGRAM(S): at 05:29

## 2018-01-01 RX ADMIN — CINACALCET 30 MILLIGRAM(S): 30 TABLET, FILM COATED ORAL at 23:17

## 2018-01-01 RX ADMIN — Medication 50 MILLIGRAM(S): at 23:17

## 2018-01-01 RX ADMIN — AMIODARONE HYDROCHLORIDE 100 MILLIGRAM(S): 400 TABLET ORAL at 06:14

## 2018-01-01 RX ADMIN — SEVELAMER CARBONATE 2400 MILLIGRAM(S): 2400 POWDER, FOR SUSPENSION ORAL at 17:51

## 2018-01-01 RX ADMIN — Medication 1000 MILLIGRAM(S): at 02:22

## 2018-01-01 RX ADMIN — ERYTHROPOIETIN 3000 UNIT(S): 10000 INJECTION, SOLUTION INTRAVENOUS; SUBCUTANEOUS at 14:01

## 2018-01-01 RX ADMIN — Medication 5 MILLIGRAM(S): at 05:03

## 2018-01-01 RX ADMIN — ERYTHROPOIETIN 10000 UNIT(S): 10000 INJECTION, SOLUTION INTRAVENOUS; SUBCUTANEOUS at 14:32

## 2018-01-01 RX ADMIN — PANTOPRAZOLE SODIUM 40 MILLIGRAM(S): 20 TABLET, DELAYED RELEASE ORAL at 18:12

## 2018-01-01 RX ADMIN — Medication 650 MILLIGRAM(S): at 23:44

## 2018-01-01 RX ADMIN — PHENYLEPHRINE HYDROCHLORIDE 11.91 MICROGRAM(S)/KG/MIN: 10 INJECTION INTRAVENOUS at 15:19

## 2018-01-01 RX ADMIN — MORPHINE SULFATE 2 MILLIGRAM(S): 50 CAPSULE, EXTENDED RELEASE ORAL at 03:30

## 2018-01-01 RX ADMIN — SEVELAMER CARBONATE 800 MILLIGRAM(S): 2400 POWDER, FOR SUSPENSION ORAL at 08:48

## 2018-01-01 RX ADMIN — Medication 1 TABLET(S): at 12:08

## 2018-01-01 RX ADMIN — CINACALCET 30 MILLIGRAM(S): 30 TABLET, FILM COATED ORAL at 08:26

## 2018-01-01 RX ADMIN — Medication 125 MILLIGRAM(S): at 14:46

## 2018-01-01 RX ADMIN — OXYCODONE HYDROCHLORIDE 5 MILLIGRAM(S): 5 TABLET ORAL at 12:17

## 2018-01-01 RX ADMIN — Medication 1 TABLET(S): at 12:16

## 2018-01-01 RX ADMIN — Medication 1: at 13:22

## 2018-01-01 RX ADMIN — OXYCODONE HYDROCHLORIDE 5 MILLIGRAM(S): 5 TABLET ORAL at 09:45

## 2018-01-01 RX ADMIN — PIPERACILLIN AND TAZOBACTAM 3.38 GRAM(S): 4; .5 INJECTION, POWDER, LYOPHILIZED, FOR SOLUTION INTRAVENOUS at 14:22

## 2018-01-01 RX ADMIN — WARFARIN SODIUM 1 MILLIGRAM(S): 2.5 TABLET ORAL at 22:09

## 2018-01-01 RX ADMIN — SEVELAMER CARBONATE 2400 MILLIGRAM(S): 2400 POWDER, FOR SUSPENSION ORAL at 11:40

## 2018-01-01 RX ADMIN — SEVELAMER CARBONATE 2400 MILLIGRAM(S): 2400 POWDER, FOR SUSPENSION ORAL at 17:54

## 2018-01-01 RX ADMIN — Medication 125 MILLIGRAM(S): at 23:17

## 2018-01-01 RX ADMIN — MORPHINE SULFATE 2 MILLIGRAM(S): 50 CAPSULE, EXTENDED RELEASE ORAL at 02:25

## 2018-01-01 RX ADMIN — OXYCODONE HYDROCHLORIDE 5 MILLIGRAM(S): 5 TABLET ORAL at 13:38

## 2018-01-01 RX ADMIN — Medication 650 MILLIGRAM(S): at 17:56

## 2018-01-01 RX ADMIN — Medication 250 MILLIGRAM(S): at 05:32

## 2018-01-01 RX ADMIN — Medication 125 MILLIGRAM(S): at 00:23

## 2018-01-01 RX ADMIN — SEVELAMER CARBONATE 800 MILLIGRAM(S): 2400 POWDER, FOR SUSPENSION ORAL at 12:08

## 2018-01-01 RX ADMIN — PANTOPRAZOLE SODIUM 40 MILLIGRAM(S): 20 TABLET, DELAYED RELEASE ORAL at 18:01

## 2018-01-01 RX ADMIN — SEVELAMER CARBONATE 800 MILLIGRAM(S): 2400 POWDER, FOR SUSPENSION ORAL at 00:33

## 2018-01-01 RX ADMIN — Medication 3: at 17:51

## 2018-01-01 RX ADMIN — Medication 125 MILLIGRAM(S): at 11:40

## 2018-01-01 RX ADMIN — OXYCODONE HYDROCHLORIDE 5 MILLIGRAM(S): 5 TABLET ORAL at 05:07

## 2018-01-01 RX ADMIN — SEVELAMER CARBONATE 2400 MILLIGRAM(S): 2400 POWDER, FOR SUSPENSION ORAL at 09:47

## 2018-01-01 RX ADMIN — FAMOTIDINE 20 MILLIGRAM(S): 10 INJECTION INTRAVENOUS at 18:35

## 2018-01-01 RX ADMIN — OXYCODONE HYDROCHLORIDE 5 MILLIGRAM(S): 5 TABLET ORAL at 08:45

## 2018-01-01 RX ADMIN — Medication 4: at 14:05

## 2018-01-01 RX ADMIN — ONDANSETRON 4 MILLIGRAM(S): 8 TABLET, FILM COATED ORAL at 23:01

## 2018-01-01 RX ADMIN — Medication 250 MILLIGRAM(S): at 18:37

## 2018-01-01 RX ADMIN — Medication 125 MILLIGRAM(S): at 12:11

## 2018-01-01 RX ADMIN — OXYCODONE HYDROCHLORIDE 5 MILLIGRAM(S): 5 TABLET ORAL at 18:54

## 2018-01-01 RX ADMIN — SEVELAMER CARBONATE 800 MILLIGRAM(S): 2400 POWDER, FOR SUSPENSION ORAL at 17:29

## 2018-01-01 RX ADMIN — OXYCODONE HYDROCHLORIDE 5 MILLIGRAM(S): 5 TABLET ORAL at 02:21

## 2018-01-01 RX ADMIN — AMIODARONE HYDROCHLORIDE 100 MILLIGRAM(S): 400 TABLET ORAL at 05:52

## 2018-01-01 RX ADMIN — Medication 1000 MILLIGRAM(S): at 16:30

## 2018-01-01 RX ADMIN — WARFARIN SODIUM 3 MILLIGRAM(S): 2.5 TABLET ORAL at 22:08

## 2018-01-01 RX ADMIN — PANTOPRAZOLE SODIUM 40 MILLIGRAM(S): 20 TABLET, DELAYED RELEASE ORAL at 08:40

## 2018-01-01 RX ADMIN — Medication 100 MILLIGRAM(S): at 14:54

## 2018-01-01 RX ADMIN — AMIODARONE HYDROCHLORIDE 200 MILLIGRAM(S): 400 TABLET ORAL at 14:45

## 2018-01-01 RX ADMIN — SEVELAMER CARBONATE 800 MILLIGRAM(S): 2400 POWDER, FOR SUSPENSION ORAL at 12:34

## 2018-01-01 RX ADMIN — PIPERACILLIN AND TAZOBACTAM 25 GRAM(S): 4; .5 INJECTION, POWDER, LYOPHILIZED, FOR SOLUTION INTRAVENOUS at 05:31

## 2018-01-01 RX ADMIN — PANTOPRAZOLE SODIUM 10 MG/HR: 20 TABLET, DELAYED RELEASE ORAL at 20:23

## 2018-01-01 RX ADMIN — Medication 25 MILLIGRAM(S): at 18:58

## 2018-01-01 RX ADMIN — SEVELAMER CARBONATE 800 MILLIGRAM(S): 2400 POWDER, FOR SUSPENSION ORAL at 05:32

## 2018-01-01 RX ADMIN — Medication 125 MILLIGRAM(S): at 00:00

## 2018-01-01 RX ADMIN — Medication 125 MILLIGRAM(S): at 18:44

## 2018-01-01 RX ADMIN — SEVELAMER CARBONATE 2400 MILLIGRAM(S): 2400 POWDER, FOR SUSPENSION ORAL at 17:21

## 2018-01-01 RX ADMIN — Medication 2.5 MILLIGRAM(S): at 01:58

## 2018-01-01 RX ADMIN — TRAMADOL HYDROCHLORIDE 50 MILLIGRAM(S): 50 TABLET ORAL at 04:59

## 2018-01-01 RX ADMIN — PANTOPRAZOLE SODIUM 40 MILLIGRAM(S): 20 TABLET, DELAYED RELEASE ORAL at 03:23

## 2018-01-01 RX ADMIN — AMIODARONE HYDROCHLORIDE 200 MILLIGRAM(S): 400 TABLET ORAL at 05:49

## 2018-01-01 RX ADMIN — Medication 15 MILLIGRAM(S): at 17:56

## 2018-01-01 RX ADMIN — CEFEPIME 1000 MILLIGRAM(S): 1 INJECTION, POWDER, FOR SOLUTION INTRAMUSCULAR; INTRAVENOUS at 11:02

## 2018-01-01 RX ADMIN — Medication 100 MILLIGRAM(S): at 21:06

## 2018-01-01 RX ADMIN — OXYCODONE HYDROCHLORIDE 5 MILLIGRAM(S): 5 TABLET ORAL at 00:55

## 2018-01-01 RX ADMIN — PIPERACILLIN AND TAZOBACTAM 200 GRAM(S): 4; .5 INJECTION, POWDER, LYOPHILIZED, FOR SOLUTION INTRAVENOUS at 13:52

## 2018-01-01 RX ADMIN — Medication 1 TABLET(S): at 11:03

## 2018-01-01 RX ADMIN — SEVELAMER CARBONATE 800 MILLIGRAM(S): 2400 POWDER, FOR SUSPENSION ORAL at 14:16

## 2018-01-01 RX ADMIN — Medication 125 MILLIGRAM(S): at 06:19

## 2018-01-01 RX ADMIN — Medication 125 MILLIGRAM(S): at 17:54

## 2018-01-01 RX ADMIN — Medication 1: at 12:14

## 2018-01-01 RX ADMIN — OXYCODONE HYDROCHLORIDE 5 MILLIGRAM(S): 5 TABLET ORAL at 00:02

## 2018-01-01 RX ADMIN — Medication 125 MILLIGRAM(S): at 00:03

## 2018-01-01 RX ADMIN — Medication 5 MILLIGRAM(S): at 06:35

## 2018-01-01 RX ADMIN — SEVELAMER CARBONATE 800 MILLIGRAM(S): 2400 POWDER, FOR SUSPENSION ORAL at 17:03

## 2018-01-01 RX ADMIN — Medication 125 MILLIGRAM(S): at 23:20

## 2018-01-01 RX ADMIN — PANTOPRAZOLE SODIUM 10 MG/HR: 20 TABLET, DELAYED RELEASE ORAL at 08:03

## 2018-01-01 RX ADMIN — Medication 50 MILLIGRAM(S): at 19:34

## 2018-01-01 RX ADMIN — SEVELAMER CARBONATE 2400 MILLIGRAM(S): 2400 POWDER, FOR SUSPENSION ORAL at 14:06

## 2018-01-01 RX ADMIN — PANTOPRAZOLE SODIUM 40 MILLIGRAM(S): 20 TABLET, DELAYED RELEASE ORAL at 05:43

## 2018-01-01 RX ADMIN — PANTOPRAZOLE SODIUM 40 MILLIGRAM(S): 20 TABLET, DELAYED RELEASE ORAL at 13:26

## 2018-01-01 RX ADMIN — OXYCODONE HYDROCHLORIDE 5 MILLIGRAM(S): 5 TABLET ORAL at 12:47

## 2018-01-01 RX ADMIN — Medication 5 MILLIGRAM(S): at 17:57

## 2018-01-01 RX ADMIN — PHENYLEPHRINE HYDROCHLORIDE 11.91 MICROGRAM(S)/KG/MIN: 10 INJECTION INTRAVENOUS at 02:15

## 2018-01-01 RX ADMIN — Medication 250 MILLIGRAM(S): at 17:57

## 2018-01-01 RX ADMIN — Medication 2: at 17:54

## 2018-01-01 RX ADMIN — AMIODARONE HYDROCHLORIDE 200 MILLIGRAM(S): 400 TABLET ORAL at 05:32

## 2018-01-01 RX ADMIN — PHENYLEPHRINE HYDROCHLORIDE 11.91 MICROGRAM(S)/KG/MIN: 10 INJECTION INTRAVENOUS at 16:07

## 2018-01-01 RX ADMIN — SODIUM CHLORIDE 3 MILLILITER(S): 9 INJECTION INTRAMUSCULAR; INTRAVENOUS; SUBCUTANEOUS at 18:38

## 2018-01-01 RX ADMIN — PIPERACILLIN AND TAZOBACTAM 25 GRAM(S): 4; .5 INJECTION, POWDER, LYOPHILIZED, FOR SOLUTION INTRAVENOUS at 12:02

## 2018-01-01 RX ADMIN — ERYTHROPOIETIN 10000 UNIT(S): 10000 INJECTION, SOLUTION INTRAVENOUS; SUBCUTANEOUS at 15:25

## 2018-01-01 RX ADMIN — AMIODARONE HYDROCHLORIDE 200 MILLIGRAM(S): 400 TABLET ORAL at 13:28

## 2018-01-01 RX ADMIN — Medication 5 MILLIGRAM(S): at 05:21

## 2018-01-01 RX ADMIN — Medication 1 TABLET(S): at 17:03

## 2018-01-01 RX ADMIN — Medication 125 MILLIGRAM(S): at 17:11

## 2018-01-01 RX ADMIN — Medication 125 MILLIGRAM(S): at 11:58

## 2018-01-01 RX ADMIN — CEFEPIME 1000 MILLIGRAM(S): 1 INJECTION, POWDER, FOR SOLUTION INTRAMUSCULAR; INTRAVENOUS at 16:23

## 2018-01-01 RX ADMIN — CEFEPIME 1000 MILLIGRAM(S): 1 INJECTION, POWDER, FOR SOLUTION INTRAMUSCULAR; INTRAVENOUS at 11:40

## 2018-01-01 RX ADMIN — OXYCODONE HYDROCHLORIDE 5 MILLIGRAM(S): 5 TABLET ORAL at 18:44

## 2018-01-01 RX ADMIN — Medication 650 MILLIGRAM(S): at 08:28

## 2018-01-01 RX ADMIN — Medication 125 MILLIGRAM(S): at 22:59

## 2018-01-01 RX ADMIN — PANTOPRAZOLE SODIUM 40 MILLIGRAM(S): 20 TABLET, DELAYED RELEASE ORAL at 06:21

## 2018-01-01 RX ADMIN — Medication 3: at 17:09

## 2018-01-01 RX ADMIN — OXYCODONE HYDROCHLORIDE 5 MILLIGRAM(S): 5 TABLET ORAL at 17:45

## 2018-01-01 RX ADMIN — CEFEPIME 1000 MILLIGRAM(S): 1 INJECTION, POWDER, FOR SOLUTION INTRAMUSCULAR; INTRAVENOUS at 14:46

## 2018-01-01 RX ADMIN — SEVELAMER CARBONATE 2400 MILLIGRAM(S): 2400 POWDER, FOR SUSPENSION ORAL at 17:57

## 2018-01-01 RX ADMIN — SEVELAMER CARBONATE 800 MILLIGRAM(S): 2400 POWDER, FOR SUSPENSION ORAL at 13:27

## 2018-01-01 RX ADMIN — SEVELAMER CARBONATE 800 MILLIGRAM(S): 2400 POWDER, FOR SUSPENSION ORAL at 15:58

## 2018-01-01 RX ADMIN — LOSARTAN POTASSIUM 25 MILLIGRAM(S): 100 TABLET, FILM COATED ORAL at 06:14

## 2018-01-01 RX ADMIN — PANTOPRAZOLE SODIUM 40 MILLIGRAM(S): 20 TABLET, DELAYED RELEASE ORAL at 06:06

## 2018-01-01 RX ADMIN — WARFARIN SODIUM 5 MILLIGRAM(S): 2.5 TABLET ORAL at 21:53

## 2018-01-01 RX ADMIN — PANTOPRAZOLE SODIUM 40 MILLIGRAM(S): 20 TABLET, DELAYED RELEASE ORAL at 08:54

## 2018-01-01 RX ADMIN — LOSARTAN POTASSIUM 25 MILLIGRAM(S): 100 TABLET, FILM COATED ORAL at 18:38

## 2018-01-01 RX ADMIN — Medication 50 MILLIGRAM(S): at 18:43

## 2018-01-01 RX ADMIN — Medication 125 MILLIGRAM(S): at 18:08

## 2018-01-01 RX ADMIN — Medication 250 MILLIGRAM(S): at 17:56

## 2018-01-01 RX ADMIN — Medication 100 MILLIGRAM(S): at 06:21

## 2018-01-01 RX ADMIN — SEVELAMER CARBONATE 2400 MILLIGRAM(S): 2400 POWDER, FOR SUSPENSION ORAL at 12:17

## 2018-01-01 RX ADMIN — OXYCODONE HYDROCHLORIDE 5 MILLIGRAM(S): 5 TABLET ORAL at 00:04

## 2018-01-01 RX ADMIN — Medication 1 TABLET(S): at 14:45

## 2018-01-01 RX ADMIN — Medication 125 MILLIGRAM(S): at 23:42

## 2018-01-01 RX ADMIN — Medication 50 MILLIGRAM(S): at 21:52

## 2018-01-01 RX ADMIN — POLYETHYLENE GLYCOL 3350 17 GRAM(S): 17 POWDER, FOR SOLUTION ORAL at 12:08

## 2018-01-01 RX ADMIN — SEVELAMER CARBONATE 2400 MILLIGRAM(S): 2400 POWDER, FOR SUSPENSION ORAL at 10:12

## 2018-01-01 RX ADMIN — PANTOPRAZOLE SODIUM 40 MILLIGRAM(S): 20 TABLET, DELAYED RELEASE ORAL at 06:33

## 2018-01-01 RX ADMIN — Medication 650 MILLIGRAM(S): at 00:00

## 2018-01-01 RX ADMIN — Medication 250 MILLIGRAM(S): at 06:19

## 2018-01-01 RX ADMIN — SEVELAMER CARBONATE 2400 MILLIGRAM(S): 2400 POWDER, FOR SUSPENSION ORAL at 13:00

## 2018-01-01 RX ADMIN — OXYCODONE HYDROCHLORIDE 5 MILLIGRAM(S): 5 TABLET ORAL at 01:00

## 2018-01-01 RX ADMIN — OXYCODONE HYDROCHLORIDE 5 MILLIGRAM(S): 5 TABLET ORAL at 17:23

## 2018-01-01 RX ADMIN — Medication 125 MILLIGRAM(S): at 13:22

## 2018-01-01 RX ADMIN — SEVELAMER CARBONATE 800 MILLIGRAM(S): 2400 POWDER, FOR SUSPENSION ORAL at 07:07

## 2018-01-01 RX ADMIN — OXYCODONE HYDROCHLORIDE 5 MILLIGRAM(S): 5 TABLET ORAL at 18:38

## 2018-01-01 RX ADMIN — ERYTHROPOIETIN 5000 UNIT(S): 10000 INJECTION, SOLUTION INTRAVENOUS; SUBCUTANEOUS at 11:03

## 2018-01-01 RX ADMIN — SEVELAMER CARBONATE 2400 MILLIGRAM(S): 2400 POWDER, FOR SUSPENSION ORAL at 09:40

## 2018-01-01 RX ADMIN — Medication 1 TABLET(S): at 13:01

## 2018-01-01 RX ADMIN — PHENYLEPHRINE HYDROCHLORIDE 11.91 MICROGRAM(S)/KG/MIN: 10 INJECTION INTRAVENOUS at 06:03

## 2018-01-01 RX ADMIN — AMIODARONE HYDROCHLORIDE 200 MILLIGRAM(S): 400 TABLET ORAL at 05:43

## 2018-01-01 RX ADMIN — AMIODARONE HYDROCHLORIDE 100 MILLIGRAM(S): 400 TABLET ORAL at 05:02

## 2018-01-01 RX ADMIN — Medication 25 MILLIGRAM(S): at 06:10

## 2018-01-01 RX ADMIN — CEFEPIME 1000 MILLIGRAM(S): 1 INJECTION, POWDER, FOR SOLUTION INTRAMUSCULAR; INTRAVENOUS at 13:21

## 2018-01-01 RX ADMIN — POLYETHYLENE GLYCOL 3350 17 GRAM(S): 17 POWDER, FOR SOLUTION ORAL at 21:14

## 2018-01-01 RX ADMIN — Medication 5 MILLIGRAM(S): at 05:32

## 2018-01-01 RX ADMIN — OXYCODONE HYDROCHLORIDE 5 MILLIGRAM(S): 5 TABLET ORAL at 09:46

## 2018-01-01 RX ADMIN — Medication 125 MILLIGRAM(S): at 23:30

## 2018-01-01 RX ADMIN — Medication 5 MILLIGRAM(S): at 06:19

## 2018-01-01 RX ADMIN — SEVELAMER CARBONATE 2400 MILLIGRAM(S): 2400 POWDER, FOR SUSPENSION ORAL at 09:37

## 2018-01-01 RX ADMIN — AMIODARONE HYDROCHLORIDE 200 MILLIGRAM(S): 400 TABLET ORAL at 06:10

## 2018-01-01 RX ADMIN — OXYCODONE HYDROCHLORIDE 5 MILLIGRAM(S): 5 TABLET ORAL at 13:14

## 2018-01-01 RX ADMIN — Medication 125 MILLIGRAM(S): at 17:51

## 2018-01-01 RX ADMIN — PANTOPRAZOLE SODIUM 40 MILLIGRAM(S): 20 TABLET, DELAYED RELEASE ORAL at 05:29

## 2018-01-01 RX ADMIN — Medication 125 MILLIGRAM(S): at 05:49

## 2018-01-01 RX ADMIN — SEVELAMER CARBONATE 800 MILLIGRAM(S): 2400 POWDER, FOR SUSPENSION ORAL at 22:08

## 2018-01-01 RX ADMIN — PANTOPRAZOLE SODIUM 40 MILLIGRAM(S): 20 TABLET, DELAYED RELEASE ORAL at 06:22

## 2018-01-01 RX ADMIN — Medication 1 TABLET(S): at 12:50

## 2018-01-01 RX ADMIN — Medication 5 MILLIGRAM(S): at 06:33

## 2018-01-01 RX ADMIN — CEFEPIME 1000 MILLIGRAM(S): 1 INJECTION, POWDER, FOR SOLUTION INTRAMUSCULAR; INTRAVENOUS at 00:03

## 2018-01-01 RX ADMIN — Medication 50 MILLIGRAM(S): at 15:58

## 2018-01-01 RX ADMIN — SEVELAMER CARBONATE 2400 MILLIGRAM(S): 2400 POWDER, FOR SUSPENSION ORAL at 12:49

## 2018-01-01 RX ADMIN — Medication 60 MILLIGRAM(S): at 02:21

## 2018-01-01 RX ADMIN — Medication 125 MILLIGRAM(S): at 18:06

## 2018-01-01 RX ADMIN — PANTOPRAZOLE SODIUM 40 MILLIGRAM(S): 20 TABLET, DELAYED RELEASE ORAL at 12:45

## 2018-01-01 RX ADMIN — OXYCODONE HYDROCHLORIDE 5 MILLIGRAM(S): 5 TABLET ORAL at 10:07

## 2018-01-01 RX ADMIN — SEVELAMER CARBONATE 800 MILLIGRAM(S): 2400 POWDER, FOR SUSPENSION ORAL at 17:09

## 2018-01-01 RX ADMIN — ERYTHROPOIETIN 8000 UNIT(S): 10000 INJECTION, SOLUTION INTRAVENOUS; SUBCUTANEOUS at 14:56

## 2018-01-01 RX ADMIN — Medication 15 MILLIGRAM(S): at 05:43

## 2018-01-01 RX ADMIN — CEFEPIME 1000 MILLIGRAM(S): 1 INJECTION, POWDER, FOR SOLUTION INTRAMUSCULAR; INTRAVENOUS at 12:49

## 2018-01-01 RX ADMIN — Medication 125 MILLIGRAM(S): at 12:09

## 2018-01-01 RX ADMIN — Medication 3 MILLIGRAM(S): at 21:47

## 2018-01-01 RX ADMIN — PHENYLEPHRINE HYDROCHLORIDE 11.91 MICROGRAM(S)/KG/MIN: 10 INJECTION INTRAVENOUS at 10:47

## 2018-01-01 RX ADMIN — CINACALCET 30 MILLIGRAM(S): 30 TABLET, FILM COATED ORAL at 21:30

## 2018-01-01 RX ADMIN — CINACALCET 30 MILLIGRAM(S): 30 TABLET, FILM COATED ORAL at 22:07

## 2018-01-01 RX ADMIN — Medication 2.5 MILLIGRAM(S): at 13:54

## 2018-01-01 RX ADMIN — Medication 100 MILLIGRAM(S): at 05:41

## 2018-01-01 RX ADMIN — SODIUM CHLORIDE 75 MILLILITER(S): 9 INJECTION INTRAMUSCULAR; INTRAVENOUS; SUBCUTANEOUS at 23:00

## 2018-01-01 RX ADMIN — PIPERACILLIN AND TAZOBACTAM 25 GRAM(S): 4; .5 INJECTION, POWDER, LYOPHILIZED, FOR SOLUTION INTRAVENOUS at 17:23

## 2018-01-01 RX ADMIN — OXYCODONE HYDROCHLORIDE 5 MILLIGRAM(S): 5 TABLET ORAL at 23:06

## 2018-01-01 RX ADMIN — Medication 125 MILLIGRAM(S): at 05:29

## 2018-01-01 RX ADMIN — Medication 650 MILLIGRAM(S): at 18:37

## 2018-01-01 RX ADMIN — AMIODARONE HYDROCHLORIDE 100 MILLIGRAM(S): 400 TABLET ORAL at 06:19

## 2018-01-01 RX ADMIN — ERYTHROPOIETIN 10000 UNIT(S): 10000 INJECTION, SOLUTION INTRAVENOUS; SUBCUTANEOUS at 15:36

## 2018-01-01 RX ADMIN — SEVELAMER CARBONATE 2400 MILLIGRAM(S): 2400 POWDER, FOR SUSPENSION ORAL at 18:07

## 2018-01-01 RX ADMIN — AMIODARONE HYDROCHLORIDE 100 MILLIGRAM(S): 400 TABLET ORAL at 05:32

## 2018-01-01 RX ADMIN — CINACALCET 60 MILLIGRAM(S): 30 TABLET, FILM COATED ORAL at 14:45

## 2018-01-01 RX ADMIN — Medication 125 MILLIGRAM(S): at 13:29

## 2018-01-01 RX ADMIN — OXYCODONE HYDROCHLORIDE 5 MILLIGRAM(S): 5 TABLET ORAL at 15:36

## 2018-01-01 RX ADMIN — SEVELAMER CARBONATE 2400 MILLIGRAM(S): 2400 POWDER, FOR SUSPENSION ORAL at 17:46

## 2018-01-01 RX ADMIN — SEVELAMER CARBONATE 2400 MILLIGRAM(S): 2400 POWDER, FOR SUSPENSION ORAL at 13:26

## 2018-01-01 RX ADMIN — PANTOPRAZOLE SODIUM 40 MILLIGRAM(S): 20 TABLET, DELAYED RELEASE ORAL at 05:54

## 2018-01-01 RX ADMIN — Medication 650 MILLIGRAM(S): at 14:50

## 2018-01-01 RX ADMIN — Medication 1 TABLET(S): at 19:34

## 2018-01-01 RX ADMIN — Medication 50 MILLIGRAM(S): at 05:32

## 2018-01-01 RX ADMIN — Medication 25 MILLIGRAM(S): at 05:49

## 2018-01-01 RX ADMIN — Medication 125 MILLIGRAM(S): at 17:04

## 2018-01-01 RX ADMIN — SEVELAMER CARBONATE 800 MILLIGRAM(S): 2400 POWDER, FOR SUSPENSION ORAL at 15:10

## 2018-01-01 RX ADMIN — Medication 125 MILLIGRAM(S): at 12:48

## 2018-01-02 ENCOUNTER — INPATIENT (INPATIENT)
Facility: HOSPITAL | Age: 63
LOS: 3 days | Discharge: HOME | End: 2018-01-06
Attending: INTERNAL MEDICINE | Admitting: INTERNAL MEDICINE
Payer: MEDICARE

## 2018-01-02 VITALS
SYSTOLIC BLOOD PRESSURE: 100 MMHG | OXYGEN SATURATION: 91 % | WEIGHT: 169.98 LBS | TEMPERATURE: 99 F | HEART RATE: 85 BPM | HEIGHT: 70 IN | DIASTOLIC BLOOD PRESSURE: 48 MMHG

## 2018-01-02 DIAGNOSIS — I77.0 ARTERIOVENOUS FISTULA, ACQUIRED: Chronic | ICD-10-CM

## 2018-01-02 DIAGNOSIS — Z94.0 KIDNEY TRANSPLANT STATUS: Chronic | ICD-10-CM

## 2018-01-02 DIAGNOSIS — Z98.890 OTHER SPECIFIED POSTPROCEDURAL STATES: Chronic | ICD-10-CM

## 2018-01-02 DIAGNOSIS — Z95.810 PRESENCE OF AUTOMATIC (IMPLANTABLE) CARDIAC DEFIBRILLATOR: Chronic | ICD-10-CM

## 2018-01-02 LAB
ANION GAP SERPL CALC-SCNC: 12 MMOL/L — SIGNIFICANT CHANGE UP (ref 5–17)
APPEARANCE UR: CLEAR — SIGNIFICANT CHANGE UP
BACTERIA # UR AUTO: (no result)
BASOPHILS # BLD AUTO: 0.1 K/UL — SIGNIFICANT CHANGE UP (ref 0–0.2)
BASOPHILS NFR BLD AUTO: 0.5 % — SIGNIFICANT CHANGE UP (ref 0–2)
BILIRUB UR-MCNC: NEGATIVE — SIGNIFICANT CHANGE UP
BUN SERPL-MCNC: 84 MG/DL — HIGH (ref 7–23)
CALCIUM SERPL-MCNC: 8.1 MG/DL — LOW (ref 8.5–10.1)
CHLORIDE SERPL-SCNC: 102 MMOL/L — SIGNIFICANT CHANGE UP (ref 96–108)
CO2 SERPL-SCNC: 25 MMOL/L — SIGNIFICANT CHANGE UP (ref 22–31)
COLOR SPEC: YELLOW — SIGNIFICANT CHANGE UP
CREAT SERPL-MCNC: 7.44 MG/DL — HIGH (ref 0.5–1.3)
DIFF PNL FLD: NEGATIVE — SIGNIFICANT CHANGE UP
EOSINOPHIL # BLD AUTO: 0 K/UL — SIGNIFICANT CHANGE UP (ref 0–0.5)
EOSINOPHIL NFR BLD AUTO: 0.2 % — SIGNIFICANT CHANGE UP (ref 0–6)
EPI CELLS # UR: SIGNIFICANT CHANGE UP
GLUCOSE SERPL-MCNC: 147 MG/DL — HIGH (ref 70–99)
GLUCOSE UR QL: 100 MG/DL
HAV IGM SER-ACNC: SIGNIFICANT CHANGE UP
HBV CORE IGM SER-ACNC: SIGNIFICANT CHANGE UP
HBV SURFACE AG SER-ACNC: SIGNIFICANT CHANGE UP
HCOV NL63 RNA SPEC QL NAA+PROBE: DETECTED
HCT VFR BLD CALC: 27.6 % — LOW (ref 39–50)
HCV AB S/CO SERPL IA: 0.12 S/CO — SIGNIFICANT CHANGE UP
HCV AB SERPL-IMP: SIGNIFICANT CHANGE UP
HGB BLD-MCNC: 8.6 G/DL — LOW (ref 13–17)
INR BLD: 4.43 RATIO — HIGH (ref 0.88–1.16)
KETONES UR-MCNC: NEGATIVE — SIGNIFICANT CHANGE UP
LACTATE SERPL-SCNC: 1.2 MMOL/L — SIGNIFICANT CHANGE UP (ref 0.7–2)
LEUKOCYTE ESTERASE UR-ACNC: NEGATIVE — SIGNIFICANT CHANGE UP
LYMPHOCYTES # BLD AUTO: 0.6 K/UL — LOW (ref 1–3.3)
LYMPHOCYTES # BLD AUTO: 5.3 % — LOW (ref 13–44)
MANUAL DIF COMMENT BLD-IMP: SIGNIFICANT CHANGE UP
MCHC RBC-ENTMCNC: 31.1 GM/DL — LOW (ref 32–36)
MCHC RBC-ENTMCNC: 32.2 PG — SIGNIFICANT CHANGE UP (ref 27–34)
MCV RBC AUTO: 103.8 FL — HIGH (ref 80–100)
MONOCYTES # BLD AUTO: 0.8 K/UL — SIGNIFICANT CHANGE UP (ref 0–0.9)
MONOCYTES NFR BLD AUTO: 7.6 % — SIGNIFICANT CHANGE UP (ref 2–14)
NEUTROPHILS # BLD AUTO: 9 K/UL — HIGH (ref 1.8–7.4)
NEUTROPHILS NFR BLD AUTO: 86.4 % — HIGH (ref 43–77)
NITRITE UR-MCNC: NEGATIVE — SIGNIFICANT CHANGE UP
PH UR: 8 — SIGNIFICANT CHANGE UP (ref 5–8)
PLAT MORPH BLD: NORMAL — SIGNIFICANT CHANGE UP
PLATELET # BLD AUTO: 97 K/UL — LOW (ref 150–400)
POTASSIUM SERPL-MCNC: 4.9 MMOL/L — SIGNIFICANT CHANGE UP (ref 3.5–5.3)
POTASSIUM SERPL-SCNC: 4.9 MMOL/L — SIGNIFICANT CHANGE UP (ref 3.5–5.3)
PROT UR-MCNC: 100 MG/DL
PROTHROM AB SERPL-ACNC: 49.3 SEC — HIGH (ref 9.8–12.7)
RAPID RVP RESULT: DETECTED
RBC # BLD: 2.66 M/UL — LOW (ref 4.2–5.8)
RBC # FLD: 14.2 % — SIGNIFICANT CHANGE UP (ref 10.3–14.5)
RBC BLD AUTO: SIGNIFICANT CHANGE UP
RBC CASTS # UR COMP ASSIST: NEGATIVE /HPF — SIGNIFICANT CHANGE UP (ref 0–4)
SODIUM SERPL-SCNC: 139 MMOL/L — SIGNIFICANT CHANGE UP (ref 135–145)
SP GR SPEC: 1.01 — SIGNIFICANT CHANGE UP (ref 1.01–1.02)
TROPONIN I SERPL-MCNC: 0.02 NG/ML — SIGNIFICANT CHANGE UP (ref 0.01–0.04)
UROBILINOGEN FLD QL: 1 MG/DL
WBC # BLD: 10.4 K/UL — SIGNIFICANT CHANGE UP (ref 3.8–10.5)
WBC # FLD AUTO: 10.4 K/UL — SIGNIFICANT CHANGE UP (ref 3.8–10.5)
WBC UR QL: SIGNIFICANT CHANGE UP

## 2018-01-02 PROCEDURE — 93010 ELECTROCARDIOGRAM REPORT: CPT

## 2018-01-02 PROCEDURE — 99285 EMERGENCY DEPT VISIT HI MDM: CPT

## 2018-01-02 PROCEDURE — 71046 X-RAY EXAM CHEST 2 VIEWS: CPT | Mod: 26

## 2018-01-02 RX ORDER — AZITHROMYCIN 500 MG/1
500 TABLET, FILM COATED ORAL ONCE
Qty: 0 | Refills: 0 | Status: COMPLETED | OUTPATIENT
Start: 2018-01-02 | End: 2018-01-02

## 2018-01-02 RX ORDER — CARVEDILOL PHOSPHATE 80 MG/1
3.12 CAPSULE, EXTENDED RELEASE ORAL DAILY
Qty: 0 | Refills: 0 | Status: DISCONTINUED | OUTPATIENT
Start: 2018-01-02 | End: 2018-01-06

## 2018-01-02 RX ORDER — ONDANSETRON 8 MG/1
4 TABLET, FILM COATED ORAL EVERY 6 HOURS
Qty: 0 | Refills: 0 | Status: DISCONTINUED | OUTPATIENT
Start: 2018-01-02 | End: 2018-01-06

## 2018-01-02 RX ORDER — ACETAMINOPHEN 500 MG
650 TABLET ORAL ONCE
Qty: 0 | Refills: 0 | Status: COMPLETED | OUTPATIENT
Start: 2018-01-02 | End: 2018-01-02

## 2018-01-02 RX ORDER — HYDROCORTISONE 20 MG
50 TABLET ORAL EVERY 8 HOURS
Qty: 0 | Refills: 0 | Status: DISCONTINUED | OUTPATIENT
Start: 2018-01-02 | End: 2018-01-05

## 2018-01-02 RX ORDER — AZITHROMYCIN 500 MG/1
250 TABLET, FILM COATED ORAL DAILY
Qty: 0 | Refills: 0 | Status: DISCONTINUED | OUTPATIENT
Start: 2018-01-03 | End: 2018-01-06

## 2018-01-02 RX ORDER — PIPERACILLIN AND TAZOBACTAM 4; .5 G/20ML; G/20ML
3.38 INJECTION, POWDER, LYOPHILIZED, FOR SOLUTION INTRAVENOUS ONCE
Qty: 0 | Refills: 0 | Status: COMPLETED | OUTPATIENT
Start: 2018-01-02 | End: 2018-01-02

## 2018-01-02 RX ORDER — CEFEPIME 1 G/1
INJECTION, POWDER, FOR SOLUTION INTRAMUSCULAR; INTRAVENOUS
Qty: 0 | Refills: 0 | Status: DISCONTINUED | OUTPATIENT
Start: 2018-01-02 | End: 2018-01-02

## 2018-01-02 RX ORDER — ACETAMINOPHEN 500 MG
650 TABLET ORAL EVERY 6 HOURS
Qty: 0 | Refills: 0 | Status: DISCONTINUED | OUTPATIENT
Start: 2018-01-02 | End: 2018-01-06

## 2018-01-02 RX ORDER — PANTOPRAZOLE SODIUM 20 MG/1
40 TABLET, DELAYED RELEASE ORAL
Qty: 0 | Refills: 0 | Status: DISCONTINUED | OUTPATIENT
Start: 2018-01-02 | End: 2018-01-06

## 2018-01-02 RX ORDER — IPRATROPIUM/ALBUTEROL SULFATE 18-103MCG
3 AEROSOL WITH ADAPTER (GRAM) INHALATION EVERY 6 HOURS
Qty: 0 | Refills: 0 | Status: DISCONTINUED | OUTPATIENT
Start: 2018-01-02 | End: 2018-01-06

## 2018-01-02 RX ORDER — SENNA PLUS 8.6 MG/1
2 TABLET ORAL AT BEDTIME
Qty: 0 | Refills: 0 | Status: DISCONTINUED | OUTPATIENT
Start: 2018-01-02 | End: 2018-01-06

## 2018-01-02 RX ORDER — CARVEDILOL PHOSPHATE 80 MG/1
6.25 CAPSULE, EXTENDED RELEASE ORAL AT BEDTIME
Qty: 0 | Refills: 0 | Status: DISCONTINUED | OUTPATIENT
Start: 2018-01-02 | End: 2018-01-06

## 2018-01-02 RX ORDER — ERYTHROPOIETIN 10000 [IU]/ML
10000 INJECTION, SOLUTION INTRAVENOUS; SUBCUTANEOUS
Qty: 0 | Refills: 0 | Status: DISCONTINUED | OUTPATIENT
Start: 2018-01-02 | End: 2018-01-06

## 2018-01-02 RX ORDER — VANCOMYCIN HCL 1 G
125 VIAL (EA) INTRAVENOUS EVERY 6 HOURS
Qty: 0 | Refills: 0 | Status: DISCONTINUED | OUTPATIENT
Start: 2018-01-02 | End: 2018-01-06

## 2018-01-02 RX ORDER — SEVELAMER CARBONATE 2400 MG/1
2400 POWDER, FOR SUSPENSION ORAL
Qty: 0 | Refills: 0 | Status: DISCONTINUED | OUTPATIENT
Start: 2018-01-02 | End: 2018-01-06

## 2018-01-02 RX ORDER — DOXERCALCIFEROL 2.5 UG/1
2 CAPSULE ORAL
Qty: 0 | Refills: 0 | Status: DISCONTINUED | OUTPATIENT
Start: 2018-01-02 | End: 2018-01-06

## 2018-01-02 RX ORDER — HEPARIN SODIUM 5000 [USP'U]/ML
5000 INJECTION INTRAVENOUS; SUBCUTANEOUS EVERY 12 HOURS
Qty: 0 | Refills: 0 | Status: DISCONTINUED | OUTPATIENT
Start: 2018-01-02 | End: 2018-01-02

## 2018-01-02 RX ORDER — AMIODARONE HYDROCHLORIDE 400 MG/1
100 TABLET ORAL DAILY
Qty: 0 | Refills: 0 | Status: DISCONTINUED | OUTPATIENT
Start: 2018-01-02 | End: 2018-01-04

## 2018-01-02 RX ORDER — VANCOMYCIN HCL 1 G
1000 VIAL (EA) INTRAVENOUS ONCE
Qty: 0 | Refills: 0 | Status: COMPLETED | OUTPATIENT
Start: 2018-01-02 | End: 2018-01-02

## 2018-01-02 RX ORDER — CEFEPIME 1 G/1
1000 INJECTION, POWDER, FOR SOLUTION INTRAMUSCULAR; INTRAVENOUS EVERY 24 HOURS
Qty: 0 | Refills: 0 | Status: DISCONTINUED | OUTPATIENT
Start: 2018-01-02 | End: 2018-01-06

## 2018-01-02 RX ORDER — CINACALCET 30 MG/1
30 TABLET, FILM COATED ORAL
Qty: 0 | Refills: 0 | Status: DISCONTINUED | OUTPATIENT
Start: 2018-01-02 | End: 2018-01-04

## 2018-01-02 RX ADMIN — Medication 125 MILLIGRAM(S): at 22:59

## 2018-01-02 RX ADMIN — Medication 650 MILLIGRAM(S): at 18:40

## 2018-01-02 RX ADMIN — Medication 50 MILLIGRAM(S): at 13:28

## 2018-01-02 RX ADMIN — Medication 50 MILLIGRAM(S): at 22:58

## 2018-01-02 RX ADMIN — SEVELAMER CARBONATE 2400 MILLIGRAM(S): 2400 POWDER, FOR SUSPENSION ORAL at 14:33

## 2018-01-02 RX ADMIN — CEFEPIME 100 MILLIGRAM(S): 1 INJECTION, POWDER, FOR SOLUTION INTRAMUSCULAR; INTRAVENOUS at 13:51

## 2018-01-02 RX ADMIN — Medication 250 MILLIGRAM(S): at 10:58

## 2018-01-02 RX ADMIN — AZITHROMYCIN 500 MILLIGRAM(S): 500 TABLET, FILM COATED ORAL at 13:28

## 2018-01-02 RX ADMIN — PIPERACILLIN AND TAZOBACTAM 200 GRAM(S): 4; .5 INJECTION, POWDER, LYOPHILIZED, FOR SOLUTION INTRAVENOUS at 12:49

## 2018-01-02 RX ADMIN — SEVELAMER CARBONATE 2400 MILLIGRAM(S): 2400 POWDER, FOR SUSPENSION ORAL at 18:42

## 2018-01-02 RX ADMIN — Medication 650 MILLIGRAM(S): at 08:28

## 2018-01-02 NOTE — ED ADULT NURSE REASSESSMENT NOTE - NS ED NURSE REASSESS COMMENT FT1
2 attempted to take blood by  the RN ,ed phlebotomist tried too with no outcome another ed phlebotomist called
Telephone order to discontinue Heparin injection order as per Dr. Jauregui.
blood was drown by lab awaiting result no acute change in pt condition
iv team called by request
pt had left lower leg wound that was evaluated last week by his vascular surgeon, pt states the wound is clean and only the surgeon can open it.
report received from Nico CHENEY. pt resting comfortably. report given to 5E. awaiting transport. safety maintained. will continue to monitor.
family at the bedside

## 2018-01-02 NOTE — ED PROVIDER NOTE - OBJECTIVE STATEMENT
Patient is a 62 year old male with 2-3 weeks of intermittent bilateral upper neck pain; sinus congestion; frontal headache and non productive cough; patient reports that symptoms have gotten worse over last "few days"- patient reports fever to 101 last night with worsening cough- prompting patient's visit to the ED- also with associated sob and chest tightness; hx of ESRD- on dialysis- M/W/F- received dialysis 2 days ago due to holiday- due again tomorrow; patient's grandson with RSV; patient with no abdominal pain; no weakness in arms or legs; no trauma or injury; no blood in stool; not vomiting blood; no numbness or tingling in arms/legs.

## 2018-01-02 NOTE — ED PROVIDER NOTE - PROGRESS NOTE DETAILS
Patient hypoxic: 91% on RA- placed on 2L NC at this time; rectal temp ordered; pending labs; CXR at this time. Stacie Ramirez: CXR shows Left lower lobe infiltrate, will order IV ABX and admit to Hospital. Stacie Ramirez: Recent Hospitalization last 30 days given recent Vancomycin Zosyn, 30 cc bolis held secondary to ESRD on Dialysis.

## 2018-01-02 NOTE — H&P ADULT - NSHPPHYSICALEXAM_GEN_ALL_CORE
· EYES: Clear bilaterally, pupils equal, round and reactive to light.  · CARDIAC: Normal rate, regular rhythm.  Heart sounds S1, S2.  No murmurs, rubs or gallops.  · RESPIRATORY: Breath sounds clear and equal bilaterally.  · GASTROINTESTINAL: Abdomen soft, non-tender, no guarding.  · MUSCULOSKELETAL: Spine appears normal, range of motion is not limited, no muscle or joint tenderness; Neck -supple; FROM without pain; ?left side paravertebral cervical tenderness;  · NEUROLOGICAL: Alert and oriented, no focal deficits, no motor or sensory deficits.  · SKIN: Skin normal color for race, warm, dry and intact. No evidence of rash; (+) right AV fistula with good bruit and thrill.

## 2018-01-02 NOTE — H&P ADULT - NSHPREVIEWOFSYSTEMS_GEN_ALL_CORE
62 year old male with 2-3 weeks of intermittent bilateral upper neck pain; sinus congestion; frontal headache and non productive cough; patient reports that symptoms have gotten worse over last "few days"- patient reports fever to 101 last night with worsening cough- prompting patient's visit to the ED- also with associated sob and chest tightness; hx of ESRD- on dialysis- M/W/F- received dialysis 2 days ago due to holiday- due again tomorrow. Fam members + for RSV. He is prone to severe infections and sepsis per wife; adm for IV abx and stress dose steroids. Pt feels better now; no c/o

## 2018-01-02 NOTE — CONSULT NOTE ADULT - ASSESSMENT
62 year old male with 2-3 weeks of intermittent bilateral upper neck pain, sinus congestion, headaches and dry cough, patient reports that symptoms have gotten worse over last several days with associated fever 101 last night with worsening cough- prompting patient's visit to the ED- hx of ESRD- on dialysis- M/W/F- received dialysis 2 days ago due to holiday- due again tomorrow. Fam members + for RSV and coronavirus. He is prone to severe infections and sepsis per wife; adm for IV abx and stress dose steroids. Here, afebrile, wbc ct wnl, xray shows LLL consolidation/effusion, was given IV vanco/cefepime/azithro for pna coverage, RVP positive for coronavirus. Has hx of recurrent C diff colitis multiple times in the past and is typically on prophylaxis with oral vanco when on abx.    1. sepsis/LLL PNA/HCAP/coronavirus/viral syndrome/ESRD/hx of cdad  - agree with IV cefepime 1gm daily   - on azithromycin 500 X 1 and 250mg daily   - continue with empiric antibiotic coverage for bacterial coverage   - hold further vancomycin for now  - likely viral pna  - oral vancomycin [id number] for c diff prophylaxis  - f/u cultures  - monitor temps  -tolerating abx well so far; no side effects noted  -reason for abx use and side effects reviewed with patient  - supportive care  - f/u cbc    2. other issues - care per medicine
62 with CM, Hypothyroid, CAD, PCKD, ESRD s/p failed transplant on HD now M/W/F here with SOB in the setting of viral illness and PNA.     ESRD  -HD TIW, last on sunday. Plan for next in AM. Primary respiratory problem does not appear to be volume related  -K protocol, UF as tolerated by BP  -SACHIN protocol    PNA  -IV abx  -F/u culutures  -Medical support of viral issues/PNA  -Management per medicine, consideration of ID    ANemia  -SACHIN protocol, treatment at HD    Thanks, will follow with you    d/ cwith HD staff  d/c with wife at length

## 2018-01-02 NOTE — H&P ADULT - ASSESSMENT
ASSESSMENT:    SOB  FEVER  CHRONIC SYSTOLIC HF  AF  THROMBOCYTOPENIA  IATROGENIC COAGULOPATHY   ADRENAL INSUFFICIENCY  ESRD ( s/p failed kidney tranplantx3)    PLAN:    - broad spectrum iv abx  - STRESS DOSE STEROIDS  - repeat am INR and cbc  - prn nebs  - no evid of HF exacerbation  - pt needs to come off Amio been on it for 5 years- to d/w Dr. Douglas; pt aware  - HD per renal

## 2018-01-02 NOTE — ED PROVIDER NOTE - SKIN, MLM
Skin normal color for race, warm, dry and intact. No evidence of rash; (+) right AV fistula with good bruit and thrill.

## 2018-01-02 NOTE — ED PROVIDER NOTE - MEDICAL DECISION MAKING DETAILS
63 yo male with 2-3 weeks of intermittent cough, sinus congestion; febrile last night; found to have pneumonia; IV antibiotics; admit.

## 2018-01-02 NOTE — ED PROVIDER NOTE - MUSCULOSKELETAL, MLM
Spine appears normal, range of motion is not limited, no muscle or joint tenderness; Neck -supple; FROM without pain; ?left side paravertebral cervical tenderness;

## 2018-01-02 NOTE — ED ADULT NURSE NOTE - OBJECTIVE STATEMENT
pt feeling sick for  2 weeks  coughing body ache ,headache and shoulder pain  on and off. yesterday pt had fever of 101 took Tylenol at midnight. pt states all peaople in the house are sick. h/o dialysis M/W/F. PACEMAKER  ,DEFIBRILLATOR

## 2018-01-02 NOTE — H&P ADULT - NSHPLABSRESULTS_GEN_ALL_CORE
8.6    10.4  )-----------( 97       ( 02 Jan 2018 09:30 )             27.6   01-02    139  |  102  |  84<H>  ----------------------------<  147<H>  4.9   |  25  |  7.44<H>    Ca    8.1<L>      02 Jan 2018 09:30    EKG paced in sinus

## 2018-01-02 NOTE — CONSULT NOTE ADULT - SUBJECTIVE AND OBJECTIVE BOX
Patient is a 62y Male whom presented to the hospital with CM, Hypothyroid, CAD, PCKD, ESRD s/p failed transplant on HD now M/W/F here with SOB in the setting of viral illness and PNA. Patient now reports multiple sick contacts at home, took steroids and oral augmentin per pcp for sinuses. Fever and SOB ON so to the ED. noted with + viral panel and + pna on xray. Feels better since arrival in ED.    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  Clostridium difficile colitis  CAD (coronary artery disease)  Peripheral vascular disease  Hypertension  Polycystic kidney disease  ESRD (end stage renal disease)  AICD (automatic cardioverter/defibrillator) present  H/O hernia repair  A-V fistula  H/O kidney transplant      MEDICATIONS  (STANDING):  amiodarone    Tablet 100 milliGRAM(s) Oral daily  carvedilol 3.125 milliGRAM(s) Oral daily  carvedilol 6.25 milliGRAM(s) Oral at bedtime  cefepime  IVPB 1000 milliGRAM(s) IV Intermittent every 24 hours  cinacalcet 30 milliGRAM(s) Oral <User Schedule>  heparin  Injectable 5000 Unit(s) SubCutaneous every 12 hours  hydrocortisone sodium succinate Injectable 50 milliGRAM(s) IV Push every 8 hours  pantoprazole    Tablet 40 milliGRAM(s) Oral before breakfast  sevelamer hydrochloride 2400 milliGRAM(s) Oral three times a day with meals    MEDICATIONS  (PRN):  acetaminophen   Tablet 650 milliGRAM(s) Oral every 6 hours PRN pain fever  ALBUTerol/ipratropium for Nebulization 3 milliLiter(s) Nebulizer every 6 hours PRN Bronchospasm  aluminum hydroxide/magnesium hydroxide/simethicone Suspension 30 milliLiter(s) Oral every 4 hours PRN Dyspepsia  ondansetron Injectable 4 milliGRAM(s) IV Push every 6 hours PRN Nausea  senna 2 Tablet(s) Oral at bedtime PRN Constipation      Allergies    No Known Allergies    Intolerances        SOCIAL HISTORY:  no current etoh/cigg  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 No nausea, vomiting, or hematemesis; No diarrhea or constipation. No melena or hematochezia.  GENITOURINARY: No dysuria, frequency or hematuria  NEUROLOGICAL: No numbness or weakness  SKIN: No itching, burning, rashes, or lesions   All other review of systems is negative unless indicated above.      T(C): , Max: 38 (01-02-18 @ 08:22)  T(F): , Max: 100.4 (01-02-18 @ 08:22)  HR: 60 (01-02-18 @ 13:49)  BP: 93/53 (01-02-18 @ 13:49)  BP(mean): --  RR: 94 (01-02-18 @ 13:49)  SpO2: 91% (01-02-18 @ 06:51)  Wt(kg): --    Height (cm): 177.8 (01-02 @ 06:51)  Weight (kg): 77.1 (01-02 @ 06:51)  BMI (kg/m2): 24.4 (01-02 @ 06:51)  BSA (m2): 1.95 (01-02 @ 06:51)    PHYSICAL EXAM:    Constitutional: NAD  HEENT: PERRLA, EOMI,  MMM  Neck: No LAD, No JVD  Respiratory: creps at base  Cardiovascular: S1 and S2, RRR  Gastrointestinal: BS+, soft, NT/ND  Extremities: No peripheral edema  Neurological: A/O x 3, no focal deficits  Psychiatric: Normal mood, normal affect  : No Amaya  Skin: No rashes  Access: + avf, + thrill        LABS:                        8.6    10.4  )-----------( 97       ( 02 Jan 2018 09:30 )             27.6     02 Jan 2018 09:30    139    |  102    |  84     ----------------------------<  147    4.9     |  25     |  7.44     Ca    8.1        02 Jan 2018 09:30        Hepatitis C Virus S/CO Ratio: 0.12 S/CO (01-02 @ 09:30)  Hepatitis C Virus Interpretation: Nonreact (01-02 @ 09:30)      Urine Studies:          RADIOLOGY & ADDITIONAL STUDIES:
Patient is a 62y old  Male who presents with a chief complaint of     HPI:  62 year old male with 2-3 weeks of intermittent bilateral upper neck pain, sinus congestion, headaches and dry cough, patient reports that symptoms have gotten worse over last several days with associated fever 101 last night with worsening cough- prompting patient's visit to the ED- hx of ESRD- on dialysis- M/W/F- received dialysis 2 days ago due to holiday- due again tomorrow. Fam members + for RSV and coronavirus. He is prone to severe infections and sepsis per wife; adm for IV abx and stress dose steroids. Here, afebrile, wbc ct wnl, xray shows LLL consolidation/effusion, was given IV vanco/cefepime/azithro for pna coverage, RVP positive for coronavirus. Has hx of recurrent C diff colitis multiple times in the past and is typically on prophylaxis with oral vanco when on abx.      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     Past Surgical History:  A-V fistula    AICD (automatic cardioverter/defibrillator) present    H/O hernia repair    H/O kidney transplant.    Meds: per reconciliation sheet, noted below    MEDICATIONS  (STANDING):  amiodarone    Tablet 100 milliGRAM(s) Oral daily  carvedilol 3.125 milliGRAM(s) Oral daily  carvedilol 6.25 milliGRAM(s) Oral at bedtime  cefepime  IVPB 1000 milliGRAM(s) IV Intermittent every 24 hours  cinacalcet 30 milliGRAM(s) Oral <User Schedule>  heparin  Injectable 5000 Unit(s) SubCutaneous every 12 hours  hydrocortisone sodium succinate Injectable 50 milliGRAM(s) IV Push every 8 hours  pantoprazole    Tablet 40 milliGRAM(s) Oral before breakfast  sevelamer hydrochloride 2400 milliGRAM(s) Oral three times a day with meals  vancomycin    Solution 125 milliGRAM(s) Oral every 6 hours      Allergies    No Known Allergies    Intolerances        Social: no smoking, no alcohol, no illegal drugs; no recent travel, no exposure to TB    Family history:  Family history of kidney disease (Mother)  Family history of colon cancer (Sibling)      ROS:  no HA, no dizziness, no sore throat, no blurry vision, no CP, no palpitations, no abdominal pain, no diarrhea, no N/V, no dysuria, no leg pain, no claudication, no rash, no joint aches, no rectal pain or bleeding, no night sweats    Vital Signs Last 24 Hrs  T(C): 38 (02 Jan 2018 08:22), Max: 38 (02 Jan 2018 08:22)  T(F): 100.4 (02 Jan 2018 08:22), Max: 100.4 (02 Jan 2018 08:22)  HR: 60 (02 Jan 2018 13:49) (60 - 85)  BP: 93/53 (02 Jan 2018 13:49) (93/53 - 100/48)  BP(mean): --  RR: 94 (02 Jan 2018 13:49) (94 - 97)  SpO2: 91% (02 Jan 2018 06:51) (91% - 91%)      PE:  Constitutional: frail looking  HEENT: NC/AT, EOMI, PERRLA  Neck: supple  Back: no tenderness  Respiratory: decreased breath sounds, LLL scant rhonchi  Cardiovascular: S1S2 regular, no murmurs  Abdomen: soft, not tender, not distended, positive BS  Genitourinary: deferred  Rectal: deferred  Musculoskeletal: no muscle tenderness, no joint swelling or tenderness  Extremities: no pedal edema  Neurological:  no focal deficits  Skin: no rashes, R arm fistula with thrill, + skin graft, healing wound    Labs:                        8.6    10.4  )-----------( 97       ( 02 Jan 2018 09:30 )             27.6     01-02    139  |  102  |  84<H>  ----------------------------<  147<H>  4.9   |  25  |  7.44<H>    Ca    8.1<L>      02 Jan 2018 09:30                 Radiology:  < from: Xray Chest 2 Views PA/Lat (01.02.18 @ 09:16) >    EXAM:  XR CHEST PA LAT 2V                            PROCEDURE DATE:  01/02/2018          INTERPRETATION:  XR CHEST PA LAT 2V    HISTORY:  cough    Views:  PA and Lateral chest.       Comparison:  none.    Left pacemaker/defibrillator. Right subclavian vascular stent. Normal   heart size. Moderate left lower lobe consolidation and trace bilateral   pleural effusions.    IMPRESSION: Moderate left lower lobe pneumonia.                            < end of copied text >    Advanced directives addressed: full resuscitation

## 2018-01-03 LAB
ALBUMIN SERPL ELPH-MCNC: 2.3 G/DL — LOW (ref 3.3–5)
ANION GAP SERPL CALC-SCNC: 15 MMOL/L — SIGNIFICANT CHANGE UP (ref 5–17)
BUN SERPL-MCNC: 102 MG/DL — HIGH (ref 7–23)
CALCIUM SERPL-MCNC: 8.2 MG/DL — LOW (ref 8.5–10.1)
CHLORIDE SERPL-SCNC: 101 MMOL/L — SIGNIFICANT CHANGE UP (ref 96–108)
CO2 SERPL-SCNC: 20 MMOL/L — LOW (ref 22–31)
CREAT SERPL-MCNC: 8.12 MG/DL — HIGH (ref 0.5–1.3)
GLUCOSE SERPL-MCNC: 271 MG/DL — HIGH (ref 70–99)
INR BLD: 3.87 RATIO — HIGH (ref 0.88–1.16)
PHOSPHATE SERPL-MCNC: 5.2 MG/DL — HIGH (ref 2.5–4.5)
PHOSPHATE SERPL-MCNC: 5.2 MG/DL — HIGH (ref 2.5–4.5)
POTASSIUM SERPL-MCNC: 4.8 MMOL/L — SIGNIFICANT CHANGE UP (ref 3.5–5.3)
POTASSIUM SERPL-SCNC: 4.8 MMOL/L — SIGNIFICANT CHANGE UP (ref 3.5–5.3)
PROTHROM AB SERPL-ACNC: 43 SEC — HIGH (ref 9.8–12.7)
SODIUM SERPL-SCNC: 136 MMOL/L — SIGNIFICANT CHANGE UP (ref 135–145)

## 2018-01-03 RX ORDER — WARFARIN SODIUM 2.5 MG/1
1 TABLET ORAL ONCE
Qty: 0 | Refills: 0 | Status: COMPLETED | OUTPATIENT
Start: 2018-01-03 | End: 2018-01-03

## 2018-01-03 RX ADMIN — Medication 125 MILLIGRAM(S): at 20:37

## 2018-01-03 RX ADMIN — CINACALCET 30 MILLIGRAM(S): 30 TABLET, FILM COATED ORAL at 07:33

## 2018-01-03 RX ADMIN — Medication 125 MILLIGRAM(S): at 15:47

## 2018-01-03 RX ADMIN — CINACALCET 30 MILLIGRAM(S): 30 TABLET, FILM COATED ORAL at 15:46

## 2018-01-03 RX ADMIN — Medication 50 MILLIGRAM(S): at 06:36

## 2018-01-03 RX ADMIN — DOXERCALCIFEROL 2 MICROGRAM(S): 2.5 CAPSULE ORAL at 10:29

## 2018-01-03 RX ADMIN — SEVELAMER CARBONATE 2400 MILLIGRAM(S): 2400 POWDER, FOR SUSPENSION ORAL at 19:00

## 2018-01-03 RX ADMIN — Medication 50 MILLIGRAM(S): at 19:04

## 2018-01-03 RX ADMIN — CEFEPIME 100 MILLIGRAM(S): 1 INJECTION, POWDER, FOR SOLUTION INTRAMUSCULAR; INTRAVENOUS at 18:59

## 2018-01-03 RX ADMIN — SEVELAMER CARBONATE 2400 MILLIGRAM(S): 2400 POWDER, FOR SUSPENSION ORAL at 15:48

## 2018-01-03 RX ADMIN — PANTOPRAZOLE SODIUM 40 MILLIGRAM(S): 20 TABLET, DELAYED RELEASE ORAL at 06:36

## 2018-01-03 RX ADMIN — AZITHROMYCIN 250 MILLIGRAM(S): 500 TABLET, FILM COATED ORAL at 15:47

## 2018-01-03 RX ADMIN — Medication 125 MILLIGRAM(S): at 06:36

## 2018-01-03 RX ADMIN — ERYTHROPOIETIN 10000 UNIT(S): 10000 INJECTION, SOLUTION INTRAVENOUS; SUBCUTANEOUS at 11:27

## 2018-01-03 RX ADMIN — SEVELAMER CARBONATE 2400 MILLIGRAM(S): 2400 POWDER, FOR SUSPENSION ORAL at 07:33

## 2018-01-03 RX ADMIN — Medication 650 MILLIGRAM(S): at 10:29

## 2018-01-03 NOTE — PROGRESS NOTE ADULT - SUBJECTIVE AND OBJECTIVE BOX
62 year old male with 2-3 weeks of intermittent bilateral upper neck pain, sinus congestion, headaches and dry cough, patient reports that symptoms have gotten worse over last several days with associated fever 101 last night with worsening cough- prompting patient's visit to the ED- hx of ESRD- on dialysis- M/W/F- received dialysis 2 days ago due to holiday- due again tomorrow. Fam members + for RSV and coronavirus. He is prone to severe infections and sepsis per wife; adm for IV abx and stress dose steroids. Here, afebrile, wbc ct wnl, xray shows LLL consolidation/effusion, was given IV vanco/cefepime/azithro for pna coverage, RVP positive for coronavirus. Has hx of recurrent C diff colitis multiple times in the past and is typically on prophylaxis with oral vanco when on abx.    feels better  less cough  no fevers    MEDICATIONS  (STANDING):  amiodarone    Tablet 100 milliGRAM(s) Oral daily  azithromycin   Tablet 250 milliGRAM(s) Oral daily  carvedilol 3.125 milliGRAM(s) Oral daily  carvedilol 6.25 milliGRAM(s) Oral at bedtime  cefepime  IVPB 1000 milliGRAM(s) IV Intermittent every 24 hours  cinacalcet 30 milliGRAM(s) Oral <User Schedule>  doxercalciferol Injectable 2 MICROGram(s) IV Push <User Schedule>  epoetin amee Injectable 30095 Unit(s) IV Push <User Schedule>  hydrocortisone sodium succinate Injectable 50 milliGRAM(s) IV Push every 8 hours  pantoprazole    Tablet 40 milliGRAM(s) Oral before breakfast  sevelamer hydrochloride 2400 milliGRAM(s) Oral three times a day with meals  vancomycin    Solution 125 milliGRAM(s) Oral every 6 hours  warfarin 1 milliGRAM(s) Oral once      Vital Signs Last 24 Hrs  T(C): 36.4 (03 Jan 2018 13:22), Max: 36.7 (02 Jan 2018 20:24)  T(F): 97.6 (03 Jan 2018 13:22), Max: 98.1 (02 Jan 2018 20:24)  HR: 75 (03 Jan 2018 11:42) (55 - 75)  BP: 101/51 (03 Jan 2018 13:22) (95/54 - 118/49)  BP(mean): --  RR: 16 (03 Jan 2018 13:22) (16 - 20)  SpO2: 96% (03 Jan 2018 06:16) (96% - 99%)            PE:  Constitutional: frail looking  HEENT: NC/AT, EOMI, PERRLA  Neck: supple  Back: no tenderness  Respiratory: decreased breath sounds, LLL scant rhonchi  Cardiovascular: S1S2 regular, no murmurs  Abdomen: soft, not tender, not distended, positive BS  Genitourinary: deferred  Rectal: deferred  Musculoskeletal: no muscle tenderness, no joint swelling or tenderness  Extremities: no pedal edema  Neurological:  no focal deficits  Skin: no rashes, R arm fistula with thrill, + skin graft, healing wound    Labs:                                   8.6    10.4  )-----------( 97       ( 02 Jan 2018 09:30 )             27.6     01-03    136  |  101  |  102<H>  ----------------------------<  271<H>  4.8   |  20<L>  |  8.12<H>    Ca    8.2<L>      03 Jan 2018 08:15  Phos  5.2     01-03    TPro  x   /  Alb  2.3<L>  /  TBili  x   /  DBili  x   /  AST  x   /  ALT  x   /  AlkPhos  x   01-03              Culture - Blood (01.02.18 @ 09:30)    Specimen Source: .Blood None    Culture Results:   No growth to date.    Culture - Blood (01.02.18 @ 09:29)    Specimen Source: .Blood None    Culture Results:   No growth to date.                Radiology:  < from: Xray Chest 2 Views PA/Lat (01.02.18 @ 09:16) >    EXAM:  XR CHEST PA LAT 2V                            PROCEDURE DATE:  01/02/2018          INTERPRETATION:  XR CHEST PA LAT 2V    HISTORY:  cough    Views:  PA and Lateral chest.       Comparison:  none.    Left pacemaker/defibrillator. Right subclavian vascular stent. Normal   heart size. Moderate left lower lobe consolidation and trace bilateral   pleural effusions.    IMPRESSION: Moderate left lower lobe pneumonia.                            < end of copied text >    Advanced directives addressed: full resuscitation

## 2018-01-03 NOTE — PROGRESS NOTE ADULT - SUBJECTIVE AND OBJECTIVE BOX
62 year old male with 2-3 weeks of intermittent bilateral upper neck pain; sinus congestion; frontal headache and non productive cough; patient reports that symptoms have gotten worse over last "few days"- patient reports fever to 101 last night with worsening cough- prompting patient's visit to the ED- also with associated sob and chest tightness; hx of ESRD- on dialysis- M/W/F- received dialysis 2 days ago due to holiday- due again tomorrow. Fam members + for RSV. He is prone to severe infections and sepsis per wife; adm for IV abx and stress dose steroids. Pt feels better now; no c/o        18: Patient seen and examined. Feels much better today. Had HD in am. Discussed with patient regarding management and d/c plan.         Vital Signs Last 24 Hrs  T(C): 36.4 (2018 13:22), Max: 36.7 (2018 20:24)  T(F): 97.6 (2018 13:22), Max: 98.1 (2018 20:24)  HR: 75 (2018 11:42) (55 - 75)  BP: 101/51 (2018 13:22) (95/54 - 118/49)  BP(mean): --  RR: 16 (2018 13:22) (16 - 20)  SpO2: 96% (2018 06:16) (96% - 99%)          Physical Exam: · EYES: Clear bilaterally, pupils equal, round and reactive to light.  · CARDIAC: Normal rate, regular rhythm.  Heart sounds S1, S2.  No murmurs, rubs or gallops.  · RESPIRATORY: Breath sounds clear and equal bilaterally.  · GASTROINTESTINAL: Abdomen soft, non-tender, no guarding.  · MUSCULOSKELETAL: Spine appears normal, range of motion is not limited, no muscle or joint tenderness; Neck -supple; FROM without pain; ?left side paravertebral cervical tenderness;  · NEUROLOGICAL: Alert and oriented, no focal deficits, no motor or sensory deficits. · SKIN: Skin normal color for race, warm, dry and intact. No evidence of rash; (+) right AV fistula with good bruit and thrill.	            Labs:                             8.6    10.4  )-----------( 97       ( 2018 09:30 )             27.6     2018 08:15    136    |  101    |  102    ----------------------------<  271    4.8     |  20     |  8.12     Ca    8.2        2018 08:15  Phos  5.2       2018 08:15    TPro  x      /  Alb  2.3    /  TBili  x      /  DBili  x      /  AST  x      /  ALT  x      /  AlkPhos  x      2018 08:15    LIVER FUNCTIONS - ( 2018 08:15 )  Alb: 2.3 g/dL / Pro: x     / ALK PHOS: x     / ALT: x     / AST: x     / GGT: x           PT/INR - ( 2018 09:30 )   PT: 49.3 sec;   INR: 4.43 ratio           CAPILLARY BLOOD GLUCOSE        CARDIAC MARKERS ( 2018 09:30 )  0.022 ng/mL / x     / x     / x     / x          Urinalysis Basic - ( 2018 17:30 )    Color: Yellow / Appearance: Clear / S.010 / pH: x  Gluc: x / Ketone: Negative  / Bili: Negative / Urobili: 1 mg/dL   Blood: x / Protein: 100 mg/dL / Nitrite: Negative   Leuk Esterase: Negative / RBC: Negative /HPF / WBC 0-2   Sq Epi: x / Non Sq Epi: Occasional / Bacteria: Occasional            MEDICATIONS:       amiodarone    Tablet 100 milliGRAM(s) Oral daily  azithromycin   Tablet 250 milliGRAM(s) Oral daily  carvedilol 3.125 milliGRAM(s) Oral daily  carvedilol 6.25 milliGRAM(s) Oral at bedtime  cefepime  IVPB 1000 milliGRAM(s) IV Intermittent every 24 hours  cinacalcet 30 milliGRAM(s) Oral <User Schedule>  doxercalciferol Injectable 2 MICROGram(s) IV Push <User Schedule>  epoetin amee Injectable 66356 Unit(s) IV Push <User Schedule>  hydrocortisone sodium succinate Injectable 50 milliGRAM(s) IV Push every 8 hours  pantoprazole    Tablet 40 milliGRAM(s) Oral before breakfast  sevelamer hydrochloride 2400 milliGRAM(s) Oral three times a day with meals  vancomycin    Solution 125 milliGRAM(s) Oral every 6 hours    MEDICATIONS  (PRN):  acetaminophen   Tablet 650 milliGRAM(s) Oral every 6 hours PRN pain fever  ALBUTerol/ipratropium for Nebulization 3 milliLiter(s) Nebulizer every 6 hours PRN Bronchospasm  aluminum hydroxide/magnesium hydroxide/simethicone Suspension 30 milliLiter(s) Oral every 4 hours PRN Dyspepsia  ondansetron Injectable 4 milliGRAM(s) IV Push every 6 hours PRN Nausea  senna 2 Tablet(s) Oral at bedtime PRN Constipation          Assessment and Plan:   Assessment:  · Assessment		    ASSESSMENT:    Left lower lobe pneumonia-HCAP  Suspected gram negative rods  FEVER  CHRONIC SYSTOLIC HF  AF  THROMBOCYTOPENIA  IATROGENIC COAGULOPATHY   ADRENAL INSUFFICIENCY  ESRD ( s/p failed kidney tranplantx3)    PLAN:    -Continue broad spectrum iv abx, vanco and cefepime  - STRESS DOSE STEROIDS, taper  - repeat  INR, hold coumadin  - prn nebs  - no evid of HF exacerbation  - HD per renal, dialyzed today

## 2018-01-03 NOTE — PROGRESS NOTE ADULT - SUBJECTIVE AND OBJECTIVE BOX
Patient is a 62y Male who reports no complaints overnight. Feels well, improved symptoms with breathing.     REVIEW OF SYSTEMS:    CONSTITUTIONAL: Stable weakness, fevers or chills  RESPIRATORY: No cough, wheezing, hemoptysis; Stablle shortness of breath  CARDIOVASCULAR: No chest pain or palpitations  GENITOURINARY: No dysuria, frequency or hematuria  All other review of systems is negative unless indicated above.    MEDICATIONS  (STANDING):  amiodarone    Tablet 100 milliGRAM(s) Oral daily  azithromycin   Tablet 250 milliGRAM(s) Oral daily  carvedilol 3.125 milliGRAM(s) Oral daily  carvedilol 6.25 milliGRAM(s) Oral at bedtime  cefepime  IVPB 1000 milliGRAM(s) IV Intermittent every 24 hours  cinacalcet 30 milliGRAM(s) Oral <User Schedule>  doxercalciferol Injectable 2 MICROGram(s) IV Push <User Schedule>  epoetin amee Injectable 80613 Unit(s) IV Push <User Schedule>  hydrocortisone sodium succinate Injectable 50 milliGRAM(s) IV Push every 8 hours  pantoprazole    Tablet 40 milliGRAM(s) Oral before breakfast  sevelamer hydrochloride 2400 milliGRAM(s) Oral three times a day with meals  vancomycin    Solution 125 milliGRAM(s) Oral every 6 hours    MEDICATIONS  (PRN):  acetaminophen   Tablet 650 milliGRAM(s) Oral every 6 hours PRN pain fever  ALBUTerol/ipratropium for Nebulization 3 milliLiter(s) Nebulizer every 6 hours PRN Bronchospasm  aluminum hydroxide/magnesium hydroxide/simethicone Suspension 30 milliLiter(s) Oral every 4 hours PRN Dyspepsia  ondansetron Injectable 4 milliGRAM(s) IV Push every 6 hours PRN Nausea  senna 2 Tablet(s) Oral at bedtime PRN Constipation        T(C): , Max: 36.9 (18 @ 15:57)  T(F): , Max: 98.4 (18 @ 15:57)  HR: 55 (18 @ 09:43)  BP: 109/50 (18 @ 09:43)  BP(mean): --  RR: 17 (18 @ 09:16)  SpO2: 96% (18 @ 06:16)  Wt(kg): --        PHYSICAL EXAM:    Constitutional: NAD,  HEENT: PERRLA, EOMI,  MMM  Neck: No LAD, No JVD  Respiratory: scatt ronchi  Cardiovascular: S1 and S2, RRR  Gastrointestinal: BS+, soft, NT/ND  Extremities: No peripheral edema  Neurological: A/O x 3, no focal deficits  Psychiatric: Normal mood, normal affect  : No Amaya  Skin: No rashes  Access: avf        LABS:                        8.6    10.4  )-----------( 97       ( 2018 09:30 )             27.6     2018 08:15    136    |  101    |  102    ----------------------------<  271    4.8     |  20     |  8.12   2018 09:30    139    |  102    |  84     ----------------------------<  147    4.9     |  25     |  7.44     Ca    8.2        2018 08:15  Ca    8.1        2018 09:30  Phos  5.2       2018 08:15    TPro  x      /  Alb  2.3    /  TBili  x      /  DBili  x      /  AST  x      /  ALT  x      /  AlkPhos  x      2018 08:15          Urine Studies:  Urinalysis Basic - ( 2018 17:30 )    Color: Yellow / Appearance: Clear / S.010 / pH: x  Gluc: x / Ketone: Negative  / Bili: Negative / Urobili: 1 mg/dL   Blood: x / Protein: 100 mg/dL / Nitrite: Negative   Leuk Esterase: Negative / RBC: Negative /HPF / WBC 0-2   Sq Epi: x / Non Sq Epi: Occasional / Bacteria: Occasional            RADIOLOGY & ADDITIONAL STUDIES:

## 2018-01-04 LAB
HCT VFR BLD CALC: 29.4 % — LOW (ref 39–50)
HGB BLD-MCNC: 8.7 G/DL — LOW (ref 13–17)
INR BLD: 2.87 RATIO — HIGH (ref 0.88–1.16)
MCHC RBC-ENTMCNC: 29.7 GM/DL — LOW (ref 32–36)
MCHC RBC-ENTMCNC: 30.8 PG — SIGNIFICANT CHANGE UP (ref 27–34)
MCV RBC AUTO: 103.6 FL — HIGH (ref 80–100)
PLATELET # BLD AUTO: 121 K/UL — LOW (ref 150–400)
PROTHROM AB SERPL-ACNC: 31.7 SEC — HIGH (ref 9.8–12.7)
RBC # BLD: 2.84 M/UL — LOW (ref 4.2–5.8)
RBC # FLD: 14.6 % — HIGH (ref 10.3–14.5)
WBC # BLD: 7.4 K/UL — SIGNIFICANT CHANGE UP (ref 3.8–10.5)
WBC # FLD AUTO: 7.4 K/UL — SIGNIFICANT CHANGE UP (ref 3.8–10.5)

## 2018-01-04 RX ORDER — CINACALCET 30 MG/1
30 TABLET, FILM COATED ORAL DAILY
Qty: 0 | Refills: 0 | Status: CANCELLED | OUTPATIENT
Start: 2018-01-08 | End: 2018-01-06

## 2018-01-04 RX ORDER — WARFARIN SODIUM 2.5 MG/1
1 TABLET ORAL ONCE
Qty: 0 | Refills: 0 | Status: COMPLETED | OUTPATIENT
Start: 2018-01-04 | End: 2018-01-04

## 2018-01-04 RX ADMIN — SEVELAMER CARBONATE 2400 MILLIGRAM(S): 2400 POWDER, FOR SUSPENSION ORAL at 12:08

## 2018-01-04 RX ADMIN — Medication 50 MILLIGRAM(S): at 06:47

## 2018-01-04 RX ADMIN — Medication 125 MILLIGRAM(S): at 06:48

## 2018-01-04 RX ADMIN — SEVELAMER CARBONATE 2400 MILLIGRAM(S): 2400 POWDER, FOR SUSPENSION ORAL at 18:01

## 2018-01-04 RX ADMIN — Medication 125 MILLIGRAM(S): at 18:01

## 2018-01-04 RX ADMIN — AZITHROMYCIN 250 MILLIGRAM(S): 500 TABLET, FILM COATED ORAL at 12:08

## 2018-01-04 RX ADMIN — Medication 50 MILLIGRAM(S): at 21:36

## 2018-01-04 RX ADMIN — Medication 125 MILLIGRAM(S): at 01:03

## 2018-01-04 RX ADMIN — PANTOPRAZOLE SODIUM 40 MILLIGRAM(S): 20 TABLET, DELAYED RELEASE ORAL at 06:48

## 2018-01-04 RX ADMIN — SEVELAMER CARBONATE 2400 MILLIGRAM(S): 2400 POWDER, FOR SUSPENSION ORAL at 08:57

## 2018-01-04 RX ADMIN — CEFEPIME 100 MILLIGRAM(S): 1 INJECTION, POWDER, FOR SOLUTION INTRAMUSCULAR; INTRAVENOUS at 12:08

## 2018-01-04 RX ADMIN — Medication 50 MILLIGRAM(S): at 14:15

## 2018-01-04 RX ADMIN — Medication 50 MILLIGRAM(S): at 01:03

## 2018-01-04 NOTE — PROGRESS NOTE ADULT - SUBJECTIVE AND OBJECTIVE BOX
COVERING FOR DR. SALMON     62y Male with episode of hypotension earlier today SBP high 80s, asymptomatic, amiodarone was held. Also receives Sensipar QIW as outpatient - orders modified -- will receive next dose on Monday.    REVIEW OF SYSTEMS:    CONSTITUTIONAL: Stable weakness, fevers or chills  RESPIRATORY: No cough, wheezing, hemoptysis; Stable shortness of breath  CARDIOVASCULAR: No chest pain or palpitations  GENITOURINARY: No dysuria, frequency or hematuria  All other review of systems is negative unless indicated above.    MEDICATIONS  (STANDING):  amiodarone    Tablet 100 milliGRAM(s) Oral daily  azithromycin   Tablet 250 milliGRAM(s) Oral daily  carvedilol 3.125 milliGRAM(s) Oral daily  carvedilol 6.25 milliGRAM(s) Oral at bedtime  cefepime  IVPB 1000 milliGRAM(s) IV Intermittent every 24 hours  doxercalciferol Injectable 2 MICROGram(s) IV Push <User Schedule>  epoetin amee Injectable 29171 Unit(s) IV Push <User Schedule>  hydrocortisone sodium succinate Injectable 50 milliGRAM(s) IV Push every 8 hours  pantoprazole    Tablet 40 milliGRAM(s) Oral before breakfast  sevelamer hydrochloride 2400 milliGRAM(s) Oral three times a day with meals  vancomycin    Solution 125 milliGRAM(s) Oral every 6 hours      Vital Signs Last 24 Hrs  T(C): 36.8 (2018 16:35), Max: 36.8 (2018 22:52)  T(F): 98.3 (2018 16:35), Max: 98.3 (2018 22:52)  HR: 75 (2018 16:35) (60 - 75)  BP: 113/65 (2018 16:35) (88/52 - 113/65)  BP(mean): --  RR: 18 (2018 16:35) (17 - 18)  SpO2: 97% (2018 16:35) (96% - 97%)  Daily     Daily Weight in k.3 (2018 07:20)  I&O's Summary    2018 07:01  -  2018 20:11  --------------------------------------------------------  IN: 50 mL / OUT: 0 mL / NET: 50 mL            PHYSICAL EXAM:    Constitutional: NAD, AAOx3  HEENT: PERRLA, EOMI,  MMM  Neck: No LAD, No JVD  Respiratory: CTAB, no wheeze  Cardiovascular: S1 and S2, RRR  Gastrointestinal: BS+, soft, NT/ND  Extremities: No peripheral edema  Neurological: A/O x 3, no focal deficits  Psychiatric: Normal mood, normal affect  : No Amaya  Skin: No rashes  Access: [facility]        LABS:                        8.6    10.4  )-----------( 97       ( 2018 09:30 )             27.6       136    |  101    |  102    ----------------------------<  271       2018 08:15  4.8     |  20     |  8.12     139    |  102    |  84     ----------------------------<  147       2018 09:30  4.9     |  25     |  7.44     Ca    8.2        2018 08:15  Ca    8.1        2018 09:30    Phos  5.2       2018 08:15      TPro  x      /  Alb  2.3    /  TBili  x      /        2018 08:15  DBili  x      /  AST  x      /  ALT  x      /  AlkPhos  x                Urine Studies:  Urinalysis Basic - ( 2018 17:30 )    Color: Yellow / Appearance: Clear / S.010 / pH: x  Gluc: x / Ketone: Negative  / Bili: Negative / Urobili: 1 mg/dL   Blood: x / Protein: 100 mg/dL / Nitrite: Negative   Leuk Esterase: Negative / RBC: Negative /HPF / WBC 0-2   Sq Epi: x / Non Sq Epi: Occasional / Bacteria: Occasional            RADIOLOGY & ADDITIONAL STUDIES:

## 2018-01-04 NOTE — PROGRESS NOTE ADULT - SUBJECTIVE AND OBJECTIVE BOX
Patient is a 62y old  Male who presents with a chief complaint of   62 year old male with a PMHx notable for HTN, GERD, ESRD on dialysis, chronic systolic CHF, and GERD.  He presents with 2-3 weeks of intermittent bilateral upper neck pain; sinus congestion; frontal headache and non productive cough; patient reports that symptoms have gotten worse over last "few days"- patient reports fever to 101 last night with worsening cough- prompting patient's visit to the ED- also with associated sob and chest tightness; hx of ESRD- on dialysis- M/W/F- received dialysis 2 days ago due to holiday- due again tomorrow. Fam members + for RSV. He is prone to severe infections and sepsis per wife.  In ED found to have sepsis/LLL PNA/HCAP/ + coronovirus.      01/03/18: Patient seen and examined. Feels much better today. Had HD in am. Discussed with patient regarding management and d/c plan.   1/4/2018 : No events. Afebrile.        PHYSICAL EXAM:  T(C): 36.8 (01-04-18 @ 16:35), Max: 36.8 (01-03-18 @ 22:52)  HR: 75 (01-04-18 @ 16:35) (60 - 75)  BP: 113/65 (01-04-18 @ 16:35) (88/52 - 113/65)  RR: 18 (01-04-18 @ 16:35) (17 - 18)  SpO2: 97% (01-04-18 @ 16:35) (96% - 97%)    GENERAL: NAD, well-groomed, well-developed  HEAD:  Atraumatic, Normocephalic  EYES: EOMI, PERRLA, conjunctiva and sclera clear  ENMT: Moist mucous membranes  NECK: Supple, No JVD  NERVOUS SYSTEM:  Alert & Oriented X3, Motor Strength 5/5 B/L upper and lower extremities; DTRs 2+ intact and symmetric  CHEST/LUNG: Clear to auscultation bilaterally; No rales, rhonchi, wheezing, or rubs  HEART: Regular rate and rhythm; No murmurs, rubs, or gallops  ABDOMEN: Soft, Nontender, Nondistended; Bowel sounds present  EXTREMITIES:  2+ Peripheral Pulses, No clubbing, cyanosis, or edema    MEDICATIONS  (STANDING):  amiodarone    Tablet 100 milliGRAM(s) Oral daily  azithromycin   Tablet 250 milliGRAM(s) Oral daily  carvedilol 3.125 milliGRAM(s) Oral daily  carvedilol 6.25 milliGRAM(s) Oral at bedtime  cefepime  IVPB 1000 milliGRAM(s) IV Intermittent every 24 hours  doxercalciferol Injectable 2 MICROGram(s) IV Push <User Schedule>  epoetin amee Injectable 06415 Unit(s) IV Push <User Schedule>  hydrocortisone sodium succinate Injectable 50 milliGRAM(s) IV Push every 8 hours  pantoprazole    Tablet 40 milliGRAM(s) Oral before breakfast  sevelamer hydrochloride 2400 milliGRAM(s) Oral three times a day with meals  vancomycin    Solution 125 milliGRAM(s) Oral every 6 hours    MEDICATIONS  (PRN):  acetaminophen   Tablet 650 milliGRAM(s) Oral every 6 hours PRN pain fever  ALBUTerol/ipratropium for Nebulization 3 milliLiter(s) Nebulizer every 6 hours PRN Bronchospasm  aluminum hydroxide/magnesium hydroxide/simethicone Suspension 30 milliLiter(s) Oral every 4 hours PRN Dyspepsia  ondansetron Injectable 4 milliGRAM(s) IV Push every 6 hours PRN Nausea  senna 2 Tablet(s) Oral at bedtime PRN Constipation      LABS:                        8.7    7.4   )-----------( 121      ( 04 Jan 2018 07:44 )             29.4     01-    136  |  101  |  102<H>  ----------------------------<  271<H>  4.8   |  20<L>  |  8.12<H>    Calcium    8.2<L>      03 Jan 2018 08:15  Phosphorous  5.2     01-03    T Protein  x   /  Albumin  2.3<L>  /  Total Bili  x   /  Direct Bili  x   /  AST  x   /  ALT  x   /  AlkPhos  x   01-03    PT/INR - ( 04 Jan 2018 07:44 )   PT: 31.7 sec;   INR: 2.87 ratio      ASSESSMENT:    Left lower lobe pneumonia-HCAP  Suspected gram negative rods  FEVER  CHRONIC SYSTOLIC HF  Chronic Afib  THROMBOCYTOPENIA  IATROGENIC COAGULOPATHY   ADRENAL INSUFFICIENCY  ESRD ( s/p failed kidney tranplantx3)    PLAN:    # Left lower lobe pneumonia/HCAP  - Appreciate ID eval   - suspected gram negative rods  - Improving on abx  - s/p vancomycin  - now on IV cefepime  - Also on azithromcyin s/p 500mg x 1 - 250mg daily   - Blood cultures : thus far ; no growth   - STRESS DOSE STEROIDS, taper  - prn nebs    # Chronic atrial fibrillatin  - Rate controlled on beta-blocker  - AC with coumadin  - daily PT/INR     # Chronic systolic CHF  - no evid of HF exacerbation    # ESRD  - HD per renal    # GERD  - stable on PPI    # DVT prophylaxsis  - as above coumadin

## 2018-01-05 LAB
ALBUMIN SERPL ELPH-MCNC: 2.4 G/DL — LOW (ref 3.3–5)
ANION GAP SERPL CALC-SCNC: 11 MMOL/L — SIGNIFICANT CHANGE UP (ref 5–17)
APTT BLD: 29 SEC — SIGNIFICANT CHANGE UP (ref 27.5–37.4)
BUN SERPL-MCNC: 100 MG/DL — HIGH (ref 7–23)
CALCIUM SERPL-MCNC: 8.2 MG/DL — LOW (ref 8.5–10.1)
CHLORIDE SERPL-SCNC: 104 MMOL/L — SIGNIFICANT CHANGE UP (ref 96–108)
CO2 SERPL-SCNC: 23 MMOL/L — SIGNIFICANT CHANGE UP (ref 22–31)
CREAT SERPL-MCNC: 6.76 MG/DL — HIGH (ref 0.5–1.3)
GLUCOSE SERPL-MCNC: 185 MG/DL — HIGH (ref 70–99)
HCT VFR BLD CALC: 28.1 % — LOW (ref 39–50)
HGB BLD-MCNC: 8.8 G/DL — LOW (ref 13–17)
INR BLD: 2.27 RATIO — HIGH (ref 0.88–1.16)
MCHC RBC-ENTMCNC: 31.2 GM/DL — LOW (ref 32–36)
MCHC RBC-ENTMCNC: 31.4 PG — SIGNIFICANT CHANGE UP (ref 27–34)
MCV RBC AUTO: 100.7 FL — HIGH (ref 80–100)
PHOSPHATE SERPL-MCNC: 5.2 MG/DL — HIGH (ref 2.5–4.5)
PLATELET # BLD AUTO: 131 K/UL — LOW (ref 150–400)
POTASSIUM SERPL-MCNC: 4.4 MMOL/L — SIGNIFICANT CHANGE UP (ref 3.5–5.3)
POTASSIUM SERPL-SCNC: 4.4 MMOL/L — SIGNIFICANT CHANGE UP (ref 3.5–5.3)
PROTHROM AB SERPL-ACNC: 24.9 SEC — HIGH (ref 9.8–12.7)
RBC # BLD: 2.79 M/UL — LOW (ref 4.2–5.8)
RBC # FLD: 13.8 % — SIGNIFICANT CHANGE UP (ref 10.3–14.5)
SODIUM SERPL-SCNC: 138 MMOL/L — SIGNIFICANT CHANGE UP (ref 135–145)
WBC # BLD: 8.1 K/UL — SIGNIFICANT CHANGE UP (ref 3.8–10.5)
WBC # FLD AUTO: 8.1 K/UL — SIGNIFICANT CHANGE UP (ref 3.8–10.5)

## 2018-01-05 RX ORDER — AZITHROMYCIN 500 MG/1
1 TABLET, FILM COATED ORAL
Qty: 5 | Refills: 0 | OUTPATIENT
Start: 2018-01-05 | End: 2018-01-09

## 2018-01-05 RX ORDER — WARFARIN SODIUM 2.5 MG/1
1 TABLET ORAL ONCE
Qty: 0 | Refills: 0 | Status: COMPLETED | OUTPATIENT
Start: 2018-01-05 | End: 2018-01-05

## 2018-01-05 RX ORDER — AMIODARONE HYDROCHLORIDE 400 MG/1
0.5 TABLET ORAL
Qty: 0 | Refills: 0 | COMMUNITY

## 2018-01-05 RX ADMIN — Medication 125 MILLIGRAM(S): at 17:56

## 2018-01-05 RX ADMIN — SEVELAMER CARBONATE 2400 MILLIGRAM(S): 2400 POWDER, FOR SUSPENSION ORAL at 06:29

## 2018-01-05 RX ADMIN — ERYTHROPOIETIN 10000 UNIT(S): 10000 INJECTION, SOLUTION INTRAVENOUS; SUBCUTANEOUS at 10:32

## 2018-01-05 RX ADMIN — Medication 40 MILLIGRAM(S): at 17:56

## 2018-01-05 RX ADMIN — SEVELAMER CARBONATE 2400 MILLIGRAM(S): 2400 POWDER, FOR SUSPENSION ORAL at 12:33

## 2018-01-05 RX ADMIN — CEFEPIME 100 MILLIGRAM(S): 1 INJECTION, POWDER, FOR SOLUTION INTRAMUSCULAR; INTRAVENOUS at 14:17

## 2018-01-05 RX ADMIN — SEVELAMER CARBONATE 2400 MILLIGRAM(S): 2400 POWDER, FOR SUSPENSION ORAL at 17:56

## 2018-01-05 RX ADMIN — PANTOPRAZOLE SODIUM 40 MILLIGRAM(S): 20 TABLET, DELAYED RELEASE ORAL at 06:29

## 2018-01-05 RX ADMIN — Medication 50 MILLIGRAM(S): at 06:29

## 2018-01-05 RX ADMIN — AZITHROMYCIN 250 MILLIGRAM(S): 500 TABLET, FILM COATED ORAL at 12:33

## 2018-01-05 RX ADMIN — DOXERCALCIFEROL 2 MICROGRAM(S): 2.5 CAPSULE ORAL at 10:32

## 2018-01-05 RX ADMIN — WARFARIN SODIUM 1 MILLIGRAM(S): 2.5 TABLET ORAL at 21:34

## 2018-01-05 RX ADMIN — Medication 125 MILLIGRAM(S): at 06:29

## 2018-01-05 NOTE — PROGRESS NOTE ADULT - SUBJECTIVE AND OBJECTIVE BOX
62 year old male with a PMHx notable for HTN, GERD, ESRD on dialysis, chronic systolic CHF, and GERD.  He presents with 2-3 weeks of intermittent bilateral upper neck pain; sinus congestion; frontal headache and non productive cough; patient reports that symptoms have gotten worse over last "few days"- patient reports fever to 101 last night with worsening cough- prompting patient's visit to the ED- also with associated sob and chest tightness; hx of ESRD- on dialysis- M/W/F- received dialysis 2 days ago due to holiday- due again tomorrow. Fam members + for RSV. He is prone to severe infections and sepsis per wife.  In ED found to have sepsis/LLL PNA/HCAP/ + coronovirus.      01/03/18: Patient seen and examined. Feels much better today. Had HD in am. Discussed with patient regarding management and d/c plan.   1/4/2018 : No events. Afebrile.    1/5/2018:      PHYSICAL EXAM:  T(C): 36.6 (05 Jan 2018 11:50), Max: 36.8 (04 Jan 2018 16:35)  T(F): 97.8 (05 Jan 2018 11:50), Max: 98.3 (04 Jan 2018 16:35)  HR: 76 (05 Jan 2018 11:50) (57 - 76)  BP: 106/63 (05 Jan 2018 11:50) (100/60 - 125/92)  RR: 18 (05 Jan 2018 11:50) (16 - 20)  SpO2: 95% (05 Jan 2018 11:50) (94% - 97%)    GENERAL: NAD, well-groomed, well-developed  HEAD:  Atraumatic, Normocephalic  EYES: EOMI, PERRLA, conjunctiva and sclera clear  ENMT: Moist mucous membranes  NECK: Supple, No JVD  NERVOUS SYSTEM:  Alert & Oriented X3, Motor Strength 5/5 B/L upper and lower extremities; DTRs 2+ intact and symmetric  CHEST/LUNG: Clear to auscultation bilaterally; No rales, rhonchi, wheezing, or rubs  HEART: Regular rate and rhythm; No murmurs, rubs, or gallops  ABDOMEN: Soft, Nontender, Nondistended; Bowel sounds present  EXTREMITIES:  2+ Peripheral Pulses, No clubbing, cyanosis, or edema    MEDICATIONS  (STANDING):  amiodarone    Tablet 100 milliGRAM(s) Oral daily  azithromycin   Tablet 250 milliGRAM(s) Oral daily  carvedilol 3.125 milliGRAM(s) Oral daily  carvedilol 6.25 milliGRAM(s) Oral at bedtime  cefepime  IVPB 1000 milliGRAM(s) IV Intermittent every 24 hours  doxercalciferol Injectable 2 MICROGram(s) IV Push <User Schedule>  epoetin amee Injectable 33507 Unit(s) IV Push <User Schedule>  hydrocortisone sodium succinate Injectable 50 milliGRAM(s) IV Push every 8 hours  pantoprazole    Tablet 40 milliGRAM(s) Oral before breakfast  sevelamer hydrochloride 2400 milliGRAM(s) Oral three times a day with meals  vancomycin    Solution 125 milliGRAM(s) Oral every 6 hours    MEDICATIONS  (PRN):  acetaminophen   Tablet 650 milliGRAM(s) Oral every 6 hours PRN pain fever  ALBUTerol/ipratropium for Nebulization 3 milliLiter(s) Nebulizer every 6 hours PRN Bronchospasm  aluminum hydroxide/magnesium hydroxide/simethicone Suspension 30 milliLiter(s) Oral every 4 hours PRN Dyspepsia  ondansetron Injectable 4 milliGRAM(s) IV Push every 6 hours PRN Nausea  senna 2 Tablet(s) Oral at bedtime PRN Constipation      LABS:                            8.8    8.1   )-----------( 131      ( 05 Jan 2018 07:50 )             28.1     01-    138  |  104  |  100<H>  ----------------------------<  185<H>  4.4   |  23  |  6.76<H>    Calcium    8.2<L>      05 Jan 2018 07:50  Phos  5.2     01-05    T Protein  x   /  Albumin  2.4<L>  /  Total Bili  x   /  Direct Bili  x   /  AST  x   /  ALT  x   /  AlkPhos  x   01-05      PT/INR - ( 05 Jan 2018 11:05 )   PT: 24.9 sec;   INR: 2.27 ratio    PTT - ( 05 Jan 2018 11:05 )  PTT:29.0 sec    ASSESSMENT:    # Left lower lobe pneumonia-HCAP  Suspected gram negative rods  FEVER  CHRONIC SYSTOLIC HF  Chronic Afib  THROMBOCYTOPENIA  IATROGENIC COAGULOPATHY   ADRENAL INSUFFICIENCY  ESRD ( s/p failed kidney tranplantx3)    PLAN:    # Left lower lobe pneumonia/HCAP  - Appreciate ID eval   - suspected gram negative rods  - Improving on abx  - s/p vancomycin  - now on IV cefepime  - Also on azithromcyin s/p 500mg x 1 - 250mg daily   - Blood cultures : thus far ; no growth   - Discharge home on oral zpack x 5 days  - IV STRESS DOSE STEROIDS, taper--Discharge home on prednisone 40mg daily x 3 days and then resume home med prednisone 5mg daily   - prn nebs    # Chronic atrial fibrillatin  - Rate controlled on beta-blocker  - AC with coumadin  - daily PT/INR     # Chronic systolic CHF  - no evid of HF exacerbation    # ESRD  - HD per renal    # GERD  - stable on PPI    # DVT prophylaxsis  - as above coumadin 62 year old male with a PMHx notable for HTN, GERD, ESRD on dialysis, chronic systolic CHF, and GERD.  He presents with 2-3 weeks of intermittent bilateral upper neck pain; sinus congestion; frontal headache and non productive cough; patient reports that symptoms have gotten worse over last "few days"- patient reports fever to 101 last night with worsening cough- prompting patient's visit to the ED- also with associated sob and chest tightness; hx of ESRD- on dialysis- M/W/F- received dialysis 2 days ago due to holiday- due again tomorrow. Fam members + for RSV. He is prone to severe infections and sepsis per wife.  In ED found to have sepsis/LLL PNA/HCAP/ + coronovirus.      01/03/18: Patient seen and examined. Feels much better today. Had HD in am. Discussed with patient regarding management and d/c plan.   1/4/2018 : No events. Afebrile.    1/5/2018:      PHYSICAL EXAM:  T(C): 36.6 (05 Jan 2018 11:50), Max: 36.8 (04 Jan 2018 16:35)  T(F): 97.8 (05 Jan 2018 11:50), Max: 98.3 (04 Jan 2018 16:35)  HR: 76 (05 Jan 2018 11:50) (57 - 76)  BP: 106/63 (05 Jan 2018 11:50) (100/60 - 125/92)  RR: 18 (05 Jan 2018 11:50) (16 - 20)  SpO2: 95% (05 Jan 2018 11:50) (94% - 97%)    GENERAL: NAD, well-groomed, well-developed  HEAD:  Atraumatic, Normocephalic  EYES: EOMI, PERRLA, conjunctiva and sclera clear  ENMT: Moist mucous membranes  NECK: Supple, No JVD  NERVOUS SYSTEM:  Alert & Oriented X3, Motor Strength 5/5 B/L upper and lower extremities; DTRs 2+ intact and symmetric  CHEST/LUNG: Clear to auscultation bilaterally; No rales, rhonchi, wheezing, or rubs  HEART: Regular rate and rhythm; No murmurs, rubs, or gallops  ABDOMEN: Soft, Nontender, Nondistended; Bowel sounds present  EXTREMITIES:  2+ Peripheral Pulses, No clubbing, cyanosis, or edema    MEDICATIONS  (STANDING):  amiodarone    Tablet 100 milliGRAM(s) Oral daily  azithromycin   Tablet 250 milliGRAM(s) Oral daily  carvedilol 3.125 milliGRAM(s) Oral daily  carvedilol 6.25 milliGRAM(s) Oral at bedtime  cefepime  IVPB 1000 milliGRAM(s) IV Intermittent every 24 hours  doxercalciferol Injectable 2 MICROGram(s) IV Push <User Schedule>  epoetin amee Injectable 23820 Unit(s) IV Push <User Schedule>  hydrocortisone sodium succinate Injectable 50 milliGRAM(s) IV Push every 8 hours  pantoprazole    Tablet 40 milliGRAM(s) Oral before breakfast  sevelamer hydrochloride 2400 milliGRAM(s) Oral three times a day with meals  vancomycin    Solution 125 milliGRAM(s) Oral every 6 hours    MEDICATIONS  (PRN):  acetaminophen   Tablet 650 milliGRAM(s) Oral every 6 hours PRN pain fever  ALBUTerol/ipratropium for Nebulization 3 milliLiter(s) Nebulizer every 6 hours PRN Bronchospasm  aluminum hydroxide/magnesium hydroxide/simethicone Suspension 30 milliLiter(s) Oral every 4 hours PRN Dyspepsia  ondansetron Injectable 4 milliGRAM(s) IV Push every 6 hours PRN Nausea  senna 2 Tablet(s) Oral at bedtime PRN Constipation      LABS:                            8.8    8.1   )-----------( 131      ( 05 Jan 2018 07:50 )             28.1     01-    138  |  104  |  100<H>  ----------------------------<  185<H>  4.4   |  23  |  6.76<H>    Calcium    8.2<L>      05 Jan 2018 07:50  Phos  5.2     01-05    T Protein  x   /  Albumin  2.4<L>  /  Total Bili  x   /  Direct Bili  x   /  AST  x   /  ALT  x   /  AlkPhos  x   01-05      PT/INR - ( 05 Jan 2018 11:05 )   PT: 24.9 sec;   INR: 2.27 ratio    PTT - ( 05 Jan 2018 11:05 )  PTT:29.0 sec    ASSESSMENT:    # Left lower lobe pneumonia-HCAP  Suspected gram negative rods  FEVER  CHRONIC SYSTOLIC HF  Chronic Afib  THROMBOCYTOPENIA  IATROGENIC COAGULOPATHY   ADRENAL INSUFFICIENCY  ESRD ( s/p failed kidney tranplantx3)    PLAN:    # Left lower lobe pneumonia/HCAP  - Appreciate ID eval   - suspected gram negative rods  - Improving on abx  - s/p vancomycin  - now on IV cefepime  - Also on azithromcyin s/p 500mg x 1 - 250mg daily   - Blood cultures : thus far ; no growth   - Discharge home on oral zpack x 5 days  - IV STRESS DOSE STEROIDS, taper--Discharge home on prednisone 40mg daily x 3 days and then resume home med prednisone 5mg daily   - prn nebs    # Chronic atrial fibrillatin  - Rate controlled on beta-blocker  - AC with coumadin  - INR therapeutic = 2.27  -continue daily PT/INR     # Chronic systolic CHF  - no evidence of HF exacerbation    # ESRD  - HD per renal    # GERD  - stable on PPI    # DVT prophylaxsis  - as above coumadin     DISCHARGE MED REC COMPLETED 62 year old male with a PMHx notable for HTN, GERD, ESRD on dialysis, chronic systolic CHF, and GERD.  He presents with 2-3 weeks of intermittent bilateral upper neck pain; sinus congestion; frontal headache and non productive cough; patient reports that symptoms have gotten worse over last "few days"- patient reports fever to 101 last night with worsening cough- prompting patient's visit to the ED- also with associated sob and chest tightness; hx of ESRD- on dialysis- M/W/F- received dialysis 2 days ago due to holiday- due again tomorrow. Fam members + for RSV. He is prone to severe infections and sepsis per wife.  In ED found to have sepsis/LLL PNA/HCAP/ + coronovirus.      01/03/18: Patient seen and examined. Feels much better today. Had HD in am. Discussed with patient regarding management and d/c plan.   1/4/2018 : No events. Afebrile.    1/5/2018: no events overnight.  no current complaints.  Afebrile.       PHYSICAL EXAM:  T(C): 36.6 (05 Jan 2018 11:50), Max: 36.8 (04 Jan 2018 16:35)  T(F): 97.8 (05 Jan 2018 11:50), Max: 98.3 (04 Jan 2018 16:35)  HR: 76 (05 Jan 2018 11:50) (57 - 76)  BP: 106/63 (05 Jan 2018 11:50) (100/60 - 125/92)  RR: 18 (05 Jan 2018 11:50) (16 - 20)  SpO2: 95% (05 Jan 2018 11:50) (94% - 97%)    GENERAL: NAD, well-groomed, well-developed  HEAD:  Atraumatic, Normocephalic  EYES: EOMI, PERRLA, conjunctiva and sclera clear  ENMT: Moist mucous membranes  NECK: Supple, No JVD  NERVOUS SYSTEM:  Alert & Oriented X3, Motor Strength 5/5 B/L upper and lower extremities; DTRs 2+ intact and symmetric  CHEST/LUNG: Clear to auscultation bilaterally; No rales, rhonchi, wheezing, or rubs  HEART: Regular rate and rhythm; No murmurs, rubs, or gallops  ABDOMEN: Soft, Nontender, Nondistended; Bowel sounds present  EXTREMITIES:  2+ Peripheral Pulses, No clubbing, cyanosis, or edema    MEDICATIONS  (STANDING):  amiodarone    Tablet 100 milliGRAM(s) Oral daily  azithromycin   Tablet 250 milliGRAM(s) Oral daily  carvedilol 3.125 milliGRAM(s) Oral daily  carvedilol 6.25 milliGRAM(s) Oral at bedtime  cefepime  IVPB 1000 milliGRAM(s) IV Intermittent every 24 hours  doxercalciferol Injectable 2 MICROGram(s) IV Push <User Schedule>  epoetin amee Injectable 29508 Unit(s) IV Push <User Schedule>  hydrocortisone sodium succinate Injectable 50 milliGRAM(s) IV Push every 8 hours  pantoprazole    Tablet 40 milliGRAM(s) Oral before breakfast  sevelamer hydrochloride 2400 milliGRAM(s) Oral three times a day with meals  vancomycin    Solution 125 milliGRAM(s) Oral every 6 hours    MEDICATIONS  (PRN):  acetaminophen   Tablet 650 milliGRAM(s) Oral every 6 hours PRN pain fever  ALBUTerol/ipratropium for Nebulization 3 milliLiter(s) Nebulizer every 6 hours PRN Bronchospasm  aluminum hydroxide/magnesium hydroxide/simethicone Suspension 30 milliLiter(s) Oral every 4 hours PRN Dyspepsia  ondansetron Injectable 4 milliGRAM(s) IV Push every 6 hours PRN Nausea  senna 2 Tablet(s) Oral at bedtime PRN Constipation      LABS:                            8.8    8.1   )-----------( 131      ( 05 Jan 2018 07:50 )             28.1     01-    138  |  104  |  100<H>  ----------------------------<  185<H>  4.4   |  23  |  6.76<H>    Calcium    8.2<L>      05 Jan 2018 07:50  Phos  5.2     01-05    T Protein  x   /  Albumin  2.4<L>  /  Total Bili  x   /  Direct Bili  x   /  AST  x   /  ALT  x   /  AlkPhos  x   01-05      PT/INR - ( 05 Jan 2018 11:05 )   PT: 24.9 sec;   INR: 2.27 ratio    PTT - ( 05 Jan 2018 11:05 )  PTT:29.0 sec    ASSESSMENT:    # Left lower lobe pneumonia-HCAP  Suspected gram negative rods  FEVER  CHRONIC SYSTOLIC HF  Chronic Afib  THROMBOCYTOPENIA  IATROGENIC COAGULOPATHY   ADRENAL INSUFFICIENCY  ESRD ( s/p failed kidney tranplantx3)    PLAN:    # Left lower lobe pneumonia/HCAP  - Appreciate ID eval   - suspected gram negative rods  - Improving on abx  - s/p vancomycin  - now on IV cefepime  - Also on azithromcyin s/p 500mg x 1 - 250mg daily   - Blood cultures : thus far ; no growth   - Discharge home on oral zpack x 5 days  - IV STRESS DOSE STEROIDS, taper--Discharge home on prednisone 40mg daily x 3 days and then resume home med prednisone 5mg daily   - prn nebs    # Chronic atrial fibrillatin  - Rate controlled on beta-blocker  - AC with coumadin  - INR therapeutic = 2.27  -continue daily PT/INR     # Chronic systolic CHF  - no evidence of HF exacerbation    # ESRD  - HD per renal    # GERD  - stable on PPI    # DVT prophylaxsis  - as above coumadin     DISCHARGE MED REC COMPLETED

## 2018-01-05 NOTE — PROGRESS NOTE ADULT - ASSESSMENT
62 year old male with 2-3 weeks of intermittent bilateral upper neck pain, sinus congestion, headaches and dry cough, patient reports that symptoms have gotten worse over last several days with associated fever 101 last night with worsening cough- prompting patient's visit to the ED- hx of ESRD- on dialysis- M/W/F- received dialysis 2 days ago due to holiday- due again tomorrow. Fam members + for RSV and coronavirus. He is prone to severe infections and sepsis per wife; adm for IV abx and stress dose steroids. Here, afebrile, wbc ct wnl, xray shows LLL consolidation/effusion, was given IV vanco/cefepime/azithro for pna coverage, RVP positive for coronavirus. Has hx of recurrent C diff colitis multiple times in the past and is typically on prophylaxis with oral vanco when on abx.    1. sepsis/LLL PNA/HCAP/coronavirus/viral syndrome/ESRD/hx of cdad  - improving  - on IV cefepime 1gm daily #2  - on azithromycin s/p 500 X1- 250mg daily #2  - continue with empiric antibiotic coverage for bacterial coverage   - hold further vancomycin for now  - likely viral pna  - oral vancomycin [id number] for c diff prophylaxis  - blood cx no growth  - monitor temps  -tolerating abx well so far; no side effects noted  -reason for abx use and side effects reviewed with patient  - supportive care  - f/u cbc    2. other issues - care per medicine
62 with CM, Hypothyroid, CAD, PCKD, ESRD s/p failed transplant on HD now M/W/F here with SOB in the setting of viral illness and PNA.     ESRD  -HD TIW, complete treatment now as ordered  -UF as tolerated  -K protocol    Anemia  -Procrit 10,000  -SACHIN protocol  -Trend serum values    PNA  -Abx, ID following  -F/u culutures  -Medical support of viral issues / PNA    d/c with HD staff
62 with CM, Hypothyroid, CAD, PCKD, ESRD s/p failed transplant on HD now M/W/F here with SOB in the setting of viral illness and PNA.     ESRD  -HD TIW, s/p HD yesterday with >2L UF, today no SOB  - BP improved since AM, plan for HD tomorrow  -UF as tolerated    MBD  - Sensipar changed to outpatient dosing, next dose on Monday    Anemia  -Procrit 10,000  -SACHIN protocol  -Trend serum values    PNA  -Abx, ID following  -F/u cultures  -Medical support of viral issues / PNA    d/c with HD staff
62 with CM, Hypothyroid, CAD, PCKD, ESRD s/p failed transplant on HD now M/W/F here with SOB in the setting of viral illness and PNA.     ESRD  -HD TIW, to continue M/W/F with discharge  -Dose m eds crcl <15 ml/min  -F/u at [facility] outpatient HD on discharge    MBD  - Sensipar outpatient dosing      Anemia  -Procrit 10,000, SACHIN protocol at  HD  -Trend serum values    PNA  -Abx to PO per ID

## 2018-01-05 NOTE — PROGRESS NOTE ADULT - SUBJECTIVE AND OBJECTIVE BOX
Patient is a 62y Male who reports no complaints overnight. Eager to leave, SOB much improved.     REVIEW OF SYSTEMS:    CONSTITUTIONAL: stable weakness, fevers or chills  RESPIRATORY: No cough, wheezing, hemoptysis; improved shortness of breath  CARDIOVASCULAR: No chest pain or palpitations  GENITOURINARY: No dysuria, frequency or hematuria  All other review of systems is negative unless indicated above.    MEDICATIONS  (STANDING):  azithromycin   Tablet 250 milliGRAM(s) Oral daily  carvedilol 3.125 milliGRAM(s) Oral daily  carvedilol 6.25 milliGRAM(s) Oral at bedtime  cefepime  IVPB 1000 milliGRAM(s) IV Intermittent every 24 hours  doxercalciferol Injectable 2 MICROGram(s) IV Push <User Schedule>  epoetin amee Injectable 08569 Unit(s) IV Push <User Schedule>  hydrocortisone sodium succinate Injectable 50 milliGRAM(s) IV Push every 8 hours  pantoprazole    Tablet 40 milliGRAM(s) Oral before breakfast  sevelamer hydrochloride 2400 milliGRAM(s) Oral three times a day with meals  vancomycin    Solution 125 milliGRAM(s) Oral every 6 hours    MEDICATIONS  (PRN):  acetaminophen   Tablet 650 milliGRAM(s) Oral every 6 hours PRN pain fever  ALBUTerol/ipratropium for Nebulization 3 milliLiter(s) Nebulizer every 6 hours PRN Bronchospasm  aluminum hydroxide/magnesium hydroxide/simethicone Suspension 30 milliLiter(s) Oral every 4 hours PRN Dyspepsia  ondansetron Injectable 4 milliGRAM(s) IV Push every 6 hours PRN Nausea  senna 2 Tablet(s) Oral at bedtime PRN Constipation        T(C): , Max: 36.8 (01-04-18 @ 16:35)  T(F): , Max: 98.3 (01-04-18 @ 16:35)  HR: 76 (01-05-18 @ 11:50)  BP: 106/63 (01-05-18 @ 11:50)  BP(mean): --  RR: 18 (01-05-18 @ 11:50)  SpO2: 95% (01-05-18 @ 11:50)  Wt(kg): --    01-04 @ 07:01  -  01-05 @ 07:00  --------------------------------------------------------  IN: 50 mL / OUT: 0 mL / NET: 50 mL          PHYSICAL EXAM:    Constitutional: NAD, frail  HEENT: PERRLA, EOMI,  MMM  Neck: No LAD, No JVD  Respiratory: dec base  Cardiovascular: S1 and S2, RRR  Gastrointestinal: BS+, soft, NT/ND  Extremities: No peripheral edema  Neurological: A/O x 3, no focal deficits  Psychiatric: Normal mood, normal affect  : No Amaya  Skin: No rashes  Access:a vf      LABS:                        8.8    8.1   )-----------( 131      ( 05 Jan 2018 07:50 )             28.1     05 Jan 2018 07:50    138    |  104    |  100    ----------------------------<  185    4.4     |  23     |  6.76   03 Jan 2018 08:15    136    |  101    |  102    ----------------------------<  271    4.8     |  20     |  8.12   02 Jan 2018 09:30    139    |  102    |  84     ----------------------------<  147    4.9     |  25     |  7.44     Ca    8.2        05 Jan 2018 07:50  Ca    8.2        03 Jan 2018 08:15  Ca    8.1        02 Jan 2018 09:30  Phos  5.2       05 Jan 2018 07:50  Phos  5.2       03 Jan 2018 08:15    TPro  x      /  Alb  2.4    /  TBili  x      /  DBili  x      /  AST  x      /  ALT  x      /  AlkPhos  x      05 Jan 2018 07:50  TPro  x      /  Alb  2.3    /  TBili  x      /  DBili  x      /  AST  x      /  ALT  x      /  AlkPhos  x      03 Jan 2018 08:15          Urine Studies:          RADIOLOGY & ADDITIONAL STUDIES:

## 2018-01-06 ENCOUNTER — TRANSCRIPTION ENCOUNTER (OUTPATIENT)
Age: 63
End: 2018-01-06

## 2018-01-06 VITALS
SYSTOLIC BLOOD PRESSURE: 116 MMHG | OXYGEN SATURATION: 93 % | HEART RATE: 60 BPM | TEMPERATURE: 98 F | DIASTOLIC BLOOD PRESSURE: 56 MMHG | RESPIRATION RATE: 18 BRPM

## 2018-01-06 LAB
APTT BLD: 28.2 SEC — SIGNIFICANT CHANGE UP (ref 27.5–37.4)
INR BLD: 1.94 RATIO — HIGH (ref 0.88–1.16)
PROTHROM AB SERPL-ACNC: 21.2 SEC — HIGH (ref 9.8–12.7)

## 2018-01-06 RX ORDER — CARVEDILOL PHOSPHATE 80 MG/1
1 CAPSULE, EXTENDED RELEASE ORAL
Qty: 0 | Refills: 0 | COMMUNITY

## 2018-01-06 RX ORDER — CEFUROXIME AXETIL 250 MG
1 TABLET ORAL
Qty: 4 | Refills: 0 | OUTPATIENT
Start: 2018-01-06

## 2018-01-06 RX ORDER — VANCOMYCIN HCL 1 G
1 VIAL (EA) INTRAVENOUS
Qty: 20 | Refills: 0 | OUTPATIENT
Start: 2018-01-06

## 2018-01-06 RX ADMIN — Medication 40 MILLIGRAM(S): at 07:53

## 2018-01-06 RX ADMIN — SEVELAMER CARBONATE 2400 MILLIGRAM(S): 2400 POWDER, FOR SUSPENSION ORAL at 07:53

## 2018-01-06 RX ADMIN — Medication 125 MILLIGRAM(S): at 07:52

## 2018-01-06 RX ADMIN — PANTOPRAZOLE SODIUM 40 MILLIGRAM(S): 20 TABLET, DELAYED RELEASE ORAL at 07:53

## 2018-01-06 RX ADMIN — AZITHROMYCIN 250 MILLIGRAM(S): 500 TABLET, FILM COATED ORAL at 11:40

## 2018-01-06 RX ADMIN — SEVELAMER CARBONATE 2400 MILLIGRAM(S): 2400 POWDER, FOR SUSPENSION ORAL at 12:45

## 2018-01-06 RX ADMIN — CEFEPIME 100 MILLIGRAM(S): 1 INJECTION, POWDER, FOR SOLUTION INTRAMUSCULAR; INTRAVENOUS at 13:09

## 2018-01-06 NOTE — DISCHARGE NOTE ADULT - CARE PLAN
Principal Discharge DX:	Pneumonia  Goal:	feel well  Instructions for follow-up, activity and diet:	activity as tolerated, low salt diet

## 2018-01-06 NOTE — DISCHARGE NOTE ADULT - HOSPITAL COURSE
62 year old male with a PMHx notable for HTN, GERD, ESRD on dialysis, chronic systolic CHF, and GERD.  He presents with 2-3 weeks of intermittent bilateral upper neck pain; sinus congestion; frontal headache and non productive cough; patient reports that symptoms have gotten worse over last "few days"- patient reports fever to 101 last night with worsening cough- prompting patient's visit to the ED- also with associated sob and chest tightness; hx of ESRD- on dialysis- M/W/F- received dialysis 2 days ago due to holiday- due again tomorrow. Fam members + for RSV. He is prone to severe infections and sepsis per wife.  In ED found to have sepsis/LLL PNA/HCAP/ + coronovirus.      01/03/18: Patient seen and examined. Feels much better today. Had HD in am. Discussed with patient regarding management and d/c plan.   1/4/2018 : No events. Afebrile.    1/5/2018: no events overnight.  no current complaints.  Afebrile.   1.6: no cp, no sob, no distress    Vital Signs Last 24 Hrs  T(C): 36.6 (06 Jan 2018 12:41), Max: 36.9 (05 Jan 2018 18:49)  T(F): 97.8 (06 Jan 2018 12:41), Max: 98.5 (05 Jan 2018 18:49)  HR: 65 (06 Jan 2018 12:41) (63 - 67)  BP: 100/53 (06 Jan 2018 12:41) (95/60 - 109/70)  RR: 16 (06 Jan 2018 12:41) (16 - 16)  SpO2: 99% (06 Jan 2018 12:41) (95% - 99%)    GENERAL: NAD, well-groomed, well-developed  HEAD:  Atraumatic, Normocephalic  EYES: EOMI, PERRLA, conjunctiva and sclera clear  ENMT: Moist mucous membranes  NECK: Supple, No JVD  NERVOUS SYSTEM:  Alert & Oriented X3, Motor Strength 5/5 B/L upper and lower extremities; DTRs 2+ intact and symmetric  CHEST/LUNG: Clear to auscultation bilaterally; No rales, rhonchi, wheezing, or rubs  HEART: Regular rate and rhythm; No murmurs, rubs, or gallops  ABDOMEN: Soft, Nontender, Nondistended; Bowel sounds present  EXTREMITIES:  2+ Peripheral Pulses, No clubbing, cyanosis, or edema      ASSESSMENT:    # Left lower lobe pneumonia/HCAP  - suspected gram negative rods  - Improving on abx  - Blood cultures : thus far ; no growth   - Discharge home on oral Ceftin for 2more days    # Chronic atrial fibrillatin  - Rate controlled on beta-blocker  - AC with coumadin  - INR therapeutic = 2.27  -continue daily PT/INR     # Chronic systolic CHF  - no evidence of CHF exacerbation    # ESRD  - HD per renal    # GERD  - stable on PPI    # Adrenal Insufficiency:  Prednisone taper   resume Prednisone 5mg/day after taper    d/c planning, time 35min

## 2018-01-06 NOTE — DISCHARGE NOTE ADULT - MEDICATION SUMMARY - MEDICATIONS TO TAKE
I will START or STAY ON the medications listed below when I get home from the hospital:    predniSONE 10 mg oral tablet  -- 3 tab(s) by mouth once a day MDD:3tb daily for 2 days; 2 tb daily for 2days, 1tb daily for 2 days   -- It is very important that you take or use this exactly as directed.  Do not skip doses or discontinue unless directed by your doctor.  Obtain medical advice before taking any non-prescription drugs as some may affect the action of this medication.  Take with food or milk.    -- Indication: For adrenal insufficiency    predniSONE 5 mg oral tablet  -- 1 tab(s) by mouth once a day FOR ADRENAL INSUFFICIENCY     **PLEASE RESUME THIS MEDICATION AFTER YOU COMPLETE THE 6DAYS OF PREDNISONE TAPER  -- Indication: For adrenal insufficiency    losartan 25 mg oral tablet  -- 1 tab(s) by mouth once a day (at bedtime)  -- Indication: For Htn    warfarin 1 mg oral tablet  -- 1 tab(s) by mouth once a day  -- Indication: For Afib    Coreg 3.125 mg oral tablet  -- 1 tab(s) by mouth once a day  -- Indication: For Htn    Sensipar 30 mg oral tablet  -- 1 tab(s) by mouth 4 times a week MONDAY WEDNESDAY FRIDAY AND SATURDAY AT NIGHT  -- Indication: For Parathyroid    Ceftin 250 mg oral tablet  -- 1 tab(s) by mouth 2 times a day   -- Finish all this medication unless otherwise directed by prescriber.  Medication should be taken with plenty of water.  Take with food or milk.    -- Indication: For Pneumonia    vancomycin 125 mg oral capsule  -- 1 cap(s) by mouth 4 times a day   -- Finish all this medication unless otherwise directed by prescriber.    -- Indication: For Pneumonia and Cdiff prophy    azithromycin 500 mg oral tablet  -- 1 tab(s) by mouth once a day   -- Do not take dairy products, antacids, or iron preparations within one hour of this medication.  Finish all this medication unless otherwise directed by prescriber.    -- Indication: For Pneumonia    Renvela 800 mg oral tablet  -- 3 tab(s) by mouth 3 times a day (with meals)  -- Indication: For Renal failure    omeprazole 20 mg oral delayed release capsule  -- 1 cap(s) by mouth once a day  -- Indication: For gerd

## 2018-01-06 NOTE — DISCHARGE NOTE ADULT - PATIENT PORTAL LINK FT
“You can access the [facility] Patient Portal, offered by [facility], by registering with the following website: [email or web address]”

## 2018-01-07 LAB
CULTURE RESULTS: SIGNIFICANT CHANGE UP
CULTURE RESULTS: SIGNIFICANT CHANGE UP
SPECIMEN SOURCE: SIGNIFICANT CHANGE UP
SPECIMEN SOURCE: SIGNIFICANT CHANGE UP

## 2018-01-09 ENCOUNTER — APPOINTMENT (OUTPATIENT)
Dept: ELECTROPHYSIOLOGY | Facility: CLINIC | Age: 63
End: 2018-01-09
Payer: MEDICARE

## 2018-01-09 PROCEDURE — 93296 REM INTERROG EVL PM/IDS: CPT

## 2018-01-09 PROCEDURE — 93297 REM INTERROG DEV EVAL ICPMS: CPT

## 2018-01-09 PROCEDURE — 93295 DEV INTERROG REMOTE 1/2/MLT: CPT

## 2018-01-11 DIAGNOSIS — K21.9 GASTRO-ESOPHAGEAL REFLUX DISEASE WITHOUT ESOPHAGITIS: ICD-10-CM

## 2018-01-11 DIAGNOSIS — Z99.2 DEPENDENCE ON RENAL DIALYSIS: ICD-10-CM

## 2018-01-11 DIAGNOSIS — J15.6 PNEUMONIA DUE TO OTHER GRAM-NEGATIVE BACTERIA: ICD-10-CM

## 2018-01-11 DIAGNOSIS — Z79.01 LONG TERM (CURRENT) USE OF ANTICOAGULANTS: ICD-10-CM

## 2018-01-11 DIAGNOSIS — J18.9 PNEUMONIA, UNSPECIFIED ORGANISM: ICD-10-CM

## 2018-01-11 DIAGNOSIS — E03.9 HYPOTHYROIDISM, UNSPECIFIED: ICD-10-CM

## 2018-01-11 DIAGNOSIS — D69.6 THROMBOCYTOPENIA, UNSPECIFIED: ICD-10-CM

## 2018-01-11 DIAGNOSIS — I48.2 CHRONIC ATRIAL FIBRILLATION: ICD-10-CM

## 2018-01-11 DIAGNOSIS — I13.2 HYPERTENSIVE HEART AND CHRONIC KIDNEY DISEASE WITH HEART FAILURE AND WITH STAGE 5 CHRONIC KIDNEY DISEASE, OR END STAGE RENAL DISEASE: ICD-10-CM

## 2018-01-11 DIAGNOSIS — D68.8 OTHER SPECIFIED COAGULATION DEFECTS: ICD-10-CM

## 2018-01-11 DIAGNOSIS — E27.40 UNSPECIFIED ADRENOCORTICAL INSUFFICIENCY: ICD-10-CM

## 2018-01-11 DIAGNOSIS — Q61.3 POLYCYSTIC KIDNEY, UNSPECIFIED: ICD-10-CM

## 2018-01-11 DIAGNOSIS — I25.10 ATHEROSCLEROTIC HEART DISEASE OF NATIVE CORONARY ARTERY WITHOUT ANGINA PECTORIS: ICD-10-CM

## 2018-01-11 DIAGNOSIS — N18.6 END STAGE RENAL DISEASE: ICD-10-CM

## 2018-01-11 DIAGNOSIS — D63.1 ANEMIA IN CHRONIC KIDNEY DISEASE: ICD-10-CM

## 2018-01-11 DIAGNOSIS — I50.22 CHRONIC SYSTOLIC (CONGESTIVE) HEART FAILURE: ICD-10-CM

## 2018-01-11 DIAGNOSIS — Z95.810 PRESENCE OF AUTOMATIC (IMPLANTABLE) CARDIAC DEFIBRILLATOR: ICD-10-CM

## 2018-01-11 DIAGNOSIS — I73.9 PERIPHERAL VASCULAR DISEASE, UNSPECIFIED: ICD-10-CM

## 2018-02-09 ENCOUNTER — APPOINTMENT (OUTPATIENT)
Dept: ELECTROPHYSIOLOGY | Facility: CLINIC | Age: 63
End: 2018-02-09
Payer: MEDICARE

## 2018-02-09 VITALS — SYSTOLIC BLOOD PRESSURE: 97 MMHG | DIASTOLIC BLOOD PRESSURE: 54 MMHG | HEART RATE: 67 BPM

## 2018-02-09 PROCEDURE — 93290 INTERROG DEV EVAL ICPMS IP: CPT | Mod: 26

## 2018-02-09 PROCEDURE — 93284 PRGRMG EVAL IMPLANTABLE DFB: CPT

## 2018-03-05 ENCOUNTER — APPOINTMENT (OUTPATIENT)
Dept: ELECTROPHYSIOLOGY | Facility: CLINIC | Age: 63
End: 2018-03-05
Payer: MEDICARE

## 2018-03-05 VITALS
SYSTOLIC BLOOD PRESSURE: 93 MMHG | WEIGHT: 140 LBS | DIASTOLIC BLOOD PRESSURE: 55 MMHG | HEART RATE: 66 BPM | BODY MASS INDEX: 18.96 KG/M2 | HEIGHT: 72 IN

## 2018-03-05 PROCEDURE — 93283 PRGRMG EVAL IMPLANTABLE DFB: CPT

## 2018-03-05 PROCEDURE — 93290 INTERROG DEV EVAL ICPMS IP: CPT | Mod: 26

## 2018-03-05 RX ORDER — SEVELAMER CARBONATE 800 MG/1
TABLET, FILM COATED ORAL
Refills: 0 | Status: ACTIVE | COMMUNITY

## 2018-03-05 RX ORDER — ERYTHROPOIETIN 10000 [IU]/ML
10000 INJECTION, SOLUTION INTRAVENOUS; SUBCUTANEOUS
Refills: 0 | Status: ACTIVE | COMMUNITY

## 2018-03-05 RX ORDER — CARVEDILOL 3.12 MG/1
TABLET, FILM COATED ORAL
Refills: 0 | Status: DISCONTINUED | COMMUNITY
End: 2018-03-05

## 2018-03-05 RX ORDER — WARFARIN 2.5 MG/1
2.5 TABLET ORAL
Refills: 0 | Status: ACTIVE | COMMUNITY

## 2018-03-05 RX ORDER — LOSARTAN POTASSIUM 25 MG/1
25 TABLET, FILM COATED ORAL
Refills: 0 | Status: ACTIVE | COMMUNITY

## 2018-03-05 RX ORDER — OMEPRAZOLE MAGNESIUM 20.6 MG/1
20.6 (20 BASE) CAPSULE, DELAYED RELEASE ORAL
Refills: 0 | Status: ACTIVE | COMMUNITY

## 2018-03-05 RX ORDER — CARVEDILOL 6.25 MG/1
6.25 TABLET, FILM COATED ORAL
Refills: 0 | Status: ACTIVE | COMMUNITY

## 2018-03-05 RX ORDER — CINACALCET HYDROCHLORIDE 30 MG/1
30 TABLET, COATED ORAL
Refills: 0 | Status: ACTIVE | COMMUNITY

## 2018-03-05 RX ORDER — CARVEDILOL 3.12 MG/1
3.12 TABLET, FILM COATED ORAL
Refills: 0 | Status: ACTIVE | COMMUNITY

## 2018-03-13 ENCOUNTER — APPOINTMENT (OUTPATIENT)
Dept: ELECTROPHYSIOLOGY | Facility: CLINIC | Age: 63
End: 2018-03-13

## 2018-04-17 ENCOUNTER — APPOINTMENT (OUTPATIENT)
Dept: ELECTROPHYSIOLOGY | Facility: CLINIC | Age: 63
End: 2018-04-17

## 2018-05-11 ENCOUNTER — OUTPATIENT (OUTPATIENT)
Dept: OUTPATIENT SERVICES | Facility: HOSPITAL | Age: 63
LOS: 1 days | Discharge: HOME | End: 2018-05-11
Payer: MEDICARE

## 2018-05-11 VITALS
WEIGHT: 136.03 LBS | RESPIRATION RATE: 15 BRPM | HEART RATE: 69 BPM | OXYGEN SATURATION: 100 % | DIASTOLIC BLOOD PRESSURE: 86 MMHG | HEIGHT: 72 IN | TEMPERATURE: 98 F | SYSTOLIC BLOOD PRESSURE: 128 MMHG

## 2018-05-11 DIAGNOSIS — Z94.0 KIDNEY TRANSPLANT STATUS: Chronic | ICD-10-CM

## 2018-05-11 DIAGNOSIS — I77.0 ARTERIOVENOUS FISTULA, ACQUIRED: Chronic | ICD-10-CM

## 2018-05-11 DIAGNOSIS — Z95.5 PRESENCE OF CORONARY ANGIOPLASTY IMPLANT AND GRAFT: Chronic | ICD-10-CM

## 2018-05-11 DIAGNOSIS — Z98.890 OTHER SPECIFIED POSTPROCEDURAL STATES: Chronic | ICD-10-CM

## 2018-05-11 DIAGNOSIS — Z95.810 PRESENCE OF AUTOMATIC (IMPLANTABLE) CARDIAC DEFIBRILLATOR: Chronic | ICD-10-CM

## 2018-05-11 DIAGNOSIS — Z41.9 ENCOUNTER FOR PROCEDURE FOR PURPOSES OTHER THAN REMEDYING HEALTH STATE, UNSPECIFIED: Chronic | ICD-10-CM

## 2018-05-11 DIAGNOSIS — I50.9 HEART FAILURE, UNSPECIFIED: ICD-10-CM

## 2018-05-11 LAB
ANION GAP SERPL CALC-SCNC: 11 MMOL/L — SIGNIFICANT CHANGE UP (ref 5–17)
BASOPHILS # BLD AUTO: 0.01 K/UL — SIGNIFICANT CHANGE UP (ref 0–0.2)
BASOPHILS NFR BLD AUTO: 0.2 % — SIGNIFICANT CHANGE UP (ref 0–2)
BUN SERPL-MCNC: 32 MG/DL — HIGH (ref 7–23)
CALCIUM SERPL-MCNC: 8.4 MG/DL — LOW (ref 8.5–10.1)
CHLORIDE SERPL-SCNC: 106 MMOL/L — SIGNIFICANT CHANGE UP (ref 96–108)
CO2 SERPL-SCNC: 24 MMOL/L — SIGNIFICANT CHANGE UP (ref 22–31)
CREAT SERPL-MCNC: 3.82 MG/DL — HIGH (ref 0.5–1.3)
EOSINOPHIL # BLD AUTO: 0.14 K/UL — SIGNIFICANT CHANGE UP (ref 0–0.5)
EOSINOPHIL NFR BLD AUTO: 2.4 % — SIGNIFICANT CHANGE UP (ref 0–6)
GLUCOSE SERPL-MCNC: 128 MG/DL — HIGH (ref 70–99)
HCT VFR BLD CALC: 29.4 % — LOW (ref 39–50)
HGB BLD-MCNC: 9 G/DL — LOW (ref 13–17)
IMM GRANULOCYTES NFR BLD AUTO: 0.3 % — SIGNIFICANT CHANGE UP (ref 0–1.5)
INR BLD: 1.65 RATIO — HIGH (ref 0.88–1.16)
LYMPHOCYTES # BLD AUTO: 0.74 K/UL — LOW (ref 1–3.3)
LYMPHOCYTES # BLD AUTO: 12.7 % — LOW (ref 13–44)
MCHC RBC-ENTMCNC: 30.6 GM/DL — LOW (ref 32–36)
MCHC RBC-ENTMCNC: 32.3 PG — SIGNIFICANT CHANGE UP (ref 27–34)
MCV RBC AUTO: 105.4 FL — HIGH (ref 80–100)
MONOCYTES # BLD AUTO: 0.46 K/UL — SIGNIFICANT CHANGE UP (ref 0–0.9)
MONOCYTES NFR BLD AUTO: 7.9 % — SIGNIFICANT CHANGE UP (ref 2–14)
NEUTROPHILS # BLD AUTO: 4.46 K/UL — SIGNIFICANT CHANGE UP (ref 1.8–7.4)
NEUTROPHILS NFR BLD AUTO: 76.5 % — SIGNIFICANT CHANGE UP (ref 43–77)
PLATELET # BLD AUTO: 105 K/UL — LOW (ref 150–400)
POTASSIUM SERPL-MCNC: 4.1 MMOL/L — SIGNIFICANT CHANGE UP (ref 3.5–5.3)
POTASSIUM SERPL-SCNC: 4.1 MMOL/L — SIGNIFICANT CHANGE UP (ref 3.5–5.3)
PROTHROM AB SERPL-ACNC: 18 SEC — HIGH (ref 9.8–12.7)
RBC # BLD: 2.79 M/UL — LOW (ref 4.2–5.8)
RBC # FLD: 16.9 % — HIGH (ref 10.3–14.5)
SODIUM SERPL-SCNC: 141 MMOL/L — SIGNIFICANT CHANGE UP (ref 135–145)
WBC # BLD: 5.83 K/UL — SIGNIFICANT CHANGE UP (ref 3.8–10.5)
WBC # FLD AUTO: 5.83 K/UL — SIGNIFICANT CHANGE UP (ref 3.8–10.5)

## 2018-05-11 PROCEDURE — 93010 ELECTROCARDIOGRAM REPORT: CPT

## 2018-05-11 PROCEDURE — 71046 X-RAY EXAM CHEST 2 VIEWS: CPT | Mod: 26

## 2018-05-11 RX ORDER — AMIODARONE HYDROCHLORIDE 400 MG/1
1 TABLET ORAL
Qty: 0 | Refills: 0 | COMMUNITY

## 2018-05-11 NOTE — H&P PST ADULT - MUSCULOSKELETAL COMMENTS
Reports right thigh & left arm pain s/p surgery to replace artery in left arm. Reports right thigh & left arm pain s/p surgery to replace artery in left arm. Reports left arm swelling that is improving since surgery.

## 2018-05-11 NOTE — H&P PST ADULT - EXTREMITIES COMMENTS
left arm swelling pitting edema 1+.   right upper arm AV fistula +bruit +thrill  right anterior thigh tender to palpation

## 2018-05-11 NOTE — H&P PST ADULT - ASSESSMENT
yo scheduled for   with    on     1. Labs as per surgeon  2. EKG  3. Medical clearance with  4. discussed EZ sponges & day of procedure instructions 61 yo male scheduled for  CRT-D generator change on 5/15/18 with Dr. Marie    1. Labs as per surgeon  2. EKG  3. CXR  4. discussed EZ sponges & mupirocin instructions  5. Medication &  day of procedure instructions as per EP staff

## 2018-05-11 NOTE — H&P PST ADULT - HISTORY OF PRESENT ILLNESS
63 yo male  presents to PST. Reports h/o irregular heartbeat & CHF. Reports having AICD placed 1/13/13. Reports the battery is low and he believes Dr Marie is going to change the whole device. Denies chest pain, SOB or dizziness

## 2018-05-11 NOTE — H&P PST ADULT - CARDIOVASCULAR COMMENTS
Followed by Dr Coello for CHF, HTN & CAD with stent managed with warfarin. Followed by Dr Marie for ICD. ICD palpable under skin left chest wall

## 2018-05-11 NOTE — H&P PST ADULT - PMH
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

## 2018-05-12 LAB
MRSA PCR RESULT.: SIGNIFICANT CHANGE UP
S AUREUS DNA NOSE QL NAA+PROBE: SIGNIFICANT CHANGE UP

## 2018-05-15 ENCOUNTER — OUTPATIENT (OUTPATIENT)
Dept: OUTPATIENT SERVICES | Facility: HOSPITAL | Age: 63
LOS: 1 days | Discharge: HOME | End: 2018-05-15
Payer: MEDICARE

## 2018-05-15 VITALS
TEMPERATURE: 97 F | HEIGHT: 72 IN | WEIGHT: 136.03 LBS | HEART RATE: 66 BPM | DIASTOLIC BLOOD PRESSURE: 85 MMHG | RESPIRATION RATE: 18 BRPM | SYSTOLIC BLOOD PRESSURE: 147 MMHG | OXYGEN SATURATION: 99 %

## 2018-05-15 VITALS
HEART RATE: 72 BPM | RESPIRATION RATE: 18 BRPM | SYSTOLIC BLOOD PRESSURE: 131 MMHG | OXYGEN SATURATION: 100 % | DIASTOLIC BLOOD PRESSURE: 52 MMHG

## 2018-05-15 DIAGNOSIS — Z98.890 OTHER SPECIFIED POSTPROCEDURAL STATES: Chronic | ICD-10-CM

## 2018-05-15 DIAGNOSIS — I77.0 ARTERIOVENOUS FISTULA, ACQUIRED: Chronic | ICD-10-CM

## 2018-05-15 DIAGNOSIS — Z41.9 ENCOUNTER FOR PROCEDURE FOR PURPOSES OTHER THAN REMEDYING HEALTH STATE, UNSPECIFIED: Chronic | ICD-10-CM

## 2018-05-15 DIAGNOSIS — Z95.810 PRESENCE OF AUTOMATIC (IMPLANTABLE) CARDIAC DEFIBRILLATOR: Chronic | ICD-10-CM

## 2018-05-15 DIAGNOSIS — Z94.0 KIDNEY TRANSPLANT STATUS: Chronic | ICD-10-CM

## 2018-05-15 DIAGNOSIS — Z95.5 PRESENCE OF CORONARY ANGIOPLASTY IMPLANT AND GRAFT: Chronic | ICD-10-CM

## 2018-05-15 PROCEDURE — 33264 RMVL & RPLCMT DFB GEN MLT LD: CPT

## 2018-05-15 RX ORDER — CEFAZOLIN SODIUM 1 G
2000 VIAL (EA) INJECTION ONCE
Qty: 0 | Refills: 0 | Status: COMPLETED | OUTPATIENT
Start: 2018-05-15 | End: 2018-05-15

## 2018-05-15 RX ORDER — CEPHALEXIN 500 MG
500 CAPSULE ORAL
Qty: 0 | Refills: 0 | Status: DISCONTINUED | OUTPATIENT
Start: 2018-05-15 | End: 2018-01-01

## 2018-05-15 RX ADMIN — Medication 100 MILLIGRAM(S): at 09:35

## 2018-05-15 NOTE — PROCEDURE NOTE - PROCEDURE
<<-----Click on this checkbox to enter Procedure AICD generator replacement  05/15/2018    Active  ALVARADO

## 2018-05-15 NOTE — ASU PATIENT PROFILE, ADULT - PSH
A-V fistula  1995 left UE  right arm 2007,  AICD (automatic cardioverter/defibrillator) present  2013  Elective surgery  left arm artery replaced with with right thigh used as donor site  H/O hernia repair    H/O kidney transplant  1985, 1995, 1997  S/P colonoscopy  last done 2016  Stented coronary artery  2008?

## 2018-05-15 NOTE — PACU DISCHARGE NOTE - COMMENTS
Discharge instructions given to patient. Answered all questions. Keflex caps given to Patient's wife. All belongings are with the patient. Left the unit at 1240 with the transport staff.

## 2018-05-16 ENCOUNTER — APPOINTMENT (OUTPATIENT)
Dept: ELECTROPHYSIOLOGY | Facility: CLINIC | Age: 63
End: 2018-05-16
Payer: MEDICARE

## 2018-05-16 VITALS
WEIGHT: 140 LBS | HEART RATE: 72 BPM | SYSTOLIC BLOOD PRESSURE: 127 MMHG | DIASTOLIC BLOOD PRESSURE: 70 MMHG | HEIGHT: 72 IN | BODY MASS INDEX: 18.96 KG/M2

## 2018-05-16 PROCEDURE — 99024 POSTOP FOLLOW-UP VISIT: CPT

## 2018-05-17 DIAGNOSIS — Z99.2 DEPENDENCE ON RENAL DIALYSIS: ICD-10-CM

## 2018-05-17 DIAGNOSIS — I12.0 HYPERTENSIVE CHRONIC KIDNEY DISEASE WITH STAGE 5 CHRONIC KIDNEY DISEASE OR END STAGE RENAL DISEASE: ICD-10-CM

## 2018-05-17 DIAGNOSIS — Z80.0 FAMILY HISTORY OF MALIGNANT NEOPLASM OF DIGESTIVE ORGANS: ICD-10-CM

## 2018-05-17 DIAGNOSIS — Z94.0 KIDNEY TRANSPLANT STATUS: ICD-10-CM

## 2018-05-17 DIAGNOSIS — Z45.02 ENCOUNTER FOR ADJUSTMENT AND MANAGEMENT OF AUTOMATIC IMPLANTABLE CARDIAC DEFIBRILLATOR: ICD-10-CM

## 2018-05-17 DIAGNOSIS — R34 ANURIA AND OLIGURIA: ICD-10-CM

## 2018-05-17 DIAGNOSIS — Z84.1 FAMILY HISTORY OF DISORDERS OF KIDNEY AND URETER: ICD-10-CM

## 2018-05-17 DIAGNOSIS — I25.10 ATHEROSCLEROTIC HEART DISEASE OF NATIVE CORONARY ARTERY WITHOUT ANGINA PECTORIS: ICD-10-CM

## 2018-05-17 DIAGNOSIS — N18.6 END STAGE RENAL DISEASE: ICD-10-CM

## 2018-05-17 DIAGNOSIS — Z95.5 PRESENCE OF CORONARY ANGIOPLASTY IMPLANT AND GRAFT: ICD-10-CM

## 2018-05-17 DIAGNOSIS — M79.89 OTHER SPECIFIED SOFT TISSUE DISORDERS: ICD-10-CM

## 2018-05-17 DIAGNOSIS — E20.9 HYPOPARATHYROIDISM, UNSPECIFIED: ICD-10-CM

## 2018-05-17 DIAGNOSIS — I73.9 PERIPHERAL VASCULAR DISEASE, UNSPECIFIED: ICD-10-CM

## 2018-05-17 DIAGNOSIS — I49.9 CARDIAC ARRHYTHMIA, UNSPECIFIED: ICD-10-CM

## 2018-05-17 DIAGNOSIS — I50.20 UNSPECIFIED SYSTOLIC (CONGESTIVE) HEART FAILURE: ICD-10-CM

## 2018-05-17 DIAGNOSIS — E27.40 UNSPECIFIED ADRENOCORTICAL INSUFFICIENCY: ICD-10-CM

## 2018-05-23 ENCOUNTER — APPOINTMENT (OUTPATIENT)
Dept: ELECTROPHYSIOLOGY | Facility: CLINIC | Age: 63
End: 2018-05-23
Payer: MEDICARE

## 2018-05-23 VITALS — HEIGHT: 72 IN | WEIGHT: 140 LBS | HEART RATE: 60 BPM | BODY MASS INDEX: 18.96 KG/M2

## 2018-05-23 DIAGNOSIS — Z95.810 PRESENCE OF AUTOMATIC (IMPLANTABLE) CARDIAC DEFIBRILLATOR: ICD-10-CM

## 2018-05-23 PROCEDURE — 93290 INTERROG DEV EVAL ICPMS IP: CPT | Mod: 26

## 2018-05-23 PROCEDURE — 93284 PRGRMG EVAL IMPLANTABLE DFB: CPT

## 2018-05-23 RX ORDER — OXYCODONE AND ACETAMINOPHEN 5; 325 MG/1; MG/1
5-325 TABLET ORAL EVERY 6 HOURS
Qty: 4 | Refills: 0 | Status: DISCONTINUED | COMMUNITY
Start: 2018-05-16 | End: 2018-05-23

## 2018-07-16 PROBLEM — E03.9 HYPOTHYROIDISM, UNSPECIFIED: Chronic | Status: INACTIVE | Noted: 2017-08-17 | Resolved: 2018-05-11

## 2018-08-30 PROBLEM — A04.7 ENTEROCOLITIS DUE TO CLOSTRIDIUM DIFFICILE: Chronic | Status: ACTIVE | Noted: 2017-08-17

## 2018-08-30 PROBLEM — E27.40 UNSPECIFIED ADRENOCORTICAL INSUFFICIENCY: Chronic | Status: ACTIVE | Noted: 2018-05-11

## 2018-08-30 PROBLEM — Z95.810 PRESENCE OF AUTOMATIC (IMPLANTABLE) CARDIAC DEFIBRILLATOR: Chronic | Status: ACTIVE | Noted: 2018-05-11

## 2018-08-30 PROBLEM — I10 ESSENTIAL (PRIMARY) HYPERTENSION: Chronic | Status: ACTIVE | Noted: 2017-08-17

## 2018-08-30 PROBLEM — I25.10 ATHEROSCLEROTIC HEART DISEASE OF NATIVE CORONARY ARTERY WITHOUT ANGINA PECTORIS: Chronic | Status: ACTIVE | Noted: 2017-08-17

## 2018-08-30 PROBLEM — R34 ANURIA AND OLIGURIA: Chronic | Status: ACTIVE | Noted: 2018-05-11

## 2018-08-30 PROBLEM — B45.1: Chronic | Status: ACTIVE | Noted: 2017-08-17

## 2018-08-30 PROBLEM — Q61.3 POLYCYSTIC KIDNEY, UNSPECIFIED: Chronic | Status: ACTIVE | Noted: 2017-08-17

## 2018-08-30 PROBLEM — M79.604 PAIN IN RIGHT LEG: Chronic | Status: ACTIVE | Noted: 2018-05-11

## 2018-08-30 PROBLEM — Z99.2 DEPENDENCE ON RENAL DIALYSIS: Chronic | Status: ACTIVE | Noted: 2018-05-11

## 2018-08-30 PROBLEM — E20.9 HYPOPARATHYROIDISM, UNSPECIFIED: Chronic | Status: ACTIVE | Noted: 2018-05-11

## 2018-08-30 PROBLEM — I50.20 UNSPECIFIED SYSTOLIC (CONGESTIVE) HEART FAILURE: Chronic | Status: ACTIVE | Noted: 2017-08-17

## 2018-08-30 PROBLEM — I77.0 ARTERIOVENOUS FISTULA, ACQUIRED: Chronic | Status: ACTIVE | Noted: 2018-05-11

## 2018-08-30 PROBLEM — M79.89 OTHER SPECIFIED SOFT TISSUE DISORDERS: Chronic | Status: ACTIVE | Noted: 2018-05-11

## 2018-08-30 PROBLEM — I73.9 PERIPHERAL VASCULAR DISEASE, UNSPECIFIED: Chronic | Status: ACTIVE | Noted: 2017-08-17

## 2018-08-30 PROBLEM — I49.9 CARDIAC ARRHYTHMIA, UNSPECIFIED: Chronic | Status: ACTIVE | Noted: 2018-05-11

## 2018-10-17 NOTE — ED ADULT NURSE NOTE - OBJECTIVE STATEMENT
62 y/o male awake alert and oriented x4 presents to ED c/o headache, fever tmax 100, sinus pressure and post nasal drip x 3 days. (+) sick contacts. Pt states he noticed green mucus with cough. (+) congestion. Took Tylenol at home with mild relief. Denies nausea, vomiting, chest pain, or urinary sx. hx of sepsis with sinusitis.

## 2018-10-17 NOTE — ED STATDOCS - CARE PLAN
Principal Discharge DX:	Other acute sinusitis, recurrence not specified  Goal:	discharge  Secondary Diagnosis:	INR (international normal ratio) abnormal

## 2018-10-17 NOTE — ED STATDOCS - ENMT, MLM
Mouth with normal mucosa  Throat has no vesicles, no oropharyngeal exudates and uvula is midline. +cerumen right ear, throat is red.

## 2018-10-17 NOTE — ED STATDOCS - PROGRESS NOTE DETAILS
62 yo male with a PMH of PCKD (3 transplants, on prednisone), dialysis pt, htn, AICD presents with facial pain, nasal discharge, post nasal drip. Pt had other sinusitis in the past and feels the same. Denies f/c/sweating, cp, sob. -Greg Doyle PA-C Because pt has not had his INR checked in 2 months will order INR. Offered basic labs to be checked but had it done today during dialysis and refused. Will order INR and d/c pt on augmentin and vanco (due to previously dx of c diff on abx). Pt and wife aware. -Greg Doyle PA-C

## 2018-10-17 NOTE — ED STATDOCS - ATTENDING CONTRIBUTION TO CARE
I, Servando Shah, performed the initial face to face bedside interview with this patient regarding history of present illness, review of symptoms and relevant past medical, social and family history.  I completed an independent physical examination.  I was the initial provider who evaluated this patient. I have signed out the follow up of any pending tests (i.e. labs, radiological studies) to the ACP.  I have communicated the patient’s plan of care and disposition with the ACP.  The history, relevant review of systems, past medical and surgical history, medical decision making, and physical examination was documented by the scribe in my presence and I attest to the accuracy of the documentation.

## 2018-10-17 NOTE — ED STATDOCS - OBJECTIVE STATEMENT
62 y/o male with a PMHx of oliguria, AICD, ESRD on dialysis (for 10 years) AV fistula right UE, hypoparathyroidism, HFrEF with stent, meningitis due to cryptococcus, C. Diff, CAD, PVD, HTN, polycystic kidney disease s/p x3 transplants presents to the ED c/o sinus pressure, post nasal drip, and HA starting x3 days ago with fever, 100F PTA. Mucus is green. Denies N/V, SOB, CP. Has h/o sepsis with sinusitis, usually given PO vancomycin or Augmentin. On prednisone for renal insufficiency. Last dialyzed this morning, INR unknown. On blood thinners. NKDA. PMD- Dr. Srivastava.

## 2018-11-21 NOTE — ED PROVIDER NOTE - MEDICAL DECISION MAKING DETAILS
64 y/o male with significant medical history presents to ED with fever, malaise, abd pain, cough. Fever 102F at 9 PM tonight. Plan for IV abx, labs, CXR, and reassess.

## 2018-11-21 NOTE — ED PROVIDER NOTE - PROGRESS NOTE DETAILS
spoke to Dr. Mitchell to admit pt ANA Jerez DO pt with acute onset of jaw pain and shivering,  repeat ekg showed stemi when compared to the first ekg, Dr. Ribeiro called for pci. called and updated hsopitalist spoke with Dr. Eckert pt without chest pain at this time, Dr. Ribeiro was at bedside  will not do pci, recommend stress steroid dose and icu admission, Dr. Ribeiro called and spoke with Dr. Levi from ICU. ANA Jerez DO

## 2018-11-21 NOTE — ED ADULT TRIAGE NOTE - CHIEF COMPLAINT QUOTE
pt c/o midepigastric pain, sore throat, fever, chills started last night, dialysis pt, fistula over the right arm.

## 2018-11-21 NOTE — ED ADULT NURSE NOTE - OBJECTIVE STATEMENT
Pt c/o cough, left shoulder, back & abd pain with fever since last night. Took 1000mg of tylenol at 2100 prior to coming to ER. Right AVF, Last dialysis tuesday 11/19/18

## 2018-11-21 NOTE — ED STATDOCS - PROGRESS NOTE DETAILS
Stacie CD for ED attending, Doctor Magen. 62 y/o male with a PMHx of AV fistula, CAD, ESRD, HTN, peripheral vascular disease, adrenal insufficiency presents to the ED c/o abd pain, HA, productive cough and muscle aches since last night, worsened this morning. +fever today. Denies n/v/d. +recent sick contacts- pt's daughter. Pt is a dialysis pt, last dialysis was yesterday. NKDA. Will send pt to main ED for further evaluation.

## 2018-11-21 NOTE — ED ADULT NURSE NOTE - NSIMPLEMENTINTERV_GEN_ALL_ED
Implemented All Universal Safety Interventions:  [place] to call system. Call bell, personal items and telephone within reach. Instruct patient to call for assistance. Room bathroom lighting operational. Non-slip footwear when patient is off stretcher. Physically safe environment: no spills, clutter or unnecessary equipment. Stretcher in lowest position, wheels locked, appropriate side rails in place.

## 2018-11-22 NOTE — CONSULT NOTE ADULT - ASSESSMENT
64 y/o Male with h/o ESRD on HD, HFrEF, hypoparathyroidism, CAD with stent, prior cryptococcal meningitis, prior refractory C. Diff, CAD, PVD, HTN, polycystic kidney disease s/p x 3 failed transplants was admitted on 11/21 for abd pain, diarrhea, HA, productive cough and muscle aches in the left shoulder. He was found to have a fever of 102F at 9PM. Denies n/v/d. In ER, he was found hypotensive and given zosyn and vancomycin PO.    1. Hypotension. Diarrheal syndrome. Abdominal pain. Probable acute enterocolitis. 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 64 y/o Male with h/o ESRD on HD, HFrEF, hypoparathyroidism, CAD with stent, prior cryptococcal meningitis, prior refractory C. Diff, CAD, PVD, HTN, polycystic kidney disease s/p x 3 failed transplants was admitted on 11/21 for abd pain, diarrhea, HA, productive cough and muscle aches in the left shoulder. He was found to have a fever of 102F at 9PM. Denies n/v/d. In ER, he was found hypotensive and given zosyn and vancomycin PO.    1. Hypotension. ?related to subclinical Pj disease. Abdominal cramps. Probable acute enterocolitis. 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 Immunocompromised host. ESRD on HD.   -no loose stools noted, but the patient ate case lettuce with his wife; he got abdominal cramps and wife has severe diarrhea  -given steroids  -if loose stools would send specimen for GI PCR and CDT PCR  -hold off further zosyn for now  -continue vancomycin 125 mg PO q6h  -reason for abx use and side effects reviewed with patient; monitor BMP   -IV fluids  -old chart reviewed to assess prior cultures  -monitor temps  -f/u CBC  -supportive care  2. Other issues:   -care per medicine

## 2018-11-22 NOTE — ED ADULT NURSE REASSESSMENT NOTE - NS ED NURSE REASSESS COMMENT FT1
Phylenephrine drip increased to 1mcg/kg/min as per Dr. Alejo for hypotension. Pt awaiting CT scan & ICU admit. No c/o at this time. No distress noted. Will continue to monitor

## 2018-11-22 NOTE — H&P ADULT - HISTORY OF PRESENT ILLNESS
63y old M with PMHX of ESRD on HD, HFrEF, hypoparathyroidism, CAD with stent, meningitis due to cryptococcus, C. Diff, CAD, PVD, HTN, polycystic kidney disease s/p x3 transplants presents to the ED c/o  abd pain, HA, productive cough and muscle aches in the left shoulder. He was found to have a fever of 102F at 9PM. Denies n/v/d. +recent sick contacts- pt's daughter. Pt is a dialysis pt, last dialysis was yesterday. Pt's wife gave pt 1000mg Tylenol. He has a history of severe septic shock from Pneumococcous.  On arrival he is on 100% NRB with his SBP ~90. He feels weak.

## 2018-11-22 NOTE — ED ADULT NURSE REASSESSMENT NOTE - NS ED NURSE REASSESS COMMENT FT1
Report given to WadeICU RN. Phleynephrine drip continues with some improvement in BP. Going to CT scan, ICU RN will follow for transport to ICU. Pt remains stable at present. No distress noted. A&Ox4. No c/o at this time. Will continue to monitor

## 2018-11-22 NOTE — ED ADULT NURSE REASSESSMENT NOTE - NS ED NURSE REASSESS COMMENT FT1
Seen at bedside by Dr. Ribeiro. No cath lab at this time. Dr. Wright notified. Pt no longer shivering or purple/ cyanotic coloring , appearance improve. VSS. Will continue to monitor.

## 2018-11-22 NOTE — ED ADULT NURSE REASSESSMENT NOTE - NS ED NURSE REASSESS COMMENT FT1
Pt has ASA 325mg PO as per Dr. Jerez. Cardiology, Dr. Ribeiro called by ER MD. O2 sat 99% on NRB. #20 IVL placed in left arm. Vital signs improved.  /84 . RR 18. Awaiting further orders. Will continue to monitor. Wife at bedside.

## 2018-11-22 NOTE — CONSULT NOTE ADULT - ASSESSMENT
62 with CM, Hypothyroid, CAD, PCKD, ESRD s/p failed transplant on HD now M/W/F here with suspected sepsis.     ESRD  -HD TIW, last on wednesday, will consider next HD for AM (typical M/W/F schedule)  -K protocol, UF as tolerated by BP (limited with hypotension)  -SACHIN protocol    sepsis/hypotension  -IV abx  -F/u culutures  -Management per ICU, consideration of ID    MBD  -Will change sensipar to qod as opposed to QID    d/ c with RN staff  d/c with Dr. Portillo  d/c with wife at length

## 2018-11-22 NOTE — CONSULT NOTE ADULT - SUBJECTIVE AND OBJECTIVE BOX
Patient is a 63y old  Male who presents with a chief complaint of weakness (2018 08:42)    HPI:  64 y/o Male with h/o ESRD on HD, HFrEF, hypoparathyroidism, CAD with stent, prior cryptococcal meningitis, prior refractory C. Diff, CAD, PVD, HTN, polycystic kidney disease s/p x 3 failed transplants was admitted on  for abd pain, diarrhea, HA, productive cough and muscle aches in the left shoulder. He was found to have a fever of 102F at 9PM. Denies n/v/d. In ER, he was found hypotensive and given zosyn and vancomycin PO.      PMH: as above  PSH: as above  Meds: per reconciliation sheet, noted below  MEDICATIONS  (STANDING):  amiodarone    Tablet 200 milliGRAM(s) Oral daily  cinacalcet 30 milliGRAM(s) Oral <User Schedule>  hydrocortisone sodium succinate Injectable 50 milliGRAM(s) IV Push every 6 hours  pantoprazole    Tablet 40 milliGRAM(s) Oral before breakfast  phenylephrine    Infusion 0.5 MICROgram(s)/kG/Min (11.906 mL/Hr) IV Continuous <Continuous>  piperacillin/tazobactam IVPB. 3.375 Gram(s) IV Intermittent every 12 hours  sevelamer hydrochloride 800 milliGRAM(s) Oral three times a day  vancomycin    Solution 125 milliGRAM(s) Oral every 6 hours    MEDICATIONS  (PRN):    Allergies    No Known Allergies    Intolerances      Social: no smoking, no alcohol, no illegal drugs; no recent travel, no exposure to TB  FAMILY HISTORY:  Family history of kidney disease (Mother)  Family history of colon cancer (Sibling)    no history of premature cardiovascular disease in first degree relatives    ROS: the patient denies HA, no seizures, no dizziness, no sore throat, no nasal congestion, no blurry vision, no CP, no palpitations, no SOB, no cough, had abdominal pain, had diarrhea, no N/V, no dysuria, no leg pain, no claudication, no rash, no joint aches, no rectal pain or bleeding, no night sweats  All other systems reviewed and are negative    Vital Signs Last 24 Hrs  T(C): 37.6 (2018 10:11), Max: 39.1 (2018 22:10)  T(F): 99.6 (2018 10:11), Max: 102.3 (2018 22:10)  HR: 73 (2018 10:11) (69 - 132)  BP: 105/45 (2018 10:00) (69/48 - 154/84)  BP(mean): 61 (2018 10:00) (58 - 71)  RR: 16 (2018 10:11) (11 - 26)  SpO2: 99% (2018 10:11) (90% - 100%)  Daily Height in cm: 182.88 (2018 21:22)    Daily     PE:    Constitutional: frail looking  HEENT: NC/AT, EOMI, PERRLA, conjunctivae clear; ears and nose atraumatic; pharynx benign  Neck: supple; thyroid not palpable  Back: no tenderness  Respiratory: respiratory effort normal; clear to auscultation  Cardiovascular: S1S2 regular, no murmurs  Abdomen: soft, not tender, not distended, positive BS; no liver or spleen organomegaly  Genitourinary: no suprapubic tenderness  Lymphatic: no LN palpable  Musculoskeletal: no muscle tenderness, no joint swelling or tenderness  Extremities: no pedal edema  Neurological/ Psychiatric: AxOx3, judgement and insight normal; moving all extremities  Skin: no rashes; no palpable lesions    Labs: all available labs reviewed                        8.6    6.75  )-----------( 83       ( 2018 07:41 )             28.5         142  |  102  |  47<H>  ----------------------------<  82  4.0   |  30  |  5.41<H>    Ca    7.8<L>      2018 07:41  Phos  2.8       Mg     1.5         TPro  6.1  /  Alb  2.8<L>  /  TBili  1.4<H>  /  DBili  x   /  AST  13<L>  /  ALT  19  /  AlkPhos  88       LIVER FUNCTIONS - ( 2018 07:41 )  Alb: 2.8 g/dL / Pro: 6.1 gm/dL / ALK PHOS: 88 U/L / ALT: 19 U/L / AST: 13 U/L / GGT: x           Urinalysis Basic - ( 2018 06:09 )    Color: Yellow / Appearance: Clear / S.015 / pH: x  Gluc: x / Ketone: Negative  / Bili: Negative / Urobili: Negative mg/dL   Blood: x / Protein: 100 mg/dL / Nitrite: Negative   Leuk Esterase: Trace / RBC: x / WBC 3-5   Sq Epi: x / Non Sq Epi: x / Bacteria: x          Radiology: all available radiological tests reviewed    < from: CT Abdomen and Pelvis No Cont (18 @ 07:30) >  1. Polycystic liver disease is again noted.  2. Small amount of ascites, also present on A/P CT of 08.  3. Mild nonspecific colonic wall thickening of the distal   ascending/transverse/descending colon, similarin appearance to prior   study. Extensive diverticulosis without CT evidence of diverticulitis.   4. Distended gallbladder with gallstones and CBD dilation measuring up to   10 mm, similar in appearance to prior study.   5. Bilateral native kidneys are not seen. Transplanted kidney within the   right lower abdomen/pelvis, without hydroureteronephrosis or calculus.  6. Chronic pancreatic duct dilation and atrophy with multifocal pancreas   calcifications, similar in appearance to prior study.    < end of copied text >      Advanced directives addressed: full resuscitation Patient is a 63y old  Male who presents with a chief complaint of weakness (2018 08:42)    HPI:  64 y/o Male with h/o ESRD on HD, HFrEF, hypoparathyroidism, CAD with stent, prior cryptococcal meningitis, prior refractory C. Diff, CAD, PVD, HTN, polycystic kidney disease s/p x 3 failed transplants was admitted on  for abd cramps, HA, productive cough and muscle aches in the left shoulder. He was found to have a fever of 102F at 9PM. Denies n/v/d. In ER, he was found hypotensive and given zosyn and vancomycin PO.      PMH: as above  PSH: as above  Meds: per reconciliation sheet, noted below  MEDICATIONS  (STANDING):  amiodarone    Tablet 200 milliGRAM(s) Oral daily  cinacalcet 30 milliGRAM(s) Oral <User Schedule>  hydrocortisone sodium succinate Injectable 50 milliGRAM(s) IV Push every 6 hours  pantoprazole    Tablet 40 milliGRAM(s) Oral before breakfast  phenylephrine    Infusion 0.5 MICROgram(s)/kG/Min (11.906 mL/Hr) IV Continuous <Continuous>  piperacillin/tazobactam IVPB. 3.375 Gram(s) IV Intermittent every 12 hours  sevelamer hydrochloride 800 milliGRAM(s) Oral three times a day  vancomycin    Solution 125 milliGRAM(s) Oral every 6 hours    MEDICATIONS  (PRN):    Allergies    No Known Allergies    Intolerances      Social: no smoking, no alcohol, no illegal drugs; no recent travel, no exposure to TB  FAMILY HISTORY:  Family history of kidney disease (Mother)  Family history of colon cancer (Sibling)    no history of premature cardiovascular disease in first degree relatives    ROS: the patient denies HA, no seizures, no dizziness, no sore throat, no nasal congestion, no blurry vision, no CP, no palpitations, no SOB, no cough, had abdominal pain, no diarrhea, no N/V, no dysuria, no leg pain, no claudication, no rash, no joint aches, no rectal pain or bleeding, no night sweats  All other systems reviewed and are negative    Vital Signs Last 24 Hrs  T(C): 37.6 (2018 10:11), Max: 39.1 (2018 22:10)  T(F): 99.6 (2018 10:11), Max: 102.3 (2018 22:10)  HR: 73 (2018 10:11) (69 - 132)  BP: 105/45 (2018 10:00) (69/48 - 154/84)  BP(mean): 61 (2018 10:00) (58 - 71)  RR: 16 (2018 10:11) (11 - 26)  SpO2: 99% (2018 10:11) (90% - 100%)  Daily Height in cm: 182.88 (2018 21:22)    Daily     PE:    Constitutional: frail looking  HEENT: NC/AT, EOMI, PERRLA, conjunctivae clear; ears and nose atraumatic; pharynx benign  Neck: supple; thyroid not palpable  Back: no tenderness  Respiratory: respiratory effort normal; clear to auscultation  Cardiovascular: S1S2 regular, no murmurs  Abdomen: soft, not tender, not distended, positive BS; no liver or spleen organomegaly  Genitourinary: no suprapubic tenderness  Lymphatic: no LN palpable  Musculoskeletal: no muscle tenderness, no joint swelling or tenderness  Extremities: no pedal edema  Neurological/ Psychiatric: AxOx3, judgement and insight normal; moving all extremities  Skin: no rashes; no palpable lesions    Labs: all available labs reviewed                        8.6    6.75  )-----------( 83       ( 2018 07:41 )             28.5         142  |  102  |  47<H>  ----------------------------<  82  4.0   |  30  |  5.41<H>    Ca    7.8<L>      2018 07:41  Phos  2.8       Mg     1.5         TPro  6.1  /  Alb  2.8<L>  /  TBili  1.4<H>  /  DBili  x   /  AST  13<L>  /  ALT  19  /  AlkPhos  88       LIVER FUNCTIONS - ( 2018 07:41 )  Alb: 2.8 g/dL / Pro: 6.1 gm/dL / ALK PHOS: 88 U/L / ALT: 19 U/L / AST: 13 U/L / GGT: x           Urinalysis Basic - ( 2018 06:09 )    Color: Yellow / Appearance: Clear / S.015 / pH: x  Gluc: x / Ketone: Negative  / Bili: Negative / Urobili: Negative mg/dL   Blood: x / Protein: 100 mg/dL / Nitrite: Negative   Leuk Esterase: Trace / RBC: x / WBC 3-5   Sq Epi: x / Non Sq Epi: x / Bacteria: x          Radiology: all available radiological tests reviewed    < from: CT Abdomen and Pelvis No Cont (18 @ 07:30) >  1. Polycystic liver disease is again noted.  2. Small amount of ascites, also present on A/P CT of 08.  3. Mild nonspecific colonic wall thickening of the distal   ascending/transverse/descending colon, similarin appearance to prior   study. Extensive diverticulosis without CT evidence of diverticulitis.   4. Distended gallbladder with gallstones and CBD dilation measuring up to   10 mm, similar in appearance to prior study.   5. Bilateral native kidneys are not seen. Transplanted kidney within the   right lower abdomen/pelvis, without hydroureteronephrosis or calculus.  6. Chronic pancreatic duct dilation and atrophy with multifocal pancreas   calcifications, similar in appearance to prior study.    < end of copied text >      Advanced directives addressed: full resuscitation

## 2018-11-22 NOTE — SEPSIS NOTE - ASSESSMENT
Septic Shock  ESRD on HD  Adrenal Insufficiency    Plan:  Admit to ICU  Pressors for MAP > 65  Pan CT scan  plan as per H&P

## 2018-11-22 NOTE — CONSULT NOTE ADULT - SUBJECTIVE AND OBJECTIVE BOX
Patient is a 63y Male whom presented to the hospital with [place] [facility] [place], CAD with stent, hx meningitis due to cryptococcus, C. Diff, CAD, PVD, HTN, polycystic kidney disease with failed transplants presents to the ED c/o fever and productive cough. Issues started wednesday pm, did have HD earlier.  In ed with increasing SOB, concern regarding ekg changes as well now in ICU with NRB and on pressors. Wife and patient report feeling much better today, in general with improved strength.       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  S/P colonoscopy: last done   AICD (automatic cardioverter/defibrillator) present:   H/O hernia repair  A-V fistula:  left UE  right arm ,  H/O kidney transplant: , ,       MEDICATIONS  (STANDING):  amiodarone    Tablet 200 milliGRAM(s) Oral daily  cinacalcet 30 milliGRAM(s) Oral four times a day  hydrocortisone sodium succinate Injectable 50 milliGRAM(s) IV Push every 6 hours  pantoprazole    Tablet 40 milliGRAM(s) Oral before breakfast  phenylephrine    Infusion 0.5 MICROgram(s)/kG/Min (11.906 mL/Hr) IV Continuous <Continuous>  piperacillin/tazobactam IVPB. 3.375 Gram(s) IV Intermittent every 12 hours  sevelamer hydrochloride 800 milliGRAM(s) Oral three times a day  vancomycin    Solution 125 milliGRAM(s) Oral every 6 hours    MEDICATIONS  (PRN):      Allergies    No Known Allergies    Intolerances        SOCIAL HISTORY:  no current cig/etoh    FAMILY HISTORY:  Family history of kidney disease (Mother)  Family history of colon cancer (Sibling)      REVIEW OF SYSTEMS:    + weakness, fevers and chills  EYES/ENT: No visual changes;  No vertigo or throat pain   NECK: No pain or stiffness  RESPIRATORY: N+ cough, wheezing, hemoptysis; stable shortness of breath  CARDIOVASCULAR: No chest pain or palpitations  GASTROINTESTINAL: No abdominal or epigastric pain. No nausea, vomiting, or hematemesis; No diarrhea or constipation. No melena or hematochezia.  GENITOURINARY: No dysuria, frequency or hematuria  NEUROLOGICAL: No numbness or weakness  SKIN: No itching, burning, rashes, or lesions   All other review of systems is negative unless indicated above.      T(C): , Max: 39.1 (18 @ 22:10)  T(F): , Max: 102.3 (18 @ 22:10)  HR: 88 (18 @ 06:45)  BP: 95/62 (18 @ 06:45)  BP(mean): 71 (18 @ 05:59)  RR: 18 (18 @ 06:45)  SpO2: 100% (18 @ 06:45)  Wt(kg): --    Height (cm): 182.88 ( @ 21:22)  Weight (kg): 63.5 ( @ 21:22)  BMI (kg/m2): 19 ( @ :22)  BSA (m2): 1.83 (:22)    PHYSICAL EXAM:    Constitutional: NAD, comfortable  HEENT: PERRLA, EOMI,  MMM  Neck: No LAD, No JVD  Respiratory: scatt ronchi  Cardiovascular: S1 and S2, RRR  Gastrointestinal: BS+, soft, NT/ND  Extremities: No peripheral edema  Neurological: A/O x 3, no focal deficits  Psychiatric: Normal mood, normal affect  : No Amaya  Skin: No rashes  Access: avf + thrill        LABS:                        8.6    6.75  )-----------( 83       ( 2018 07:41 )             28.5     2018 07:41    142    |  102    |  47     ----------------------------<  82     4.0     |  30     |  5.41   2018 23:49    140    |  99     |  44     ----------------------------<  108    4.0     |  32     |  5.07     Ca    7.8        2018 07:41  Ca    8.0        2018 23:49  Phos  2.8       2018 07:41  Phos  2.9       2018 23:49  Mg     1.5       2018 07:41  Mg     1.6       2018 23:49    TPro  6.1    /  Alb  2.8    /  TBili  1.4    /  DBili  x      /  AST  13     /  ALT  19     /  AlkPhos  88     2018 07:41  TPro  6.0    /  Alb  2.9    /  TBili  1.0    /  DBili  x      /  AST  11     /  ALT  19     /  AlkPhos  88     2018 23:49      Creatine Kinase, Serum: 34 U/L [26 - 308] (11- @ 23:49)      Urine Studies:  Urinalysis Basic - ( 2018 06:09 )    Color: Yellow / Appearance: Clear / S.015 / pH: x  Gluc: x / Ketone: Negative  / Bili: Negative / Urobili: Negative mg/dL   Blood: x / Protein: 100 mg/dL / Nitrite: Negative   Leuk Esterase: Trace / RBC: x / WBC 3-5   Sq Epi: x / Non Sq Epi: x / Bacteria: x            RADIOLOGY & ADDITIONAL STUDIES:

## 2018-11-22 NOTE — H&P ADULT - ASSESSMENT
63y old M with:  Septic Shock   ESRD on HD  HFrEF  AICD (automatic cardioverter/defibrillator) present    Arm swelling  left arm  AV fistula  right UE  CAD (coronary artery disease)    Clostridium difficile colitis    Hypertension    Hypoparathyroidism      Plan:  Admit to ICU  Serial Neuro Exams  BP low - LIYA  Not a PCI candidate per Dr. Ribeiro  Cautious IVF with HD and HFrEF  Start pressors to keep MAP > 65  NPO  Monitor renal function  Strict I/O's  PanCx  Empiric IV Zosyn  CT Chest/Abd/Pelvis  Obtain ID Consult  Adrenal Insufficiency - Stress Steroids  DVT prophylaxis - Inc INR  I spoke to the patient and wife at bedside regarding his current status, plan of care, and prognosis with all questions answered in detail.

## 2018-11-22 NOTE — ED ADULT NURSE REASSESSMENT NOTE - NS ED NURSE REASSESS COMMENT FT1
Pts wife reported pt coloring looked "james", he began shivering and c/o jaw pain. Pt had almost purple appearance upon assessment. EKG repeated with changes noted. Dr. Jerez aware & assessed pt bedside. Elevated BP noted. O2 sat drop into the 80s. Pacing pads applied. Will continue to monitor

## 2018-11-23 NOTE — PROGRESS NOTE ADULT - ASSESSMENT
63y old M with:  Septic Shock   ESRD on HD  HFrEF  AICD (automatic cardioverter/defibrillator) present    Arm swelling  left arm  AV fistula  right UE  CAD (coronary artery disease)    Clostridium difficile colitis    Hypertension    Hypoparathyroidism      Plan:    ICU    PULM: Doing fine on RA    Cardio: Hypotension from septic shock +/- adrenal insuffiencey--Try to wean pressors.  Hold Coumadin as INR >4 today    Renal: HD today as per renal    GI: PO Diet    Endo: Continue stress dose steroids    ID: Continue Zosyn for GNR bacteremia.  Continue PO Vanco for hx of C. Diff colitis

## 2018-11-23 NOTE — PROGRESS NOTE ADULT - ASSESSMENT
62 with CM, Hypothyroid, CAD, PCKD, ESRD s/p failed transplant on HD now M/W/F here with sepsis.     ESRD  -HD TIW, complete now as ordered  -K protocol, UF as tolerated by BP   -SACHIN protocol    sepsis/hypotension  -IV abx  -ID followind and optimizing therapy    MBD  -Oral sensipar, low phos diet    d/ c with RN staff  d/c with Dr. Portillo this AM  d/c with HD staff

## 2018-11-23 NOTE — PROGRESS NOTE ADULT - ASSESSMENT
62 y/o Male with h/o ESRD on HD, HFrEF, hypoparathyroidism, CAD with stent, prior cryptococcal meningitis, prior refractory C. Diff, CAD, PVD, HTN, polycystic kidney disease s/p x 3 failed transplants was admitted on 11/21 for abd pain, diarrhea, HA, productive cough and muscle aches in the left shoulder. He was found to have a fever of 102F at 9PM. Denies n/v/d. In ER, he was found hypotensive and given zosyn and vancomycin PO.    1. Sepsis with KLPN. Abdominal resolved. Probable acute enterocolitis. Liver cysts. Prior CDAD. Immunocompromised host. ESRD on HD.   -no loose stools noted, but the patient ate case lettuce with his wife; he got abdominal cramps and wife has severe diarrhea  -given steroids  -no diarrhea  -on zosyn # 2 and vancomycin 125 mg PO q6h  -urine culture is negative  -tolerating abx well so far; no side effects noted  -continue IV abx coverage  -IV fluids  -monitor temps  -f/u CBC  -supportive care  2. Other issues:   -care per medicine

## 2018-11-24 NOTE — CHART NOTE - NSCHARTNOTEFT_GEN_A_CORE
Patient been off pressors all day.  BP at its baseline 100-110    Patient ambulating and tolerating PO    Will transfer to a [facility] MED-Surg floor    Called into the Hospitalist at 6:23PM and awaiting a call back

## 2018-11-24 NOTE — PROGRESS NOTE ADULT - ASSESSMENT
63y old M with:  Septic Shock   ESRD on HD  HFrEF  AICD (automatic cardioverter/defibrillator) present    Arm swelling  left arm  AV fistula  right UE  CAD (coronary artery disease)    Clostridium difficile colitis    Hypertension    Hypoparathyroidism      Plan:    ICU    PULM: Doing fine on RA    Cardio: Hypotension from septic shock +/- adrenal insuffiencey--Try to wean pressors.  Resume Coumadin 11/24    Renal: HD as per renal    GI: PO Diet    Endo: Continue stress dose steroids    ID: Continue Zosyn for GNR bacteremia.  Continue PO Vanco for hx of C. Diff colitis 63y old M with:  Septic Shock   ESRD on HD  HFrEF  AICD (automatic cardioverter/defibrillator) present    Arm swelling  left arm  AV fistula  right UE  CAD (coronary artery disease)    Clostridium difficile colitis    Hypertension    Hypoparathyroidism      Plan:    ICU    PULM: Doing fine on RA    Cardio: Hypotension from septic shock +/- adrenal insuffiencey--Weaned off pressors 11/24.  Resume Coumadin 11/24    Renal: HD as per renal    GI: PO Diet    Endo: Continue stress dose steroids--Started tapering 11/24    ID: Continue Zosyn for GNR- klebsiella  bacteremia.  Continue PO Vanco for hx of C. Diff colitis    Ambulate

## 2018-11-24 NOTE — PROGRESS NOTE ADULT - ASSESSMENT
64 y/o Male with h/o ESRD on HD, HFrEF, hypoparathyroidism, CAD with stent, prior cryptococcal meningitis, prior refractory C. Diff, CAD, PVD, HTN, polycystic kidney disease s/p x 3 failed transplants was admitted on 11/21 for abd pain, diarrhea, HA, productive cough and muscle aches in the left shoulder. He was found to have a fever of 102F at 9PM. Denies n/v/d. In ER, he was found hypotensive and given zosyn and vancomycin PO.    1. Sepsis with KLPN. Abdominal resolved. Probable acute enterocolitis. Liver cysts. Prior CDAD. Immunocompromised host. ESRD on HD.   -no diarrhea  -low grade fever  -on zosyn # 3 and vancomycin 125 mg PO q6h  -urine culture is negative  -tolerating abx well so far; no side effects noted  -continue IV abx coverage  -repeat BC collected  -IV fluids  -monitor temps  -f/u CBC  -supportive care  2. Other issues:   -care per medicine

## 2018-11-24 NOTE — PROGRESS NOTE ADULT - ASSESSMENT
62 with CM, Hypothyroid, CAD, PCKD, ESRD s/p failed transplant on HD now M/W/F here with sepsis.     ESRD  -HD TIW, treatment next on monday (standing m/w/f)  -Off pressors and clnically much improved  -K protocol, UF as tolerated  -SACHIN protocol    sepsis/hypotension  -IV abx, follow up sensitivity  -ID following and optimizing therapy    MBD  -Oral sensipar, low phos diet    d/ c with RN staff  d/c with HD staff

## 2018-11-25 NOTE — PROGRESS NOTE ADULT - RS GEN PE MLT RESP DETAILS PC
breath sounds equal/no rhonchi/no rales/no wheezes
no rales/no rhonchi/good air movement/breath sounds equal
no rhonchi/no rales/breath sounds equal/good air movement

## 2018-11-25 NOTE — PROGRESS NOTE ADULT - ASSESSMENT
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 Diff,  PVD, HTN, adrenal insufficiency admitted for:     1. Severe Sepsis with septic shock due to Klebsiella Bacteremia, possible source enterocolitis vs PNA  agree with transfer   BCX: + Klebsiella, repeat BCx: NGTD   UCx: neg   CT chest and abd/pelvis reviewed   C/w IV Abxs: On Zosyn IV and VAnco PO for h/o C.diff colitis in the past   C//w stress dose steroids taper on hydrocortisone 25mg IVP BID       2. ESRD on HD  HD as per renal orders, scheduled for tomorrow   C/w sevelamer      3. AFIB. Coagulopathy   HR controlled  C/w amiodarone  C/w Coumadin  INR subTx  as coumadin was held, INR 4 on admission       4. Elevated T.Bili  will repeat LFTs in am   CT abd/pelvis with Cholelithiasis and dilated CBD and pancreatic duct but all chronic as per radiology report   Monitor       5.  Systolic HF with cardiomyopathy and reduced LV Fx. S/p AICD  euvolemic  volume  management with HD   Not on BB or ACEI/ARBs  due to low BP       6. DVT/GI PPxs

## 2018-11-25 NOTE — PROGRESS NOTE ADULT - MS EXT PE MLT D E PC
no clubbing/no cyanosis/no pedal edema
no pedal edema/no cyanosis/no clubbing
no clubbing/no cyanosis/no pedal edema

## 2018-11-25 NOTE — PROGRESS NOTE ADULT - ASSESSMENT
62 y/o Male with h/o ESRD on HD, HFrEF, hypoparathyroidism, CAD with stent, prior cryptococcal meningitis, prior refractory C. Diff, CAD, PVD, HTN, polycystic kidney disease s/p x 3 failed transplants was admitted on 11/21 for abd pain, diarrhea, HA, productive cough and muscle aches in the left shoulder. He was found to have a fever of 102F at 9PM. Denies n/v/d. In ER, he was found hypotensive and given zosyn and vancomycin PO.    1. Sepsis with KLPN improving. Probable acute enterocolitis resolving. Liver cysts. Prior CDAD. Immunocompromised host. ESRD on HD.   -no diarrhea  -low grade fever  -on zosyn # 4 and vancomycin 125 mg PO q6h  -urine culture is negative  -tolerating abx well so far; no side effects noted  -continue IV abx coverage  -steroids tappered  -repeat BC are negative to date  -IV fluids  -monitor temps  -f/u CBC  -supportive care  2. Other issues:   -care per medicine

## 2018-11-25 NOTE — PROGRESS NOTE ADULT - ASSESSMENT
63y old M with:  Septic Shock   ESRD on HD  HFrEF  AICD (automatic cardioverter/defibrillator) present    Arm swelling  left arm  AV fistula  right UE  CAD (coronary artery disease)    Clostridium difficile colitis    Hypertension    Hypoparathyroidism      Plan:    ICU    PULM: Doing fine on RA    Cardio: Hypotension from septic shock +/- adrenal insuffiencey--Try to wean pressors.  Hold Coumadin as INR >4 today    Renal: HD 11/26 as per renal    GI: PO Diet    Endo: Continue stress dose steroids--Continue to taper     ID: Continue Zosyn for GNR bacteremia.  Continue PO Vanco for hx of C. Diff colitis    Transfer to reg floor  Called to Dr. Murguia on 11/24

## 2018-11-25 NOTE — PROGRESS NOTE ADULT - ASSESSMENT
62 with CM, Hypothyroid, CAD, PCKD, ESRD s/p failed transplant on HD now M/W/F here with sepsis.     ESRD  -HD TIW, treatment next on monday (standing m/w/f)  -Off pressors and clinically much improved  -K protocol, UF as tolerated. Early treatment tomorrow AM  -SACHIN protocol    sepsis/hypotension  -IV abx renally dosed  -ID following and optimizing therapy    MBD  -Oral sensipar, low phos diet    d/ c with RN staff and Dr. Portillo  d/c with HD staff

## 2018-11-26 NOTE — DISCHARGE NOTE ADULT - PLAN OF CARE
resolve complete oral abxs c/w regular coumadin schedule, INR check with Dr Coello on 11/28 Hold BP meds for now  F/u with Dr Coello for BP check  in 2-3 days monitor If frequent pain, consider surgical evaluation decrease sensipar to 3 times a week   F/u with Dr Aguilar

## 2018-11-26 NOTE — DISCHARGE NOTE ADULT - PATIENT PORTAL LINK FT
You can access the [facility] Portal, offered by [facility], by registering with the following website: [email or web address]

## 2018-11-26 NOTE — PROGRESS NOTE ADULT - SUBJECTIVE AND OBJECTIVE BOX
Date of service: 18 @ 10:47    OOB to chair  Ambulatory with PT  Abdomen feels better  No diarrhea    ROS: no fever or chills; denies dizziness, no HA, no SOB or cough, no dysuria, no legs pain, no rashes    MEDICATIONS  (STANDING):  amiodarone    Tablet 200 milliGRAM(s) Oral daily  cinacalcet 30 milliGRAM(s) Oral <User Schedule>  epoetin amee Injectable 8000 Unit(s) IV Push <User Schedule>  hydrocortisone sodium succinate Injectable 25 milliGRAM(s) IV Push every 6 hours  pantoprazole    Tablet 40 milliGRAM(s) Oral before breakfast  phenylephrine    Infusion 0.5 MICROgram(s)/kG/Min (11.906 mL/Hr) IV Continuous <Continuous>  piperacillin/tazobactam IVPB. 3.375 Gram(s) IV Intermittent every 12 hours  sevelamer hydrochloride 800 milliGRAM(s) Oral three times a day  vancomycin    Solution 125 milliGRAM(s) Oral every 6 hours  warfarin 3 milliGRAM(s) Oral once      Vital Signs Last 24 Hrs  T(C): 36.6 (2018 08:00), Max: 37.4 (2018 19:00)  T(F): 97.8 (2018 08:00), Max: 99.4 (2018 19:00)  HR: 88 (2018 10:25) (60 - 89)  BP: 137/78 (2018 10:25) (86/54 - 137/78)  BP(mean): 93 (2018 10:25) (61 - 99)  RR: 15 (2018 10:25) (15 - 25)  SpO2: 99% (2018 08:00) (92% - 100%)    Physical Exam:      Constitutional: frail looking  HEENT: NC/AT, EOMI, PERRLA, conjunctivae clear  Neck: supple; thyroid not palpable  Back: no tenderness  Respiratory: respiratory effort normal; clear to auscultation  Cardiovascular: S1S2 regular, no murmurs  Abdomen: soft, not tender, not distended, positive BS  Genitourinary: no suprapubic tenderness  Lymphatic: no LN palpable  Musculoskeletal: no muscle tenderness, no joint swelling or tenderness  Extremities: no pedal edema  Neurological/ Psychiatric: AxOx3, moving all extremities  Skin: no rashes; no palpable lesions    Labs: reviewed                        8.8    7.00  )-----------( 121      ( 2018 06:22 )             29.6     11-    139  |  101  |  44<H>  ----------------------------<  200<H>  4.0   |  28  |  4.42<H>    Ca    7.7<L>      2018 06:22  Phos  3.4     11-  Mg     2.0         TPro  x   /  Alb  2.5<L>  /  TBili  x   /  DBili  x   /  AST  x   /  ALT  x   /  AlkPhos  x                           8.6    6.75  )-----------( 83       ( 2018 07:41 )             28.5     11-    142  |  102  |  47<H>  ----------------------------<  82  4.0   |  30  |  5.41<H>    Ca    7.8<L>      2018 07:41  Phos  2.8       Mg     1.5         TPro  6.1  /  Alb  2.8<L>  /  TBili  1.4<H>  /  DBili  x   /  AST  13<L>  /  ALT  19  /  AlkPhos  88       LIVER FUNCTIONS - ( 2018 07:41 )  Alb: 2.8 g/dL / Pro: 6.1 gm/dL / ALK PHOS: 88 U/L / ALT: 19 U/L / AST: 13 U/L / GGT: x           Urinalysis Basic - ( 2018 06:09 )    Color: Yellow / Appearance: Clear / S.015 / pH: x  Gluc: x / Ketone: Negative  / Bili: Negative / Urobili: Negative mg/dL   Blood: x / Protein: 100 mg/dL / Nitrite: Negative   Leuk Esterase: Trace / RBC: x / WBC 3-5   Sq Epi: x / Non Sq Epi: x / Bacteria: x      Culture - Urine (collected 2018 06:09)  Source: .Urine None  Final Report (2018 12:49):    <10,000 CFU/ml Normal Urogenital too present    Culture - Blood (collected 2018 23:49)  Source: .Blood None  Gram Stain (2018 18:52):    Growth in aerobic bottle: Gram Negative Rods    Growth in anaerobic bottle: Gram Negative Rods  Preliminary Report (2018 18:52):    Growth in aerobic bottle: Gram Negative Rods    Growth in anaerobic bottle: Gram Negative Rods    Culture - Blood (collected 2018 23:29)  Source: .Blood None  Gram Stain (2018 19:03):    Growth in aerobic bottle: Gram Negative Rods    Growth in anaerobic bottle: Gram Negative Rods  Preliminary Report (2018 19:04):    Growth in aerobic bottle: Gram Negative Rods    Growth in anaerobic bottle: Gram Negative Rods  Organism: Blood Culture PCR (2018 20:11)      -  Klebsiella pneumoniae: Detec      Method Type: PCR          Radiology: all available radiological tests reviewed    < from: CT Abdomen and Pelvis No Cont (18 @ 07:30) >  1. Polycystic liver disease is again noted.  2. Small amount of ascites, also present on A/P CT of 08.  3. Mild nonspecific colonic wall thickening of the distal   ascending/transverse/descending colon, similarin appearance to prior   study. Extensive diverticulosis without CT evidence of diverticulitis.   4. Distended gallbladder with gallstones and CBD dilation measuring up to   10 mm, similar in appearance to prior study.   5. Bilateral native kidneys are not seen. Transplanted kidney within the   right lower abdomen/pelvis, without hydroureteronephrosis or calculus.  6. Chronic pancreatic duct dilation and atrophy with multifocal pancreas   calcifications, similar in appearance to prior study.    < end of copied text >      Advanced directives addressed: full resuscitation
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  S/P colonoscopy: last done 2016  AICD (automatic cardioverter/defibrillator) present: 2013  H/O hernia repair  A-V fistula: 1995 left UE  right arm 2007,  H/O kidney transplant: 1985, 1995, 1997      FAMILY HISTORY:  Family history of kidney disease (Mother)  Family history of colon cancer (Sibling)      Social Hx:    Allergies    No Known Allergies    Intolerances            ICU Vital Signs Last 24 Hrs  T(C): 36.6 (24 Nov 2018 08:00), Max: 37.4 (23 Nov 2018 19:00)  T(F): 97.8 (24 Nov 2018 08:00), Max: 99.4 (23 Nov 2018 19:00)  HR: 88 (24 Nov 2018 10:25) (60 - 89)  BP: 137/78 (24 Nov 2018 10:25) (86/54 - 137/78)  BP(mean): 93 (24 Nov 2018 10:25) (61 - 99)  ABP: --  ABP(mean): --  RR: 15 (24 Nov 2018 10:25) (15 - 25)  SpO2: 99% (24 Nov 2018 08:00) (92% - 100%)          I&O's Summary    23 Nov 2018 07:01  -  24 Nov 2018 07:00  --------------------------------------------------------  IN: 580.7 mL / OUT: 0 mL / NET: 580.7 mL    24 Nov 2018 07:01  -  24 Nov 2018 10:36  --------------------------------------------------------  IN: 11.9 mL / OUT: 0 mL / NET: 11.9 mL                              8.8    7.00  )-----------( 121      ( 24 Nov 2018 06:22 )             29.6       11-24    139  |  101  |  44<H>  ----------------------------<  200<H>  4.0   |  28  |  4.42<H>    Ca    7.7<L>      24 Nov 2018 06:22  Phos  3.4     11-24  Mg     2.0     11-24    TPro  x   /  Alb  2.5<L>  /  TBili  x   /  DBili  x   /  AST  x   /  ALT  x   /  AlkPhos  x   11-23                    MEDICATIONS  (STANDING):  amiodarone    Tablet 200 milliGRAM(s) Oral daily  cinacalcet 30 milliGRAM(s) Oral <User Schedule>  epoetin amee Injectable 8000 Unit(s) IV Push <User Schedule>  hydrocortisone sodium succinate Injectable 50 milliGRAM(s) IV Push every 6 hours  pantoprazole    Tablet 40 milliGRAM(s) Oral before breakfast  phenylephrine    Infusion 0.5 MICROgram(s)/kG/Min (11.906 mL/Hr) IV Continuous <Continuous>  piperacillin/tazobactam IVPB. 3.375 Gram(s) IV Intermittent every 12 hours  sevelamer hydrochloride 800 milliGRAM(s) Oral three times a day  vancomycin    Solution 125 milliGRAM(s) Oral every 6 hours  warfarin 3 milliGRAM(s) Oral once    MEDICATIONS  (PRN):      DVT Prophylaxis:    Advanced Directives:  Discussed with:    Visit Information:    ** Time is exclusive of billed procedures and/or teaching and/or routine family updates.
63y old M with PMHX of ESRD on HD, HFrEF, hypoparathyroidism, CAD with stent, meningitis due to cryptococcus, C. Diff, CAD, PVD, HTN, polycystic kidney disease s/p x3 transplants presents to the ED c/o  abd pain, HA, productive cough and muscle aches in the left shoulder. He was found to have a fever of 102F at 9PM. Denies n/v/d. +recent sick contacts- pt's daughter. Pt is a dialysis pt, last dialysis was yesterday. Pt's wife gave pt 1000mg Tylenol. He has a history of severe septic shock from Pneumococcous.      11/23: Patient remains on pressors.  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  S/P colonoscopy: last done 2016  AICD (automatic cardioverter/defibrillator) present: 2013  H/O hernia repair  A-V fistula: 1995 left UE  right arm 2007,  H/O kidney transplant: 1985, 1995, 1997      FAMILY HISTORY:  Family history of kidney disease (Mother)  Family history of colon cancer (Sibling)      Social Hx:    Allergies    No Known Allergies    Intolerances            ICU Vital Signs Last 24 Hrs  T(C): 36.6 (25 Nov 2018 09:45), Max: 36.7 (24 Nov 2018 15:00)  T(F): 97.8 (25 Nov 2018 09:45), Max: 98.1 (24 Nov 2018 20:00)  HR: 79 (25 Nov 2018 09:00) (66 - 89)  BP: 142/84 (25 Nov 2018 09:00) (93/61 - 142/84)  BP(mean): 99 (25 Nov 2018 09:00) (64 - 105)  ABP: --  ABP(mean): --  RR: 20 (25 Nov 2018 09:00) (11 - 22)  SpO2: 98% (25 Nov 2018 08:00) (74% - 98%)          I&O's Summary    24 Nov 2018 07:01  -  25 Nov 2018 07:00  --------------------------------------------------------  IN: 251.9 mL / OUT: 0 mL / NET: 251.9 mL                              8.8    7.00  )-----------( 121      ( 24 Nov 2018 06:22 )             29.6       11-25    140  |  101  |  70<H>  ----------------------------<  183<H>  3.6   |  27  |  5.38<H>    Ca    7.8<L>      25 Nov 2018 05:37  Phos  3.3     11-25  Mg     1.9     11-25    TPro  x   /  Alb  2.5<L>  /  TBili  x   /  DBili  x   /  AST  x   /  ALT  x   /  AlkPhos  x   11-23                    MEDICATIONS  (STANDING):  amiodarone    Tablet 200 milliGRAM(s) Oral daily  cinacalcet 30 milliGRAM(s) Oral <User Schedule>  epoetin amee Injectable 8000 Unit(s) IV Push <User Schedule>  hydrocortisone sodium succinate Injectable 25 milliGRAM(s) IV Push every 6 hours  pantoprazole    Tablet 40 milliGRAM(s) Oral before breakfast  piperacillin/tazobactam IVPB. 3.375 Gram(s) IV Intermittent every 12 hours  sevelamer hydrochloride 800 milliGRAM(s) Oral three times a day  vancomycin    Solution 125 milliGRAM(s) Oral every 6 hours  warfarin 5 milliGRAM(s) Oral once    MEDICATIONS  (PRN):      DVT Prophylaxis: Coumadin    Advanced Directives:  Discussed with:    Visit Information:    ** Time is exclusive of billed procedures and/or teaching and/or routine family updates.
63y old M with PMHX of ESRD on HD, HFrEF, hypoparathyroidism, CAD with stent, meningitis due to cryptococcus, C. Diff, CAD, PVD, HTN, polycystic kidney disease s/p x3 transplants presents to the ED c/o  abd pain, HA, productive cough and muscle aches in the left shoulder. He was found to have a fever of 102F at 9PM. Denies n/v/d. +recent sick contacts- pt's daughter. Pt is a dialysis pt, last dialysis was yesterday. Pt's wife gave pt 1000mg Tylenol. He has a history of severe septic shock from Pneumococcous.      : Patient remains on pressors.  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  S/P colonoscopy: last done   AICD (automatic cardioverter/defibrillator) present:   H/O hernia repair  A-V fistula:  left UE  right arm ,  H/O kidney transplant: , ,       FAMILY HISTORY:  Family history of kidney disease (Mother)  Family history of colon cancer (Sibling)      Social Hx:    Allergies    No Known Allergies    Intolerances            ICU Vital Signs Last 24 Hrs  T(C): 36.6 (2018 09:00), Max: 37.6 (2018 10:11)  T(F): 97.8 (2018 09:00), Max: 99.6 (2018 10:11)  HR: 61 (2018 09:00) (60 - 77)  BP: 113/82 (2018 09:00) (83/48 - 122/84)  BP(mean): 89 (2018 09:00) (56 - 93)  ABP: --  ABP(mean): --  RR: 20 (2018 09:00) (7 - 21)  SpO2: 90% (2018 09:00) (78% - 100%)          I&O's Summary    2018 07:01  -  2018 07:00  --------------------------------------------------------  IN: 1141.8 mL / OUT: 250 mL / NET: 891.8 mL    2018 07:01  -  2018 09:51  --------------------------------------------------------  IN: 52.3 mL / OUT: 0 mL / NET: 52.3 mL                              8.6    6.75  )-----------( 83       ( 2018 07:41 )             28.5           142  |  102  |  47<H>  ----------------------------<  82  4.0   |  30  |  5.41<H>    Ca    7.8<L>      2018 07:41  Phos  2.8       Mg     1.5         TPro  6.1  /  Alb  2.8<L>  /  TBili  1.4<H>  /  DBili  x   /  AST  13<L>  /  ALT  19  /  AlkPhos  88  -22      CARDIAC MARKERS ( 2018 23:49 )  0.028 ng/mL / x     / 34 U/L / x     / x                Urinalysis Basic - ( 2018 06:09 )    Color: Yellow / Appearance: Clear / S.015 / pH: x  Gluc: x / Ketone: Negative  / Bili: Negative / Urobili: Negative mg/dL   Blood: x / Protein: 100 mg/dL / Nitrite: Negative   Leuk Esterase: Trace / RBC: x / WBC 3-5   Sq Epi: x / Non Sq Epi: x / Bacteria: x        MEDICATIONS  (STANDING):  amiodarone    Tablet 200 milliGRAM(s) Oral daily  cinacalcet 30 milliGRAM(s) Oral <User Schedule>  epoetin amee Injectable 8000 Unit(s) IV Push <User Schedule>  hydrocortisone sodium succinate Injectable 50 milliGRAM(s) IV Push every 6 hours  pantoprazole    Tablet 40 milliGRAM(s) Oral before breakfast  phenylephrine    Infusion 0.5 MICROgram(s)/kG/Min (11.906 mL/Hr) IV Continuous <Continuous>  piperacillin/tazobactam IVPB. 3.375 Gram(s) IV Intermittent every 12 hours  sevelamer hydrochloride 800 milliGRAM(s) Oral three times a day  vancomycin    Solution 125 milliGRAM(s) Oral every 6 hours    MEDICATIONS  (PRN):      DVT Prophylaxis: Inc IRN    Advanced Directives:  Discussed with:    Visit Information: 30 min    ** Time is exclusive of billed procedures and/or teaching and/or routine family updates.
CC: weakness (25 Nov 2018 11:24)    HPI: 63y old M with PMHX of ESRD on HD,  polycystic kidney disease s/p x3 transplants,  CAD with stent,  HFrEF, S/p AICD,  hypoparathyroidism,  meningitis due to cryptococcus, C. Diff,  PVD, HTN, adrenal insufficiency  presents to the ED c/o fever,  abd pain, HA, productive cough and muscle aches, no  n/v/d. +  sick contact,  his wife had  Diarrhea after they both ate lettuce salad. On arrival he is on 100% NRB with his SBP ~90.  He was admitted to ICU and started on pressors, IVF, IV Abxs , also was given stress dose steroids. BCX + klebsiella. Pt is on Work up negative for PNA  or UTI, no diarrhea. Hospitalist services  called as Pt is stable and ready for transfer to medical floor.          INTERVAL HPI/ OVERNIGHT EVENTS: Pt was seen and examined, states feels better and close to his normal. Denies CP or SOB, BP still low normal, but as per Pt runs usually  low 100s and even lower when laying down. Denies Dizziness or lightheadedness. OOB  and ambulates.     Vital Signs Last 24 Hrs  T(C): 36.6 (25 Nov 2018 09:45), Max: 36.7 (24 Nov 2018 15:00)  T(F): 97.8 (25 Nov 2018 09:45), Max: 98.1 (24 Nov 2018 20:00)  HR: 66 (25 Nov 2018 12:00) (63 - 86)  BP: 140/76 (25 Nov 2018 12:00) (93/61 - 142/84)  BP(mean): 92 (25 Nov 2018 12:00) (64 - 106)  RR: 20 (25 Nov 2018 12:00) (9 - 22)  SpO2: 96% (25 Nov 2018 10:00) (74% - 98%)      REVIEW OF SYSTEMS:  All other review of systems is negative unless indicated above.        PHYSICAL EXAM:  General: Well developed; well nourished; in no acute distress  Eyes: PERRLA, EOMI;   sclera  mildly icteric   Head: Normocephalic; atraumatic  ENMT: No nasal discharge; airway clear  Neck: Supple; non tender; no masses  Respiratory: Decreased Bs at bases,  No wheezes, rales or rhonchi  Cardiovascular: Regular rate and rhythm. S1 and S2 Normal; No murmurs  Gastrointestinal: Soft non-tender non-distended; Normal bowel sounds  Genitourinary: No suprapubic  tenderness  Extremities: Normal range of motion, no pedal edema, RUE with AVF.   Vascular: Peripheral pulses palpable 2+ bilaterally  Neurological: Alert and oriented x4, non focal   Skin: Warm and dry. No acute rash  Musculoskeletal: Normal muscle tone, without deformities  Psychiatric: Cooperative and appropriate        LABS:                       8.8    7.00  )-----------( 121      ( 24 Nov 2018 06:22 )             29.6     25 Nov 2018 05:37    140    |  101    |  70     ----------------------------<  183    3.6     |  27     |  5.38     Ca    7.8        25 Nov 2018 05:37  Phos  3.3       25 Nov 2018 05:37  Mg     1.9       25 Nov 2018 05:37    PT/INR - ( 25 Nov 2018 05:37 )   PT: 14.1 sec;   INR: 1.26 ratio     PTT - ( 25 Nov 2018 05:37 )  PTT:26.5 sec            MEDICATIONS  (STANDING):  amiodarone    Tablet 200 milliGRAM(s) Oral daily  cinacalcet 30 milliGRAM(s) Oral <User Schedule>  epoetin amee Injectable 8000 Unit(s) IV Push <User Schedule>  hydrocortisone sodium succinate Injectable 25 milliGRAM(s) IV Push every 12 hours  pantoprazole    Tablet 40 milliGRAM(s) Oral before breakfast  piperacillin/tazobactam IVPB. 3.375 Gram(s) IV Intermittent every 12 hours  sevelamer hydrochloride 800 milliGRAM(s) Oral three times a day with meals  vancomycin    Solution 125 milliGRAM(s) Oral every 6 hours  warfarin 5 milliGRAM(s) Oral once    MEDICATIONS  (PRN):      RADIOLOGY & ADDITIONAL TESTS:  < from: CT Abdomen and Pelvis No Cont (11.22.18 @ 07:30) >  EXAM:  CT ABDOMEN AND PELVIS                          EXAM:  CT CHEST                            PROCEDURE DATE:  11/22/2018          INTERPRETATION:  CLINICAL HISTORY:  63 years old, male; Signs and   symptoms; Fever; Shortness of breath; Patient HX: SOB. Fever unspecified   cause. Septic shock . Polycystic kidney disease with renal transplant.    TECHNIQUE:  Noncontrast chest, abdominal, and pelvic CTs were performed.    Axial computed tomography images of the chest without intravenous   contrast.  Coronal and sagittal reformatted images were created and   reviewed. MIP reconstructed images were created and reviewed.     Axial computed tomography images of the abdomen and pelvis without   intravenous contrast.  Coronal and sagittal reformatted images were   created and reviewed.  MIP reconstructed images were created and   reviewed.     COMPARISON: Chest CT dated 9/12/2014 1:10 PM. Abdominal/pelvic CT dated   11/26/2008.     FINDINGS:   CHEST CT:   Lines/tubes: Dual-lead left-sided pacemaker/AICD.   Lungs: Mild bibasilar dependent/passive atelectasis. There is a left   basilar consolidation with air bronchograms, new since chest CT of   9/12/14, with associate chronic bronchiectasis.   Pleural space:  Trace bilateral pleural effusions.    Heart:  Cardiomegaly. No pericardial effusion. Severe coronary artery   calcifications and/or stents, particularly of the left anterior   descending coronary artery.    Aorta/vasculature: Normal. No aortic aneurysm. Right subclavian vein   stent is noted.   Lymph nodes: Several mediastinal lymph nodes measuring up to 1 cm. no   lymphadenopathy.   Bones/joints: No acute bony pathology or fracture. Renal osteodystrophy   is noted.    Soft tissues: Unremarkable.     ABDOMINAL/PELVIC CT:  Limited noncontrast study.    ABDOMEN:    Liver:  Multiple low-attenuation densities with calcifications within   the liver similar to previous study, and likely associated with   polycystic kidney/hepatic disease. Liver is chronically enlarged.   Gallbladder and bile ducts:  Distended gallbladder with gallstones.   Chronically dilated CBD measuring up to 10.3 mm, without calcified   calculus. These findings are similar in appearance to A/P CT of 11/26/08.    Pancreas:  Atrophic pancreas with chronicdilated duct with multiple   peripheral calcifications in the central calcification measuring   approximately 1centimeter at the head of the pancreas, unchanged since   A/P CT of 11/26/08.    Spleen: Few low-attenuation areas in the spleen, unchangedfrom prior   exam.    Adrenals: Normal. No mass.    Kidneys and ureters:  Bilateral negative kidneys are not seen as   demonstrated on prior study. There is a transplanted kidney in the right   lower quadrant without any stones or hydroureteronephrosis. The   transplanted kidney there is mild parenchymal atrophy, advanced since A/P   CT of 11/26/08.   Stomach and bowel:  Extensive diverticulosis of the colon without CT   evidence of diverticulitis. Moderate fecal loading. Possible mild   thickening of the mid   and distal transverse colon is again noted.    Appendix: No evidence of appendicitis.     PELVIS:    Bladder:  Urinary bladder is not well-distended and appears to be   thickened likely secondary to nondistention. Remonstration of small right   bladder diverticulum.    Reproductive: Unremarkable as visualized.     ABDOMEN and PELVIS:    Intraperitoneal space:  Small ascites is present, as visualized on prior   A/P CT of 11/26/08. Mesenteric edema.    Bones/joints: No acute bony pathology or fracture. Renal osteodystrophy   is noted.    Soft tissues: Unremarkable.    Vasculature:  Extensive atherosclerosis.    Lymph nodes: Normal. No enlarged lymph nodes.     IMPRESSION:     CHEST CT:  1. Left basilar consolidation with air bronchograms and associated   chronic bronchiectasis, findings may represent atelectasis although   pneumonia cannot be fully excluded in the correct clinical setting.   2. Trace bilateral pleural effusions, with mild right basilar   dependent/passive atelectasis.    ABDOMINAL/PELVIC CT:  1. Polycystic liver disease is again noted.  2. Small amount of ascites, also present on A/P CT of 11/26/08.  3. Mild nonspecific colonic wall thickening of the distal   ascending/transverse/descending colon, similarin appearance to prior   study. Extensive diverticulosis without CT evidence of diverticulitis.   4. Distended gallbladder with gallstones and CBD dilation measuring up to   10 mm, similar in appearance to prior study.   5. Bilateral native kidneys are not seen. Transplanted kidney within the   right lower abdomen/pelvis, without hydroureteronephrosis or calculus.  6. Chronic pancreatic duct dilation and atrophy with multifocal pancreas   calcifications, similar in appearance to prior study.
Date of service: 18 @ 11:26    Sitting in chair in NAD  Off pressors  Still on steroids    ROS: no fever or chills; denies dizziness, no HA, no SOB or cough, no abdominal pain, no diarrhea or constipation; no dysuria, no legs pain, no rashes    MEDICATIONS  (STANDING):  amiodarone    Tablet 200 milliGRAM(s) Oral daily  cinacalcet 30 milliGRAM(s) Oral <User Schedule>  epoetin amee Injectable 8000 Unit(s) IV Push <User Schedule>  hydrocortisone sodium succinate Injectable 25 milliGRAM(s) IV Push every 12 hours  pantoprazole    Tablet 40 milliGRAM(s) Oral before breakfast  piperacillin/tazobactam IVPB. 3.375 Gram(s) IV Intermittent every 12 hours  sevelamer hydrochloride 800 milliGRAM(s) Oral three times a day with meals  vancomycin    Solution 125 milliGRAM(s) Oral every 6 hours  warfarin 5 milliGRAM(s) Oral once      Vital Signs Last 24 Hrs  T(C): 36.6 (2018 09:45), Max: 36.7 (2018 15:00)  T(F): 97.8 (2018 09:45), Max: 98.1 (2018 20:00)  HR: 79 (2018 09:00) (66 - 86)  BP: 142/84 (2018 09:00) (93/61 - 142/84)  BP(mean): 99 (2018 09:00) (64 - 105)  RR: 20 (2018 09:00) (11 - 22)  SpO2: 98% (2018 08:00) (74% - 98%)    Physical Exam:      Constitutional: frail looking  HEENT: NC/AT, EOMI, PERRLA, conjunctivae clear  Neck: supple; thyroid not palpable  Back: no tenderness  Respiratory: respiratory effort normal; clear to auscultation  Cardiovascular: S1S2 regular, no murmurs  Abdomen: soft, not tender, not distended, positive BS  Genitourinary: no suprapubic tenderness  Lymphatic: no LN palpable  Musculoskeletal: no muscle tenderness, no joint swelling or tenderness  Extremities: no pedal edema  Neurological/ Psychiatric: AxOx3, moving all extremities  Skin: no rashes; no palpable lesions    Labs: reviewed                        8.8    7.00  )-----------( 121      ( 2018 06:22 )             29.6     11-    140  |  101  |  70<H>  ----------------------------<  183<H>  3.6   |  27  |  5.38<H>    Ca    7.8<L>      2018 05:37  Phos  3.3     11-  Mg     1.9                             8.6    6.75  )-----------( 83       ( 2018 07:41 )             28.5     11-    142  |  102  |  47<H>  ----------------------------<  82  4.0   |  30  |  5.41<H>    Ca    7.8<L>      2018 07:41  Phos  2.8       Mg     1.5         TPro  6.1  /  Alb  2.8<L>  /  TBili  1.4<H>  /  DBili  x   /  AST  13<L>  /  ALT  19  /  AlkPhos  88       LIVER FUNCTIONS - ( 2018 07:41 )  Alb: 2.8 g/dL / Pro: 6.1 gm/dL / ALK PHOS: 88 U/L / ALT: 19 U/L / AST: 13 U/L / GGT: x           Urinalysis Basic - ( 2018 06:09 )    Color: Yellow / Appearance: Clear / S.015 / pH: x  Gluc: x / Ketone: Negative  / Bili: Negative / Urobili: Negative mg/dL   Blood: x / Protein: 100 mg/dL / Nitrite: Negative   Leuk Esterase: Trace / RBC: x / WBC 3-5   Sq Epi: x / Non Sq Epi: x / Bacteria: x      Culture - Blood (collected 2018 13:35)  Source: .Blood Blood  Preliminary Report (2018 18:03):    No growth to date.    Culture - Urine (collected 2018 06:09)  Source: .Urine None  Final Report (2018 12:49):    <10,000 CFU/ml Normal Urogenital too present    Culture - Blood (collected 2018 23:49)  Source: .Blood None  Gram Stain (2018 18:52):    Growth in aerobic bottle: Gram Negative Rods    Growth in anaerobic bottle: Gram Negative Rods  Final Report (2018 18:23):    Growth in aerobic and anaerobic bottles: Klebsiella pneumoniae    See previous culture [id number]    Culture - Blood (collected 2018 23:29)  Source: .Blood None  Gram Stain (2018 19:03):    Growth in aerobic bottle: Gram Negative Rods    Growth in anaerobic bottle: Gram Negative Rods  Final Report (2018 18:23):    Growth in aerobic and anaerobic bottles: Klebsiella pneumoniae  Organism: Blood Culture PCR  Klebsiella pneumoniae (2018 18:23)  Organism: Klebsiella pneumoniae (2018 18:23)      -  Amikacin: S <=8      -  Ampicillin: R 16 These ampicillin results predict results for amoxicillin      -  Ampicillin/Sulbactam: S <=4/2      -  Aztreonam: S <=4      -  Cefazolin: S <=2      -  Cefepime: S <=2      -  Cefoxitin: I 16      -  Ceftriaxone: S <=1 Enterobacter, Citrobacter, and Serratia may develop resistance during prolonged therapy      -  Ciprofloxacin: S <=0.5      -  Ertapenem: S <=0.5      -  Gentamicin: S <=1      -  Imipenem: S <=1      -  Levofloxacin: S <=1      -  Meropenem: S <=1      -  Piperacillin/Tazobactam: S <=8      -  Tobramycin: S <=2      -  Trimethoprim/Sulfamethoxazole: S <=0.5/9.5      Method Type: NAEL  Organism: Blood Culture PCR (2018 18:23)      -  Klebsiella pneumoniae: Detec      Method Type: PCR          Radiology: all available radiological tests reviewed    < from: CT Abdomen and Pelvis No Cont (18 @ 07:30) >  1. Polycystic liver disease is again noted.  2. Small amount of ascites, also present on A/P CT of 08.  3. Mild nonspecific colonic wall thickening of the distal   ascending/transverse/descending colon, similarin appearance to prior   study. Extensive diverticulosis without CT evidence of diverticulitis.   4. Distended gallbladder with gallstones and CBD dilation measuring up to   10 mm, similar in appearance to prior study.   5. Bilateral native kidneys are not seen. Transplanted kidney within the   right lower abdomen/pelvis, without hydroureteronephrosis or calculus.  6. Chronic pancreatic duct dilation and atrophy with multifocal pancreas   calcifications, similar in appearance to prior study.    < end of copied text >      Advanced directives addressed: full resuscitation
Date of service: 18 @ 13:20    Events noted  Reported with bacteremia  BP is improving  No diarrhea    ROS: no fever or chills; denies dizziness, no HA, no SOB or cough, no further abdominal pain,  no dysuria, no legs pain, no rashes    MEDICATIONS  (STANDING):  amiodarone    Tablet 200 milliGRAM(s) Oral daily  cinacalcet 30 milliGRAM(s) Oral <User Schedule>  epoetin amee Injectable 8000 Unit(s) IV Push <User Schedule>  hydrocortisone sodium succinate Injectable 50 milliGRAM(s) IV Push every 6 hours  pantoprazole    Tablet 40 milliGRAM(s) Oral before breakfast  phenylephrine    Infusion 0.5 MICROgram(s)/kG/Min (11.906 mL/Hr) IV Continuous <Continuous>  piperacillin/tazobactam IVPB. 3.375 Gram(s) IV Intermittent every 12 hours  sevelamer hydrochloride 800 milliGRAM(s) Oral three times a day  vancomycin    Solution 125 milliGRAM(s) Oral every 6 hours      Vital Signs Last 24 Hrs  T(C): 36.6 (2018 11:36), Max: 37 (2018 01:00)  T(F): 97.9 (2018 11:36), Max: 98.6 (2018 01:00)  HR: 789 (2018 13:00) (60 - 789)  BP: 110/56 (2018 13:00) (83/48 - 122/84)  BP(mean): 89 (2018 09:00) (56 - 93)  RR: 18 (2018 13:00) (7 - 21)  SpO2: 90% (2018 09:00) (78% - 100%)    Physical Exam:      Constitutional: frail looking  HEENT: NC/AT, EOMI, PERRLA, conjunctivae clear  Neck: supple; thyroid not palpable  Back: no tenderness  Respiratory: respiratory effort normal; clear to auscultation  Cardiovascular: S1S2 regular, no murmurs  Abdomen: soft, not tender, not distended, positive BS  Genitourinary: no suprapubic tenderness  Lymphatic: no LN palpable  Musculoskeletal: no muscle tenderness, no joint swelling or tenderness  Extremities: no pedal edema  Neurological/ Psychiatric: AxOx3, moving all extremities  Skin: no rashes; no palpable lesions    Labs: reviewed                        8.8    9.33  )-----------( 98       ( 2018 11:20 )             28.7     11-    137  |  100  |  75<H>  ----------------------------<  252<H>  4.5   |  23  |  6.88<H>    Ca    7.3<L>      2018 11:20  Phos  4.8     11-  Mg     1.5         TPro  x   /  Alb  2.5<L>  /  TBili  x   /  DBili  x   /  AST  x   /  ALT  x   /  AlkPhos  x                           8.6    6.75  )-----------( 83       ( 2018 07:41 )             28.5     -    142  |  102  |  47<H>  ----------------------------<  82  4.0   |  30  |  5.41<H>    Ca    7.8<L>      2018 07:41  Phos  2.8     -  Mg     1.5         TPro  6.1  /  Alb  2.8<L>  /  TBili  1.4<H>  /  DBili  x   /  AST  13<L>  /  ALT  19  /  AlkPhos  88       LIVER FUNCTIONS - ( 2018 07:41 )  Alb: 2.8 g/dL / Pro: 6.1 gm/dL / ALK PHOS: 88 U/L / ALT: 19 U/L / AST: 13 U/L / GGT: x           Urinalysis Basic - ( 2018 06:09 )    Color: Yellow / Appearance: Clear / S.015 / pH: x  Gluc: x / Ketone: Negative  / Bili: Negative / Urobili: Negative mg/dL   Blood: x / Protein: 100 mg/dL / Nitrite: Negative   Leuk Esterase: Trace / RBC: x / WBC 3-5   Sq Epi: x / Non Sq Epi: x / Bacteria: x      Culture - Urine (collected 2018 06:09)  Source: .Urine None  Final Report (2018 12:49):    <10,000 CFU/ml Normal Urogenital too present    Culture - Blood (collected 2018 23:49)  Source: .Blood None  Gram Stain (2018 18:52):    Growth in aerobic bottle: Gram Negative Rods    Growth in anaerobic bottle: Gram Negative Rods  Preliminary Report (2018 18:52):    Growth in aerobic bottle: Gram Negative Rods    Growth in anaerobic bottle: Gram Negative Rods    Culture - Blood (collected 2018 23:29)  Source: .Blood None  Gram Stain (2018 19:03):    Growth in aerobic bottle: Gram Negative Rods    Growth in anaerobic bottle: Gram Negative Rods  Preliminary Report (2018 19:04):    Growth in aerobic bottle: Gram Negative Rods    Growth in anaerobic bottle: Gram Negative Rods  Organism: Blood Culture PCR (2018 20:11)      -  Klebsiella pneumoniae: Detec      Method Type: PCR          Radiology: all available radiological tests reviewed    < from: CT Abdomen and Pelvis No Cont (18 @ 07:30) >  1. Polycystic liver disease is again noted.  2. Small amount of ascites, also present on A/P CT of 08.  3. Mild nonspecific colonic wall thickening of the distal   ascending/transverse/descending colon, similarin appearance to prior   study. Extensive diverticulosis without CT evidence of diverticulitis.   4. Distended gallbladder with gallstones and CBD dilation measuring up to   10 mm, similar in appearance to prior study.   5. Bilateral native kidneys are not seen. Transplanted kidney within the   right lower abdomen/pelvis, without hydroureteronephrosis or calculus.  6. Chronic pancreatic duct dilation and atrophy with multifocal pancreas   calcifications, similar in appearance to prior study.    < end of copied text >      Advanced directives addressed: full resuscitation
Date of service: 18 @ 14:33    Underwent HD today  Feels better  Denies pain    ROS: no fever or chills; denies dizziness, no HA, no SOB or cough, no abdominal pain, no diarrhea or constipation; no dysuria, no legs pain, no rashes    MEDICATIONS  (STANDING):  cinacalcet 30 milliGRAM(s) Oral <User Schedule>  epoetin amee Injectable 8000 Unit(s) IV Push <User Schedule>  pantoprazole    Tablet 40 milliGRAM(s) Oral before breakfast  piperacillin/tazobactam IVPB. 3.375 Gram(s) IV Intermittent every 12 hours  predniSONE   Tablet 15 milliGRAM(s) Oral daily  sevelamer hydrochloride 800 milliGRAM(s) Oral three times a day with meals  vancomycin    Solution 125 milliGRAM(s) Oral every 6 hours  warfarin 3 milliGRAM(s) Oral once      Vital Signs Last 24 Hrs  T(C): 36.2 (2018 11:46), Max: 36.8 (2018 20:55)  T(F): 97.2 (2018 11:46), Max: 98.2 (2018 20:55)  HR: 80 (2018 11:46) (45 - 80)  BP: 136/71 (2018 11:46) (83/43 - 136/71)  BP(mean): --  RR: 16 (2018 11:46) (16 - 18)  SpO2: 99% (2018 05:03) (98% - 99%)    Physical Exam:      Constitutional: frail looking  HEENT: NC/AT, EOMI, PERRLA, conjunctivae clear  Neck: supple; thyroid not palpable  Back: no tenderness  Respiratory: respiratory effort normal; clear to auscultation  Cardiovascular: S1S2 regular, no murmurs  Abdomen: soft, not tender, not distended, positive BS  Genitourinary: no suprapubic tenderness  Lymphatic: no LN palpable  Musculoskeletal: no muscle tenderness, no joint swelling or tenderness  Extremities: no pedal edema  Neurological/ Psychiatric: AxOx3, moving all extremities  Skin: no rashes; no palpable lesions    Labs: reviewed                        8.8    7.00  )-----------( 121      ( 2018 06:22 )             29.6     11-25    140  |  101  |  70<H>  ----------------------------<  183<H>  3.6   |  27  |  5.38<H>    Ca    7.8<L>      2018 05:37  Phos  3.3     11-25  Mg     1.9                             8.6    6.75  )-----------( 83       ( 2018 07:41 )             28.5         142  |  102  |  47<H>  ----------------------------<  82  4.0   |  30  |  5.41<H>    Ca    7.8<L>      2018 07:41  Phos  2.8       Mg     1.5         TPro  6.1  /  Alb  2.8<L>  /  TBili  1.4<H>  /  DBili  x   /  AST  13<L>  /  ALT  19  /  AlkPhos  88       LIVER FUNCTIONS - ( 2018 07:41 )  Alb: 2.8 g/dL / Pro: 6.1 gm/dL / ALK PHOS: 88 U/L / ALT: 19 U/L / AST: 13 U/L / GGT: x           Urinalysis Basic - ( 2018 06:09 )    Color: Yellow / Appearance: Clear / S.015 / pH: x  Gluc: x / Ketone: Negative  / Bili: Negative / Urobili: Negative mg/dL   Blood: x / Protein: 100 mg/dL / Nitrite: Negative   Leuk Esterase: Trace / RBC: x / WBC 3-5   Sq Epi: x / Non Sq Epi: x / Bacteria: x      Culture - Blood (collected 2018 13:35)  Source: .Blood Blood  Preliminary Report (2018 18:03):    No growth to date.    Culture - Urine (collected 2018 06:09)  Source: .Urine None  Final Report (2018 12:49):    <10,000 CFU/ml Normal Urogenital too present    Culture - Blood (collected 2018 23:49)  Source: .Blood None  Gram Stain (2018 18:52):    Growth in aerobic bottle: Gram Negative Rods    Growth in anaerobic bottle: Gram Negative Rods  Final Report (2018 18:23):    Growth in aerobic and anaerobic bottles: Klebsiella pneumoniae    See previous culture [id number]    Culture - Blood (collected 2018 23:29)  Source: .Blood None  Gram Stain (2018 19:03):    Growth in aerobic bottle: Gram Negative Rods    Growth in anaerobic bottle: Gram Negative Rods  Final Report (2018 18:23):    Growth in aerobic and anaerobic bottles: Klebsiella pneumoniae  Organism: Blood Culture PCR  Klebsiella pneumoniae (2018 18:23)  Organism: Klebsiella pneumoniae (2018 18:23)      -  Amikacin: S <=8      -  Ampicillin: R 16 These ampicillin results predict results for amoxicillin      -  Ampicillin/Sulbactam: S <=4/2      -  Aztreonam: S <=4      -  Cefazolin: S <=2      -  Cefepime: S <=2      -  Cefoxitin: I 16      -  Ceftriaxone: S <=1 Enterobacter, Citrobacter, and Serratia may develop resistance during prolonged therapy      -  Ciprofloxacin: S <=0.5      -  Ertapenem: S <=0.5      -  Gentamicin: S <=1      -  Imipenem: S <=1      -  Levofloxacin: S <=1      -  Meropenem: S <=1      -  Piperacillin/Tazobactam: S <=8      -  Tobramycin: S <=2      -  Trimethoprim/Sulfamethoxazole: S <=0.5/9.5      Method Type: NAEL  Organism: Blood Culture PCR (2018 18:23)      -  Klebsiella pneumoniae: Detec      Method Type: PCR          Radiology: all available radiological tests reviewed    < from: CT Abdomen and Pelvis No Cont (18 @ 07:30) >  1. Polycystic liver disease is again noted.  2. Small amount of ascites, also present on A/P CT of 08.  3. Mild nonspecific colonic wall thickening of the distal   ascending/transverse/descending colon, similarin appearance to prior   study. Extensive diverticulosis without CT evidence of diverticulitis.   4. Distended gallbladder with gallstones and CBD dilation measuring up to   10 mm, similar in appearance to prior study.   5. Bilateral native kidneys are not seen. Transplanted kidney within the   right lower abdomen/pelvis, without hydroureteronephrosis or calculus.  6. Chronic pancreatic duct dilation and atrophy with multifocal pancreas   calcifications, similar in appearance to prior study.    < end of copied text >      Advanced directives addressed: full resuscitation
Patient is a 63y Male who reports no complaints overnight. Feels much better, thaddeus off.     REVIEW OF SYSTEMS:    CONSTITUTIONAL: improved weakness, fevers or chills  RESPIRATORY: No cough, wheezing, hemoptysis; No shortness of breath  CARDIOVASCULAR: No chest pain or palpitations  GENITOURINARY: No dysuria, frequency or hematuria  All other review of systems is negative unless indicated above.    MEDICATIONS  (STANDING):  amiodarone    Tablet 200 milliGRAM(s) Oral daily  cinacalcet 30 milliGRAM(s) Oral <User Schedule>  epoetin amee Injectable 8000 Unit(s) IV Push <User Schedule>  hydrocortisone sodium succinate Injectable 25 milliGRAM(s) IV Push every 6 hours  pantoprazole    Tablet 40 milliGRAM(s) Oral before breakfast  phenylephrine    Infusion 0.5 MICROgram(s)/kG/Min (11.906 mL/Hr) IV Continuous <Continuous>  piperacillin/tazobactam IVPB. 3.375 Gram(s) IV Intermittent every 12 hours  sevelamer hydrochloride 800 milliGRAM(s) Oral three times a day  vancomycin    Solution 125 milliGRAM(s) Oral every 6 hours  warfarin 3 milliGRAM(s) Oral once    MEDICATIONS  (PRN):        T(C): , Max: 37.4 (11-23-18 @ 19:00)  T(F): , Max: 99.4 (11-23-18 @ 19:00)  HR: 88 (11-24-18 @ 10:25)  BP: 137/78 (11-24-18 @ 10:25)  BP(mean): 93 (11-24-18 @ 10:25)  RR: 15 (11-24-18 @ 10:25)  SpO2: 99% (11-24-18 @ 08:00)  Wt(kg): --    11-23 @ 07:01  -  11-24 @ 07:00  --------------------------------------------------------  IN: 580.7 mL / OUT: 0 mL / NET: 580.7 mL    11-24 @ 07:01  -  11-24 @ 11:28  --------------------------------------------------------  IN: 11.9 mL / OUT: 0 mL / NET: 11.9 mL      PHYSICAL EXAM:    Constitutional: NAD,frail  HEENT: PERRLA, EOMI,  MMM  Neck: No LAD, No JVD  Respiratory: good aeration  Cardiovascular: S1 and S2, RRR  Gastrointestinal: BS+, soft, NT/ND  Extremities: No peripheral edema  Neurological: A/O x 3, no focal deficits  Psychiatric: Normal mood, normal affect  : No Amaya  Skin: No rashes  Access: avf        LABS:                        8.8    7.00  )-----------( 121      ( 24 Nov 2018 06:22 )             29.6     24 Nov 2018 06:22    139    |  101    |  44     ----------------------------<  200    4.0     |  28     |  4.42   23 Nov 2018 11:20    137    |  100    |  75     ----------------------------<  252    4.5     |  23     |  6.88   22 Nov 2018 07:41    142    |  102    |  47     ----------------------------<  82     4.0     |  30     |  5.41   21 Nov 2018 23:49    140    |  99     |  44     ----------------------------<  108    4.0     |  32     |  5.07     Ca    7.7        24 Nov 2018 06:22  Ca    7.3        23 Nov 2018 11:20  Ca    7.8        22 Nov 2018 07:41  Ca    8.0        21 Nov 2018 23:49  Phos  3.4       24 Nov 2018 06:22  Phos  4.8       23 Nov 2018 11:20  Phos  2.8       22 Nov 2018 07:41  Phos  2.9       21 Nov 2018 23:49  Mg     2.0       24 Nov 2018 06:22  Mg     1.5       22 Nov 2018 07:41  Mg     1.6       21 Nov 2018 23:49    TPro  x      /  Alb  2.5    /  TBili  x      /  DBili  x      /  AST  x      /  ALT  x      /  AlkPhos  x      23 Nov 2018 11:20  TPro  6.1    /  Alb  2.8    /  TBili  1.4    /  DBili  x      /  AST  13     /  ALT  19     /  AlkPhos  88     22 Nov 2018 07:41  TPro  6.0    /  Alb  2.9    /  TBili  1.0    /  DBili  x      /  AST  11     /  ALT  19     /  AlkPhos  88     21 Nov 2018 23:49          Urine Studies:          RADIOLOGY & ADDITIONAL STUDIES:
Patient is a 63y Male who reports no complaints overnight. Seen at HD, no complaints. Feels much better    REVIEW OF SYSTEMS:    CONSTITUTIONAL: No weakness, fevers or chills  RESPIRATORY: No cough, wheezing, hemoptysis; No shortness of breath  CARDIOVASCULAR: No chest pain or palpitations  GENITOURINARY: No dysuria, frequency or hematuria  All other review of systems is negative unless indicated above.    MEDICATIONS  (STANDING):  amiodarone    Tablet 200 milliGRAM(s) Oral daily  cinacalcet 30 milliGRAM(s) Oral <User Schedule>  epoetin amee Injectable 8000 Unit(s) IV Push <User Schedule>  hydrocortisone sodium succinate Injectable 50 milliGRAM(s) IV Push every 6 hours  pantoprazole    Tablet 40 milliGRAM(s) Oral before breakfast  phenylephrine    Infusion 0.5 MICROgram(s)/kG/Min (11.906 mL/Hr) IV Continuous <Continuous>  piperacillin/tazobactam IVPB. 3.375 Gram(s) IV Intermittent every 12 hours  sevelamer hydrochloride 800 milliGRAM(s) Oral three times a day  vancomycin    Solution 125 milliGRAM(s) Oral every 6 hours    MEDICATIONS  (PRN):        T(C): , Max: 37 (18 @ 01:00)  T(F): , Max: 98.6 (18 @ 01:00)  HR: 62 (18 @ 14:25)  BP: 91/52 (18 @ 14:25)  BP(mean): 89 (18 @ 09:00)  RR: 18 (18 @ 14:25)  SpO2: 90% (18 @ 09:00)  Wt(kg): --     @ 07:01  -   @ 07:00  --------------------------------------------------------  IN: 1141.8 mL / OUT: 250 mL / NET: 891.8 mL     @ 07:01  -   @ 14:59  --------------------------------------------------------  IN: 52.3 mL / OUT: 0 mL / NET: 52.3 mL          PHYSICAL EXAM:    Constitutional: NAD, frail  HEENT: PERRLA, EOMI,  MMM  Neck: No LAD, No JVD  Respiratory: good aeration, scatt ronchi  Cardiovascular: S1 and S2, RRR  Gastrointestinal: BS+, soft, NT/ND  Extremities: No peripheral edema  Neurological: A/O x 3, no focal deficits  Psychiatric: Normal mood, normal affect  : No Amaya  Skin: No rashes  Access: avf        LABS:                        8.8    9.33  )-----------( 98       ( 2018 11:20 )             28.7     2018 11:20    137    |  100    |  75     ----------------------------<  252    4.5     |  23     |  6.88   2018 07:41    142    |  102    |  47     ----------------------------<  82     4.0     |  30     |  5.41   2018 23:49    140    |  99     |  44     ----------------------------<  108    4.0     |  32     |  5.07     Ca    7.3        2018 11:20  Ca    7.8        2018 07:41  Ca    8.0        2018 23:49  Phos  4.8       2018 11:20  Phos  2.8       2018 07:41  Phos  2.9       2018 23:49  Mg     1.5       2018 07:41  Mg     1.6       2018 23:49    TPro  x      /  Alb  2.5    /  TBili  x      /  DBili  x      /  AST  x      /  ALT  x      /  AlkPhos  x      2018 11:20  TPro  6.1    /  Alb  2.8    /  TBili  1.4    /  DBili  x      /  AST  13     /  ALT  19     /  AlkPhos  88     2018 07:41  TPro  6.0    /  Alb  2.9    /  TBili  1.0    /  DBili  x      /  AST  11     /  ALT  19     /  AlkPhos  88     2018 23:49          Urine Studies:  Urinalysis Basic - ( 2018 06:09 )    Color: Yellow / Appearance: Clear / S.015 / pH: x  Gluc: x / Ketone: Negative  / Bili: Negative / Urobili: Negative mg/dL   Blood: x / Protein: 100 mg/dL / Nitrite: Negative   Leuk Esterase: Trace / RBC: x / WBC 3-5   Sq Epi: x / Non Sq Epi: x / Bacteria: x            RADIOLOGY & ADDITIONAL STUDIES:
Patient is a 63y Male who reports no complaints overnight. Seen on HD, eager to leave    REVIEW OF SYSTEMS:    CONSTITUTIONAL: stable weakness, no fevers or chills  RESPIRATORY: No cough, wheezing, hemoptysis; No shortness of breath  CARDIOVASCULAR: No chest pain or palpitations  GENITOURINARY: No dysuria, frequency or hematuria  All other review of systems is negative unless indicated above.    MEDICATIONS  (STANDING):  amiodarone    Tablet 200 milliGRAM(s) Oral daily  cinacalcet 30 milliGRAM(s) Oral <User Schedule>  epoetin amee Injectable 8000 Unit(s) IV Push <User Schedule>  hydrocortisone sodium succinate Injectable 25 milliGRAM(s) IV Push every 12 hours  pantoprazole    Tablet 40 milliGRAM(s) Oral before breakfast  piperacillin/tazobactam IVPB. 3.375 Gram(s) IV Intermittent every 12 hours  sevelamer hydrochloride 800 milliGRAM(s) Oral three times a day with meals  vancomycin    Solution 125 milliGRAM(s) Oral every 6 hours    MEDICATIONS  (PRN):        T(C): , Max: 36.8 (11-25-18 @ 20:55)  T(F): , Max: 98.2 (11-25-18 @ 20:55)  HR: 54 (11-26-18 @ 10:09)  BP: 103/60 (11-26-18 @ 10:09)  BP(mean): 92 (11-25-18 @ 12:00)  RR: 16 (11-26-18 @ 10:09)  SpO2: 99% (11-26-18 @ 05:03)  Wt(kg): --        PHYSICAL EXAM:    Constitutional: NAD  HEENT: PERRLA, EOMI,  MMM  Neck: No LAD, No JVD  Respiratory: good aeration  Cardiovascular: S1 and S2, RRR  Gastrointestinal: BS+, soft, NT/ND  Extremities: No peripheral edema  Neurological: A/O x 3, no focal deficits  Psychiatric: Normal mood, normal affect  : No Amaya  Skin: No rashes  Access: avf        LABS:                        9.1    6.01  )-----------( 86       ( 26 Nov 2018 08:10 )             29.0     26 Nov 2018 08:10    139    |  100    |  88     ----------------------------<  275    3.8     |  23     |  6.18   25 Nov 2018 05:37    140    |  101    |  70     ----------------------------<  183    3.6     |  27     |  5.38   24 Nov 2018 06:22    139    |  101    |  44     ----------------------------<  200    4.0     |  28     |  4.42   23 Nov 2018 11:20    137    |  100    |  75     ----------------------------<  252    4.5     |  23     |  6.88     Ca    7.7        26 Nov 2018 08:10  Ca    7.8        25 Nov 2018 05:37  Ca    7.7        24 Nov 2018 06:22  Ca    7.3        23 Nov 2018 11:20  Phos  3.4       26 Nov 2018 08:10  Phos  3.3       25 Nov 2018 05:37  Phos  3.4       24 Nov 2018 06:22  Phos  4.8       23 Nov 2018 11:20  Mg     1.8       26 Nov 2018 08:10  Mg     1.9       25 Nov 2018 05:37  Mg     2.0       24 Nov 2018 06:22    TPro  x      /  Alb  2.5    /  TBili  x      /  DBili  x      /  AST  x      /  ALT  x      /  AlkPhos  x      23 Nov 2018 11:20          Urine Studies:          RADIOLOGY & ADDITIONAL STUDIES:
Patient is a 63y Male who reports no complaints overnight. Sitting in chair, feels baseline    REVIEW OF SYSTEMS:    CONSTITUTIONAL: No weakness, fevers or chills  RESPIRATORY: No cough, wheezing, hemoptysis; No shortness of breath  CARDIOVASCULAR: No chest pain or palpitations  GENITOURINARY: No dysuria, frequency or hematuria  All other review of systems is negative unless indicated above.    MEDICATIONS  (STANDING):  amiodarone    Tablet 200 milliGRAM(s) Oral daily  cinacalcet 30 milliGRAM(s) Oral <User Schedule>  epoetin amee Injectable 8000 Unit(s) IV Push <User Schedule>  hydrocortisone sodium succinate Injectable 25 milliGRAM(s) IV Push every 12 hours  pantoprazole    Tablet 40 milliGRAM(s) Oral before breakfast  piperacillin/tazobactam IVPB. 3.375 Gram(s) IV Intermittent every 12 hours  sevelamer hydrochloride 800 milliGRAM(s) Oral three times a day with meals  vancomycin    Solution 125 milliGRAM(s) Oral every 6 hours  warfarin 5 milliGRAM(s) Oral once    MEDICATIONS  (PRN):        T(C): , Max: 36.7 (11-24-18 @ 15:00)  T(F): , Max: 98.1 (11-24-18 @ 20:00)  HR: 79 (11-25-18 @ 09:00)  BP: 142/84 (11-25-18 @ 09:00)  BP(mean): 99 (11-25-18 @ 09:00)  RR: 20 (11-25-18 @ 09:00)  SpO2: 98% (11-25-18 @ 08:00)  Wt(kg): --    11-24 @ 07:01  -  11-25 @ 07:00  --------------------------------------------------------  IN: 251.9 mL / OUT: 0 mL / NET: 251.9 mL          PHYSICAL EXAM:    Constitutional: NAD, well-groomed, well-developed  HEENT: PERRLA, EOMI,  MMM  Neck: No LAD, No JVD  Respiratory: CTAB  Cardiovascular: S1 and S2, RRR  Gastrointestinal: BS+, soft, NT/ND  Extremities: No peripheral edema  Neurological: A/O x 3, no focal deficits  Psychiatric: Normal mood, normal affect  : No Amaya  Skin: No rashes  Access: Not applicable        LABS:                        8.8    7.00  )-----------( 121      ( 24 Nov 2018 06:22 )             29.6     25 Nov 2018 05:37    140    |  101    |  70     ----------------------------<  183    3.6     |  27     |  5.38   24 Nov 2018 06:22    139    |  101    |  44     ----------------------------<  200    4.0     |  28     |  4.42   23 Nov 2018 11:20    137    |  100    |  75     ----------------------------<  252    4.5     |  23     |  6.88   22 Nov 2018 07:41    142    |  102    |  47     ----------------------------<  82     4.0     |  30     |  5.41   21 Nov 2018 23:49    140    |  99     |  44     ----------------------------<  108    4.0     |  32     |  5.07     Ca    7.8        25 Nov 2018 05:37  Ca    7.7        24 Nov 2018 06:22  Ca    7.3        23 Nov 2018 11:20  Ca    7.8        22 Nov 2018 07:41  Ca    8.0        21 Nov 2018 23:49  Phos  3.3       25 Nov 2018 05:37  Phos  3.4       24 Nov 2018 06:22  Phos  4.8       23 Nov 2018 11:20  Phos  2.8       22 Nov 2018 07:41  Phos  2.9       21 Nov 2018 23:49  Mg     1.9       25 Nov 2018 05:37  Mg     2.0       24 Nov 2018 06:22  Mg     1.5       22 Nov 2018 07:41  Mg     1.6       21 Nov 2018 23:49    TPro  x      /  Alb  2.5    /  TBili  x      /  DBili  x      /  AST  x      /  ALT  x      /  AlkPhos  x      23 Nov 2018 11:20  TPro  6.1    /  Alb  2.8    /  TBili  1.4    /  DBili  x      /  AST  13     /  ALT  19     /  AlkPhos  88     22 Nov 2018 07:41  TPro  6.0    /  Alb  2.9    /  TBili  1.0    /  DBili  x      /  AST  11     /  ALT  19     /  AlkPhos  88     21 Nov 2018 23:49          Urine Studies:          RADIOLOGY & ADDITIONAL STUDIES:

## 2018-11-26 NOTE — PROGRESS NOTE ADULT - ASSESSMENT
62 with CM, Hypothyroid, CAD, PCKD, ESRD s/p failed transplant on HD now M/W/F here with sepsis.     ESRD  -HD TIW, complete treatment now as ordered  -K protocol, UF as toleratedM  -SACHIN protocol    sepsis/hypotension  -IV abx renally dosed  -ID following and optimizing therapy    MBD  -Oral sensipar, low phos diet    d/c with HD staff

## 2018-11-26 NOTE — PROGRESS NOTE ADULT - PROVIDER SPECIALTY LIST ADULT
Critical Care
Critical Care
Hospitalist
Infectious Disease
Nephrology
Infectious Disease
Critical Care

## 2018-11-26 NOTE — DISCHARGE NOTE ADULT - MEDICATION SUMMARY - MEDICATIONS TO STOP TAKING
I will STOP taking the medications listed below when I get home from the hospital:    losartan 25 mg oral tablet  -- 1 tab(s) by mouth once a day (at bedtime)    Coreg 3.125 mg oral tablet  -- 1 tab(s) by mouth once a day    carvedilol 6.25 mg oral tablet  -- 1 tab(s) by mouth once a day (at bedtime)    Augmentin 875 mg-125 mg oral tablet  -- 1 tab(s) by mouth every 12 hours   -- Finish all this medication unless otherwise directed by prescriber.  Take with food or milk.

## 2018-11-26 NOTE — DISCHARGE NOTE ADULT - MEDICATION SUMMARY - MEDICATIONS TO CHANGE
I will SWITCH the dose or number of times a day I take the medications listed below when I get home from the hospital:    Sensipar 30 mg oral tablet  -- 1 tab(s) by mouth 4 times a week MONDAY WEDNESDAY FRIDAY AND SATURDAY AT NIGHT    predniSONE 5 mg oral tablet  -- 1 tab(s) by mouth once a day FOR ADRENAL INSUFFICIENCY     **PLEASE RESUME THIS MEDICATION AFTER YOU COMPLETE THE 6DAYS OF PREDNISONE TAPER

## 2018-11-26 NOTE — DISCHARGE NOTE ADULT - HOSPITAL COURSE
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 Diff,  PVD, HTN, adrenal insufficiency  presents to the ED c/o fever,  abd pain, HA, productive cough and muscle aches, no  n/v/d. +  sick contact,  his wife had  Diarrhea after they both ate lettuce salad. On arrival he is on 100% NRB with his SBP ~90.  He was admitted to ICU and started on pressors, IVF, IV Abxs , also was given stress dose steroids. BCX + klebsiella. Pt is on Work up negative for PNA  or UTI, no diarrhea. Hospitalist services  called as Pt is stable and ready for transfer to medical floor.   Pt had HD today. BP stable. No fevers. D/w DR Srivastava, ok to switch to oral Abxs.  Pt was seen and examined, reports feels well and wants to go home. Mentioned  that has mild periumbilical pain, reports that chronic on/off, relieved by Tylenol. Has umbilical hernia. + flatus and  stool. D/c planning, meds and outpt follow up discussed       Vital Signs Last 24 Hrs  T(C): 36.6 (26 Nov 2018 14:50), Max: 36.8 (25 Nov 2018 20:55)  T(F): 97.8 (26 Nov 2018 14:50), Max: 98.2 (25 Nov 2018 20:55)  HR: 62 (26 Nov 2018 14:50) (45 - 80)  BP: 114/55 (26 Nov 2018 14:50) (83/43 - 136/71)  RR: 18 (26 Nov 2018 14:50) (16 - 18)  SpO2: 97% (26 Nov 2018 14:50) (97% - 99%)    PHYSICAL EXAM:  General: Well developed; well nourished; in no acute distress  Eyes: PERRLA, EOMI;   sclera  mildly icteric   Head: Normocephalic; atraumatic  ENMT: No nasal discharge; airway clear  Neck: Supple; non tender; no masses  Respiratory: Decreased Bs at bases,  No wheezes, rales or rhonchi  Cardiovascular: Regular rate and rhythm. S1 and S2 Normal; No murmurs  Gastrointestinal: Soft non-tender non-distended; + umbilical hernia, reducible, Normal bowel sounds  Genitourinary: No suprapubic  tenderness  Extremities: Normal range of motion, no pedal edema, RUE with AVF.   Vascular: Peripheral pulses palpable 2+ bilaterally  Neurological: Alert and oriented x4, non focal   Skin: Warm and dry. No acute rash  Musculoskeletal: Normal muscle tone, without deformities  Psychiatric: Cooperative and appropriate    PLAN:     1. Severe Sepsis with septic shock due to Klebsiella Bacteremia, possible source enterocolitis vs PNA. Liver cyst   BCX: + Klebsiella, repeat BCx: NGTD   UCx: neg   CT chest and abd/pelvis reviewed, stable Liver cyst   On IV Abxs: On Zosyn IV and VAnco PO for h/o C.diff colitis in the past, will switch to PO ceftin x 10 more days   C//w stress dose steroids taper on hydrocortisone 25mg IVP BID, changed to prednisone taper, Pt will continue on 5mg PO QD as maintenance dose       2. ESRD on HD. Hypokalemia   HD outPt on 11/28 arranged   C/w sevelamer lower dose due to hypokalemia   D/w DR Aguilar, will follow outPt       3. AFIB. Coagulopathy   HR controlled  C/w amiodarone  C/w Coumadin  INR subTx  as coumadin was held, INR 4 on admission   F/u with Dr Coello for INR check 11/28      4. Elevated T.Bili  repeat LFTs in am with improvement   CT abd/pelvis with Cholelithiasis and dilated CBD and pancreatic duct but all chronic as per radiology report   Monitor with PCP outpt       5.  Systolic HF with cardiomyopathy and reduced LV Fx. S/p AICD  euvolemic  volume  management with HD   Not on BB or ACEI/ARBs  due to low BP   Will follow up with DR Coello for BP check for possible restart if needed     Dispo: Stable for d/c home today   Total time 45 min   D/c summary to fax over to PCP

## 2018-11-26 NOTE — DISCHARGE NOTE ADULT - CARE PROVIDER_API CALL
Cesar Coello), Cardiovascular Disease; Internal Medicine  [address] [place] [address]  Phone: [phone]  Fax: [phone]    Adrian Aguilar), Internal Medicine; Nephrology  [address] [place] [address]  Phone: [phone]  Fax: [phone]

## 2018-11-26 NOTE — PROGRESS NOTE ADULT - REASON FOR ADMISSION
weakness

## 2018-11-26 NOTE — PROGRESS NOTE ADULT - ASSESSMENT
64 y/o Male with h/o ESRD on HD, HFrEF, hypoparathyroidism, CAD with stent, prior cryptococcal meningitis, prior refractory C. Diff, CAD, PVD, HTN, polycystic kidney disease s/p x 3 failed transplants was admitted on 11/21 for abd pain, diarrhea, HA, productive cough and muscle aches in the left shoulder. He was found to have a fever of 102F at 9PM. Denies n/v/d. In ER, he was found hypotensive and given zosyn and vancomycin PO.    1. Sepsis with KLPN resolving. Probable acute enterocolitis resolved. Liver cysts. Prior CDAD. Immunocompromised host. ESRD on HD.   -no diarrhea  -low grade fever  -on zosyn # 5 and vancomycin 125 mg PO q6h  -urine culture is negative  -tolerating abx well so far; no side effects noted  -change zosyn to ceftin 250 mg PO q12h   -steroids tappered  -repeat BC are negative to date  -continue ceftin and vancomycin PO for 10 more days  -monitor temps  -f/u CBC  -supportive care  2. Other issues:   -care per medicine

## 2018-11-26 NOTE — DISCHARGE NOTE ADULT - MEDICATION SUMMARY - MEDICATIONS TO TAKE
I will START or STAY ON the medications listed below when I get home from the hospital:    predniSONE 5 mg oral tablet  -- 3 tab(s) oral - by mouth once a day x 3 days  2 tab(s) oral - by mouth once a day x 3 days  1 tab(s) oral - by mouth once a day  for maintanace  -- It is very important that you take or use this exactly as directed.  Do not skip doses or discontinue unless directed by your doctor.  Obtain medical advice before taking any non-prescription drugs as some may affect the action of this medication.  Take with food or milk.    -- Indication: For adrenal insufficiency    amiodarone 200 mg oral tablet  -- 0.5 tab(s) by mouth once a day  -- Indication: For afib    warfarin 1 mg oral tablet  -- 1 tab(s) by mouth once a day  1mg Sun & Thurs  2 mg Mon Tues Wed Fri Sat.    Please f/u with Dr Coello to check INR on 11/28  -- Indication: For afib    Sensipar 30 mg oral tablet  -- 1 tab(s) by mouth 3 times a week, MONDAY, WEDNESDAY, FRIDAY   -- Indication: For esrd    cefuroxime 250 mg oral tablet  -- 1 tab(s) by mouth every 12 hours  -- Indication: For Bacteremia     vancomycin 125 mg oral capsule  -- 1 cap(s) by mouth every 6 hours   -- Finish all this medication unless otherwise directed by prescriber.    -- Indication: For Prophylaxis     Renvela 800 mg oral tablet  -- 3 tab(s) by mouth 3 times a day (with meals)  -- Indication: For ESRD    Acidophilus oral capsule  -- 1 cap(s) by mouth once a day  -- Indication: For Prophylaxis     omeprazole 20 mg oral delayed release capsule  -- 1 cap(s) by mouth once a day  -- Indication: For GERD

## 2018-11-26 NOTE — DISCHARGE NOTE ADULT - CARE PROVIDERS DIRECT ADDRESSES
,DirectAddress_Unknown,[email or web address]

## 2018-12-05 NOTE — ED PROVIDER NOTE - MEDICAL DECISION MAKING DETAILS
Tova OVERTON: Epigastic lower sternal pain with radiation to shoulder. Labs, imaging to rule out ACS.

## 2018-12-05 NOTE — ED PROVIDER NOTE - NS_ ATTENDINGSCRIBEDETAILS _ED_A_ED_FT
I Ludwig Bernardo MD saw and examined the patient. Scribe documented for me and under my supervision. I have modified the scribe's documentation where necessary to reflect my history, physical exam and other relevant documentations pertinent to the care of the patient.

## 2018-12-05 NOTE — ED STATDOCS - PROGRESS NOTE DETAILS
Stacie Aguirre for attending Dr. Pina: 64 y/o male with a PMHx of adrenal insufficiency, AICD, AV fistula, hypoparathyroidism, HTN, ESRD, PVD, polycystic kidney disease, on dialysis presents to the ED c/o abnormal lab result. Pt notes abd pain x1 week. Pt notes the abd pain went away and came back during his dialysis treatment. +HA. Denies CP, SOB. Pt sent to ED by NP Issa Boone for abnormal INR >8. Pt was recently in ICU for klebsiella sepsis. On exam +epigastric tenderness. Will send pt to main ED for further evaluation. SKY Pt and family declining CT scan of the abdomen at this time requesting discussion with Dr. Boyce and Dr. Fierro prior to ordering a CT scan.  Discussed possible delay in diagnosis, missed diagnosis, and risk of illness, injury and even death and patient and family understand.  Will order blood work only, reach out to patient's primary doctors and send to the main ED for further evaluation.

## 2018-12-05 NOTE — ED PROVIDER NOTE - OBJECTIVE STATEMENT
64 y/o male with a PMHx of ESRD with AV fistula right UE (dialysis MWF) last dialyzed this morning, AICD present, CAD with stent, c. diff., HFrEF, HTN, hypoparathyroidism, PVD, polycystic kidney disease presents to the ED from Dr. Mathew's office for INR of 8. Pt also notes that he was recently D/C'd x2 days ago (on ceften and vancomycin) for sepsis. The morning of being D/C'd pt was dilysed; afterwards pt developed mid abd pain that radiated to left shoulder. Since then the pain in stomach has subsided but the shoulder pain is 8-9/10 in severity. nephrologist- Dr. Paniagua, cardio- Dr. Mathew. 62 y/o male with a PMHx of ESRD with AV fistula right UE (dialysis MWF) last dialyzed this morning, adrenal insuffiencey on 5mg prednisone daily, AICD present, CAD with stent, c. diff., HFrEF, HTN, hypoparathyroidism, PVD, polycystic kidney disease presents to the ED from Dr. Mathew's office for elevated INR of 8. Pt also notes that he was recently D/C'd x2 days ago (on Ceftin and vancomycin) for sepsis. The morning of being D/C'd pt was dialyzed; afterwards pt developed mid abd pain that radiated to left shoulder. Since then the pain in stomach has subsided but the shoulder pain is 8-9/10 in severity. Pt has h/o elevated INR in the past. No h/o of CABG, mitral valve replacement. Nephrologist- Dr. Paniagua, Cardio- Dr. Mathew.

## 2018-12-05 NOTE — ED STATDOCS - NS_ ATTENDINGSCRIBEDETAILS _ED_A_ED_FT
The scribe's documentation has been prepared under my direction and personally reviewed by me in its entirety.  I confirm that the note above accurately reflects all my work, treatment, procedures, and decision making except where otherwise noted or amended by me.  Mando Pina M.D.

## 2018-12-06 PROBLEM — I50.22 CHRONIC SYSTOLIC HEART FAILURE: Status: RESOLVED | Noted: 2017-03-01 | Resolved: 2018-01-01

## 2018-12-06 NOTE — PROGRESS NOTE ADULT - ASSESSMENT
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  Recent DC for KLPN - source unknown  Now c/o epigastric pain.  "indigestion" below sternum.  and pain referred to left shoulder and scapula. ED physician was concerned about AAA/dissection - CTA was performed and neg  Renal eval called for hx ESRD on HD mgt       ESRD on HD MWF  s/p contrast load - wife concerned about this    advised her that pt would have short HD today however clincally not volume overloaded   - arrange for short HD today    - resume full HD sessions tomorrow     Anemia   - extra dose procrit today     Epigastric and left should pains    - r.o cardiac - prob GI - Dr Duncan to eval     d/w 3N staff and Dr DAmico     Thank you for the courtesy of this consult. We will follow this patient with you.   Management is subject to change if new information becomes available or patient condition changes.

## 2018-12-06 NOTE — H&P ADULT - ASSESSMENT
63F.  admitted 12/06/18.  presented to ED due to an elevated INR.  patient presented to Dr. Coello's office yesterday.  INR > 8.0.  no overt GI bleeding, however NP performed ANGEL, stool  OB postive.  hx of hemorroid.  additionally, had been c/o epigastric pain.  "indigestion" below sternum.  considering GI consult but not seen.  ? small ventral hernia, but not referred to Sx.  epigastric pain was a/w radiation to the left shoulder and back.  no overt chest pain.  recently discharged from .  previously admitted due to KLPN bacteremia, source was not identified according to paitient.   was rx'd Ceftin + vancomycin PO (CD prophylaxis) by Dr. Fierro.    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    coagulopathy due to VKA toxicity.  -Hbg stable.  -hold warfarin.  -trend INR.  -monitor for bleeding.  -f/u CBC in AM.    epigastric pain.  hx prior hernia repairs.  -r/o cardiac in n  -stool OB +, favor secondary to hemorroid.  -CT AB/pelvis, no acute intraabominal findings.  -improved after Protonix.  will continue.  -GI consult.  -consider surgery f/u outpatient. 63F.  admitted 12/06/18.  presented to ED due to an elevated INR.  patient presented to Dr. Coello's office yesterday.  INR > 8.0.  no overt GI bleeding, however NP performed ANGEL, stool  OB postive.  hx of hemorroid.  additionally, had been c/o epigastric pain.  "indigestion" below sternum.  considering GI consult but not seen.  ? small ventral hernia, but not referred to Sx.  epigastric pain was a/w radiation to the left shoulder and back.  no overt chest pain.  recently discharged from .  previously admitted due to KLPN bacteremia, source was not identified according to paieligio.   was rx'd Ceftin + vancomycin PO (CD prophylaxis) by Dr. Fierro.    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    coagulopathy due to VKA toxicity.  -Hbg stable.  -stool OB +, favor secondary to hemorroid.  -hold warfarin.  -trend INR.  -monitor for bleeding.  -f/u CBC in AM.    epigastric pain.  GI v. cardiac.  hx CAD-PCI (2007).  -telemetry monitoring.  -TnI wnl.  -EKG, no acute changes.  -CT AB/pelvis, no acute intraabominal findings.  -improved after Protonix.  will continue.  -GI consult.  -Cardiology consult.    hx ESRD RRT MWF-PCKD + nephrectomy + renal transplants (1989, 1995, 1997).  -Nephrology consult.    DVT prophylaxis.  -VKA.    disposition.  -telemetry.    communication.  -RN.  -wife.

## 2018-12-06 NOTE — H&P ADULT - REASON FOR ADMISSION
Patient is a 63y old  Male who presents with a chief complaint of elevated INR, occult blood +, epigastric pain.

## 2018-12-06 NOTE — CONSULT NOTE ADULT - SUBJECTIVE AND OBJECTIVE BOX
Patient is a 63y old  Male who presents with a chief complaint of Patient is a 63y old  Male who presents with a chief complaint of elevated INR, occult blood +, epigastric pain. (06 Dec 2018 14:48)      HPI:  63F.  admitted 12/06/18.  patient OOB in bed.  presented to ED due to an elevated INR.  patient presented to Dr. Coello's office yesterday.  INR > 8.0.  no overt GI bleeding, however NP performed ANGEL, stool  OB postive.  hx of hemorroid.  additionally, had been c/o epigastric pain.  "indigestion" below sternum.  considering GI consult but not seen.  ? small ventral hernia, but not referred to Sx.  epigastric pain was a/w radiation to the left shoulder and back.  no overt chest pain.  recently discharged from .  previously admitted due to KLPN bacteremia, source was not identified according to paicalvinnt.   was rx'd Ceftin + vancomycin PO (CD prophylaxis) by Dr. Fierro.  Protonix helps the pain significantly    PAST MEDICAL & SURGICAL HISTORY:  Oliguria  Arm swelling: left arm  AV fistula: right UE  Leg pain, anterior, right: right anterior thigh at graft donor site  Irregular heart beat  AICD (automatic cardioverter/defibrillator) present  Dialysis patient  Adrenal insufficiency  Hypoparathyroidism  HFrEF (heart failure with reduced ejection fraction)  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  S/P colonoscopy: last done 2016  AICD (automatic cardioverter/defibrillator) present: 2013  H/O hernia repair  A-V fistula: 1995 left UE  right arm 2007,  H/O kidney transplant: 1985, 1995, 1997    MEDICATIONS  (STANDING):  amiodarone    Tablet 100 milliGRAM(s) Oral daily  cefuroxime   Tablet 250 milliGRAM(s) Oral every 12 hours  cinacalcet 30 milliGRAM(s) Oral <User Schedule>  docusate sodium 100 milliGRAM(s) Oral three times a day  epoetin amee Injectable 91802 Unit(s) IV Push <User Schedule>  epoetin maee Injectable 65996 Unit(s) IV Push once  lactobacillus acidophilus 1 Tablet(s) Oral daily  pantoprazole  Injectable 40 milliGRAM(s) IV Push two times a day  predniSONE   Tablet 5 milliGRAM(s) Oral daily  sevelamer hydrochloride 2400 milliGRAM(s) Oral three times a day with meals  vancomycin    Solution 125 milliGRAM(s) Oral every 6 hours    MEDICATIONS  (PRN):  acetaminophen   Tablet .. 650 milliGRAM(s) Oral every 6 hours PRN Temp greater or equal to 38.5C (101.3F), Mild Pain (1 - 3)    Allergies  No Known Allergies    SOCIAL HISTORY: no tob, rare etoh    FAMILY HISTORY:  Family history of kidney disease (Mother)  Family history of colon cancer (Sibling)      REVIEW OF SYSTEMS:    CONSTITUTIONAL: No weakness, fevers or chills  EYES/ENT: No visual changes;  No vertigo or throat pain   NECK: No pain or stiffness  RESPIRATORY: No cough, wheezing, hemoptysis; No shortness of breath  CARDIOVASCULAR: No chest pain or palpitations  GASTROINTESTINAL: as above  GENITOURINARY: No dysuria, frequency or hematuria  NEUROLOGICAL: No numbness or weakness  SKIN: No itching, burning, rashes, or lesions   PSYCH: Normal mood and affect  All other review of systems is negative unless indicated above.    Vital Signs Last 24 Hrs  T(C): 36.7 (06 Dec 2018 16:00), Max: 36.9 (05 Dec 2018 21:18)  T(F): 98 (06 Dec 2018 16:00), Max: 98.4 (05 Dec 2018 21:18)  HR: 73 (06 Dec 2018 17:19) (57 - 78)  BP: 134/70 (06 Dec 2018 17:19) (105/78 - 134/70)  BP(mean): --  RR: 17 (06 Dec 2018 17:19) (14 - 18)  SpO2: 99% (06 Dec 2018 17:19) (95% - 100%)    PHYSICAL EXAM:    Constitutional: NAD, thin seen on dialysis  HEENT: MMM  Neck: No LAD  Respiratory: CTA  Cardiovascular: S1 and S2, RRR, no M/G/R  Gastrointestinal: BS+, soft, NT/ND  Vascular: 2+ peripheral pulses  Neurological: A/O x 3, no focal deficits  Psychiatric: Normal mood, normal affect  Skin: No rashes    LABS:                        8.9    8.56  )-----------( 163      ( 06 Dec 2018 14:00 )             28.9     12-06    135  |  95<L>  |  41<H>  ----------------------------<  279<H>  4.0   |  27  |  4.23<H>    Ca    8.1<L>      06 Dec 2018 14:00  Phos  4.3     12-06    TPro  x   /  Alb  2.4<L>  /  TBili  x   /  DBili  x   /  AST  x   /  ALT  x   /  AlkPhos  x   12-06    PT/INR - ( 05 Dec 2018 18:33 )   PT: 99.6 sec;   INR: 8.40 ratio         PTT - ( 05 Dec 2018 18:33 )  PTT:53.1 sec  LIVER FUNCTIONS - ( 06 Dec 2018 14:00 )  Alb: 2.4 g/dL / Pro: x     / ALK PHOS: x     / ALT: x     / AST: x     / GGT: x             RADIOLOGY & ADDITIONAL STUDIES:

## 2018-12-06 NOTE — CONSULT NOTE ADULT - SUBJECTIVE AND OBJECTIVE BOX
Pt seen and examined, full consult to follow Patient is a 63y old  Male who presents with a chief complaint of Patient is a 63y old  Male who presents with a chief complaint of elevated INR, occult blood +, epigastric pain. (06 Dec 2018 21:45)    HPI:  63F.  admitted 12/06/18.  patient OOB in bed.  presented to ED due to an elevated INR.  patient presented to Dr. Coello's office yesterday.  INR > 8.0.  no overt GI bleeding, however NP performed ANGEL, stool  OB postive.  hx of hemorroid.  additionally, had been c/o epigastric pain.  "indigestion" below sternum.  considering GI consult but not seen.  ? small ventral hernia, but not referred to Sx.  epigastric pain was a/w radiation to the left shoulder and back.  no overt chest pain.  recently discharged from . pain mostly in epigastrium, respond to Iv protonix no nausea. Tolerating diet, passing gas, regular gI function, most of the time pain starts during dialysis. Lasr EGD was many years ago. previously admitted due to KLPN bacteremia, source was not identified according to patient   was rx'd Ceftin + vancomycin PO (CD prophylaxis) by Dr. Fierro.    ROS:  no chest pain.  no SOB.  no hematochezia.  no melena.  no hemotoptemesis.    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    PSHx:  AVF LUE;  AICD;  hernia repair;  renal transplant.    ALL:  NKDA.    Rx:  reviewed.    SocHx:  no tobacco.  rare EtOH.  no illegal drugs.  .    FHx:  + colon CA. (06 Dec 2018 09:19)    ROS:.  [X] A ten-point review of systems was otherwise negative except as noted.  Systemic:	[ ] Fever	[ ] Chills	[ ] Night sweats    [ ] Fatigue	[ ] Other  [] Cardiovascular:  [] Pulmonary:  [] Renal/Urologic:  [] Gastrointestinal: abdominal pain, vomiting  [] Metabolic:  [] Neurologic:  [] Hematologic:  [] ENT:  [] Ophthalmologic:  [] Musculoskeletal:    [ ] Due to altered mental status/intubation, subjective information were not able to be obtained from the patient. History was obtained, to the extent possible, from review of the chart and collateral sources of information.    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  S/P colonoscopy: last done 2016  AICD (automatic cardioverter/defibrillator) present: 2013  H/O hernia repair  A-V fistula: 1995 left UE  right arm 2007,  H/O kidney transplant: 1985, 1995, 1997    FAMILY HISTORY:  Family history of kidney disease (Mother)  Family history of colon cancer (Sibling)    Social History:    Alcohol: Denied  Smoking: Denied  Drug Use: Denied        Allergies    No Known Allergies    Intolerances      MEDICATIONS  (STANDING):  amiodarone    Tablet 100 milliGRAM(s) Oral daily  cefuroxime   Tablet 250 milliGRAM(s) Oral every 12 hours  cinacalcet 30 milliGRAM(s) Oral <User Schedule>  docusate sodium 100 milliGRAM(s) Oral three times a day  epoetin amee Injectable 68894 Unit(s) IV Push <User Schedule>  lactobacillus acidophilus 1 Tablet(s) Oral daily  pantoprazole Infusion 8 mG/Hr (10 mL/Hr) IV Continuous <Continuous>  predniSONE   Tablet 5 milliGRAM(s) Oral daily  sevelamer hydrochloride 2400 milliGRAM(s) Oral three times a day with meals  vancomycin    Solution 125 milliGRAM(s) Oral every 6 hours    Vital Signs Last 24 Hrs  T(C): 36.8 (06 Dec 2018 22:52), Max: 37.3 (06 Dec 2018 20:17)  T(F): 98.3 (06 Dec 2018 22:52), Max: 99.1 (06 Dec 2018 20:17)  HR: 79 (06 Dec 2018 22:52) (57 - 79)  BP: 132/70 (06 Dec 2018 22:52) (101/56 - 134/70)  BP(mean): --  RR: 16 (06 Dec 2018 22:52) (16 - 18)  SpO2: 100% (06 Dec 2018 22:52) (95% - 100%)  PHYSICAL EXAM:  Constitutional: NAD, GCS: 15/15  AOX3  Eyes:  WNL  ENMT:  WNL  Neck:  WNL, non tender  Back: Non tender  Respiratory: CTABL  Cardiovascular:  S1+S2+0  Gastrointestinal: Soft, ND , NT, well healed incisions. rt paraumbilical possible hernia defect, no tender, no contents  Genitourinary:  WNL  Extremities: NV intact  Vascular:  Intact  Neurological: No focal neurological deficit,  CN, motor and sensory system grossly intact.  Skin: WNL  Musculoskeletal: WNL  Psychiatric: Grossly WNL      Labs:                          8.9    8.56  )-----------( 163      ( 06 Dec 2018 14:00 )             28.9       12-06    135  |  95<L>  |  41<H>  ----------------------------<  279<H>  4.0   |  27  |  4.23<H>    Ca    8.1<L>      06 Dec 2018 14:00  Phos  4.3     12-06    TPro  x   /  Alb  2.4<L>  /  TBili  x   /  DBili  x   /  AST  x   /  ALT  x   /  AlkPhos  x   12-06      PT/INR - ( 05 Dec 2018 18:33 )   PT: 99.6 sec;   INR: 8.40 ratio         PTT - ( 05 Dec 2018 18:33 )  PTT:53.1 sec    Radiology Results:  < from: CT Abdomen and Pelvis w/ IV Cont (12.05.18 @ 20:13) >    IMPRESSION:          1.   Chest:  Minimal bronchiectasis in LEFT lower lobe and scarring   in RIGHT lower lobe.              2.   Abdomen and pelvis: Advanced polycystic liver diseaseassociated   with polycystic renal disease. Mild ascites. RIGHT pelvic kidney   transplant intact.              < end of copied text >

## 2018-12-06 NOTE — H&P ADULT - NSHPPHYSICALEXAM_GEN_ALL_CORE
Vital Signs Last 24 Hrs  T(C): 36.4 (06 Dec 2018 06:48), Max: 36.9 (05 Dec 2018 21:18)  T(F): 97.5 (06 Dec 2018 06:48), Max: 98.4 (05 Dec 2018 21:18)  HR: 66 (06 Dec 2018 06:48) (63 - 82)  BP: 114/72 (06 Dec 2018 06:48) (105/78 - 133/94)  BP(mean): --  RR: 18 (06 Dec 2018 06:48) (14 - 18)  SpO2: 98% (06 Dec 2018 06:48) (95% - 98%)    Constitutional: chronically ill appearing.  HEENT: MMM.  Neck: Soft and supple, No carotid bruit, No JVD  Respiratory: Breath sounds are clear bilaterally, No wheezing, rales or rhonchi  Cardiovascular: S1 and S2, regular rate and rhythm, no murmur, rub or gallop.  Gastrointestinal: Bowel Sounds present, soft, nontender, nondistended, no guarding, no rebound, no mass.  Extremities: No peripheral edema  Vascular: LUE AVF.  Neurological: A/O x 3, no focal deficits  Musculoskeletal: 5/5 strength b/l upper and lower extremities  Skin:  no visible rashes.

## 2018-12-06 NOTE — ED ADULT NURSE REASSESSMENT NOTE - NS ED NURSE REASSESS COMMENT FT1
received pt alert and oriented x 3, spouse at bedside, no acute respiratory distress, respirations even and unlabored, IV lock to left upper arm intact, IV to left wrist infusing protonix as ordered, repositioned for comfort.

## 2018-12-06 NOTE — ED ADULT NURSE REASSESSMENT NOTE - NS ED NURSE REASSESS COMMENT FT1
Patient remains as previously assessed. VSS, denies pain/discomfort. Protonix gtt d/c per MD D'Amico. No overt s/s of bleeding present. No s/s of distress. Hand-off report to SHRAVAN Diego, awaiting transport to . Safety & comfort measures in place, spouse bedside. Purposeful active rounding on my time.

## 2018-12-06 NOTE — CHART NOTE - NSCHARTNOTEFT_GEN_A_CORE
Rapid response called for mechanical fall. Pt tripped over the IV pole while going to the bathroom. Pt did not hit his head but did scrap his left arm on the IV pole peeling the skin back approximately 2 inches wide and 3-4 inches long.       PE  General NAD, sitting on the toilet  extemities: laceration on the left arm as noted above    A/P  Pt with mechanical fall with laceration  -clean site with NS, spray with   -approximate the skin with sutures. Interrupted stitch  -apply Rapid response called for mechanical fall. Pt tripped over the IV pole while going to the bathroom. Pt did not hit his head but did scrap his left arm on the IV pole peeling the skin back approximately 2 inches wide and 3-4 inches long.       PE  General NAD, sitting on the toilet  extemities: laceration on the left arm as noted above    A/P  Pt with mechanical fall with laceration  -clean site with NS, spray with   -approximate the skin with sutures. Interrupted stitch  -apply    Case as discussed above with local wound repair and care. Rapid response called for mechanical fall. Pt tripped over the IV pole while going to the bathroom. Pt did not hit his head but did scrap his left arm on the IV pole peeling the skin back approximately 2 inches wide and 3-4 inches long.       PE  General NAD, sitting on the toilet  extremities: laceration on the left arm as noted above    A/P  Pt with mechanical fall with laceration  -clean site with NS, spray with lidocaine  -approximate the skin with sutures(13 total). Interrupted stitch  -apply xeroform and wrapped with gauze    Case as discussed above with local wound repair and care.

## 2018-12-06 NOTE — H&P ADULT - HISTORY OF PRESENT ILLNESS
63F.  admitted 12/06/18.  presented to ED due to an elevated INR.  patient presented to Dr. Coello's office yesterday.  INR > 8.0.  no overt GI bleeding, however NP performed ANGEL, stool  OB postive.  hx of hemorroid.  additionally, had been c/o epigastric pain.  "indigestion" below sternum.  considering GI consult but not seen.  ? small ventral hernia, but not referred to Sx.  epigastric pain was a/w radiation to the left shoulder and back.  no overt chest pain.  recently discharged from .  previously admitted due to KLPN bacteremia, source was not identified according to paicalvinnt.   was rx'd Ceftin + vancomycin PO (CD prophylaxis) by Dr. Fierro.    ROS:  no chest pain.  no SOB.  no hematochezia.  no melena.  no hemotoptemesis.    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    PSHx:  AVF LUE;  AICD;  hernia repair;  renal transplant.    ALL:  NKDA.    Rx:  reviewed.    SocHx:  no tobacco.  rare EtOH.  no illegal drugs.  .    FHx:  + colon CA. 63F.  admitted 12/06/18.  patient OOB in bed.  presented to ED due to an elevated INR.  patient presented to Dr. Coello's office yesterday.  INR > 8.0.  no overt GI bleeding, however NP performed ANGEL, stool  OB postive.  hx of hemorroid.  additionally, had been c/o epigastric pain.  "indigestion" below sternum.  considering GI consult but not seen.  ? small ventral hernia, but not referred to Sx.  epigastric pain was a/w radiation to the left shoulder and back.  no overt chest pain.  recently discharged from .  previously admitted due to KLPN bacteremia, source was not identified according to ena.   was rx'd Ceftin + vancomycin PO (CD prophylaxis) by Dr. Fierro.    ROS:  no chest pain.  no SOB.  no hematochezia.  no melena.  no hemotoptemesis.    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    PSHx:  AVF LUE;  AICD;  hernia repair;  renal transplant.    ALL:  NKDA.    Rx:  reviewed.    SocHx:  no tobacco.  rare EtOH.  no illegal drugs.  .    FHx:  + colon CA.

## 2018-12-06 NOTE — PROCEDURE NOTE - NSPOSTCAREGUIDE_GEN_A_CORE
Instructed patient/caregiver regarding signs and symptoms of infection/Verbal/written post procedure instructions were given to patient/caregiver/Instructed patient/caregiver to follow-up with primary care physician

## 2018-12-06 NOTE — PROCEDURE NOTE - PROCEDURE
<<-----Click on this checkbox to enter Procedure Laceration repair  12/06/2018  Left forearm  Active  MELVI

## 2018-12-06 NOTE — CONSULT NOTE ADULT - SUBJECTIVE AND OBJECTIVE BOX
Patient is a 63y old  Male who presents with a chief complaint of Patient is a 63y old  Male who presents with a chief complaint of elevated INR, occult blood +, epigastric pain. (06 Dec 2018 09:19)      HPI:  63F.  admitted 12/06/18.  patient OOB in bed.  presented to ED due to an elevated INR.  patient presented to Dr. Coello's office yesterday.  INR > 8.0.  no overt GI bleeding, however NP performed ANGEL, stool  OB postive.  hx of hemorroid.  additionally, had been c/o epigastric pain.  "indigestion" below sternum.  considering GI consult but not seen.  ? small ventral hernia, but not referred to Sx.  epigastric pain was a/w radiation to the left shoulder and back.  no overt chest pain.  recently discharged from .  previously admitted due to KLPN bacteremia, source was not identified according to paicalvinnt.   was rx'd Ceftin + vancomycin PO (CD prophylaxis) by Dr. Fierro.    ROS:  no chest pain.  no SOB.  no hematochezia.  no melena.  no hemotoptemesis.    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    PSHx:  AVF LUE;  AICD;  hernia repair;  renal transplant.    ALL:  NKDA.    Rx:  reviewed.    SocHx:  no tobacco.  rare EtOH.  no illegal drugs.  .    FHx:  + colon CA. 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  S/P colonoscopy: last done 2016  AICD (automatic cardioverter/defibrillator) present: 2013  H/O hernia repair  A-V fistula: 1995 left UE  right arm 2007,  H/O kidney transplant: 1985, 1995, 1997      PREVIOUS DIAGNOSTIC TESTING:      ECHO  FINDINGS:    STRESS  FINDINGS:    CATHETERIZATION  FINDINGS:    MEDICATIONS  (STANDING):  amiodarone    Tablet 100 milliGRAM(s) Oral daily  cefuroxime   Tablet 250 milliGRAM(s) Oral every 12 hours  cinacalcet 30 milliGRAM(s) Oral <User Schedule>  docusate sodium 100 milliGRAM(s) Oral three times a day  lactobacillus acidophilus 1 Tablet(s) Oral daily  predniSONE   Tablet 5 milliGRAM(s) Oral daily  sevelamer hydrochloride 2400 milliGRAM(s) Oral three times a day with meals  vancomycin    Solution 125 milliGRAM(s) Oral every 6 hours    MEDICATIONS  (PRN):  acetaminophen   Tablet .. 650 milliGRAM(s) Oral every 6 hours PRN Temp greater or equal to 38.5C (101.3F), Mild Pain (1 - 3)      FAMILY HISTORY:  Family history of kidney disease (Mother)  Family history of colon cancer (Sibling)      SOCIAL HISTORY: nonsmoker    REVIEW OF SYSTEM:  Pertinent items are noted in HPI.    HEENT:  Neck:   Respiratory:     Cardiovascular: cad, dilated CM, a fib/ICD  Gastrointestinal:  Genitourinary: esrd  Skin:    Musculoskeletal:  Neurological: negative for dizziness, headaches, seizures and tremors  Psychiatric:  Endocrine:   Hematlogical:  Vital Signs Last 24 Hrs  T(C): 36.4 (06 Dec 2018 06:48), Max: 36.9 (05 Dec 2018 21:18)  T(F): 97.5 (06 Dec 2018 06:48), Max: 98.4 (05 Dec 2018 21:18)  HR: 66 (06 Dec 2018 06:48) (63 - 82)  BP: 114/72 (06 Dec 2018 06:48) (105/78 - 133/94)  BP(mean): --  RR: 18 (06 Dec 2018 06:48) (14 - 18)  SpO2: 98% (06 Dec 2018 06:48) (95% - 98%)    I&O's Summary    PHYSICAL EXAM  General Appearance: cooperative, no acute distress,   HEENT: PERRL, conjunctiva clear, EOM's intact, non injected pharynx, no exudate, TM   normal  Neck: Supple, , no adenopathy, thyroid: not enlarged, no carotid bruit or JVD  Back: Symmetric, no  tenderness,no soft tissue tenderness  Lungs: Clear to auscultation bilaterally,no adventitious breath sounds, normal   expiratory phase  Heart: Regular rate and rhythm, S1, S2 normal, no murmur, rub or gallopno  rub  Abdomen: Soft,mildly distended non-tender, bowel sounds active , no hepatosplenomegaly  Extremities: no cyanosis or edema, no joint swelling. No DP/PT.  Fistual in right upper arm withot erythema  Skin: Skin color, texture normal, no rashes   Neurologic: Alert and oriented X3 , cranial nerves intact, sensory and motor normal,        INTERPRETATION OF TELEMETRY:    ECG:  sinus with vent pacing        LABS:                          8.6    7.63  )-----------( 147      ( 05 Dec 2018 18:33 )             28.6     12-05    138  |  96  |  29<H>  ----------------------------<  116<H>  5.2   |  34<H>  |  3.08<H>    Ca    8.3<L>      05 Dec 2018 18:33    TPro  6.9  /  Alb  2.4<L>  /  TBili  0.6  /  DBili  x   /  AST  25  /  ALT  16  /  AlkPhos  103  12-05    CARDIAC MARKERS ( 06 Dec 2018 00:40 )  0.028 ng/mL / x     / x     / x     / x              PT/INR - ( 05 Dec 2018 18:33 )   PT: 99.6 sec;   INR: 8.40 ratio         PTT - ( 05 Dec 2018 18:33 )  PTT:53.1 sec          RADIOLOGY & ADDITIONAL STUDIES:    IMPRESSION:1. CAD with severe dilated CM  2. ESRD  3. Overanticoagulation with heme positive stools4. ICD  4. Recent Klebsiella sepsis.    5. Hypertension    PLAN: hold warfarin, continue protonix, GI cnsultation  continue amiodarone. Restart   losartan, carvedilol when BP is stable.  Thank you, we will follow

## 2018-12-07 NOTE — PROVIDER CONTACT NOTE (CRITICAL VALUE NOTIFICATION) - ACTION/TREATMENT ORDERED:
Continue to hold AC. Continue to monitor for s/s of bleeding. -1x dose vitamin K solution.  -Continue to hold AC.   -Continue to monitor for s/s of bleeding.

## 2018-12-07 NOTE — PROGRESS NOTE ADULT - ASSESSMENT
63F.  admitted 12/06/18.  presented to ED due to an elevated INR.  patient presented to Dr. Coello's office yesterday.  INR > 8.0.  no overt GI bleeding, however NP performed ANGEL, stool  OB postive.  hx of hemorroid.  additionally, had been c/o epigastric pain.  "indigestion" below sternum.  considering GI consult but not seen.  ? small ventral hernia, but not referred to Sx.  epigastric pain was a/w radiation to the left shoulder and back.  no overt chest pain.  recently discharged from .  previously admitted due to KLPN bacteremia, source was not identified according to paieligio.   was rx'd Ceftin + vancomycin PO (CD prophylaxis) by Dr. Fierro.    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    coagulopathy due to VKA toxicity.  -Hbg stable.  -stool OB +, favor secondary to hemorroid.  -hold warfarin.  -trend INR.  -monitor for bleeding.  -f/u CBC in AM.    epigastric pain.  GI v. cardiac.  hx CAD-PCI (2007).  -telemetry monitoring.  -TnI wnl.  -EKG, no acute changes.  -CT AB/pelvis, no acute intraabominal findings.  -improved after Protonix.  will continue.  -GI consult.  -Cardiology consult.    hx ESRD RRT MWF-PCKD + nephrectomy + renal transplants (1989, 1995, 1997).  -Nephrology consult.    DVT prophylaxis.  -VKA.    disposition.  -telemetry.    communication.  -RN.  -wife.

## 2018-12-07 NOTE — PROGRESS NOTE ADULT - ASSESSMENT
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  Recent DC for KLPN - source unknown .  Now c/o epigastric pain.  "indigestion" below sternum.  and pain referred to left shoulder and scapula. ED physician was concerned about AAA/dissection - CTA was performed and neg  Renal eval called for hx ESRD on HD mgt     ESRD on HD MWF  s/p contrast load - s.p HD short yest and full regular HD for today  - min UF for now and monitor for recurrence of left shoulder pains = pains have improved w protonix - GI related?    Epigastric and left shoulder referred  pain  - GI source??   GI - Dr Duncan to eval     Anemia   - EPO at HD today     d/w 3N staff and Dr DAmico     Thank you for the courtesy of this consult. We will follow this patient with you.   Management is subject to change if new information becomes available or patient condition changes.

## 2018-12-07 NOTE — PROVIDER CONTACT NOTE (FALL NOTIFICATION) - SITUATION
Pt s/p mech fall. Pt was assisted to bathroom by wife. When finished, pt tripped on IV pole trying to get up from toilet, hitting left forearm sustaining a skin tear and stated L knee tapped ground. Pt s/p mech fall. Was assisted to bathroom by wife. Tripped on IV pole trying to get up from toilet, hit L forearm sustained a skin tear and stated L knee tapped ground. Found by nursing asst.

## 2018-12-07 NOTE — PROVIDER CONTACT NOTE (CRITICAL VALUE NOTIFICATION) - ASSESSMENT
No distress. H/H stable. Bleeding of left FA laceration controlled. No distress. H/H stable. Bleeding noted of Left FA laceration.

## 2018-12-07 NOTE — CHART NOTE - NSCHARTNOTEFT_GEN_A_CORE
Responded to code sepsis on 3N    62 y/o male ESRD on RRT, HFrEF, CAD s/p PCI and HTN admitted on 12/5 with epigastric pain and Elevated INR noted to have T 101 prompting code sepsis.  He is on cefuroxime and PO vanco for h/o CDI.    On arrival, awake, NAD, 105/39  O2 sat 98% on NC  STAT labs including lactate and BCxs ordered  Chest CT on admission negative for infiltrates    IMP:    Febrile syndrome R/O sepsis    No active cc issues    Suggest:    As above  Target Abx for specific source if found    Above handed off to dr sanchez--d/w phone

## 2018-12-07 NOTE — SEPSIS NOTE - ASSESSMENT
Patient is 63y M PMHx Polycystic kidney disease with multiple transplants, CAD sent in from Cardiologist's office with elevated INR 2/2 Warfarin toxicity and epigastric pain with 2/4 SIRS criteria (Temp and HR). Patient admits that he has a new onset LUQ pain. CT Abdomen shows polycystic liver and kidneys, no other pathology. Patient is not feeling excessive weakness, faintness, no SOB, CP.    ROS negative unless stated above                          8.5    7.27  )-----------( 136      ( 07 Dec 2018 07:10 )             28.3   12-07    136  |  97  |  33<H>  ----------------------------<  110<H>  4.0   |  31  |  3.58<H>    Ca    8.3<L>      07 Dec 2018 07:10  Phos  4.3     12-06    TPro  x   /  Alb  2.4<L>  /  TBili  x   /  DBili  x   /  AST  x   /  ALT  x   /  AlkPhos  x   12-06    a/p: Sepsis protocol due to SIRS Criteria  - Cont. Ceftin and Vancomycin (Last day of Vancomycin as per last admission's chart?)  - Blood Culture x2 STAT  - Lactate STAT  - CBC STAT  - BMP STAT  - Tylenol 650 q6h prn STAT  - 1L NS 75cc/hr

## 2018-12-07 NOTE — PROVIDER CONTACT NOTE (FALL NOTIFICATION) - BACKGROUND
Pt adm for epigastric and chest pain as well as elevated INR. PMHX: HF, CAD, HTN, polycystic kidney disease, ESRD on HD M/W/F with right AVF, PVD, PPM placement, meningitis, cdiff, hypoparathryoidism.

## 2018-12-07 NOTE — PROVIDER CONTACT NOTE (FALL NOTIFICATION) - ASSESSMENT
Left forearm skin tear with viable skin flap, and moderate bleeding. No other trauma noted on pt. No bruising/scrapes/scrates noted. Denies any LOC or head trauma.

## 2018-12-07 NOTE — PROGRESS NOTE ADULT - ASSESSMENT
62 y/o male admitted with elevated INR/ESRD and failed kidney transplant numerous times.  Recent admission for klebsiella pna/bacteremia   Now with epigastric pain with radiation to his left shoulder and back.   Improved on IV PPI, but not BID-states it occurs mostly after dialysis?  Unclear of last EGD-no documentation per our records in office.   Trial tapering IV PPI drip to BID tomorrow?   If no improvement, consider EGD.   Discussed with pt, Dr. Tejeda. 64 y/o male admitted with elevated INR/+hemoccult stool/ESRD and failed kidney transplant numerous times.  Recent admission for klebsiella pna/bacteremia   Now with epigastric pain with radiation to his left shoulder and back.   Improved on IV PPI, but not BID-states it occurs mostly after dialysis?  Unclear of last EGD-no documentation per our records in office.   Trial tapering IV PPI drip to BID tomorrow?   If no improvement, consider EGD.   Discussed with pt, Dr. Tejeda.

## 2018-12-08 NOTE — PROGRESS NOTE ADULT - ASSESSMENT
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    ESRD on HD MWF  -HD maintain TIW, potential treatment next on monday  -UF limited  -K/seven protocol  -Limit IVF, stop in 6 hours with ESRD status    sepsis  -ID pending  -Abx per medicine/id  -F/u cultures    Epigastric  pain/Occult blood  - Dr Duncan follow up    Anemia   - EPO at HD today     d/w 3[facility] staff and wife at length

## 2018-12-08 NOTE — ED PROVIDER NOTE - OBJECTIVE STATEMENT
Nursing Note by Aurelia Sanders LPN at 03/22/17 02:26 PM     Author:  Aurelia Sanders LPN Service:  (none) Author Type:  Licensed Nurse     Filed:  03/22/17 02:26 PM Encounter Date:  3/22/2017 Status:  Signed     :  Aurelia Sanders LPN (Licensed Nurse)            Roomed by: Aurelia Carrillo LPN    If provider orders tests at today's visit, patient would like to be contacted via[MM1.1T] telephone[MM1.1M] ([employer] or by telephone).  If to contact patient by phone, patient's preferred phone # is [phone] (cell)  and it is[MM1.1T] OK[MM1.1M] to leave message on voice mail or with family member.  If medications are ordered at today's visit, the pharmacy name/location patient would like them to be sent to is  [facility] [id number][MM1.1T]          Revision History        User Key Date/Time User Provider Type Action    > MM1.1 03/22/17 02:26 PM Aurelai Sanders LPN Licensed Nurse Sign    M - Manual, T - Template             62 y/o male with a PMHx of oliguria, AICD, ESRD on dialysis (for 10 years) AV fistula right UE, hypoparathyroidism, HFrEF with stent, meningitis due to cryptococcus, C. Diff, CAD, PVD, HTN, polycystic kidney disease s/p x3 transplants presents to the ED c/o  abd pain, HA, productive cough and muscle aches in the left shoulder since last night, worsened this morning. +fever today, 102F at 9PM. Denies n/v/d. +recent sick contacts- pt's daughter. Pt is a dialysis pt, last dialysis was yesterday. Pt's wife gave pt 1000mg Tylenol at 9PM tonight. Has h/o od sepsis and septic shock. Normally see Dr. Srivastava. Last Dialyzed yesterday. NKDA. PMD- Dr. Mathew. Nephrologist- Dr. Paniagua.

## 2018-12-08 NOTE — PROGRESS NOTE ADULT - ASSESSMENT
IMPRESSION:  1. CAD with severe dilated CM  2. ESRD  3. Overanticoagulation with heme positive stools4. ICD  4. Recent Klebsiella sepsis. with probable sepsis again last night    5. Hypertension  6.Laceration left forearm.   7. Adrenal insufficiency  PLAN:  Restart losartan 25 mg qd. Continue to hold carvedilol and warfarin. Continue amiodarone. blood and urine cultures pending on Vancomycin.  Dr. Tejeda to consult today

## 2018-12-08 NOTE — PROGRESS NOTE ADULT - ASSESSMENT
63F.  admitted 12/06/18.  presented to ED due to an elevated INR.  patient presented to Dr. Coello's office yesterday.  INR > 8.0.  no overt GI bleeding, however NP performed ANGEL, stool  OB postive.  hx of hemorroid.  additionally, had been c/o epigastric pain.  "indigestion" below sternum.  considering GI consult but not seen.  ? small ventral hernia, but not referred to Sx.  epigastric pain was a/w radiation to the left shoulder and back.  no overt chest pain.  recently discharged from .  previously admitted due to KLPN bacteremia, source was not identified according to paieligio.   was rx'd Ceftin + vancomycin PO (CD prophylaxis) by Dr. Fierro.    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    sepsis.  -hx of recent KLPN enterocolitis + CDI.  -Cx.  -CXR, unchanged trace left pleural effusion.  -cefepime.  -vancomycin PO.    coagulopathy due to VKA toxicity.  -INR normalizing.  -Hbg stable.  -stool OB +, favor secondary to hemorroid.  -continue to hold warfarin tonight, will d/w GI regarding resuming.  -monitor for bleeding.  -f/u CBC in AM.    epigastric pain.  GI v. cardiac.  hx CAD-PCI (2007).  -telemetry monitoring.  -TnI wnl.  -EKG, no acute changes.  -CT AB/pelvis, no acute intraabominal findings.  -PPI gtt.  -GI following.  -Cardiology following.    hx ESRD RRT MWF-PCKD + nephrectomy + renal transplants (1989, 1995, 1997).  -Nephrology following.    hx AI.  -prednisone 5mg po qd.    DVT prophylaxis.  -SCD.    disposition.  -3N.  communication.  -RN.

## 2018-12-08 NOTE — CHART NOTE - NSCHARTNOTEFT_GEN_A_CORE
Follow up lactate 2.5 give 500 ml of bolus ,will add cefepime 2 gm x 1 time and stress dose of solumedrol 60 mg once .Will consult ID .D/W and seen patient with Dr Lou .

## 2018-12-08 NOTE — CONSULT NOTE ADULT - SUBJECTIVE AND OBJECTIVE BOX
Patient is a 63y old  Male who presents with a chief complaint of epigastric pain (08 Dec 2018 09:45)    HPI:  63M  admitted 18. presented to ED due to an elevated INR.  patient presented to Dr. Coello's office day prior to admit INR > 8.0.  no overt GI bleeding, however NP performed ANGEL, stool  OB postive.  hx of hemorroid.  additionally, had been c/o epigastric pain.  "indigestion" below sternum.  considering GI consult but not seen.  ? small ventral hernia, but not referred to Sx.  epigastric pain was a/w radiation to the left shoulder and back.  no overt chest pain.  recently discharged from .  previously admitted due to KLPN bacteremia, source was not identified according to patient   was rx'd Ceftin + vancomycin PO (CD prophylaxis). Here given PPI for epigastric pain w improvement, noted to have fever o/n confusion, occasional cough, no diarrhea, no increased abd pain was given steroids/cefepime.       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    PSHx:  AVF LUE;  AICD;  hernia repair;  renal transplant.      Meds: per reconciliation sheet, noted below  MEDICATIONS  (STANDING):  amiodarone    Tablet 100 milliGRAM(s) Oral daily  cinacalcet 30 milliGRAM(s) Oral <User Schedule>  docusate sodium 100 milliGRAM(s) Oral three times a day  epoetin amee Injectable 11869 Unit(s) IV Push <User Schedule>  lactobacillus acidophilus 1 Tablet(s) Oral daily  losartan 25 milliGRAM(s) Oral daily  pantoprazole Infusion 8 mG/Hr (10 mL/Hr) IV Continuous <Continuous>  predniSONE   Tablet 5 milliGRAM(s) Oral daily  sevelamer hydrochloride 2400 milliGRAM(s) Oral three times a day with meals  sodium chloride 0.9%. 1000 milliLiter(s) (75 mL/Hr) IV Continuous <Continuous>  vancomycin    Solution 125 milliGRAM(s) Oral every 6 hours    Allergies    No Known Allergies    Intolerances      Social: no smoking, no alcohol, no illegal drugs; no recent travel, no exposure to TB  FAMILY HISTORY:  Family history of kidney disease (Mother)  Family history of colon cancer (Sibling)     no history of premature cardiovascular disease in first degree relatives    ROS: no HA, no dizziness, no sore throat, no blurry vision, no CP, no palpitations, no SOB, no cough, no abdominal pain, no diarrhea, no N/V, no dysuria, no leg pain, no claudication,  no joint aches, no rectal pain or bleeding, no night sweats  All other systems reviewed and are negative    Vital Signs Last 24 Hrs  T(C): 36.2 (08 Dec 2018 05:07), Max: 38.3 (07 Dec 2018 21:49)  T(F): 97.2 (08 Dec 2018 05:07), Max: 101 (07 Dec 2018 21:49)  HR: 83 (08 Dec 2018 05:07) (59 - 105)  BP: 113/68 (08 Dec 2018 05:07) (100/50 - 167/109)  BP(mean): 74 (07 Dec 2018 21:49) (74 - 74)  RR: 18 (08 Dec 2018 05:07) (16 - 18)  SpO2: 97% (08 Dec 2018 05:07) (96% - 97%)  Daily       PE:  Constitutional: frail looking  HEENT: NC/AT, EOMI, PERRLA, conjunctivae clear; ears and nose atraumatic; pharynx benign  Neck: supple; thyroid not palpable  Back: no tenderness  Respiratory: decreased breath sounds  Cardiovascular: S1S2 regular, no murmurs  Abdomen: soft, not tender, not distended, positive BS; liver and spleen WNL  Genitourinary: no suprapubic tenderness  Lymphatic: no LN palpable  Musculoskeletal: no muscle tenderness, no joint swelling or tenderness  Extremities: R forearm skin tear w/ sutures in place  Neurological/ Psychiatric:  moving all extremities  Skin: no rashes; no palpable lesions    Labs: all available labs reviewed                        8.0    7.68  )-----------( 105      ( 08 Dec 2018 06:28 )             26.0     12-08    135  |  97  |  23  ----------------------------<  184<H>  4.7   |  28  |  3.11<H>    Ca    8.1<L>      08 Dec 2018 06:28  Phos  4.3         TPro  x   /  Alb  2.4<L>  /  TBili  x   /  DBili  x   /  AST  x   /  ALT  x   /  AlkPhos  x        LIVER FUNCTIONS - ( 06 Dec 2018 14:00 )  Alb: 2.4 g/dL / Pro: x     / ALK PHOS: x     / ALT: x     / AST: x     / GGT: x           Urinalysis Basic - ( 07 Dec 2018 22:20 )    Color: Yellow / Appearance: Clear / S.015 / pH: x  Gluc: x / Ketone: Negative  / Bili: Negative / Urobili: Negative mg/dL   Blood: x / Protein: 100 mg/dL / Nitrite: Negative   Leuk Esterase: Trace / RBC: 3-5 /HPF / WBC 3-5   Sq Epi: x / Non Sq Epi: Few / Bacteria: Few    Culture - Urine (18 @ 14:00)    Specimen Source: .Urine Clean Catch (Midstream)    Culture Results:   No growth    Culture - Blood (18 @ 13:35)    Specimen Source: .Blood Blood    Culture Results:   No growth at 5 days.            Radiology: all available radiological tests reviewed  < from: Xray Chest 1 View AP/PA. (18 @ 11:08) >  EXAM:  XR CHEST 1 VIEW                            PROCEDURE DATE:  2018          INTERPRETATION:  History: Fever and pleuritic chest pain.    Single view the chest was performed.    Comparison is made to 2018.    Findings:    There is a biventricular AICD in place. There is a trace left pleural   effusion. The lungs are otherwise clear. Heart size is normal. There is a   vascular stent within the right subclavian region. There is right rotator   cuff arthropathy. Surgical clips are seen within the left axillary region.    Impression:    Unchanged trace left pleural effusion.    < end of copied text >    Advanced directives addressed: full resuscitation

## 2018-12-08 NOTE — CONSULT NOTE ADULT - ASSESSMENT
63M.  admitted 12/06/18. presented to ED due to an elevated INR.  patient presented to Dr. Coello's office day prior to admit INR > 8.0.  no overt GI bleeding, however NP performed ANGEL, stool  OB postive.  hx of hemorroid.  additionally, had been c/o epigastric pain.  "indigestion" below sternum.  considering GI consult but not seen.  ? small ventral hernia, but not referred to Sx.  epigastric pain was a/w radiation to the left shoulder and back.  no overt chest pain.  recently discharged from .  previously admitted due to KLPN bacteremia, source was not identified according to patient   was rx'd Ceftin + vancomycin PO (CD prophylaxis). Here given PPI for epigastric pain w improvement, noted to have fever o/n confusion, occasional cough, no diarrhea, no increased abd pain was given steroids/cefepime.     1. fever/recent KLPN sepsis-enterocolitis s/p abx course/hx cdad/renal tx  - agree with empiric cefepime for now  - continue oral vanco while on abx for prophylaxis   - f/u xray, blood cx, ua urine cx  - monitor temps  - tolerating abx well so far; no side effects noted  - reason for abx use and side effects reviewed with patient  - LUE with skin tear sp sutures after fall no superimposed cellulitis  - f/u cbc  - monitor temps  - supportive care    2. other issue s- care per medicine
64yo male with ESRD s/p failed renal transplant  admitted with epigastric pain, elevated inr  tolerating po without increased pain  likely gastritis  continue PPI  would not plan on egd unless worsens
63 Y old male with multiple medical problems, S/p Kidney transplant x2,  on HD, small widemouthed ventral hernia soft, non tender at umbilicus, liver and kidney polycystic diseases, no abdominal pathology on CT scan    PPI  Consider EGD  small ventral  abdomina wall defect with no contents on CT shouldn't be cause of epigastric pain  Cystic liver could be the source of pain    medical management per primary service  No acute surgical intervention

## 2018-12-08 NOTE — PROGRESS NOTE ADULT - ASSESSMENT
62 yo male with resolved abdominal pain, but now with fever and confusion. Would check CXR to look for pneumonia. Continue observation.

## 2018-12-09 NOTE — PROGRESS NOTE ADULT - ASSESSMENT
63F.  admitted 12/06/18.  presented to ED due to an elevated INR.  patient presented to Dr. Coello's office yesterday.  INR > 8.0.  no overt GI bleeding, however NP performed ANGEL, stool  OB postive.  hx of hemorroid.  additionally, had been c/o epigastric pain.  "indigestion" below sternum.  considering GI consult but not seen.  ? small ventral hernia, but not referred to Sx.  epigastric pain was a/w radiation to the left shoulder and back.  no overt chest pain.  recently discharged from .  previously admitted due to KLPN bacteremia, source was not identified according to paieligio.   was rx'd Ceftin + vancomycin PO (CD prophylaxis) by Dr. Fierro.    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    sepsis.  -hx of recent KLPN enterocolitis + CDI.  -Cx.  -CXR, unchanged trace left pleural effusion.  -cefepime.  -vancomycin PO.    coagulopathy due to VKA toxicity.  -INR normalizing.  -HH trending down.  f/u CBC in AM.  -stool OB +, favor secondary to hemorroid.  -continue holding warfarin.  -monitor for bleeding.  -Protonix 40mg IV q12.    epigastric pain.  -improved w/ PPI.  -CT AB/pelvis, no acute intraabominal findings.  -GI.    hx ESRD RRT MWF-PCKD + nephrectomy + renal transplants (1989, 1995, 1997).  -Nephrology following.    hx AI.  -prednisone 5mg po qd.    DVT prophylaxis.  -SCD.    disposition.  -3N.  -discontinue telemetry and transfer to the general medical jules.    communication.  -RN. 63F.  admitted 12/06/18.  presented to ED due to an elevated INR.  patient presented to Dr. Coello's office yesterday.  INR > 8.0.  no overt GI bleeding, however NP performed ANGEL, stool  OB postive.  hx of hemorroid.  additionally, had been c/o epigastric pain.  "indigestion" below sternum.  considering GI consult but not seen.  ? small ventral hernia, but not referred to Sx.  epigastric pain was a/w radiation to the left shoulder and back.  no overt chest pain.  recently discharged from .  previously admitted due to KLPN bacteremia, source was not identified according to paieligio.   was rx'd Ceftin + vancomycin PO (CD prophylaxis) by Dr. Fierro.    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    SIRS/sepsis.  -hx of recent KLPN enterocolitis + CDI.  -BCx, no growth.  -CXR, unchanged trace left pleural effusion.  -cefepime.  -vancomycin PO.  -ID.    coagulopathy due to VKA toxicity.  -INR normalizing.  -HH trending down.  f/u INR and CBC in AM.  -stool OB +, favor secondary to hemorroid.  -continue holding warfarin.  -monitor for bleeding.  -Protonix 40mg IV q12.    epigastric pain.  -improved w/ PPI.  -CT AB/pelvis, no acute intraabominal findings.  -GI.    hx ESRD RRT MWF-PCKD + nephrectomy + renal transplants (1989, 1995, 1997).  -Nephrology following.    hx AI.  -prednisone 5mg po qd.    DVT prophylaxis.  -SCD.    disposition.  -3N.  -discontinue telemetry and transfer to the general medical j[facility].    communication.  -RN.

## 2018-12-09 NOTE — PROGRESS NOTE ADULT - ASSESSMENT
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    ESRD on HD MWF  -HD maintain TIW, next on monday  -UF limited  -K/seven protocol    sepsis  -ID appreciated  -Abx per medicine/id, F/u cultures    Epigastric  pain/Occult blood  - Dr Duncan follow up    Anemia   - EPO at HD    -May need prbc transfusion

## 2018-12-10 NOTE — CHART NOTE - NSCHARTNOTEFT_GEN_A_CORE
Nurse called because pt was experiencing the epigastric pain. Gave pt PPI and did not have any relief. Went to see the pt who says that the pain is worse with laying down and is right below the left lower portion of the rib towards the middle of the abdomen. Pt states it is sharp and is worse with breathing in. Offered mylanta but the pt refused.     Vital Signs Last 24 Hrs  T(C): 36.4 (10 Dec 2018 05:16), Max: 36.6 (09 Dec 2018 20:54)  T(F): 97.5 (10 Dec 2018 05:16), Max: 97.9 (09 Dec 2018 20:54)  HR: 74 (10 Dec 2018 05:16) (73 - 76)  BP: 113/75 (10 Dec 2018 05:16) (105/61 - 119/70)  BP(mean): --  RR: 16 (10 Dec 2018 05:16) (16 - 18)  SpO2: 98% (10 Dec 2018 05:16) (96% - 99%)    PE  General sitting up in bed, in mild distress  cardio s1 s2 rrr  resp ctab  abdomen: soft, mild ascites, mildly tender to palpitation in LUQ just below the rib, normal active bowel sounds heard    A/P  Pt with reoccurring abdominal pain. CT of  the abdomen showed polycystic liver disease mild ascites   -gave the ppi with no relief. Gave some tramadol for the pain. Offered mylanta but pt declined  -may consider switching back to IV ppi as that gave some relief.

## 2018-12-10 NOTE — PROGRESS NOTE ADULT - ASSESSMENT
1. CAD with severe dilated CM  2. ESRD  3. Overanticoagulation with heme positive stools4. ICD  4. Recent Klebsiella sepsis. with probable sepsis again last night on broad spectrum antibiotics    5. Hypertension  6.Laceration left forearm.   7. Adrenal insufficiency  PLAN:  Restart losartan 25 mg qd. Continue to hold carvedilol and warfarin. Continue amiodarone. blood and urine cultures pending on Vancomycin, cefepime  consider gi eval

## 2018-12-10 NOTE — PROGRESS NOTE ADULT - ASSESSMENT
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      ESRD on HD MWF  -HD maintain TIW today   -UF limited  -K/seven protocol    Anemia r/o PUD    - EPO at HD    - PRBC x2  units transfusion at HD today     Epigastric  pain/Occult blood  - Dr Duncan follow up - EGD r/o PUD tomorrow

## 2018-12-10 NOTE — PROGRESS NOTE ADULT - ASSESSMENT
63F.  admitted 12/06/18.  presented to ED due to an elevated INR.  patient presented to Dr. Coello's office yesterday.  INR > 8.0.  no overt GI bleeding, however NP performed ANGEL, stool  OB postive.  hx of hemorroid.  additionally, had been c/o epigastric pain.  "indigestion" below sternum.  considering GI consult but not seen.  ? small ventral hernia, but not referred to Sx.  epigastric pain was a/w radiation to the left shoulder and back.  no overt chest pain.  recently discharged from .  previously admitted due to KLPN bacteremia, source was not identified according to paieligio.   was rx'd Ceftin + vancomycin PO (CD prophylaxis) by Dr. Fierro.    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    SIRS/sepsis.  -hx of recent KLPN enterocolitis + CDI.  -BCx, no growth.  -CXR, unchanged trace left pleural effusion.  -cefepime and  vancomycin PO d/kevin  by ID on 12/10; monitor off ABX    coagulopathy due to VKA toxicity.  -INR normalized  -HH trending down.  f/u INR and CBC in AM.  -stool OB +, favor secondary to hemorrhoids  -continue holding warfarin.  -monitor for bleeding.  -Protonix 40mg IV q12.    epigastric pain.  -improved w/ PPI.  -CT AB/pelvis, no acute intraabdominal findings.  -GI : EGD on 12/11    Anemia: prbc during HD    Left arm skin  laceration : wound care nurse consult    hx ESRD RRT MWF-PCKD + nephrectomy + renal transplants (1989, 1995, 1997).  -Nephrology following.    hx AI.  -prednisone 5mg po qd.    DVT prophylaxis. venodynes,

## 2018-12-10 NOTE — PROGRESS NOTE ADULT - ASSESSMENT
63M.  admitted 12/06/18. presented to ED due to an elevated INR.  patient presented to Dr. Coello's office day prior to admit INR > 8.0.  no overt GI bleeding, however NP performed ANGEL, stool  OB postive.  hx of hemorroid.  additionally, had been c/o epigastric pain.  "indigestion" below sternum.  considering GI consult but not seen.  ? small ventral hernia, but not referred to Sx.  epigastric pain was a/w radiation to the left shoulder and back.  no overt chest pain.  recently discharged from .  previously admitted due to KLPN bacteremia, source was not identified according to patient   was rx'd Ceftin + vancomycin PO (CD prophylaxis). Here given PPI for epigastric pain w improvement, noted to have fever o/n confusion, occasional cough, no diarrhea, no increased abd pain was given steroids/cefepime.     1. fever/recent KLPN sepsis-enterocolitis s/p abx course/hx cdad/renal tx  - s/p cefepime #2  - repeat blood cx no growth, xray wnl, no obvious infectious focus  - dc abx and observe, stop oral vancomycin  - monitor temps  - tolerating abx well so far; no side effects noted  - reason for abx use and side effects reviewed with patient  - LUE with skin tear sp sutures after fall no superimposed cellulitis  - f/u cbc  - monitor temps  - supportive care    2. other issue s- care per medicine

## 2018-12-10 NOTE — PROGRESS NOTE ADULT - ASSESSMENT
62 y/o male admitted with elevated INR/+hemoccult stool/ESRD and failed kidney transplant numerous times.  Recent admission for klebsiella pna/bacteremia   Still with persistent epigastric pain unrelieved by PPIs.   Continue PPIs.  Keep NPO after midnight for EGD tomorrow with Dr. Duncan.     Discussed with pt, Dr. Duncan.

## 2018-12-11 NOTE — PROGRESS NOTE ADULT - ASSESSMENT
63F.  admitted 12/06/18.  presented to ED due to an elevated INR.  patient presented to Dr. Coello's office yesterday.  INR > 8.0.  no overt GI bleeding, however NP performed ANGEL, stool  OB postive.  hx of hemorroid.  additionally, had been c/o epigastric pain.  "indigestion" below sternum.  considering GI consult but not seen.  ? small ventral hernia, but not referred to Sx.  epigastric pain was a/w radiation to the left shoulder and back.  no overt chest pain.  recently discharged from .  previously admitted due to KLPN bacteremia, source was not identified according to paicalvinnt.   was rx'd Ceftin + vancomycin PO (CD prophylaxis) by Dr. Fierro.    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    SIRS/sepsis.  -hx of recent KLPN enterocolitis + CDI.  -BCx, no growth.  -CXR, unchanged trace left pleural effusion.  -cefepime and  vancomycin PO d/kevin  by ID on 12/10; monitor off ABX    coagulopathy due to VKA toxicity. 2.5 mg  vit k given on 12/11.   -INR normalized  -HH trending down.  f/u INR and CBC in AM.  -stool OB +, favor secondary to hemorrhoids  -continue holding warfarin.  -monitor for bleeding.  -Protonix 40mg IV q12.    epigastric pain.  -improved w/ PPI.  -CT AB/pelvis, no acute intraabdominal findings.  -GI : s/p EGD on 12/11, no active bleeding.     Anemia: prbc during HD    Left arm skin  laceration : wound care nurse consult awaited    hx ESRD RRT MWF-PCKD + nephrectomy + renal transplants (1989, 1995, 1997).  -Nephrology following.    hx Adrenal Insuf:  -prednisone 5mg po qd.    DVT prophylaxis. venodynes,       poc discussed with pt, team.

## 2018-12-12 NOTE — ADVANCED PRACTICE NURSE CONSULT - ASSESSMENT
This is a 63 year old male that was admitted to the hospital on 12/5/2018 for elevated INR.  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    Patient sustained skin tear from hitting left arm on IV pole. RN reports that skin tear was sutured and that skin is clean, dry and intact. Dressing changed over sutures today by RN. Would recommend continued daily changes to arm wound with kerlix.

## 2018-12-12 NOTE — PROGRESS NOTE ADULT - ASSESSMENT
1. CAD with severe dilated CM  2. ESRD  3. Overanticoagulation with heme positive stools4. ICD  4. Recent Klebsiella sepsis. with probable sepsis again last night on broad spectrum antibiotics    5. Hypertension  6.Laceration left forearm.   7. Adrenal insufficiency    Plan: cont losartan, amio  suggest resume warfarin

## 2018-12-12 NOTE — CHART NOTE - NSCHARTNOTEFT_GEN_A_CORE
Upon Nutritional Assessment by the Registered Dietitian your patient was determined to meet criteria / has evidence of the following diagnosis/diagnoses:          [ ]  Mild Protein Calorie Malnutrition        [ ]  Moderate Protein Calorie Malnutrition        [x] Severe Protein Calorie Malnutrition        [ ] Unspecified Protein Calorie Malnutrition        [ ] Underweight / BMI <19        [ ] Morbid Obesity / BMI > 40      Findings:  Upon visit pt has jaundice and appears to be undernourished w/ muscle (severe: scapula, patella, and calf) and fat (severe: tricep and moderate: ocular) wasting. Pt reports no N/V/D/C or trouble chewing/swallowing, food allergies, or to be taking any supplements. Pt states he has a good appetite, diet recall confirms this to be true. Reinforced importance of increased protein intake while on HD. Skin: Edema (+1, L hand). Vincent: 20 w/ no PU PTA. I/O: last BM on 12/12, not on bowel regimen. Pt meets criteria for severe protein/calorie malnutrition in context of chronic illness 2/2 CHF and ESRD.     Findings as based on:  •  Comprehensive nutrition assessment and consultation  •  Calorie counts (nutrient intake analysis)  •  Food acceptance and intake status from observations by staff  •  Follow up  •  Patient education  •  Intervention secondary to interdisciplinary rounds  •   concerns      Treatment:    The following diet has been recommended:  1) initiate Gelatein BID   2)continue w/ liberalized DASH, 1500FR diet, however if potassium levels change, initiate Renal, 1500FR diet   3) monitor PO intake   4) daily weights    PROVIDER Section:     By signing this assessment you are acknowledging and agree with the diagnosis/diagnoses assigned by the Registered Dietitian    Comments:

## 2018-12-12 NOTE — PROGRESS NOTE ADULT - ASSESSMENT
63F.  admitted 12/06/18.  presented to ED due to an elevated INR.  patient presented to Dr. Coello's office yesterday.  INR > 8.0.  no overt GI bleeding, however NP performed ANGEL, stool  OB postive.  hx of hemorroid.  additionally, had been c/o epigastric pain.  "indigestion" below sternum.  considering GI consult but not seen.  ? small ventral hernia, but not referred to Sx.  epigastric pain was a/w radiation to the left shoulder and back.  no overt chest pain.  recently discharged from .  previously admitted due to KLPN bacteremia, source was not identified according to paieligio.   was rx'd Ceftin + vancomycin PO (CD prophylaxis) by Dr. Fierro.    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    SIRS/sepsis.  -hx of recent KLPN enterocolitis + CDI.  -BCx, no growth.  -CXR, unchanged trace left pleural effusion.  -cefepime and  vancomycin PO d/kevin  by ID on 12/10; monitor off ABX    coagulopathy due to VKA toxicity. 2.5 mg  vit k given on 12/11.   -INR 1.69 on 12/12  -HH trending down.  f/u INR and CBC in AM.  -stool OB +, favor secondary to hemorrhoids  -restart warfarin.  -monitor for bleeding.  -Protonix 40mg IV q12.    epigastric pain.  -improved w/ PPI.  -CT AB/pelvis, no acute intraabdominal findings.  -GI : s/p EGD on 12/11, no active bleeding.     Anemia: prbc during HD    Left arm skin  laceration : wound care nurse consult appreciated; Kerlix dressing daily    hx ESRD RRT MWF-PCKD + nephrectomy + renal transplants (1989, 1995, 1997).  -Nephrology following.    hx Adrenal Insuf:  -prednisone 5mg po qd.    DVT prophylaxis. venodynes,       poc discussed with pt, team.

## 2018-12-12 NOTE — DIETITIAN INITIAL EVALUATION ADULT. - OTHER INFO
Pt seen for LOS. Pt is 64yo M w/ PMH of Oliguria, arm swelling, AV fistula, leg pain, irregular heartbeat, HD, adrenal insufficiency, hypoparathyroidism, CHF w/ reduced ejection factor, meningitis, C. Diff, PVD, HTN, polycystic kidney disease, sepsis, and ESRD. PSH: Stented coronary artery, AICD placement, colonoscopy, hernia repair, kidney transplant x3. Pt admitted for epigastric and chest pain. Pt presents w/ chest pain on HD. Upon pt visit pt has jaundice and appears to be undernourished w/ muscle (moderate: temporal) and fat (moderate: tricep) wasting. Pt reports no N/V/D/C or trouble chewing/swallowing, food allergies, or to be taking any supplements. Pt states he has a good appetite, diet recall confirms this to be true. Skin: Edema (+1, L hand). Vincent: 20 w/ no PU PTA. I/O: last BM on 12/12, not on bowel regimen. Pt meets criteria for moderate protein/calorie malnutrition in context of chronic illness 2/2 CHF and ESRD. Recommendations: 1) initiate Gelatein BID 2) continue w/ current DASH, 1500FR diet 3) monitor PO intake 4) daily weights Pt seen for LOS. Pt is 62yo M w/ PMH of Oliguria, arm swelling, AV fistula, leg pain, irregular heartbeat, HD, adrenal insufficiency, hypoparathyroidism, CHF w/ reduced ejection factor, meningitis, C. Diff, PVD, HTN, polycystic kidney disease, sepsis, and ESRD. PSH: Stented coronary artery, AICD placement, colonoscopy, hernia repair, kidney transplant x3. Pt admitted for epigastric and chest pain. Pt presents w/ chest pain, on HD. Upon pt visit pt has jaundice and appears to be undernourished w/ muscle (moderate: temporal) and fat (moderate: tricep) wasting. Pt reports no N/V/D/C or trouble chewing/swallowing, food allergies, or to be taking any supplements. Pt states he has a good appetite, diet recall confirms this to be true. Skin: Edema (+1, L hand). Vincent: 20 w/ no PU PTA. I/O: last BM on 12/12, not on bowel regimen. Pt meets criteria for moderate protein/calorie malnutrition in context of chronic illness 2/2 CHF and ESRD. Recommendations: 1) initiate Gelatein BID 2) continue w/ current DASH, 1500FR diet 3) monitor PO intake 4) daily weights Pt seen for LOS. Pt is 62yo M w/ PMH of Oliguria, arm swelling, AV fistula, leg pain, irregular heartbeat, HD, adrenal insufficiency, hypoparathyroidism, CHF w/ reduced ejection factor, meningitis, C. Diff, PVD, HTN, polycystic kidney disease, sepsis, and ESRD. PSH: Stented coronary artery, AICD placement, colonoscopy, hernia repair, kidney transplant x3. Pt admitted for epigastric and chest pain. Pt presents w/ chest pain, on HD. Upon visit pt has jaundice and appears to be undernourished w/ muscle (severe: scapula, patella, and calf) and fat (severe: tricep and moderate: ocular) wasting. Pt reports no N/V/D/C or trouble chewing/swallowing, food allergies, or to be taking any supplements. Pt states he has a good appetite, diet recall confirms this to be true. Skin: Edema (+1, L hand). Vincent: 20 w/ no PU PTA. I/O: last BM on 12/12, not on bowel regimen. Pt meets criteria for severe protein/calorie malnutrition in context of chronic illness 2/2 CHF and ESRD. Recommendations: 1) initiate Gelatein BID 2) continue w/ current DASH, 1500FR diet 3) monitor PO intake 4) daily weights Pt seen for LOS. Pt is 64yo M w/ PMH of Oliguria, arm swelling, AV fistula, leg pain, irregular heartbeat, HD, adrenal insufficiency, hypoparathyroidism, CHF w/ reduced ejection factor, meningitis, C. Diff, PVD, HTN, polycystic kidney disease, sepsis, and ESRD. PSH: Stented coronary artery, AICD placement, colonoscopy, hernia repair, kidney transplant x3. Pt admitted for epigastric and chest pain. Pt presents w/ chest pain, on HD. Upon visit pt has jaundice and appears to be undernourished w/ muscle (severe: scapula, patella, and calf) and fat (severe: tricep and moderate: ocular) wasting. Pt reports no N/V/D/C or trouble chewing/swallowing, food allergies, or to be taking any supplements. Pt states he has a good appetite, diet recall confirms this to be true. Reinforced importance of increased protein intake while on HD. Skin: Edema (+1, L hand). Vincent: 20 w/ no PU PTA. I/O: last BM on 12/12, not on bowel regimen. Pt meets criteria for severe protein/calorie malnutrition in context of chronic illness 2/2 CHF and ESRD. Recommendations: 1) initiate Gelatein BID 2) 1) continue w/ liberalized DASH, 1500FR diet, however if potassium levels change, initiate Renal, 1500FR diet 3) monitor PO intake 4) daily weights

## 2018-12-12 NOTE — DIETITIAN INITIAL EVALUATION ADULT. - ETIOLOGY
inadequate PO intake 2/2 CHF, ESRD inadequate PO intake 2/2 increased nutrient needs due to CHF, ESRD

## 2018-12-12 NOTE — PROGRESS NOTE ADULT - ASSESSMENT
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      ESRD on HD MWF  -HD maintain TIW today   -UF limited  -K/seven protocol    Anemia ? GI source    - EPO at HD    - s/p PRBC recently     Persistent Epigastric  pain/Occult blood  - Dr Duncan follow up

## 2018-12-13 NOTE — DISCHARGE NOTE ADULT - MEDICATION SUMMARY - MEDICATIONS TO CHANGE
I will SWITCH the dose or number of times a day I take the medications listed below when I get home from the hospital:    predniSONE 5 mg oral tablet  -- 3 tab(s) oral - by mouth once a day x 3 days  2 tab(s) oral - by mouth once a day x 3 days  1 tab(s) oral - by mouth once a day  for maintanace  -- It is very important that you take or use this exactly as directed.  Do not skip doses or discontinue unless directed by your doctor.  Obtain medical advice before taking any non-prescription drugs as some may affect the action of this medication.  Take with food or milk.    warfarin 1 mg oral tablet  -- 1 tab(s) by mouth once a day  1mg Sun & Thurs  2 mg Mon Tues Wed Fri Sat.    Please f/u with Dr Coello to check INR on 11/28

## 2018-12-13 NOTE — DISCHARGE NOTE ADULT - CARE PROVIDER_API CALL
Cesar Coello), Cardiovascular Disease; Internal Medicine  [address] [place] [address]  Phone: [phone]  Fax: [phone]

## 2018-12-13 NOTE — DISCHARGE NOTE ADULT - PATIENT PORTAL LINK FT
You can access the [facility] Portal, offered by [facility], by registering with the following website: [email or web address]

## 2018-12-13 NOTE — DISCHARGE NOTE ADULT - MEDICATION SUMMARY - MEDICATIONS TO TAKE
I will START or STAY ON the medications listed below when I get home from the hospital:    predniSONE 5 mg oral tablet  -- 1 tab(s) by mouth once a day  -- Indication: For .    oxyCODONE 5 mg oral tablet  -- 1 tab(s) by mouth every 6 hours, As needed, Severe Pain (7 - 10) MDD:20 mg  -- Indication: For .    losartan 25 mg oral tablet  -- 1 tab(s) by mouth once a day  -- Indication: For .    aluminum hydroxide-magnesium hydroxide 200 mg-200 mg/5 mL oral suspension  -- 30 milliliter(s) by mouth every 6 hours, As needed, Dyspepsia  -- Indication: For .    amiodarone 200 mg oral tablet  -- 0.5 tab(s) by mouth once a day  -- Indication: For .    warfarin 1 mg oral tablet  -- 0.5 tab(s) by mouth once a day (at bedtime)   -- Do not take this drug if you are pregnant.  It is very important that you take or use this exactly as directed.  Do not skip doses or discontinue unless directed by your doctor.  Obtain medical advice before taking any non-prescription drugs as some may affect the action of this medication.    -- Indication: For .    carvedilol 3.125 mg oral tablet  -- 1 tab(s) by mouth every 12 hours  -- Indication: For .    Sensipar 30 mg oral tablet  -- 1 tab(s) by mouth 3 times a week, MONDAY, WEDNESDAY, FRIDAY   -- Indication: For .    Renvela 800 mg oral tablet  -- 3 tab(s) by mouth 3 times a day (with meals)  -- Indication: For .    Acidophilus oral capsule  -- 1 cap(s) by mouth once a day  -- Indication: For .    pantoprazole 40 mg oral delayed release tablet  -- 1 tab(s) by mouth once a day (before a meal)  -- Indication: For .

## 2018-12-13 NOTE — DISCHARGE NOTE ADULT - KEEP YOUR INTAKE OF VITAMIN K REGULAR. THE HIGHEST AMOUNT OF VITAMIN K IS FOUND IN GREEN AND LEAFY VEGETABLES LIKE BROCCOLI, LETTUCES, CABBAGE, AND SPINACH. YOU CAN EAT THESE FOODS BUT KEEP THE PORTION
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 She reports that the tinnitus is if anything a little bit worse and that she is really bothered by it. She is unable to hear and reports that now her right ear also bothers her. She has some increase in tinnitus on that side as well.  She denies any symptoms of dizziness or vertigo.  In terms of her general health she is been doing well.    Physical Exam: Exam today shows that her as minimal cerumen on the left side. I removed this with an alligator forceps. The remainder of the exam is healthy. Her facial nerve is normal with House Brackmann class I bilaterally. Examination of the eye movements does not reveal obvious nystagmus.    Imaging: I went back over her imaging from 2015 and found that the imaging has an area just at the lateral end of the left canal which is questionable for enhancement.    Audiogram: Today's audiogram indicates that there is a progression of the hearing loss in left ear. This is not so apparent in her pure tone average, but in word recognition score she has declined substantially. The right better hearing ear has no change in the pure-tone average. A note though that some frequencies have changed about 10 dB in the higher tones.    Impression/Plan: In spite of treatment with diuretics, she is not really improved. This is an unfortunate finding. I cannot easily account for it. The diuretic does not seem to be helping her and therefore I will discontinue it. Because of the finding in 2015 and the progression of her word recognition score, I will reorder an MRI scan. She should continue annual follow-ups.      Luis Sylvester MD       Statement Selected

## 2018-12-13 NOTE — PROGRESS NOTE ADULT - PROVIDER SPECIALTY LIST ADULT
Cardiology
Gastroenterology
Hospitalist
Infectious Disease
Nephrology

## 2018-12-13 NOTE — DISCHARGE NOTE ADULT - HOSPITAL COURSE
Constitutional: Well appearing  HEENT: Atraumatic, DANIEL, Normal, No congestion  Respiratory: Breath Sounds normal, no rhonchi/wheeze  Cardiovascular: N S1S2;   Gastrointestinal: Abdomen soft, non tender, Bowel Sounds present  Extremities: No edema, peripheral pulses present  Neurological: AAO x 3, no gross focal motor deficits  Skin: left arm dressing present    63F.  admitted 12/06/18.  presented to ED due to an elevated INR.  patient presented to Dr. Coello's office yesterday.  INR > 8.0.  no overt GI bleeding, however NP performed ANGEL, stool  OB postive.  hx of hemorroid.  additionally, had been c/o epigastric pain.  "indigestion" below sternum.  considering GI consult but not seen.  ? small ventral hernia, but not referred to Sx.  epigastric pain was a/w radiation to the left shoulder and back.  no overt chest pain.  recently discharged from .  previously admitted due to KLPN bacteremia, source was not identified according to paieligio.   was rx'd Ceftin + vancomycin PO (CD prophylaxis) by Dr. Fierro.    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    SIRS/sepsis.  -hx of recent KLPN enterocolitis + CDI.  -BCx, no growth.  -CXR, unchanged trace left pleural effusion.  -cefepime and  vancomycin PO d/kevin  by ID on 12/10; monitor off ABX    coagulopathy due to VKA toxicity. 2.5 mg  vit k given on 12/11.   -INR 1.69 on 12/12  -HH trending down.  f/u INR and CBC in AM.  -stool OB +, favor secondary to hemorrhoids  -restarted warfarin 0.5 mg po daily as per Dr. Coello  -monitor for bleeding.  -Protonix 40mg po daily    epigastric pain.  -improved w/ PPI.  -CT AB/pelvis, no acute intraabdominal findings.  -GI : s/p EGD on 12/11, no active bleeding.     Anemia: prbc during HD    Left arm skin  laceration : wound care nurse consult appreciated; Kerlix dressing daily    hx ESRD RRT MWF-PCKD + nephrectomy + renal transplants (1989, 1995, 1997).  -Nephrology following.    hx Adrenal Insuf:  -prednisone 5mg po qd.        poc discussed with pt, team.     d/c home today    total time spent 37 min

## 2018-12-13 NOTE — DISCHARGE NOTE ADULT - CARE PLAN
Principal Discharge DX:	Sepsis, due to unspecified organism  Goal:	resolved  Assessment and plan of treatment:	f/u with pcp in 1-3 days  check INR in 1-2 days

## 2018-12-13 NOTE — PROGRESS NOTE ADULT - SUBJECTIVE AND OBJECTIVE BOX
Patient is a 63y Male who ON had a code sepsis. Started on steroids/abx and given IVF seen by CC team. Wife reports confusion ON as well, improved this AM    REVIEW OF SYSTEMS:    CONSTITUTIONAL: + weakness, fevers or chills  RESPIRATORY: No cough, wheezing, hemoptysis; No shortness of breath  CARDIOVASCULAR: No chest pain or palpitations  GENITOURINARY: No dysuria, frequency or hematuria  All other review of systems is negative unless indicated above.    MEDICATIONS  (STANDING):  amiodarone    Tablet 100 milliGRAM(s) Oral daily  cinacalcet 30 milliGRAM(s) Oral <User Schedule>  docusate sodium 100 milliGRAM(s) Oral three times a day  epoetin amee Injectable 55191 Unit(s) IV Push <User Schedule>  lactobacillus acidophilus 1 Tablet(s) Oral daily  losartan 25 milliGRAM(s) Oral daily  pantoprazole Infusion 8 mG/Hr (10 mL/Hr) IV Continuous <Continuous>  predniSONE   Tablet 5 milliGRAM(s) Oral daily  sevelamer hydrochloride 2400 milliGRAM(s) Oral three times a day with meals  sodium chloride 0.9%. 1000 milliLiter(s) (75 mL/Hr) IV Continuous <Continuous>  vancomycin    Solution 125 milliGRAM(s) Oral every 6 hours    MEDICATIONS  (PRN):  acetaminophen   Tablet .. 650 milliGRAM(s) Oral every 6 hours PRN Temp greater or equal to 38.5C (101.3F), Mild Pain (1 - 3)  aluminum hydroxide/magnesium hydroxide/simethicone Suspension 30 milliLiter(s) Oral every 6 hours PRN Dyspepsia        T(C): , Max: 38.3 (18 @ 21:49)  T(F): , Max: 101 (18 @ 21:49)  HR: 83 (18 @ 05:07)  BP: 113/68 (18 @ 05:07)  BP(mean): 74 (18 @ 21:49)  RR: 18 (18 @ 05:07)  SpO2: 97% (18 @ 05:07)  Wt(kg): --        PHYSICAL EXAM:    Constitutional: NAD, fral  HEENT: PERRLA, EOMI,  MMM  Neck: No LAD, No JVD  Respiratory: good aeration  Cardiovascular: S1 and S2, RRR  Gastrointestinal: BS+, soft, NT/ND  Extremities: No peripheral edema  Neurological: A/O x 3, no focal deficits  Psychiatric: Normal mood, normal affect  : No Amaya  Skin: No rashes  Access: avf R        LABS:                        8.0    7.68  )-----------( 105      ( 08 Dec 2018 06:28 )             26.0     08 Dec 2018 06:28    135    |  97     |  23     ----------------------------<  184    4.7     |  28     |  3.11   07 Dec 2018 22:24    136    |  97     |  18     ----------------------------<  111    3.7     |  31     |  2.61   07 Dec 2018 07:10    136    |  97     |  33     ----------------------------<  110    4.0     |  31     |  3.58   06 Dec 2018 14:00    135    |  95     |  41     ----------------------------<  279    4.0     |  27     |  4.23   05 Dec 2018 18:33    138    |  96     |  29     ----------------------------<  116    5.2     |  34     |  3.08     Ca    8.1        08 Dec 2018 06:28  Ca    8.3        07 Dec 2018 22:24  Ca    8.3        07 Dec 2018 07:10  Ca    8.1        06 Dec 2018 14:00  Ca    8.3        05 Dec 2018 18:33  Phos  4.3       06 Dec 2018 14:00    TPro  x      /  Alb  2.4    /  TBili  x      /  DBili  x      /  AST  x      /  ALT  x      /  AlkPhos  x      06 Dec 2018 14:00  TPro  6.9    /  Alb  2.4    /  TBili  0.6    /  DBili  x      /  AST  25     /  ALT  16     /  AlkPhos  103    05 Dec 2018 18:33          Urine Studies:  Urinalysis Basic - ( 07 Dec 2018 22:20 )    Color: Yellow / Appearance: Clear / S.015 / pH: x  Gluc: x / Ketone: Negative  / Bili: Negative / Urobili: Negative mg/dL   Blood: x / Protein: 100 mg/dL / Nitrite: Negative   Leuk Esterase: Trace / RBC: 3-5 /HPF / WBC 3-5   Sq Epi: x / Non Sq Epi: Few / Bacteria: Few            RADIOLOGY & ADDITIONAL STUDIES:
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  Recently discharged from .  previously admitted due to KLPN bacteremia, source was not identified according to paicalvinnt.   was rx'd Ceftin + vancomycin O (CD prophylaxis) by Dr. Srivastava.    Now presented to ED due to an elevated INR.  patient presented from  Dr. Coello's office yesterday.  INR > 8.0.  no overt GI bleeding, however NP performed ANGEL, stool  OB postive.  hx of hemorroid.  Additionally, had been c/o epigastric pain.  "indigestion" below sternum.  and pain referred to left shoulder and scapula. ED physician was concerned about AAA/dissection - CTA was performed and neg  Renal eval called for hx ESRD on HD mgt       Today - seen earlier at pt was being wheeled down to HD ~ 11: 15 am   feeling well   did not have any further shoulder pains after HD yest       PAST MEDICAL & SURGICAL HISTORY:  Oliguria  Arm swelling: left arm  AV fistula: right UE  Leg pain, anterior, right: right anterior thigh at graft donor site  Irregular heart beat  AICD (automatic cardioverter/defibrillator) present  Dialysis patient  Adrenal insufficiency  Hypoparathyroidism  HFrEF (heart failure with reduced ejection fraction)  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  S/P colonoscopy: last done 2016  AICD (automatic cardioverter/defibrillator) present: 2013  H/O hernia repair  A-V fistula: 1995 left UE  right arm 2007,  H/O kidney transplant: 1985, 1995, 1997      MEDICATIONS  (STANDING):  amiodarone    Tablet 100 milliGRAM(s) Oral daily  cefuroxime   Tablet 250 milliGRAM(s) Oral every 12 hours  cinacalcet 30 milliGRAM(s) Oral <User Schedule>  docusate sodium 100 milliGRAM(s) Oral three times a day  epoetin amee Injectable 95027 Unit(s) IV Push <User Schedule>  lactobacillus acidophilus 1 Tablet(s) Oral daily  losartan 25 milliGRAM(s) Oral daily  pantoprazole Infusion 8 mG/Hr (10 mL/Hr) IV Continuous <Continuous>  predniSONE   Tablet 5 milliGRAM(s) Oral daily  sevelamer hydrochloride 2400 milliGRAM(s) Oral three times a day with meals  vancomycin    Solution 125 milliGRAM(s) Oral every 6 hours    MEDICATIONS  (PRN):  acetaminophen   Tablet .. 650 milliGRAM(s) Oral every 6 hours PRN Temp greater or equal to 38.5C (101.3F), Mild Pain (1 - 3)  aluminum hydroxide/magnesium hydroxide/simethicone Suspension 30 milliLiter(s) Oral every 6 hours PRN Dyspepsia        Allergies    No Known Allergies    Intolerances        SOCIAL HISTORY:  Denies ETOh,Smoking,     FAMILY HISTORY:  Family history of kidney disease (Mother)  Family history of colon cancer (Sibling)      REVIEW OF SYSTEMS:    CONSTITUTIONAL: No weakness, fevers or chills  EYES/ENT: No visual changes;  No vertigo or throat pain   NECK: No pain or stiffness  RESPIRATORY: No cough, wheezing, hemoptysis; No shortness of breath  CARDIOVASCULAR: No chest pain or palpitations  GASTROINTESTINAL: No abdominal ,  No nausea, vomiting, or hematemesis; No diarrhea or constipation. No melena or hematochezia.  GENITOURINARY: No dysuria, frequency or hematuria  NEUROLOGICAL: No numbness or weakness  SKIN: No itching, burning, rashes, or lesions   All other review of systems is negative unless indicated above.    VITAL:  T(C): , Max: 36.9 (12-05-18 @ 21:18)  T(F): , Max: 98.4 (12-05-18 @ 21:18)  HR: 57 (12-06-18 @ 14:45)  BP: 124/54 (12-06-18 @ 14:45)  BP(mean): --  RR: 16 (12-06-18 @ 14:45)  SpO2: 100% (12-06-18 @ 11:15)  Wt(kg): --    I and O's:    PHYSICAL EXAM:    Constitutional: NAD  HEENT:  EOMI,  MMM  Neck: No LAD, No JVD  Respiratory: CTAB  Cardiovascular: S1 and S2  Gastrointestinal: BS+, soft, NT/ND  Extremities: No peripheral edema  Neurological: A/O x 3, no focal deficits  Psychiatric: Normal mood, normal affect  : No Amaya  Skin: No rashes  Access: AVF +     LABS:                        8.9    8.56  )-----------( 163      ( 06 Dec 2018 14:00 )             28.9                         8.6    7.63  )-----------( 147      ( 05 Dec 2018 18:33 )             28.6       135    |  95     |  41     ----------------------------<  279       06 Dec 2018 14:00  4.0     |  27     |  4.23     138    |  96     |  29     ----------------------------<  116       05 Dec 2018 18:33  5.2     |  34     |  3.08     Ca    8.1        06 Dec 2018 14:00  Ca    8.3        05 Dec 2018 18:33    Phos  4.3       06 Dec 2018 14:00      TPro  x      /  Alb  2.4    /  TBili  x      /        06 Dec 2018 14:00  DBili  x      /  AST  x      /  ALT  x      /  AlkPhos  x        TPro  6.9    /  Alb  2.4    /  TBili  0.6    /        05 Dec 2018 18:33  DBili  x      /  AST  25     /  ALT  16     /  AlkPhos  103                Urine Studies:          RADIOLOGY & ADDITIONAL STUDIES:      EXAM:  CT ABDOMEN AND PELVIS IC                          EXAM:  CT CHEST IC                            PROCEDURE DATE:  12/05/2018          INTERPRETATION:      Three examinations were performed on this patient:   1. CT scan of the chest with intravenous contrast  2. CT scan of the abdomen with intravenous contrast  3. CT scan of the pelvis with intravenous contrast        CLINICAL INFORMATION (all 3 exams):  Chest pain abdominal pain 3 of   polycystic renal disease and renal transplant        TECHNIQUE:  Contiguous axial 3 mm sections were obtained through the   chest, abdomen and pelvis using single helical acquisition.  Images were   acquired during the rapid bolus administration of 95 cc of Omnipaque 350/   5 cc discarded.  Imaging postprocessing software was employed to   generate reformatted images in 3 mm sagittal and coronal imaging planes.     No Oral contrast was administered.   This scan was performed using   automatic exposure control (radiation dose reduction software) to obtain   a diagnostic image quality scan with patient dose as low as reasonably   achievable.         FINDINGS:   CT dated 11/22/2018 available for review.    The thyroid gland appears intact.    The lungs are significant for bronchiectasis in the LEFT lower lobe   unchanged. Scarring is seen in the RIGHT lower lobe unchanged..  No   interstitial lung disease, lung consolidation, pulmonary nodule or chest   mass is recognized.  Lung volumes are preserved.  No pleural fluid is   seen.  The trachea and major bronchi are patent.    No enlarged axillary, hilar or mediastinal lymph nodes are found.   The   heart size is normal. Pacemaker. The chest wall and thoracic spine are   unremarkable.    The liver demonstrates innumerable cysts with dystrophic calcification   consistent with polycystic liver disease attenuation without focal lesion   or abnormal enhancement.  Hepatic size is enlarged and contours are   nodular. Mild perihepatic ascites is noted..  No intrahepatic or common   ductal dilatation is recognized.  The gallbladder is intact without   calcified calculi or wall thickening.  The pancreas is intact without   ductal dilatation or focal lesion.  The spleen is normal in size. Stable   1 cm lesion in the anterior spleen. Minimal perisplenic fluid.    The adrenal glands are intact.  The native kidneys are not visualized.   The RIGHT pelvic kidney is intact with no hydronephrosis or   nephrolithiasis. The bladder contains several RIGHT lateral diverticula..    Extensive vascular calcification involves aorta and its branches.    No enlarged lymph nodes are found.  No ascites is present.   The osseous   structures are intact.          The bowel show moderate diverticulosis of the colon with no evidence of   acute diverticulitis. No obstruction, perforation or abscess is   recognized.           IMPRESSION:          1.   Chest:  Minimal bronchiectasis in LEFT lower lobe and scarring   in RIGHT lower lobe.              2.   Abdomen and pelvis: Advanced polycystic liver diseaseassociated   with polycystic renal disease. Mild ascites. RIGHT pelvic kidney   transplant intact.
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  Recently discharged from .  previously admitted due to KLPN bacteremia, source was not identified according to paicalvinnt.   was rx'd Ceftin + vancomycin O (CD prophylaxis) by Dr. Srivastava.    Now presented to ED due to an elevated INR.  patient presented from  Dr. Coello's office yesterday.  INR > 8.0.  no overt GI bleeding, however NP performed ANGEL, stool  OB postive.  hx of hemorroid.  Additionally, had been c/o epigastric pain.  "indigestion" below sternum.  and pain referred to left shoulder and scapula. ED physician was concerned about AAA/dissection - CTA was performed and neg  Renal eval called for hx ESRD on HD mgt       Today pt states pain is improved after Pepcid and protonix  GI consult to be seen.  no overt chest pain.          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  S/P colonoscopy: last done 2016  AICD (automatic cardioverter/defibrillator) present: 2013  H/O hernia repair  A-V fistula: 1995 left UE  right arm 2007,  H/O kidney transplant: 1985, 1995, 1997      MEDICATIONS  (STANDING):  amiodarone    Tablet 100 milliGRAM(s) Oral daily  cefuroxime   Tablet 250 milliGRAM(s) Oral every 12 hours  cinacalcet 30 milliGRAM(s) Oral <User Schedule>  docusate sodium 100 milliGRAM(s) Oral three times a day  lactobacillus acidophilus 1 Tablet(s) Oral daily  pantoprazole  Injectable 40 milliGRAM(s) IV Push two times a day  predniSONE   Tablet 5 milliGRAM(s) Oral daily  sevelamer hydrochloride 2400 milliGRAM(s) Oral three times a day with meals  vancomycin    Solution 125 milliGRAM(s) Oral every 6 hours      Allergies    No Known Allergies    Intolerances        SOCIAL HISTORY:  Denies ETOh,Smoking,     FAMILY HISTORY:  Family history of kidney disease (Mother)  Family history of colon cancer (Sibling)      REVIEW OF SYSTEMS:    CONSTITUTIONAL: No weakness, fevers or chills  EYES/ENT: No visual changes;  No vertigo or throat pain   NECK: No pain or stiffness  RESPIRATORY: No cough, wheezing, hemoptysis; No shortness of breath  CARDIOVASCULAR: No chest pain or palpitations  GASTROINTESTINAL: No abdominal ,  No nausea, vomiting, or hematemesis; No diarrhea or constipation. No melena or hematochezia.  GENITOURINARY: No dysuria, frequency or hematuria  NEUROLOGICAL: No numbness or weakness  SKIN: No itching, burning, rashes, or lesions   All other review of systems is negative unless indicated above.    VITAL:  T(C): , Max: 36.9 (12-05-18 @ 21:18)  T(F): , Max: 98.4 (12-05-18 @ 21:18)  HR: 57 (12-06-18 @ 14:45)  BP: 124/54 (12-06-18 @ 14:45)  BP(mean): --  RR: 16 (12-06-18 @ 14:45)  SpO2: 100% (12-06-18 @ 11:15)  Wt(kg): --    I and O's:    PHYSICAL EXAM:    Constitutional: NAD  HEENT:  EOMI,  MMM  Neck: No LAD, No JVD  Respiratory: CTAB  Cardiovascular: S1 and S2  Gastrointestinal: BS+, soft, NT/ND  Extremities: No peripheral edema  Neurological: A/O x 3, no focal deficits  Psychiatric: Normal mood, normal affect  : No Amaya  Skin: No rashes  Access: AVF +     LABS:                        8.9    8.56  )-----------( 163      ( 06 Dec 2018 14:00 )             28.9                         8.6    7.63  )-----------( 147      ( 05 Dec 2018 18:33 )             28.6       135    |  95     |  41     ----------------------------<  279       06 Dec 2018 14:00  4.0     |  27     |  4.23     138    |  96     |  29     ----------------------------<  116       05 Dec 2018 18:33  5.2     |  34     |  3.08     Ca    8.1        06 Dec 2018 14:00  Ca    8.3        05 Dec 2018 18:33    Phos  4.3       06 Dec 2018 14:00      TPro  x      /  Alb  2.4    /  TBili  x      /        06 Dec 2018 14:00  DBili  x      /  AST  x      /  ALT  x      /  AlkPhos  x        TPro  6.9    /  Alb  2.4    /  TBili  0.6    /        05 Dec 2018 18:33  DBili  x      /  AST  25     /  ALT  16     /  AlkPhos  103                Urine Studies:          RADIOLOGY & ADDITIONAL STUDIES:      EXAM:  CT ABDOMEN AND PELVIS IC                          EXAM:  CT CHEST IC                            PROCEDURE DATE:  12/05/2018          INTERPRETATION:      Three examinations were performed on this patient:   1. CT scan of the chest with intravenous contrast  2. CT scan of the abdomen with intravenous contrast  3. CT scan of the pelvis with intravenous contrast        CLINICAL INFORMATION (all 3 exams):  Chest pain abdominal pain 3 of   polycystic renal disease and renal transplant        TECHNIQUE:  Contiguous axial 3 mm sections were obtained through the   chest, abdomen and pelvis using single helical acquisition.  Images were   acquired during the rapid bolus administration of 95 cc of Omnipaque 350/   5 cc discarded.  Imaging postprocessing software was employed to   generate reformatted images in 3 mm sagittal and coronal imaging planes.     No Oral contrast was administered.   This scan was performed using   automatic exposure control (radiation dose reduction software) to obtain   a diagnostic image quality scan with patient dose as low as reasonably   achievable.         FINDINGS:   CT dated 11/22/2018 available for review.    The thyroid gland appears intact.    The lungs are significant for bronchiectasis in the LEFT lower lobe   unchanged. Scarring is seen in the RIGHT lower lobe unchanged..  No   interstitial lung disease, lung consolidation, pulmonary nodule or chest   mass is recognized.  Lung volumes are preserved.  No pleural fluid is   seen.  The trachea and major bronchi are patent.    No enlarged axillary, hilar or mediastinal lymph nodes are found.   The   heart size is normal. Pacemaker. The chest wall and thoracic spine are   unremarkable.    The liver demonstrates innumerable cysts with dystrophic calcification   consistent with polycystic liver disease attenuation without focal lesion   or abnormal enhancement.  Hepatic size is enlarged and contours are   nodular. Mild perihepatic ascites is noted..  No intrahepatic or common   ductal dilatation is recognized.  The gallbladder is intact without   calcified calculi or wall thickening.  The pancreas is intact without   ductal dilatation or focal lesion.  The spleen is normal in size. Stable   1 cm lesion in the anterior spleen. Minimal perisplenic fluid.    The adrenal glands are intact.  The native kidneys are not visualized.   The RIGHT pelvic kidney is intact with no hydronephrosis or   nephrolithiasis. The bladder contains several RIGHT lateral diverticula..    Extensive vascular calcification involves aorta and its branches.    No enlarged lymph nodes are found.  No ascites is present.   The osseous   structures are intact.          The bowel show moderate diverticulosis of the colon with no evidence of   acute diverticulitis. No obstruction, perforation or abscess is   recognized.           IMPRESSION:          1.   Chest:  Minimal bronchiectasis in LEFT lower lobe and scarring   in RIGHT lower lobe.              2.   Abdomen and pelvis: Advanced polycystic liver diseaseassociated   with polycystic renal disease. Mild ascites. RIGHT pelvic kidney   transplant intact.
CC:  Patient is a 63y old  Male who presents with a chief complaint of Patient is a 63y old  Male who presents with a chief complaint of elevated INR, occult blood +, epigastric pain. (07 Dec 2018 17:35)    SUBJECTIVE:  offers no new complaints at current time.    ROS:  all other review of systems are negative unless indicated above.    acetaminophen   Tablet .. 650 milliGRAM(s) Oral every 6 hours PRN  aluminum hydroxide/magnesium hydroxide/simethicone Suspension 30 milliLiter(s) Oral every 6 hours PRN  amiodarone    Tablet 100 milliGRAM(s) Oral daily  cefuroxime   Tablet 250 milliGRAM(s) Oral every 12 hours  cinacalcet 30 milliGRAM(s) Oral <User Schedule>  docusate sodium 100 milliGRAM(s) Oral three times a day  epoetin amee Injectable 57357 Unit(s) IV Push <User Schedule>  lactobacillus acidophilus 1 Tablet(s) Oral daily  losartan 25 milliGRAM(s) Oral daily  pantoprazole Infusion 8 mG/Hr IV Continuous <Continuous>  predniSONE   Tablet 5 milliGRAM(s) Oral daily  sevelamer hydrochloride 2400 milliGRAM(s) Oral three times a day with meals  vancomycin    Solution 125 milliGRAM(s) Oral every 6 hours    T(C): 37.1 (12-07-18 @ 15:30), Max: 37.3 (12-06-18 @ 20:17)  HR: 80 (12-07-18 @ 15:50) (59 - 82)  BP: 149/78 (12-07-18 @ 15:50) (101/56 - 167/109)  RR: 17 (12-07-18 @ 15:30) (16 - 20)  SpO2: 96% (12-07-18 @ 10:32) (96% - 100%)                        8.5    7.27  )-----------( 136      ( 07 Dec 2018 07:10 )             28.3     PT/INR - ( 07 Dec 2018 07:10 )   PT: 67.1 sec;   INR: 5.73 ratio         PTT - ( 05 Dec 2018 18:33 )  PTT:53.1 sec  12-07    136  |  97  |  33<H>  ----------------------------<  110<H>  4.0   |  31  |  3.58<H>    Ca    8.3<L>      07 Dec 2018 07:10  Phos  4.3     12-06    TPro  x   /  Alb  2.4<L>  /  TBili  x   /  DBili  x   /  AST  x   /  ALT  x   /  AlkPhos  x   12-06
CC:  Patient is a 63y old  Male who presents with a chief complaint of Patient is a 63y old  Male who presents with a chief complaint of elevated INR, occult blood +, epigastric pain. (08 Dec 2018 12:14)    SUBJECTIVE:  offers no new complaints at current time.    ROS:  all other review of systems are negative unless indicated above.    acetaminophen   Tablet .. 650 milliGRAM(s) Oral every 6 hours PRN  aluminum hydroxide/magnesium hydroxide/simethicone Suspension 30 milliLiter(s) Oral every 6 hours PRN  amiodarone    Tablet 100 milliGRAM(s) Oral daily  cefepime  Injectable.      cinacalcet 30 milliGRAM(s) Oral <User Schedule>  docusate sodium 100 milliGRAM(s) Oral three times a day  epoetin amee Injectable 20428 Unit(s) IV Push <User Schedule>  lactobacillus acidophilus 1 Tablet(s) Oral daily  losartan 25 milliGRAM(s) Oral daily  pantoprazole Infusion 8 mG/Hr IV Continuous <Continuous>  predniSONE   Tablet 5 milliGRAM(s) Oral daily  sevelamer hydrochloride 2400 milliGRAM(s) Oral three times a day with meals  sodium chloride 0.9%. 1000 milliLiter(s) IV Continuous <Continuous>  vancomycin    Solution 125 milliGRAM(s) Oral every 6 hours    Vital Signs Last 24 Hrs  T(C): 36.3 (09 Dec 2018 10:56), Max: 36.8 (08 Dec 2018 21:23)  T(F): 97.4 (09 Dec 2018 10:56), Max: 98.3 (08 Dec 2018 21:23)  HR: 76 (09 Dec 2018 10:56) (74 - 79)  BP: 119/70 (09 Dec 2018 10:56) (94/59 - 119/70)  BP(mean): --  RR: 18 (09 Dec 2018 10:56) (18 - 18)  SpO2: 99% (09 Dec 2018 10:56) (97% - 99%)    Constitutional: chronically ill appearing.  	HEENT: MMM.  	Neck: Soft and supple, No carotid bruit, No JVD  	Respiratory: Breath sounds are clear bilaterally, No wheezing, rales or rhonchi  	Cardiovascular: S1 and S2, regular rate and rhythm, no murmur, rub or gallop.  	Gastrointestinal: Bowel Sounds present, soft, nontender, nondistended, no guarding, no rebound, no mass.  	Extremities: No peripheral edema  	Vascular: LUE AVF.  	Neurological: A/O x 3, no focal deficits  	Musculoskeletal: 5/5 strength b/l upper and lower extremities  Skin:  no visible rashes                             7.7    8.87  )-----------( 120      ( 09 Dec 2018 06:13 )             25.5     12-09    135  |  99  |  50<H>  ----------------------------<  206<H>  4.5   |  24  |  4.61<H>    Ca    8.1<L>      09 Dec 2018 06:13    telemetry  70-80.
CC:  Patient is a 63y old  Male who presents with a chief complaint of Patient is a 63y old  Male who presents with a chief complaint of elevated INR, occult blood +, epigastric pain. (08 Dec 2018 12:14)    SUBJECTIVE:  offers no new complaints at current time.    ROS:  all other review of systems are negative unless indicated above.    acetaminophen   Tablet .. 650 milliGRAM(s) Oral every 6 hours PRN  aluminum hydroxide/magnesium hydroxide/simethicone Suspension 30 milliLiter(s) Oral every 6 hours PRN  amiodarone    Tablet 100 milliGRAM(s) Oral daily  cefepime  Injectable.      cinacalcet 30 milliGRAM(s) Oral <User Schedule>  docusate sodium 100 milliGRAM(s) Oral three times a day  epoetin amee Injectable 75468 Unit(s) IV Push <User Schedule>  lactobacillus acidophilus 1 Tablet(s) Oral daily  losartan 25 milliGRAM(s) Oral daily  pantoprazole Infusion 8 mG/Hr IV Continuous <Continuous>  predniSONE   Tablet 5 milliGRAM(s) Oral daily  sevelamer hydrochloride 2400 milliGRAM(s) Oral three times a day with meals  sodium chloride 0.9%. 1000 milliLiter(s) IV Continuous <Continuous>  vancomycin    Solution 125 milliGRAM(s) Oral every 6 hours    T(C): 36.6 (12-08-18 @ 15:23), Max: 38.3 (12-07-18 @ 21:49)  HR: 88 (12-08-18 @ 15:23) (83 - 105)  BP: 95/54 (12-08-18 @ 15:23) (95/54 - 131/87)  RR: 17 (12-08-18 @ 15:23) (17 - 18)  SpO2: 96% (12-08-18 @ 15:23) (96% - 97%)    Constitutional: chronically ill appearing.  	HEENT: MMM.  	Neck: Soft and supple, No carotid bruit, No JVD  	Respiratory: Breath sounds are clear bilaterally, No wheezing, rales or rhonchi  	Cardiovascular: S1 and S2, regular rate and rhythm, no murmur, rub or gallop.  	Gastrointestinal: Bowel Sounds present, soft, nontender, nondistended, no guarding, no rebound, no mass.  	Extremities: No peripheral edema  	Vascular: LUE AVF.  	Neurological: A/O x 3, no focal deficits  	Musculoskeletal: 5/5 strength b/l upper and lower extremities  Skin:  no visible rashes                        8.0    7.68  )-----------( 105      ( 08 Dec 2018 06:28 )             26.0     PT/INR - ( 08 Dec 2018 06:28 )   PT: 20.4 sec;   INR: 1.80 ratio           12-08    135  |  97  |  23  ----------------------------<  184<H>  4.7   |  28  |  3.11<H>    Ca    8.1<L>      08 Dec 2018 06:28
CC:  Patient is a 63y old  Male who presents with a chief complaint of elevated INR, occult blood +, epigastric pain. (08 Dec 2018 12:14)    12/10: no complaints  Hb 7.7, 7.5      PHYSICAL EXAM:    Daily     Daily Weight in k.8 (10 Dec 2018 05:16)    ICU Vital Signs Last 24 Hrs  T(C): 36.8 (10 Dec 2018 16:39), Max: 37.2 (10 Dec 2018 11:16)  T(F): 98.3 (10 Dec 2018 16:39), Max: 98.9 (10 Dec 2018 11:16)  HR: 80 (10 Dec 2018 17:14) (68 - 81)  BP: 104/64 (10 Dec 2018 17:14) (104/64 - 150/71)  BP(mean): --  ABP: --  ABP(mean): --  RR: 16 (10 Dec 2018 17:14) (16 - 17)  SpO2: 97% (10 Dec 2018 11:16) (96% - 98%)      Constitutional: Well appearing  HEENT: Atraumatic, DANIEL, Normal, No congestion  Respiratory: Breath Sounds normal, no rhonchi/wheeze  Cardiovascular: N S1S2;   Gastrointestinal: Abdomen soft, non tender, Bowel Sounds present  Extremities: No edema, peripheral pulses present  Neurological: AAO x 3, no gross focal motor deficits  Skin: left arm skin laceration                            7.5    7.06  )-----------( 109      ( 10 Dec 2018 10:19 )             24.8       CBC Full  -  ( 10 Dec 2018 10:19 )  WBC Count : 7.06 K/uL  Hemoglobin : 7.5 g/dL  Hematocrit : 24.8 %  Platelet Count - Automated : 109 K/uL  Mean Cell Volume : 96.9 fl  Mean Cell Hemoglobin : 29.3 pg  Mean Cell Hemoglobin Concentration : 30.2 gm/dL  Auto Neutrophil # : 5.89 K/uL  Auto Lymphocyte # : 0.63 K/uL  Auto Monocyte # : 0.45 K/uL  Auto Eosinophil # : 0.05 K/uL  Auto Basophil # : 0.01 K/uL  Auto Neutrophil % : 83.5 %  Auto Lymphocyte % : 8.9 %  Auto Monocyte % : 6.4 %  Auto Eosinophil % : 0.7 %  Auto Basophil % : 0.1 %      12-10    139  |  103  |  67<H>  ----------------------------<  147<H>  4.0   |  24  |  5.72<H>    Ca    7.6<L>      10 Dec 2018 10:19  Phos  3.6     12-10    TPro  x   /  Alb  2.1<L>  /  TBili  x   /  DBili  x   /  AST  x   /  ALT  x   /  AlkPhos  x   12-10      LIVER FUNCTIONS - ( 10 Dec 2018 10:19 )  Alb: 2.1 g/dL / Pro: x     / ALK PHOS: x     / ALT: x     / AST: x     / GGT: x             PT/INR - ( 10 Dec 2018 07:19 )   PT: 24.5 sec;   INR: 2.16 ratio                           MEDICATIONS  (STANDING):  amiodarone    Tablet 100 milliGRAM(s) Oral daily  cinacalcet 30 milliGRAM(s) Oral <User Schedule>  docusate sodium 100 milliGRAM(s) Oral three times a day  epoetin amee Injectable 72071 Unit(s) IV Push <User Schedule>  lactobacillus acidophilus 1 Tablet(s) Oral daily  losartan 25 milliGRAM(s) Oral daily  pantoprazole  Injectable 40 milliGRAM(s) IV Push two times a day  predniSONE   Tablet 5 milliGRAM(s) Oral daily  sevelamer hydrochloride 2400 milliGRAM(s) Oral three times a day with meals
CC:  Patient is a 63y old  Male who presents with a chief complaint of elevated INR, occult blood +, epigastric pain. (08 Dec 2018 12:14)    12/10: no complaints  Hb 7.7, 7.5    12/11: INR 3.59  s/p EGD  Hb 9.9      PHYSICAL EXAM:    Daily     Daily     ICU Vital Signs Last 24 Hrs  T(C): 36.9 (11 Dec 2018 17:37), Max: 36.9 (11 Dec 2018 11:00)  T(F): 98.5 (11 Dec 2018 17:37), Max: 98.5 (11 Dec 2018 17:37)  HR: 86 (11 Dec 2018 17:37) (81 - 88)  BP: 145/91 (11 Dec 2018 17:37) (112/75 - 145/91)  BP(mean): --  ABP: --  ABP(mean): --  RR: 18 (11 Dec 2018 17:37) (16 - 18)  SpO2: 100% (11 Dec 2018 17:37) (98% - 100%)      Constitutional: Well appearing  HEENT: Atraumatic, DANIEL, Normal, No congestion  Respiratory: Breath Sounds normal, no rhonchi/wheeze  Cardiovascular: N S1S2; PENG present  Gastrointestinal: Abdomen soft, non tender, Bowel Sounds present  Extremities: No edema, peripheral pulses present  Neurological: AAO x 3, no gross focal motor deficits  Skin: left arm dressing present                            9.9    6.48  )-----------( 120      ( 11 Dec 2018 06:53 )             31.5       CBC Full  -  ( 11 Dec 2018 06:53 )  WBC Count : 6.48 K/uL  Hemoglobin : 9.9 g/dL  Hematocrit : 31.5 %  Platelet Count - Automated : 120 K/uL  Mean Cell Volume : 93.8 fl  Mean Cell Hemoglobin : 29.5 pg  Mean Cell Hemoglobin Concentration : 31.4 gm/dL  Auto Neutrophil # : x  Auto Lymphocyte # : x  Auto Monocyte # : x  Auto Eosinophil # : x  Auto Basophil # : x  Auto Neutrophil % : x  Auto Lymphocyte % : x  Auto Monocyte % : x  Auto Eosinophil % : x  Auto Basophil % : x      12-10    139  |  103  |  67<H>  ----------------------------<  147<H>  4.0   |  24  |  5.72<H>    Ca    7.6<L>      10 Dec 2018 10:19  Phos  3.6     12-10    TPro  x   /  Alb  2.1<L>  /  TBili  x   /  DBili  x   /  AST  x   /  ALT  x   /  AlkPhos  x   12-10      LIVER FUNCTIONS - ( 10 Dec 2018 10:19 )  Alb: 2.1 g/dL / Pro: x     / ALK PHOS: x     / ALT: x     / AST: x     / GGT: x             PT/INR - ( 11 Dec 2018 10:26 )   PT: 41.4 sec;   INR: 3.59 ratio                           MEDICATIONS  (STANDING):  amiodarone    Tablet 100 milliGRAM(s) Oral daily  cinacalcet 30 milliGRAM(s) Oral <User Schedule>  docusate sodium 100 milliGRAM(s) Oral three times a day  epoetin amee Injectable 88910 Unit(s) IV Push <User Schedule>  lactobacillus acidophilus 1 Tablet(s) Oral daily  losartan 25 milliGRAM(s) Oral daily  pantoprazole  Injectable 40 milliGRAM(s) IV Push two times a day  predniSONE   Tablet 5 milliGRAM(s) Oral daily  sevelamer hydrochloride 2400 milliGRAM(s) Oral three times a day with meals
CC:  Patient is a 63y old  Male who presents with a chief complaint of elevated INR, occult blood +, epigastric pain. (08 Dec 2018 12:14)    12/10: no complaints  Hb 7.7, 7.5    : INR 3.59  s/p EGD  Hb 9.9    : INR 1.69  Hb 9.8      PHYSICAL EXAM:    Daily     Daily Weight in k (12 Dec 2018 11:06)    ICU Vital Signs Last 24 Hrs  T(C): 36.7 (12 Dec 2018 16:16), Max: 36.9 (11 Dec 2018 17:37)  T(F): 98 (12 Dec 2018 16:16), Max: 98.5 (11 Dec 2018 17:37)  HR: 83 (12 Dec 2018 16:16) (58 - 86)  BP: 97/62 (12 Dec 2018 16:16) (97/62 - 145/91)  BP(mean): --  ABP: --  ABP(mean): --  RR: 18 (12 Dec 2018 16:16) (17 - 18)  SpO2: 98% (12 Dec 2018 16:16) (98% - 100%)      Constitutional: Well appearing  HEENT: Atraumatic, DANIEL, Normal, No congestion  Respiratory: Breath Sounds normal, no rhonchi/wheeze  Cardiovascular: N S1S2;   Gastrointestinal: Abdomen soft, non tender, Bowel Sounds present  Extremities: No edema, peripheral pulses present  Neurological: AAO x 3, no gross focal motor deficits  Skin: left arm dressing present                            9.8    6.24  )-----------( 119      ( 12 Dec 2018 07:50 )             31.3       CBC Full  -  ( 12 Dec 2018 07:50 )  WBC Count : 6.24 K/uL  Hemoglobin : 9.8 g/dL  Hematocrit : 31.3 %  Platelet Count - Automated : 119 K/uL  Mean Cell Volume : 93.4 fl  Mean Cell Hemoglobin : 29.3 pg  Mean Cell Hemoglobin Concentration : 31.3 gm/dL  Auto Neutrophil # : x  Auto Lymphocyte # : x  Auto Monocyte # : x  Auto Eosinophil # : x  Auto Basophil # : x  Auto Neutrophil % : x  Auto Lymphocyte % : x  Auto Monocyte % : x  Auto Eosinophil % : x  Auto Basophil % : x          137  |  101  |  46<H>  ----------------------------<  249<H>  4.2   |  24  |  4.80<H>    Ca    8.6      12 Dec 2018 07:50  Phos  3.3     12-    TPro  x   /  Alb  2.3<L>  /  TBili  x   /  DBili  x   /  AST  x   /  ALT  x   /  AlkPhos  x   12      LIVER FUNCTIONS - ( 12 Dec 2018 07:50 )  Alb: 2.3 g/dL / Pro: x     / ALK PHOS: x     / ALT: x     / AST: x     / GGT: x             PT/INR - ( 12 Dec 2018 07:50 )   PT: 19.1 sec;   INR: 1.69 ratio                           MEDICATIONS  (STANDING):  amiodarone    Tablet 100 milliGRAM(s) Oral daily  cinacalcet 30 milliGRAM(s) Oral <User Schedule>  docusate sodium 100 milliGRAM(s) Oral three times a day  epoetin amee Injectable 45554 Unit(s) IV Push <User Schedule>  lactobacillus acidophilus 1 Tablet(s) Oral daily  losartan 25 milliGRAM(s) Oral daily  pantoprazole  Injectable 40 milliGRAM(s) IV Push two times a day  predniSONE   Tablet 5 milliGRAM(s) Oral daily  sevelamer hydrochloride 2400 milliGRAM(s) Oral three times a day with meals  warfarin 1.5 milliGRAM(s) Oral daily
Date of service: 12-10-18 @ 14:28      pt seen and examined  feels better  some epigastric pain this am now better  afebrile        ROS: no fever or chills; denies dizziness, no HA, no SOB or cough, no diarrhea or constipation; no dysuria, no urinary frequency, no legs pain, no rashes    MEDICATIONS  (STANDING):  amiodarone    Tablet 100 milliGRAM(s) Oral daily  cinacalcet 30 milliGRAM(s) Oral <User Schedule>  docusate sodium 100 milliGRAM(s) Oral three times a day  epoetin amee Injectable 16634 Unit(s) IV Push <User Schedule>  lactobacillus acidophilus 1 Tablet(s) Oral daily  losartan 25 milliGRAM(s) Oral daily  pantoprazole  Injectable 40 milliGRAM(s) IV Push two times a day  predniSONE   Tablet 5 milliGRAM(s) Oral daily  sevelamer hydrochloride 2400 milliGRAM(s) Oral three times a day with meals    Vital Signs Last 24 Hrs  T(C): 36.8 (10 Dec 2018 14:15), Max: 37.2 (10 Dec 2018 11:16)  T(F): 98.2 (10 Dec 2018 14:15), Max: 98.9 (10 Dec 2018 11:16)  HR: 72 (10 Dec 2018 14:26) (68 - 80)  BP: 117/63 (10 Dec 2018 14:26) (105/61 - 147/105)  BP(mean): --  RR: 16 (10 Dec 2018 14:26) (16 - 17)  SpO2: 97% (10 Dec 2018 11:16) (96% - 98%)      PE:  Constitutional: frail looking  HEENT: NC/AT, EOMI, PERRLA, conjunctivae clear; ears and nose atraumatic; pharynx benign  Neck: supple; thyroid not palpable  Back: no tenderness  Respiratory: decreased breath sounds  Cardiovascular: S1S2 regular, no murmurs  Abdomen: soft, not tender, not distended, positive BS; liver and spleen WNL  Genitourinary: no suprapubic tenderness  Lymphatic: no LN palpable  Musculoskeletal: no muscle tenderness, no joint swelling or tenderness  Extremities: R forearm skin tear w/ sutures in place  Neurological/ Psychiatric:  moving all extremities  Skin: no rashes; no palpable lesions    Labs: all available labs reviewed                                   7.5    7.06  )-----------( 109      ( 10 Dec 2018 10:19 )             24.8     12-10    139  |  103  |  67<H>  ----------------------------<  147<H>  4.0   |  24  |  5.72<H>    Ca    7.6<L>      10 Dec 2018 10:19  Phos  3.6     12-10    TPro  x   /  Alb  2.1<L>  /  TBili  x   /  DBili  x   /  AST  x   /  ALT  x   /  AlkPhos  x   12-10            LIVER FUNCTIONS - ( 06 Dec 2018 14:00 )  Alb: 2.4 g/dL / Pro: x     / ALK PHOS: x     / ALT: x     / AST: x     / GGT: x           Urinalysis Basic - ( 07 Dec 2018 22:20 )    Color: Yellow / Appearance: Clear / S.015 / pH: x  Gluc: x / Ketone: Negative  / Bili: Negative / Urobili: Negative mg/dL   Blood: x / Protein: 100 mg/dL / Nitrite: Negative   Leuk Esterase: Trace / RBC: 3-5 /HPF / WBC 3-5   Sq Epi: x / Non Sq Epi: Few / Bacteria: Few    Culture - Urine (18 @ 14:00)    Specimen Source: .Urine Clean Catch (Midstream)    Culture Results:   No growth    Culture - Blood (18 @ 13:35)    Specimen Source: .Blood Blood    Culture Results:   No growth at 5 days.    Culture - Blood (18 @ 22:24)    Specimen Source: .Blood None    Culture Results:   No growth to date.    Culture - Blood (18 @ 22:24)    Specimen Source: .Blood None    Culture Results:   No growth to date.          Radiology: all available radiological tests reviewed  < from: Xray Chest 1 View AP/PA. (18 @ 11:08) >  EXAM:  XR CHEST 1 VIEW                            PROCEDURE DATE:  2018          INTERPRETATION:  History: Fever and pleuritic chest pain.    Single view the chest was performed.    Comparison is made to 2018.    Findings:    There is a biventricular AICD in place. There is a trace left pleural   effusion. The lungs are otherwise clear. Heart size is normal. There is a   vascular stent within the right subclavian region. There is right rotator   cuff arthropathy. Surgical clips are seen within the left axillary region.    Impression:    Unchanged trace left pleural effusion.    < end of copied text >    Advanced directives addressed: full resuscitation
Patient is a 63y Male  seen on HD  Uf 1.5 Liters   c/o epigastric pains overnight - awaiting EGD tomorrow   no obvious bleeding     REVIEW OF SYSTEMS:    CONSTITUTIONAL: stable weakness, no fevers or chills  RESPIRATORY: No cough, wheezing, hemoptysis; No shortness of breath  CARDIOVASCULAR: No chest pain or palpitations  GENITOURINARY: No dysuria, frequency or hematuria  All other review of systems is negative unless indicated above.    MEDICATIONS  (STANDING):  amiodarone    Tablet 100 milliGRAM(s) Oral daily  cinacalcet 30 milliGRAM(s) Oral <User Schedule>  docusate sodium 100 milliGRAM(s) Oral three times a day  epoetin amee Injectable 88253 Unit(s) IV Push <User Schedule>  lactobacillus acidophilus 1 Tablet(s) Oral daily  losartan 25 milliGRAM(s) Oral daily  pantoprazole  Injectable 40 milliGRAM(s) IV Push two times a day  predniSONE   Tablet 5 milliGRAM(s) Oral daily  sevelamer hydrochloride 2400 milliGRAM(s) Oral three times a day with meals  vancomycin    Solution 125 milliGRAM(s) Oral every 6 hours    MEDICATIONS  (PRN):  acetaminophen   Tablet .. 650 milliGRAM(s) Oral every 6 hours PRN Temp greater or equal to 38.5C (101.3F), Mild Pain (1 - 3)  aluminum hydroxide/magnesium hydroxide/simethicone Suspension 30 milliLiter(s) Oral every 6 hours PRN Dyspepsia  oxyCODONE    IR 5 milliGRAM(s) Oral every 6 hours PRN Severe Pain (7 - 10)      Vital Signs Last 24 Hrs  T(C): 36.7 (10 Dec 2018 13:06), Max: 37.2 (10 Dec 2018 11:16)  T(F): 98 (10 Dec 2018 13:06), Max: 98.9 (10 Dec 2018 11:16)  HR: 78 (10 Dec 2018 11:16) (73 - 78)  BP: 122/70 (10 Dec 2018 13:06) (105/61 - 147/105)  BP(mean): --  RR: 16 (10 Dec 2018 13:06) (16 - 17)  SpO2: 97% (10 Dec 2018 11:16) (96% - 98%)        PHYSICAL EXAM:    Constitutional: NAD, frail  HEENT:  EOMI,  MMM  Neck: No LAD, No JVD  Respiratory: CTAB  Cardiovascular: S1 and S2, RRR  Gastrointestinal: BS+, soft, NT/ND  Extremities: L arm edema  Neurological: A/O x 3, no focal deficits  Psychiatric: Normal mood, normal affect  : No Amaya  Skin: No rashes  Access: avf R      LABS:                                   7.5    7.06  )-----------( 109      ( 10 Dec 2018 10:19 )             24.8                         7.7    7.36  )-----------( 107      ( 10 Dec 2018 07:19 )             25.6     139    |  103    |  67     ----------------------------<  147       10 Dec 2018 10:19  4.0     |  24     |  5.72     135    |  99     |  50     ----------------------------<  206       09 Dec 2018 06:13  4.5     |  24     |  4.61     135    |  97     |  23     ----------------------------<  184       08 Dec 2018 06:28  4.7     |  28     |  3.11     Ca    7.6        10 Dec 2018 10:19  Ca    8.1        09 Dec 2018 06:13    Phos  3.6       10 Dec 2018 10:19  Phos  4.3       06 Dec 2018 14:00    Urine Studies:  Urinalysis Basic - ( 07 Dec 2018 22:20 )    Color: Yellow / Appearance: Clear / S.015 / pH: x  Gluc: x / Ketone: Negative  / Bili: Negative / Urobili: Negative mg/dL   Blood: x / Protein: 100 mg/dL / Nitrite: Negative   Leuk Esterase: Trace / RBC: 3-5 /HPF / WBC 3-5   Sq Epi: x / Non Sq Epi: Few / Bacteria: Few            RADIOLOGY & ADDITIONAL STUDIES:
Patient is a 63y Male  seen on HD  uf 1.2 Liters   still c/o epigastric pains overnight and this AM - s/p EGD - no overt bleeding      REVIEW OF SYSTEMS:    CONSTITUTIONAL: stable weakness, no fevers or chills  RESPIRATORY: No cough, wheezing, hemoptysis; No shortness of breath  CARDIOVASCULAR: No chest pain or palpitations  GENITOURINARY: No dysuria, frequency or hematuria  All other review of systems is negative unless indicated above.    MEDICATIONS  (STANDING):  amiodarone    Tablet 100 milliGRAM(s) Oral daily  cinacalcet 30 milliGRAM(s) Oral <User Schedule>  docusate sodium 100 milliGRAM(s) Oral three times a day  epoetin amee Injectable 53526 Unit(s) IV Push <User Schedule>  lactobacillus acidophilus 1 Tablet(s) Oral daily  losartan 25 milliGRAM(s) Oral daily  pantoprazole  Injectable 40 milliGRAM(s) IV Push two times a day  predniSONE   Tablet 5 milliGRAM(s) Oral daily  sevelamer hydrochloride 2400 milliGRAM(s) Oral three times a day with meals  warfarin 1.5 milliGRAM(s) Oral daily    MEDICATIONS  (PRN):  acetaminophen   Tablet .. 650 milliGRAM(s) Oral every 6 hours PRN Temp greater or equal to 38.5C (101.3F), Mild Pain (1 - 3)  aluminum hydroxide/magnesium hydroxide/simethicone Suspension 30 milliLiter(s) Oral every 6 hours PRN Dyspepsia  oxyCODONE    IR 5 milliGRAM(s) Oral every 6 hours PRN Severe Pain (7 - 10)        Vital Signs Last 24 Hrs  T(C): 36.8 (12 Dec 2018 07:58), Max: 36.9 (11 Dec 2018 11:00)  T(F): 98.2 (12 Dec 2018 07:58), Max: 98.5 (11 Dec 2018 17:37)  HR: 67 (12 Dec 2018 08:55) (67 - 86)  BP: 127/66 (12 Dec 2018 08:55) (112/93 - 145/91)  BP(mean): --  RR: 18 (12 Dec 2018 08:55) (18 - 18)  SpO2: 100% (12 Dec 2018 04:52) (100% - 100%)      PHYSICAL EXAM:    Constitutional: NAD, frail  HEENT:  EOMI,  MMM  Neck: No LAD, No JVD  Respiratory: CTAB  Cardiovascular: S1 and S2, RRR  Gastrointestinal: BS+, soft, + epigaastric tenderness   Extremities: L arm edema  Neurological: A/O x 3, no focal deficits  Psychiatric: Normal mood, normal affect  : No Amaya  Skin: No rashes  Access: avf R      LABS:                                 9.8    6.24  )-----------( 119      ( 12 Dec 2018 07:50 )             31.3                         9.9    6.48  )-----------( 120      ( 11 Dec 2018 06:53 )             31.5         137    |  101    |  46     ----------------------------<  249       12 Dec 2018 07:50  4.2     |  24     |  4.80     139    |  103    |  67     ----------------------------<  147       10 Dec 2018 10:19  4.0     |  24     |  5.72     135    |  99     |  50     ----------------------------<  206       09 Dec 2018 06:13  4.5     |  24     |  4.61     Ca    8.6        12 Dec 2018 07:50  Ca    7.6        10 Dec 2018 10:19    Phos  3.3       12 Dec 2018 07:50  Phos  3.6       10 Dec 2018 10:19      TPro  x      /  Alb  2.3    /  TBili  x      /        12 Dec 2018 07:50  DBili  x      /  AST  x      /  ALT  x      /  AlkPhos  x        TPro  x      /  Alb  2.1    /  TBili  x      /        10 Dec 2018 10:19  DBili  x      /  AST  x      /  ALT  x      /  AlkPhos  x                 Urine Studies:  Urinalysis Basic - ( 07 Dec 2018 22:20 )    Color: Yellow / Appearance: Clear / S.015 / pH: x  Gluc: x / Ketone: Negative  / Bili: Negative / Urobili: Negative mg/dL   Blood: x / Protein: 100 mg/dL / Nitrite: Negative   Leuk Esterase: Trace / RBC: 3-5 /HPF / WBC 3-5   Sq Epi: x / Non Sq Epi: Few / Bacteria: Few            RADIOLOGY & ADDITIONAL STUDIES:
Patient is a 63y Male who reports no complaints overnight. Feels better, strengh better as well. No blood loss reported    REVIEW OF SYSTEMS:    CONSTITUTIONAL: stable weakness, no fevers or chills  RESPIRATORY: No cough, wheezing, hemoptysis; No shortness of breath  CARDIOVASCULAR: No chest pain or palpitations  GENITOURINARY: No dysuria, frequency or hematuria  All other review of systems is negative unless indicated above.    MEDICATIONS  (STANDING):  amiodarone    Tablet 100 milliGRAM(s) Oral daily  cefepime  Injectable.      cefepime  Injectable. 1000 milliGRAM(s) IV Push every 24 hours  cinacalcet 30 milliGRAM(s) Oral <User Schedule>  docusate sodium 100 milliGRAM(s) Oral three times a day  epoetin amee Injectable 83364 Unit(s) IV Push <User Schedule>  lactobacillus acidophilus 1 Tablet(s) Oral daily  losartan 25 milliGRAM(s) Oral daily  pantoprazole Infusion 8 mG/Hr (10 mL/Hr) IV Continuous <Continuous>  predniSONE   Tablet 5 milliGRAM(s) Oral daily  sevelamer hydrochloride 2400 milliGRAM(s) Oral three times a day with meals  sodium chloride 0.9%. 1000 milliLiter(s) (75 mL/Hr) IV Continuous <Continuous>  vancomycin    Solution 125 milliGRAM(s) Oral every 6 hours    MEDICATIONS  (PRN):  acetaminophen   Tablet .. 650 milliGRAM(s) Oral every 6 hours PRN Temp greater or equal to 38.5C (101.3F), Mild Pain (1 - 3)  aluminum hydroxide/magnesium hydroxide/simethicone Suspension 30 milliLiter(s) Oral every 6 hours PRN Dyspepsia        T(C): , Max: 36.8 (18 @ 21:23)  T(F): , Max: 98.3 (18 @ 21:23)  HR: 74 (18 @ 05:17)  BP: 109/55 (18 @ 05:17)  BP(mean): --  RR: 18 (18 @ 05:17)  SpO2: 98% (18 @ 05:17)  Wt(kg): --        PHYSICAL EXAM:    Constitutional: NAD, frail  HEENT: PERRLA, EOMI,  MMM  Neck: No LAD, No JVD  Respiratory: CTAB  Cardiovascular: S1 and S2, RRR  Gastrointestinal: BS+, soft, NT/ND  Extremities: L arm edema  Neurological: A/O x 3, no focal deficits  Psychiatric: Normal mood, normal affect  : No Amaya  Skin: No rashes  Access: avf R      LABS:                        7.7    8.87  )-----------( 120      ( 09 Dec 2018 06:13 )             25.5     09 Dec 2018 06:13    135    |  99     |  50     ----------------------------<  206    4.5     |  24     |  4.61   08 Dec 2018 06:28    135    |  97     |  23     ----------------------------<  184    4.7     |  28     |  3.11   07 Dec 2018 22:24    136    |  97     |  18     ----------------------------<  111    3.7     |  31     |  2.61   07 Dec 2018 07:10    136    |  97     |  33     ----------------------------<  110    4.0     |  31     |  3.58   06 Dec 2018 14:00    135    |  95     |  41     ----------------------------<  279    4.0     |  27     |  4.23     Ca    8.1        09 Dec 2018 06:13  Ca    8.1        08 Dec 2018 06:28  Ca    8.3        07 Dec 2018 22:24  Ca    8.3        07 Dec 2018 07:10  Ca    8.1        06 Dec 2018 14:00  Phos  4.3       06 Dec 2018 14:00    TPro  x      /  Alb  2.4    /  TBili  x      /  DBili  x      /  AST  x      /  ALT  x      /  AlkPhos  x      06 Dec 2018 14:00  TPro  6.9    /  Alb  2.4    /  TBili  0.6    /  DBili  x      /  AST  25     /  ALT  16     /  AlkPhos  103    05 Dec 2018 18:33          Urine Studies:  Urinalysis Basic - ( 07 Dec 2018 22:20 )    Color: Yellow / Appearance: Clear / S.015 / pH: x  Gluc: x / Ketone: Negative  / Bili: Negative / Urobili: Negative mg/dL   Blood: x / Protein: 100 mg/dL / Nitrite: Negative   Leuk Esterase: Trace / RBC: 3-5 /HPF / WBC 3-5   Sq Epi: x / Non Sq Epi: Few / Bacteria: Few            RADIOLOGY & ADDITIONAL STUDIES:
Patient is a 63y old  Male who presents with a chief complaint of Patient is a 63y old  Male who presents with a chief complaint of elevated INR, occult blood +, epigastric pain. (06 Dec 2018 21:45)      Followup HPI:  12/7- abdominal pain resolved.  Tripped on the IV pole on wheels last night and lacerated the left forearm requiring suturing.      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  S/P colonoscopy: last done 2016  AICD (automatic cardioverter/defibrillator) present: 2013  H/O hernia repair  A-V fistula: 1995 left UE  right arm 2007,  H/O kidney transplant: 1985, 1995, 1997      Review of symptoms:  Negative except for as noted in today's HPI.      MEDICATIONS  (STANDING):  amiodarone    Tablet 100 milliGRAM(s) Oral daily  cefuroxime   Tablet 250 milliGRAM(s) Oral every 12 hours  cinacalcet 30 milliGRAM(s) Oral <User Schedule>  docusate sodium 100 milliGRAM(s) Oral three times a day  epoetin amee Injectable 45096 Unit(s) IV Push <User Schedule>  lactobacillus acidophilus 1 Tablet(s) Oral daily  pantoprazole Infusion 8 mG/Hr (10 mL/Hr) IV Continuous <Continuous>  predniSONE   Tablet 5 milliGRAM(s) Oral daily  sevelamer hydrochloride 2400 milliGRAM(s) Oral three times a day with meals  vancomycin    Solution 125 milliGRAM(s) Oral every 6 hours    MEDICATIONS  (PRN):  acetaminophen   Tablet .. 650 milliGRAM(s) Oral every 6 hours PRN Temp greater or equal to 38.5C (101.3F), Mild Pain (1 - 3)  aluminum hydroxide/magnesium hydroxide/simethicone Suspension 30 milliLiter(s) Oral every 6 hours PRN Dyspepsia          Vital Signs Last 24 Hrs  T(C): 36.4 (07 Dec 2018 05:24), Max: 37.3 (06 Dec 2018 20:17)  T(F): 97.6 (07 Dec 2018 05:24), Max: 99.1 (06 Dec 2018 20:17)  HR: 74 (07 Dec 2018 05:24) (57 - 79)  BP: 118/67 (07 Dec 2018 05:24) (101/56 - 134/70)  BP(mean): --  RR: 20 (07 Dec 2018 05:24) (16 - 20)  SpO2: 100% (07 Dec 2018 05:24) (96% - 100%)    I&O's Summary      PHYSICAL EXAM  General Appearance: comfortable  HEENT:   Neck:   Lungs: few crackles left base  Heart: no murmur  Abdomen: soft and nontender  Extremities: no pedal edema  Neurologic:       INTERPRETATION OF TELEMETRY: atrial sensed, ventricular paced.    ECG:    LABS:                          8.5    7.27  )-----------( 136      ( 07 Dec 2018 07:10 )             28.3     12-07    136  |  97  |  33<H>  ----------------------------<  110<H>  4.0   |  31  |  3.58<H>    Ca    8.3<L>      07 Dec 2018 07:10  Phos  4.3     12-06    TPro  x   /  Alb  2.4<L>  /  TBili  x   /  DBili  x   /  AST  x   /  ALT  x   /  AlkPhos  x   12-06    CARDIAC MARKERS ( 06 Dec 2018 00:40 )  0.028 ng/mL / x     / x     / x     / x              PT/INR - ( 07 Dec 2018 07:10 )   PT: 67.1 sec;   INR: 5.73 ratio         PTT - ( 05 Dec 2018 18:33 )  PTT:53.1 sec          RADIOLOGY & ADDITIONAL STUDIES:    IMPRESSION:  1. CAD with severe dilated CM  2. ESRD  3. Overanticoagulation with heme positive stools4. ICD  4. Recent Klebsiella sepsis.    5. Hypertension  6.Laceration left forearm.   7. Adrenal insufficiency  PLAN:  Restart losartan 25 mg qd. Continue to hold carvedilol and warfarin. Continue amiodarone.
Patient is a 63y old  Male who presents with a chief complaint of Patient is a 63y old  Male who presents with a chief complaint of elevated INR, occult blood +, epigastric pain. (07 Dec 2018 09:21)    HPI:  63F.  admitted 12/06/18.  patient OOB in bed.  presented to ED due to an elevated INR.  patient presented to Dr. Coello's office yesterday.  INR > 8.0.  no overt GI bleeding, however NP performed ANGEL, stool  OB postive.  hx of hemorroid.  additionally, had been c/o epigastric pain.  "indigestion" below sternum.  considering GI consult but not seen.  ? small ventral hernia, but not referred to Sx.  epigastric pain was a/w radiation to the left shoulder and back.  no overt chest pain.  recently discharged from .  previously admitted due to KLPN bacteremia, source was not identified according to ena.   was rx'd Ceftin + vancomycin PO (CD prophylaxis) by Dr. Fierro.    12/7/2018-  Pt reports epigastric pain that radiates to his left shoulder.   Relieved when on an IV PPI drip and occurs mostly having dialysis.   Currently denies any epigastric pain, n/v, rectal bleeding, or melena.    ROS:  no chest pain.  no SOB.  no hematochezia.  no melena.  no hemotoptemesis.    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    PSHx:  AVF LUE;  AICD;  hernia repair;  renal transplant.    ALL:  NKDA.    Rx:  reviewed.    SocHx:  no tobacco.  rare EtOH.  no illegal drugs.  .    FHx:  + colon CA. 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  S/P colonoscopy: last done 2016  AICD (automatic cardioverter/defibrillator) present: 2013  H/O hernia repair  A-V fistula: 1995 left UE  right arm 2007,  H/O kidney transplant: 1985, 1995, 1997    MEDICATIONS  (STANDING):  amiodarone    Tablet 100 milliGRAM(s) Oral daily  cefuroxime   Tablet 250 milliGRAM(s) Oral every 12 hours  cinacalcet 30 milliGRAM(s) Oral <User Schedule>  docusate sodium 100 milliGRAM(s) Oral three times a day  epoetin amee Injectable 22852 Unit(s) IV Push <User Schedule>  lactobacillus acidophilus 1 Tablet(s) Oral daily  losartan 25 milliGRAM(s) Oral daily  pantoprazole Infusion 8 mG/Hr (10 mL/Hr) IV Continuous <Continuous>  predniSONE   Tablet 5 milliGRAM(s) Oral daily  sevelamer hydrochloride 2400 milliGRAM(s) Oral three times a day with meals  vancomycin    Solution 125 milliGRAM(s) Oral every 6 hours    MEDICATIONS  (PRN):  acetaminophen   Tablet .. 650 milliGRAM(s) Oral every 6 hours PRN Temp greater or equal to 38.5C (101.3F), Mild Pain (1 - 3)  aluminum hydroxide/magnesium hydroxide/simethicone Suspension 30 milliLiter(s) Oral every 6 hours PRN Dyspepsia    Allergies  No Known Allergies    FAMILY HISTORY:  Family history of kidney disease (Mother)  Family history of colon cancer (Sibling)    REVIEW OF SYSTEMS:  CONSTITUTIONAL: No weakness, fevers or chills  RESPIRATORY: No cough, wheezing, hemoptysis; No shortness of breath  CARDIOVASCULAR: No chest pain or palpitations  GASTROINTESTINAL: Per HPI.     Vital Signs Last 24 Hrs  T(C): 36.7 (07 Dec 2018 11:57), Max: 37.3 (06 Dec 2018 20:17)  T(F): 98 (07 Dec 2018 11:57), Max: 99.1 (06 Dec 2018 20:17)  HR: 72 (07 Dec 2018 12:45) (57 - 82)  BP: 117/76 (07 Dec 2018 12:45) (101/56 - 156/93)  BP(mean): --  RR: 18 (07 Dec 2018 12:45) (16 - 20)  SpO2: 96% (07 Dec 2018 10:32) (96% - 100%)    PHYSICAL EXAM:  Constitutional:  Respiratory: CTAB  Cardiovascular: S1 and S2, RRR, no M/G/R  Gastrointestinal: BS+, soft, NT, ND.     LABS:                        8.5    7.27  )-----------( 136      ( 07 Dec 2018 07:10 )             28.3     12-07    136  |  97  |  33<H>  ----------------------------<  110<H>  4.0   |  31  |  3.58<H>    Ca    8.3<L>      07 Dec 2018 07:10  Phos  4.3     12-06    TPro  x   /  Alb  2.4<L>  /  TBili  x   /  DBili  x   /  AST  x   /  ALT  x   /  AlkPhos  x   12-06    PT/INR - ( 07 Dec 2018 07:10 )   PT: 67.1 sec;   INR: 5.73 ratio         PTT - ( 05 Dec 2018 18:33 )  PTT:53.1 sec  LIVER FUNCTIONS - ( 06 Dec 2018 14:00 )  Alb: 2.4 g/dL / Pro: x     / ALK PHOS: x     / ALT: x     / AST: x     / GGT: x             RADIOLOGY & ADDITIONAL STUDIES:
Patient is a 63y old  Male who presents with a chief complaint of Patient is a 63y old  Male who presents with a chief complaint of elevated INR, occult blood +, epigastric pain. (07 Dec 2018 18:03)      HPI:  63F.  admitted 12/06/18.  patient OOB in bed.  presented to ED due to an elevated INR.  patient presented to Dr. Coello's office yesterday.  INR > 8.0.  no overt GI bleeding, however NP performed ANGEL, stool  OB postive.  hx of hemorroid.  additionally, had been c/o epigastric pain.  "indigestion" below sternum.  considering GI consult but not seen.  ? small ventral hernia, but not referred to Sx.  epigastric pain was a/w radiation to the left shoulder and back.  no overt chest pain.  recently discharged from .  previously admitted due to KLPN bacteremia, source was not identified according to ena.   was rx'd Ceftin + vancomycin PO (CD prophylaxis) by Dr. Fierro.    Patient had episode of confusion and fever last pm. No clear source. No complaints of abdominal pain - patient notes some pleuritic pain on left.     ROS:  no chest pain.  no SOB.  no hematochezia.  no melena.  no hemotoptemesis.    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    PSHx:  AVF LUE;  AICD;  hernia repair;  renal transplant.    ALL:  NKDA.    Rx:  reviewed.    SocHx:  no tobacco.  rare EtOH.  no illegal drugs.  .    FHx:  + colon CA. 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  S/P colonoscopy: last done 2016  AICD (automatic cardioverter/defibrillator) present: 2013  H/O hernia repair  A-V fistula: 1995 left UE  right arm 2007,  H/O kidney transplant: 1985, 1995, 1997      MEDICATIONS  (STANDING):  amiodarone    Tablet 100 milliGRAM(s) Oral daily  cinacalcet 30 milliGRAM(s) Oral <User Schedule>  docusate sodium 100 milliGRAM(s) Oral three times a day  epoetin amee Injectable 05222 Unit(s) IV Push <User Schedule>  lactobacillus acidophilus 1 Tablet(s) Oral daily  losartan 25 milliGRAM(s) Oral daily  pantoprazole Infusion 8 mG/Hr (10 mL/Hr) IV Continuous <Continuous>  predniSONE   Tablet 5 milliGRAM(s) Oral daily  sevelamer hydrochloride 2400 milliGRAM(s) Oral three times a day with meals  sodium chloride 0.9%. 1000 milliLiter(s) (75 mL/Hr) IV Continuous <Continuous>  vancomycin    Solution 125 milliGRAM(s) Oral every 6 hours    MEDICATIONS  (PRN):  acetaminophen   Tablet .. 650 milliGRAM(s) Oral every 6 hours PRN Temp greater or equal to 38.5C (101.3F), Mild Pain (1 - 3)  aluminum hydroxide/magnesium hydroxide/simethicone Suspension 30 milliLiter(s) Oral every 6 hours PRN Dyspepsia      Allergies    No Known Allergies    Intolerances          Vital Signs Last 24 Hrs  T(C): 36.2 (08 Dec 2018 05:07), Max: 38.3 (07 Dec 2018 21:49)  T(F): 97.2 (08 Dec 2018 05:07), Max: 101 (07 Dec 2018 21:49)  HR: 83 (08 Dec 2018 05:07) (59 - 105)  BP: 113/68 (08 Dec 2018 05:07) (100/50 - 167/109)  BP(mean): 74 (07 Dec 2018 21:49) (74 - 74)  RR: 18 (08 Dec 2018 05:07) (16 - 18)  SpO2: 97% (08 Dec 2018 05:07) (96% - 97%)    PHYSICAL EXAM:    Respiratory: CTAB  Cardiovascular: S1 and S2, RRR, no M/G/R  Gastrointestinal: BS+, soft, NT/ND    LABS:                        8.0    7.68  )-----------( 105      ( 08 Dec 2018 06:28 )             26.0     12-08    135  |  97  |  23  ----------------------------<  184<H>  4.7   |  28  |  3.11<H>    Ca    8.1<L>      08 Dec 2018 06:28  Phos  4.3     12-06    TPro  x   /  Alb  2.4<L>  /  TBili  x   /  DBili  x   /  AST  x   /  ALT  x   /  AlkPhos  x   12-06    PT/INR - ( 08 Dec 2018 06:28 )   PT: 20.4 sec;   INR: 1.80 ratio           LIVER FUNCTIONS - ( 06 Dec 2018 14:00 )  Alb: 2.4 g/dL / Pro: x     / ALK PHOS: x     / ALT: x     / AST: x     / GGT: x             RADIOLOGY & ADDITIONAL STUDIES:
Patient is a 63y old  Male who presents with a chief complaint of Patient is a 63y old  Male who presents with a chief complaint of elevated INR, occult blood +, epigastric pain. (07 Dec 2018 18:03)      HPI:  63F.  admitted 18.  patient OOB in bed.  presented to ED due to an elevated INR.  patient presented to Dr. Coello's office yesterday.  INR > 8.0.  no overt GI bleeding, however NP performed ANGEL, stool  OB postive.  hx of hemorroid.  additionally, had been c/o epigastric pain.  "indigestion" below sternum.  considering GI consult but not seen.  ? small ventral hernia, but not referred to Sx.  epigastric pain was a/w radiation to the left shoulder and back.  no overt chest pain.  recently discharged from .  previously admitted due to KLPN bacteremia, source was not identified according to ena.   was rx'd Ceftin + vancomycin PO (CD prophylaxis) by Dr. Fierro.  - episode of fever and chills last night. c/o urinary frequency  ROS:  no chest pain.  no SOB.  no hematochezia.  no melena.  no hemotoptemesis.    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    PSHx:  AVF LUE;  AICD;  hernia repair;  renal transplant.    ALL:  NKDA.    Rx:  reviewed.    SocHx:  no tobacco.  rare EtOH.  no illegal drugs.  .    FHx:  + colon CA. 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  S/P colonoscopy: last done   AICD (automatic cardioverter/defibrillator) present:   H/O hernia repair  A-V fistula:  left UE  right arm ,  H/O kidney transplant: , ,         MEDICATIONS  (STANDING):  amiodarone    Tablet 100 milliGRAM(s) Oral daily  cinacalcet 30 milliGRAM(s) Oral <User Schedule>  docusate sodium 100 milliGRAM(s) Oral three times a day  epoetin amee Injectable 71562 Unit(s) IV Push <User Schedule>  lactobacillus acidophilus 1 Tablet(s) Oral daily  losartan 25 milliGRAM(s) Oral daily  pantoprazole Infusion 8 mG/Hr (10 mL/Hr) IV Continuous <Continuous>  predniSONE   Tablet 5 milliGRAM(s) Oral daily  sevelamer hydrochloride 2400 milliGRAM(s) Oral three times a day with meals  sodium chloride 0.9%. 1000 milliLiter(s) (75 mL/Hr) IV Continuous <Continuous>  vancomycin    Solution 125 milliGRAM(s) Oral every 6 hours    MEDICATIONS  (PRN):  acetaminophen   Tablet .. 650 milliGRAM(s) Oral every 6 hours PRN Temp greater or equal to 38.5C (101.3F), Mild Pain (1 - 3)  aluminum hydroxide/magnesium hydroxide/simethicone Suspension 30 milliLiter(s) Oral every 6 hours PRN Dyspepsia          Vital Signs Last 24 Hrs  T(C): 36.2 (08 Dec 2018 05:07), Max: 38.3 (07 Dec 2018 21:49)  T(F): 97.2 (08 Dec 2018 05:07), Max: 101 (07 Dec 2018 21:49)  HR: 83 (08 Dec 2018 05:07) (59 - 105)  BP: 113/68 (08 Dec 2018 05:07) (100/50 - 167/109)  BP(mean): 74 (07 Dec 2018 21:49) (74 - 74)  RR: 18 (08 Dec 2018 05:07) (16 - 18)  SpO2: 97% (08 Dec 2018 05:07) (96% - 97%)    I&O's Summary        PHYSICAL EXAM  General Appearance: comfortable  HEENT:   Neck:   Lungs: few crackles left base  Heart: no murmur  Abdomen: soft and nontender  Extremities: no pedal edema  Neurologic:         INTERPRETATION OF TELEMETRY:    ECG:        LABS:                          8.0    7.68  )-----------( 105      ( 08 Dec 2018 06:28 )             26.0     12    135  |  97  |  23  ----------------------------<  184<H>  4.7   |  28  |  3.11<H>    Ca    8.1<L>      08 Dec 2018 06:28  Phos  4.3     12    TPro  x   /  Alb  2.4<L>  /  TBili  x   /  DBili  x   /  AST  x   /  ALT  x   /  AlkPhos  x   12-            PT/INR - ( 08 Dec 2018 06:28 )   PT: 20.4 sec;   INR: 1.80 ratio           Urinalysis Basic - ( 07 Dec 2018 22:20 )    Color: Yellow / Appearance: Clear / S.015 / pH: x  Gluc: x / Ketone: Negative  / Bili: Negative / Urobili: Negative mg/dL   Blood: x / Protein: 100 mg/dL / Nitrite: Negative   Leuk Esterase: Trace / RBC: 3-5 /HPF / WBC 3-5   Sq Epi: x / Non Sq Epi: Few / Bacteria: Few
Patient is a 63y old  Male who presents with a chief complaint of Patient is a 63y old  Male who presents with a chief complaint of elevated INR, occult blood +, epigastric pain. (08 Dec 2018 18:40)      HPI:  63F.  admitted 18.  patient OOB in bed.  presented to ED due to an elevated INR.  patient presented to Dr. Coello's office yesterday.  INR > 8.0.  no overt GI bleeding, however NP performed ANGEL, stool  OB postive.  hx of hemorroid.  additionally, had been c/o epigastric pain.  "indigestion" below sternum.  considering GI consult but not seen.  ? small ventral hernia, but not referred to Sx.  epigastric pain was a/w radiation to the left shoulder and back.  no overt chest pain.  recently discharged from .  previously admitted due to KLPN bacteremia, source was not identified according to ena.   was rx'd Ceftin + vancomycin PO (CD prophylaxis) by Dr. Fierro.  - no chills last night. Afebrile   ROS:  no chest pain.  no SOB.  no hematochezia.  no melena.  no hemotoptemesis.    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    PSHx:  AVF LUE;  AICD;  hernia repair;  renal transplant.    ALL:  NKDA.    Rx:  reviewed.    SocHx:  no tobacco.  rare EtOH.  no illegal drugs.  .    FHx:  + colon CA. 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  S/P colonoscopy: last done   AICD (automatic cardioverter/defibrillator) present:   H/O hernia repair  A-V fistula:  left UE  right arm ,  H/O kidney transplant: , ,         MEDICATIONS  (STANDING):  amiodarone    Tablet 100 milliGRAM(s) Oral daily  cefepime  Injectable.      cefepime  Injectable. 1000 milliGRAM(s) IV Push every 24 hours  cinacalcet 30 milliGRAM(s) Oral <User Schedule>  docusate sodium 100 milliGRAM(s) Oral three times a day  epoetin amee Injectable 16083 Unit(s) IV Push <User Schedule>  lactobacillus acidophilus 1 Tablet(s) Oral daily  losartan 25 milliGRAM(s) Oral daily  pantoprazole Infusion 8 mG/Hr (10 mL/Hr) IV Continuous <Continuous>  predniSONE   Tablet 5 milliGRAM(s) Oral daily  sevelamer hydrochloride 2400 milliGRAM(s) Oral three times a day with meals  sodium chloride 0.9%. 1000 milliLiter(s) (75 mL/Hr) IV Continuous <Continuous>  vancomycin    Solution 125 milliGRAM(s) Oral every 6 hours    MEDICATIONS  (PRN):  acetaminophen   Tablet .. 650 milliGRAM(s) Oral every 6 hours PRN Temp greater or equal to 38.5C (101.3F), Mild Pain (1 - 3)  aluminum hydroxide/magnesium hydroxide/simethicone Suspension 30 milliLiter(s) Oral every 6 hours PRN Dyspepsia          Vital Signs Last 24 Hrs  T(C): 36.4 (09 Dec 2018 05:17), Max: 36.8 (08 Dec 2018 21:23)  T(F): 97.6 (09 Dec 2018 05:17), Max: 98.3 (08 Dec 2018 21:23)  HR: 74 (09 Dec 2018 05:17) (74 - 88)  BP: 109/55 (09 Dec 2018 05:17) (94/59 - 109/55)  BP(mean): --  RR: 18 (09 Dec 2018 05:17) (17 - 18)  SpO2: 98% (09 Dec 2018 05:17) (96% - 98%)    I&O's Summary    PHYSICAL EXAM  General Appearance: comfortable  HEENT:   Neck:   Lungs: few crackles left base  Heart: no murmur  Abdomen: soft and nontender  Extremities: no pedal edema  Neurologic:     INTERPRETATION OF TELEMETRY: vent pacing    ECG:        LABS:                          7.7    8.87  )-----------( 120      ( 09 Dec 2018 06:13 )             25.5     12-    135  |  97  |  23  ----------------------------<  184<H>  4.7   |  28  |  3.11<H>    Ca    8.1<L>      08 Dec 2018 06:28              PT/INR - ( 09 Dec 2018 06:13 )   PT: 19.3 sec;   INR: 1.71 ratio           Urinalysis Basic - ( 07 Dec 2018 22:20 )    Color: Yellow / Appearance: Clear / S.015 / pH: x  Gluc: x / Ketone: Negative  / Bili: Negative / Urobili: Negative mg/dL   Blood: x / Protein: 100 mg/dL / Nitrite: Negative   Leuk Esterase: Trace / RBC: 3-5 /HPF / WBC 3-5   Sq Epi: x / Non Sq Epi: Few / Bacteria: Few        IMPRESSION:  1. CAD with severe dilated CM  2. ESRD  3. Overanticoagulation with heme positive stools4. ICD  4. Recent Klebsiella sepsis. with probable sepsis again last night on broad spectrum antibiotics    5. Hypertension  6.Laceration left forearm.   7. Adrenal insufficiency  PLAN:  Restart losartan 25 mg qd. Continue to hold carvedilol and warfarin. Continue amiodarone. blood and urine cultures pending on Vancomycin, cefepime. Await culture results
Patient is a 63y old  Male who presents with a chief complaint of Patient is a 63y old  Male who presents with a chief complaint of elevated INR, occult blood +, epigastric pain. (09 Dec 2018 18:15)      HPI:  63F.  admitted 12/06/18.  patient OOB in bed.  presented to ED due to an elevated INR.  patient presented to Dr. Coello's office yesterday.  INR > 8.0.  no overt GI bleeding, however NP performed ANGEL, stool  OB postive.  hx of hemorroid.  additionally, had been c/o epigastric pain.  "indigestion" below sternum.  considering GI consult but not seen.  ? small ventral hernia, but not referred to Sx.  epigastric pain was a/w radiation to the left shoulder and back.  no overt chest pain.  recently discharged from .  previously admitted due to KLPN bacteremia, source was not identified according to ena.   was rx'd Ceftin + vancomycin PO (CD prophylaxis) by Dr. Fierro.  12/9- no chills last night. Afebrile 12/8  12/10 complaining of epigastric pain  ROS:  no chest pain.  no SOB.  no hematochezia.  no melena.  no hemotoptemesis.    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    PSHx:  AVF LUE;  AICD;  hernia repair;  renal transplant.    ALL:  NKDA.    Rx:  reviewed.    SocHx:  no tobacco.  rare EtOH.  no illegal drugs.  .    FHx:  + colon CA. 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  S/P colonoscopy: last done 2016  AICD (automatic cardioverter/defibrillator) present: 2013  H/O hernia repair  A-V fistula: 1995 left UE  right arm 2007,  H/O kidney transplant: 1985, 1995, 1997        MEDICATIONS  (STANDING):  amiodarone    Tablet 100 milliGRAM(s) Oral daily  cinacalcet 30 milliGRAM(s) Oral <User Schedule>  docusate sodium 100 milliGRAM(s) Oral three times a day  epoetin amee Injectable 68355 Unit(s) IV Push <User Schedule>  lactobacillus acidophilus 1 Tablet(s) Oral daily  losartan 25 milliGRAM(s) Oral daily  pantoprazole  Injectable 40 milliGRAM(s) IV Push two times a day  predniSONE   Tablet 5 milliGRAM(s) Oral daily  sevelamer hydrochloride 2400 milliGRAM(s) Oral three times a day with meals  vancomycin    Solution 125 milliGRAM(s) Oral every 6 hours    MEDICATIONS  (PRN):  acetaminophen   Tablet .. 650 milliGRAM(s) Oral every 6 hours PRN Temp greater or equal to 38.5C (101.3F), Mild Pain (1 - 3)  aluminum hydroxide/magnesium hydroxide/simethicone Suspension 30 milliLiter(s) Oral every 6 hours PRN Dyspepsia          Vital Signs Last 24 Hrs  T(C): 36.4 (10 Dec 2018 05:16), Max: 36.6 (09 Dec 2018 20:54)  T(F): 97.5 (10 Dec 2018 05:16), Max: 97.9 (09 Dec 2018 20:54)  HR: 74 (10 Dec 2018 05:16) (73 - 76)  BP: 113/75 (10 Dec 2018 05:16) (105/61 - 119/70)  BP(mean): --  RR: 16 (10 Dec 2018 05:16) (16 - 18)  SpO2: 98% (10 Dec 2018 05:16) (96% - 99%)    I&O's Summary    09 Dec 2018 07:01  -  10 Dec 2018 07:00  --------------------------------------------------------  IN: 0 mL / OUT: 200 mL / NET: -200 mL        PHYSICAL EXAM  General Appearance: comfortable  HEENT:   Neck:   Lungs: few crackles left base  Heart: no murmur  Abdomen: soft moderate epigastric tenderness  Extremities: no pedal edema  Neurologic:         INTERPRETATION OF TELEMETRY:    ECG:        LABS:                          7.7    8.87  )-----------( 120      ( 09 Dec 2018 06:13 )             25.5     12-09    135  |  99  |  50<H>  ----------------------------<  206<H>  4.5   |  24  |  4.61<H>    Ca    8.1<L>      09 Dec 2018 06:13              PT/INR - ( 09 Dec 2018 06:13 )   PT: 19.3 sec;   INR: 1.71 ratio                   RADIOLOGY & ADDITIONAL STUDIES:
Patient is a 63y old  Male who presents with a chief complaint of Patient is a 63y old  Male who presents with a chief complaint of elevated INR, occult blood +, epigastric pain. (10 Dec 2018 07:35)    HPI:  63F.  admitted 12/06/18.  patient OOB in bed.  presented to ED due to an elevated INR.  patient presented to Dr. Coello's office yesterday.  INR > 8.0.  no overt GI bleeding, however NP performed ANGEL, stool  OB postive.  hx of hemorroid.  additionally, had been c/o epigastric pain.  "indigestion" below sternum.  considering GI consult but not seen.  ? small ventral hernia, but not referred to Sx.  epigastric pain was a/w radiation to the left shoulder and back.  no overt chest pain.  recently discharged from .  previously admitted due to KLPN bacteremia, source was not identified according to ena.   was rx'd Ceftin + vancomycin PO (CD prophylaxis) by Dr. Fierro.    12/10/2018-  Pt still will intermittent epigastric now.   Now not relieved by PPI.       ROS:  no chest pain.  no SOB.  no hematochezia.  no melena.  no hemotoptemesis.    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    PSHx:  AVF LUE;  AICD;  hernia repair;  renal transplant.    ALL:  NKDA.    Rx:  reviewed.    SocHx:  no tobacco.  rare EtOH.  no illegal drugs.  .    FHx:  + colon CA. 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  S/P colonoscopy: last done 2016  AICD (automatic cardioverter/defibrillator) present: 2013  H/O hernia repair  A-V fistula: 1995 left UE  right arm 2007,  H/O kidney transplant: 1985, 1995, 1997    MEDICATIONS  (STANDING):  amiodarone    Tablet 100 milliGRAM(s) Oral daily  cinacalcet 30 milliGRAM(s) Oral <User Schedule>  docusate sodium 100 milliGRAM(s) Oral three times a day  epoetin amee Injectable 63801 Unit(s) IV Push <User Schedule>  lactobacillus acidophilus 1 Tablet(s) Oral daily  losartan 25 milliGRAM(s) Oral daily  pantoprazole  Injectable 40 milliGRAM(s) IV Push two times a day  predniSONE   Tablet 5 milliGRAM(s) Oral daily  sevelamer hydrochloride 2400 milliGRAM(s) Oral three times a day with meals  vancomycin    Solution 125 milliGRAM(s) Oral every 6 hours    MEDICATIONS  (PRN):  acetaminophen   Tablet .. 650 milliGRAM(s) Oral every 6 hours PRN Temp greater or equal to 38.5C (101.3F), Mild Pain (1 - 3)  aluminum hydroxide/magnesium hydroxide/simethicone Suspension 30 milliLiter(s) Oral every 6 hours PRN Dyspepsia    Allergies  No Known Allergies    FAMILY HISTORY:  Family history of kidney disease (Mother)  Family history of colon cancer (Sibling)    REVIEW OF SYSTEMS:  CONSTITUTIONAL: No weakness, fevers or chills  RESPIRATORY: No cough, wheezing, hemoptysis; No shortness of breath  CARDIOVASCULAR: No chest pain or palpitations  GASTROINTESTINAL: Per HPI.     Vital Signs Last 24 Hrs  T(C): 36.4 (10 Dec 2018 05:16), Max: 36.6 (09 Dec 2018 20:54)  T(F): 97.5 (10 Dec 2018 05:16), Max: 97.9 (09 Dec 2018 20:54)  HR: 74 (10 Dec 2018 05:16) (73 - 76)  BP: 113/75 (10 Dec 2018 05:16) (105/61 - 119/70)  BP(mean): --  RR: 16 (10 Dec 2018 05:16) (16 - 18)  SpO2: 98% (10 Dec 2018 05:16) (96% - 99%)    PHYSICAL EXAM:  Constitutional: Middle aged male with multiple medical issues, in mild distress.   Respiratory: CTAB  Cardiovascular: S1 and S2, RRR, no M/G/R  Gastrointestinal: BS+, moderate epigastric tenderness, ND.     LABS:                        7.7    7.36  )-----------( 107      ( 10 Dec 2018 07:19 )             25.6     12-09    135  |  99  |  50<H>  ----------------------------<  206<H>  4.5   |  24  |  4.61<H>    Ca    8.1<L>      09 Dec 2018 06:13      PT/INR - ( 10 Dec 2018 07:19 )   PT: 24.5 sec;   INR: 2.16 ratio               RADIOLOGY & ADDITIONAL STUDIES:
Patient is a 63y old  Male who presents with a chief complaint of Patient is a 63y old  Male who presents with a chief complaint of elevated INR, occult blood +, epigastric pain. (10 Dec 2018 17:22)      HPI:  63F.  admitted 12/06/18.  patient OOB in bed.  presented to ED due to an elevated INR.  patient presented to Dr. Coello's office yesterday.  INR > 8.0.  no overt GI bleeding, however NP performed ANGEL, stool  OB postive.  hx of hemorroid.  additionally, had been c/o epigastric pain.  "indigestion" below sternum.  considering GI consult but not seen.  ? small ventral hernia, but not referred to Sx.  epigastric pain was a/w radiation to the left shoulder and back.  no overt chest pain.  recently discharged from .  previously admitted due to KLPN bacteremia, source was not identified according to paicalvinnt.   was rx'd Ceftin + vancomycin PO (CD prophylaxis) by Dr. Fierro.    ROS:  no chest pain.  no SOB.  no hematochezia.  no melena.  no hemotoptemesis.    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    PSHx:  AVF LUE;  AICD;  hernia repair;  renal transplant.    ALL:  NKDA.    Rx:  reviewed.    SocHx:  no tobacco.  rare EtOH.  no illegal drugs.  .    FHx:  + colon CA. 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  S/P colonoscopy: last done 2016  AICD (automatic cardioverter/defibrillator) present: 2013  H/O hernia repair  A-V fistula: 1995 left UE  right arm 2007,  H/O kidney transplant: 1985, 1995, 1997        MEDICATIONS  (STANDING):  amiodarone    Tablet 100 milliGRAM(s) Oral daily  cinacalcet 30 milliGRAM(s) Oral <User Schedule>  docusate sodium 100 milliGRAM(s) Oral three times a day  epoetin amee Injectable 56287 Unit(s) IV Push <User Schedule>  lactobacillus acidophilus 1 Tablet(s) Oral daily  losartan 25 milliGRAM(s) Oral daily  pantoprazole  Injectable 40 milliGRAM(s) IV Push two times a day  predniSONE   Tablet 5 milliGRAM(s) Oral daily  sevelamer hydrochloride 2400 milliGRAM(s) Oral three times a day with meals    MEDICATIONS  (PRN):  acetaminophen   Tablet .. 650 milliGRAM(s) Oral every 6 hours PRN Temp greater or equal to 38.5C (101.3F), Mild Pain (1 - 3)  aluminum hydroxide/magnesium hydroxide/simethicone Suspension 30 milliLiter(s) Oral every 6 hours PRN Dyspepsia  oxyCODONE    IR 5 milliGRAM(s) Oral every 6 hours PRN Severe Pain (7 - 10)          Vital Signs Last 24 Hrs  T(C): 36.5 (11 Dec 2018 05:41), Max: 37.2 (10 Dec 2018 11:16)  T(F): 97.7 (11 Dec 2018 05:41), Max: 98.9 (10 Dec 2018 11:16)  HR: 86 (11 Dec 2018 05:41) (68 - 88)  BP: 117/70 (11 Dec 2018 05:41) (104/64 - 150/71)  BP(mean): --  RR: 18 (10 Dec 2018 22:31) (16 - 18)  SpO2: 100% (11 Dec 2018 05:41) (97% - 100%)    I&O's Summary    10 Dec 2018 07:01  -  11 Dec 2018 07:00  --------------------------------------------------------  IN: 564 mL / OUT: 0 mL / NET: 564 mL        PHYSICAL EXAM  General Appearance: comfortable  HEENT:   Neck:   Lungs: few crackles left base  Heart: no murmur  Abdomen: soft moderate epigastric tenderness  Extremities: no pedal edema  Neurologic:     INTERPRETATION OF TELEMETRY:    ECG:        LABS:                          7.5    7.06  )-----------( 109      ( 10 Dec 2018 10:19 )             24.8     12-10    139  |  103  |  67<H>  ----------------------------<  147<H>  4.0   |  24  |  5.72<H>    Ca    7.6<L>      10 Dec 2018 10:19  Phos  3.6     12-10    TPro  x   /  Alb  2.1<L>  /  TBili  x   /  DBili  x   /  AST  x   /  ALT  x   /  AlkPhos  x   12-10            PT/INR - ( 10 Dec 2018 07:19 )   PT: 24.5 sec;   INR: 2.16 ratio               Assessment and Plan:   · Assessment		  1. CAD with severe dilated CM  2. ESRD  3. Overanticoagulation with heme positive stools4. ICD  4. Recent Klebsiella sepsis. with probable sepsis again last night on broad spectrum antibiotics    5. Hypertension  6.Laceration left forearm.   7. Adrenal insufficiency  PLAN:  Restart losartan 25 mg qd. Continue to hold carvedilol and warfarin. Continue amiodarone. blood and urine cultures pending on Vancomycin, cefepime  For upper endo today with Dr. Duncan
Patient is a 63y old  Male who presents with a chief complaint of Patient is a 63y old  Male who presents with a chief complaint of elevated INR, occult blood +, epigastric pain. (11 Dec 2018 17:50)      HPI:  63F.  admitted 12/06/18.  patient OOB in bed.  presented to ED due to an elevated INR.  patient presented to Dr. Coello's office yesterday.  INR > 8.0.  no overt GI bleeding, however NP performed ANGEL, stool  OB postive.  hx of hemorroid.  additionally, had been c/o epigastric pain.  "indigestion" below sternum.  considering GI consult but not seen.  ? small ventral hernia, but not referred to Sx.  epigastric pain was a/w radiation to the left shoulder and back.  no overt chest pain.  recently discharged from .  previously admitted due to KLPN bacteremia, source was not identified according to paieligio.   was rx'd Ceftin + vancomycin PO (CD prophylaxis) by Dr. Fierro.    12/12 Patient seen on hd today. no new complaints. feels somewhat better  ROS:  no chest pain.  no SOB.  no hematochezia.  no melena.  no hemotoptemesis.    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    PSHx:  AVF LUE;  AICD;  hernia repair;  renal transplant.    ALL:  NKDA.    Rx:  reviewed.    SocHx:  no tobacco.  rare EtOH.  no illegal drugs.  .    FHx:  + colon CA. 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  S/P colonoscopy: last done 2016  AICD (automatic cardioverter/defibrillator) present: 2013  H/O hernia repair  A-V fistula: 1995 left UE  right arm 2007,  H/O kidney transplant: 1985, 1995, 1997        MEDICATIONS  (STANDING):  amiodarone    Tablet 100 milliGRAM(s) Oral daily  cinacalcet 30 milliGRAM(s) Oral <User Schedule>  docusate sodium 100 milliGRAM(s) Oral three times a day  epoetin amee Injectable 82582 Unit(s) IV Push <User Schedule>  lactobacillus acidophilus 1 Tablet(s) Oral daily  losartan 25 milliGRAM(s) Oral daily  pantoprazole  Injectable 40 milliGRAM(s) IV Push two times a day  predniSONE   Tablet 5 milliGRAM(s) Oral daily  sevelamer hydrochloride 2400 milliGRAM(s) Oral three times a day with meals    MEDICATIONS  (PRN):  acetaminophen   Tablet .. 650 milliGRAM(s) Oral every 6 hours PRN Temp greater or equal to 38.5C (101.3F), Mild Pain (1 - 3)  aluminum hydroxide/magnesium hydroxide/simethicone Suspension 30 milliLiter(s) Oral every 6 hours PRN Dyspepsia  oxyCODONE    IR 5 milliGRAM(s) Oral every 6 hours PRN Severe Pain (7 - 10)          Vital Signs Last 24 Hrs  T(C): 36.8 (12 Dec 2018 07:58), Max: 36.9 (11 Dec 2018 11:00)  T(F): 98.2 (12 Dec 2018 07:58), Max: 98.5 (11 Dec 2018 17:37)  HR: 70 (12 Dec 2018 08:27) (70 - 86)  BP: 125/64 (12 Dec 2018 08:27) (112/93 - 145/91)  BP(mean): --  RR: 18 (12 Dec 2018 08:27) (18 - 18)  SpO2: 100% (12 Dec 2018 04:52) (100% - 100%)    I&O's Summary      PHYSICAL EXAM  General Appearance: comfortable  HEENT:   Neck:   Lungs: few crackles left base  Heart: no murmur  Abdomen: soft mild epigastric tenderness  Extremities: no pedal edema  Neurologic:       INTERPRETATION OF TELEMETRY:    ECG:        LABS:                          9.8    6.24  )-----------( 119      ( 12 Dec 2018 07:50 )             31.3     12-10    139  |  103  |  67<H>  ----------------------------<  147<H>  4.0   |  24  |  5.72<H>    Ca    7.6<L>      10 Dec 2018 10:19  Phos  3.6     12-10    TPro  x   /  Alb  2.1<L>  /  TBili  x   /  DBili  x   /  AST  x   /  ALT  x   /  AlkPhos  x   12-10            PT/INR - ( 12 Dec 2018 07:50 )   PT: 19.1 sec;   INR: 1.69 ratio                   RADIOLOGY & ADDITIONAL STUDIES:
Patient is a 63y old  Male who presents with a chief complaint of Patient is a 63y old  Male who presents with a chief complaint of elevated INR, occult blood +, epigastric pain. (12 Dec 2018 16:17)      Followup HPI:  12/12 Patient seen on hd today. no new complaints. feels somewhat better  12/13- Feels much better. EGD demonstrated gastritis and duodenitis.     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  S/P colonoscopy: last done 2016  AICD (automatic cardioverter/defibrillator) present: 2013  H/O hernia repair  A-V fistula: 1995 left UE  right arm 2007,  H/O kidney transplant: 1985, 1995, 1997      Review of symptoms:  Negative except for as noted in today's HPI.      MEDICATIONS  (STANDING):  amiodarone    Tablet 100 milliGRAM(s) Oral daily  cinacalcet 30 milliGRAM(s) Oral <User Schedule>  docusate sodium 100 milliGRAM(s) Oral three times a day  epoetin amee Injectable 93962 Unit(s) IV Push <User Schedule>  lactobacillus acidophilus 1 Tablet(s) Oral daily  losartan 25 milliGRAM(s) Oral daily  pantoprazole  Injectable 40 milliGRAM(s) IV Push two times a day  predniSONE   Tablet 5 milliGRAM(s) Oral daily  sevelamer hydrochloride 2400 milliGRAM(s) Oral three times a day with meals  warfarin 1 milliGRAM(s) Oral daily    MEDICATIONS  (PRN):  acetaminophen   Tablet .. 650 milliGRAM(s) Oral every 6 hours PRN Temp greater or equal to 38.5C (101.3F), Mild Pain (1 - 3)  aluminum hydroxide/magnesium hydroxide/simethicone Suspension 30 milliLiter(s) Oral every 6 hours PRN Dyspepsia  oxyCODONE    IR 5 milliGRAM(s) Oral every 6 hours PRN Severe Pain (7 - 10)          Vital Signs Last 24 Hrs  T(C): 36.7 (13 Dec 2018 05:37), Max: 37 (12 Dec 2018 20:59)  T(F): 98.1 (13 Dec 2018 05:37), Max: 98.6 (12 Dec 2018 20:59)  HR: 79 (13 Dec 2018 05:37) (58 - 83)  BP: 101/53 (13 Dec 2018 05:37) (97/62 - 127/66)  BP(mean): --  RR: 18 (13 Dec 2018 05:37) (17 - 18)  SpO2: 97% (13 Dec 2018 05:37) (97% - 98%)    I&O's Summary      PHYSICAL EXAM  General Appearance: comfortable  HEENT:   Neck:   Lungs: crackles left base. Clear right lung.  Heart: S1 and S2 normal. No miurmur  Abdomen: soft. Mild epigastric tenderness.  Extremities: bandaged left forearm. No pedal edema.  Neurologic:       INTERPRETATION OF TELEMETRY:    ECG:    LABS:                          9.3    4.98  )-----------( 107      ( 13 Dec 2018 06:32 )             30.3     12-13    140  |  99  |  28<H>  ----------------------------<  106<H>  3.8   |  32<H>  |  3.72<H>    Ca    8.1<L>      13 Dec 2018 06:32  Phos  3.3     12-12    TPro  x   /  Alb  2.3<L>  /  TBili  x   /  DBili  x   /  AST  x   /  ALT  x   /  AlkPhos  x   12-12            PT/INR - ( 13 Dec 2018 06:32 )   PT: 17.9 sec;   INR: 1.59 ratio                   RADIOLOGY & ADDITIONAL STUDIES:    IMPRESSION:  1. CAD with severe dilated CM. S/P ICD.  2.History of paroxysmal atrial flutter and VT. On amiodarone chronically.   3. ESRD  4.Gastritis and duodenitis.  5. Recent Klebsiella sepsis.     6. Hypertension  7.Laceration left forearm.   8.Chronic  Adrenal insufficiency  PLAN:  Suggest reduction in warfarin dose to 0.5 mg qd to goal INR of 1.7- 2.0 in view of the gastritis and recent severe over anticoagulation. Restart carvedilol 3.125 mg bid. Continue losartan 25 mg qd and amiodarone 100 mg qd.
patient with persistent discomfort, though mildly improved    we have agreed on egd tomorrow to r/o PUD

## 2018-12-13 NOTE — DISCHARGE NOTE ADULT - MEDICATION SUMMARY - MEDICATIONS TO STOP TAKING
I will STOP taking the medications listed below when I get home from the hospital:    omeprazole 20 mg oral delayed release capsule  -- 1 cap(s) by mouth once a day    cefuroxime 250 mg oral tablet  -- 1 tab(s) by mouth every 12 hours    vancomycin 125 mg oral capsule  -- 1 cap(s) by mouth every 6 hours   -- Finish all this medication unless otherwise directed by prescriber.

## 2018-12-13 NOTE — PROGRESS NOTE ADULT - REASON FOR ADMISSION
Patient is a 63y old  Male who presents with a chief complaint of elevated INR, occult blood +, epigastric pain.
epigastric pain
Patient is a 63y old  Male who presents with a chief complaint of elevated INR, occult blood +, epigastric pain.

## 2018-12-21 NOTE — ED ADULT NURSE NOTE - NSIMPLEMENTINTERV_GEN_ALL_ED
Implemented All Fall with Harm Risk Interventions:  [place] to call system. Call bell, personal items and telephone within reach. Instruct patient to call for assistance. Room bathroom lighting operational. Non-slip footwear when patient is off stretcher. Physically safe environment: no spills, clutter or unnecessary equipment. Stretcher in lowest position, wheels locked, appropriate side rails in place. Provide visual cue, wrist band, yellow gown, etc. Monitor gait and stability. Monitor for mental status changes and reorient to person, place, and time. Review medications for side effects contributing to fall risk. Reinforce activity limits and safety measures with patient and family. Provide visual clues: red socks.

## 2018-12-21 NOTE — ED PROVIDER NOTE - MEDICAL DECISION MAKING DETAILS
Pt with chronic abd pain, now presents with hypotension and dizziness following hemodialysis. Check labs, CXR, steroids, small fluid boluses and re-evaluate. Pt with chronic abd pain, now presents with hypotension and dizziness following hemodialysis. Check labs, CXR, steroids, small fluid boluses (received IV fluids at HD PTA) and re-evaluate.

## 2018-12-21 NOTE — PROVIDER CONTACT NOTE (OTHER) - SITUATION
Report from ED RN - SBP 85/54 s/p 500 cc fluid bolus. Patient full code - ESRD, CHF admitted to surgical stepdown unit.

## 2018-12-21 NOTE — H&P ADULT - NSHPPHYSICALEXAM_GEN_ALL_CORE
Vital Signs Last 24 Hrs  T(C): 38.1 (21 Dec 2018 13:07), Max: 38.1 (21 Dec 2018 13:07)  T(F): 100.5 (21 Dec 2018 13:07), Max: 100.5 (21 Dec 2018 13:07)  HR: 88 (21 Dec 2018 13:50) (87 - 96)  BP: 87/53 (21 Dec 2018 13:50) (82/66 - 94/61)  BP(mean): --  RR: 12 (21 Dec 2018 11:30) (12 - 22)  SpO2: 99% (21 Dec 2018 11:30) (94% - 99%)

## 2018-12-21 NOTE — ED PROVIDER NOTE - PHYSICAL EXAMINATION
***GEN - NAD; well appearing; A+O x3 ***HEAD - NC/AT   ***PULMONARY - CTA b/l, symmetric breath sounds. ***CARDIAC -s1s2, RRR, no M,G,R  ***ABDOMEN - ND, NT, soft, no guarding, no rebound, no rafaela's. +mild tenderness    ***SKIN - no rash or bruising. +well appearing wound to the left arm.   ***NEUROLOGIC - alert and oriented, follows commands, sensation nl, motor nl, ***PSYCH - insight and judgment nl, memory nl, affect nl, thought nl

## 2018-12-21 NOTE — ED PROVIDER NOTE - OBJECTIVE STATEMENT
62 y/o M w/ Hx CAD s/p stent, irregular heart beat s/p AICD on Coumadin, adrenal insufficiency, HTN, ESRD on HD (MWF), heart failure (EF 25%) pw abd pain, dizziness. 11/21/18 pt admitted to [facility] for 5 days due to sepsis, found to have klebsiella infection. Upon discharge pt had abd pain at suture site and then at home it began to refer to his left shoulder, did not improve. Pt then admitted again to [facility] on 12/5/18 for 1 week due to elevated INR and abd pain. Yesterday pt had onset of dizziness, lethargy, decreased PO intake. Last night pt saw Dr. Mathew who started pt on Losartan. Today pt had his dialysis, was given fluids prior due to decreased PO intake the day prior, sent to ED. +abd pain xweeks. +mild cough. No vomiting, chest pain, SOB, black stools, bloody stools. Vascular surgeon: Dr. Quintero. Cardio: Dr. Mathew. ID: Dr. [email or web address]. 62 y/o M w/ Hx CAD s/p stent, irregular heart beat s/p AICD on Coumadin, adrenal insufficiency, HTN, ESRD on HD (MWF), heart failure (EF 25%) pw abd pain, dizziness. 11/21/18 pt admitted to [facility] for 5 days due to sepsis, found to have klebsiella infection. Upon discharge pt had abd pain at suture site and then at home it began to refer to his left shoulder, did not improve. Pt then admitted again to [facility] on 12/5/18 for 1 week due to elevated INR and abd pain, s/p CT. Yesterday pt had onset of dizziness, lethargy, decreased PO intake. Last night pt saw Dr. Mathew who started pt on Losartan. Today pt had his dialysis, was given fluids prior due to decreased PO intake the day prior, sent to ED. +abd pain xweeks. +mild cough. No vomiting, chest pain, SOB, black stools, bloody stools. Vascular surgeon: Dr. Quintero. Cardio: Dr. Mathew. ID: Dr. [email or web address].

## 2018-12-21 NOTE — PATIENT PROFILE ADULT - TRAUMATIC EVENT EXPERIENCED
septic shock event last admissionin ED claims no did anything felt like he was dieing septic shock event last admission in ED claims noone did anything felt like he was dying

## 2018-12-21 NOTE — PROVIDER CONTACT NOTE (OTHER) - ACTION/TREATMENT ORDERED:
MD aware. No new orders received at this time.
MD Paniagua returned call; OK to give PO contrast per MD Paniagua.  Consult will be carried out by MD Sauer, tomorrow.

## 2018-12-21 NOTE — PROVIDER CONTACT NOTE (OTHER) - SITUATION
consult ordered  pt ordered for 2100 CT scan abdomen/pelvis, with oral contrast, requesting call back d/t pt being complex renal transplant patient consult ordered, also:  pt ordered for 2100 CT scan abdomen/pelvis, with oral contrast, requesting call back d/t pt being complex renal transplant patient

## 2018-12-21 NOTE — ED PROVIDER NOTE - NS_ ATTENDINGSCRIBEDETAILS _ED_A_ED_FT
The scribe's documentation has been prepared under my direction and personally reviewed by me in its entirety. I confirm that the note above accurately reflects all work, treatment, procedures, and medical decision-making performed by me.  Juan F Chow MD

## 2018-12-21 NOTE — PATIENT PROFILE ADULT - NSPROPTRIGHTREPPHONE_GEN_A_NUR
Gabapentin capsules or tablets  Brand Names: Active-PAC with Gabapentin, Neurontin  What is this medicine? GABAPENTIN (GA ba pen tin) is used to control partial seizures in adults with epilepsy. It is also used to treat certain types of nerve pain.   How · antihistamines for allergy, cough and cold  · certain medicines for anxiety or sleep  · certain medicines for depression or psychotic disturbances  · homatropine; hydrocodone  · naproxen  · narcotic medicines (opiates) for pain  · phenothiazines like chl Visit your doctor or health care professional for regular checks on your progress. You may want to keep a record at home of how you feel your condition is responding to treatment.  You may want to share this information with your doctor or health care profe [phone]

## 2018-12-21 NOTE — ED ADULT NURSE NOTE - OBJECTIVE STATEMENT
Pt complains of worsening dizziness over past couple of days, worse today following dialysis treatment.  Recent ICU stay for sepsis with Klebsiella infection reported. Pt complains of worsening dizziness over past couple of days, worse today following dialysis treatment.  Recent ICU stay for sepsis with Klebsiella infection reported.  EKG done on arrival. Cardiac and VS monitoring initiated.  Wife at bedside, relaying history, states that pt has been lethargic with poor po intake.    L forearm wrapped with Kerlix, pt reports skin tear and repair at site.  L upper arm fistula, pink bracelet applied.  Unable to place IV.

## 2018-12-21 NOTE — ED ADULT TRIAGE NOTE - CHIEF COMPLAINT QUOTE
Pt reports cont abd pain for weeks for which he has been in and out of  ED. Pt reports that today he started to feel dizzy with worsening dizziness with movement.

## 2018-12-21 NOTE — H&P ADULT - HISTORY OF PRESENT ILLNESS
64 yo male with PMH of CAD, ESRD on HD, polycystic kidney disease, systolic CHF s/p AICD, HTN, adrenal insufficiency (on chronic prednisone), hypoparathyroidism, h/o cryptococcal meningitis, h/o c. diff infection sent to ED from dialysis for lethargy. Pt with 2 recent admissions first for sepsis and noted to have klebsiella in blood cultures and second for sepsis and abdominal pain with +occult blood. Pt had CT abdomen with IV contrast on 12/5 which did not show any acute pathology. He underwent a EGD which showed gastritis with no active bleeding. Pt was discharged home and was feeling well a little better until a few days ago. He again started to have worsening abd pain and unable to tolerate PO. Every time he ate he developed worsening pain. His BP also remained on the lower side and his INR has remained elevated as outpatient. He was at dialysis this morning when he became lethargic. His weight was equivalent to his dry weight and was thought to be dehydrated and was given IV fluids and underwent dialysis for electrolytes. He was then sent to ED for further evaluation.    Currently in ED pt only complaining of abd pain which has been chronic. Has not changed since last admission. No nausea, vomiting or diarrhea. Denies chest pain. Does states he has a dry cough in the morning. CXR negative in ED.   He was noted to have a fever of 100.5 rectally. Lactate 2.1. He was given 500ml IV fluid. Started on vanco and zosyn. He was also given stress dose solucortef given history of adrenal insufficiency. He was hypotensive on arrival with improvement while in ED.

## 2018-12-21 NOTE — ED ADULT NURSE REASSESSMENT NOTE - NS ED NURSE REASSESS COMMENT FT1
Unable to obtain IV access, lab work despite multiple tries.  IV team RNs at bedside; phlebotomist en route.

## 2018-12-22 NOTE — PROGRESS NOTE ADULT - SUBJECTIVE AND OBJECTIVE BOX
62 yo male with PMH of CAD, ESRD on HD, polycystic kidney disease, systolic CHF s/p AICD, HTN, adrenal insufficiency (on chronic prednisone), hypoparathyroidism, h/o cryptococcal meningitis, h/o c. diff infection, sepsis with Klebsiella pneumoniae on prior hospitalization admitted on  after his dialysis session for increased lethargy, mid abdominal pain that radiates to left shoulder and upon admission found to have fever to 101 and hypotension which responded to stress dose steroids and iv antibiotics.        Vital Signs Last 24 Hrs  T(C): 36.7 (22 Dec 2018 04:43), Max: 36.7 (21 Dec 2018 15:45)  T(F): 98 (22 Dec 2018 04:43), Max: 98.1 (21 Dec 2018 17:08)  HR: 86 (22 Dec 2018 09:00) (67 - 96)  BP: 110/63 (22 Dec 2018 09:00) (79/50 - 110/63)  BP(mean): 74 (22 Dec 2018 09:00) (47 - 79)  RR: 20 (22 Dec 2018 09:00) (7 - 23)  SpO2: 94% (22 Dec 2018 09:00) (90% - 99%)  Daily     Daily Weight in k.4 (21 Dec 2018 17:08)    PE:    Constitutional: frail looking  HEENT: NC/AT, EOMI, PERRLA, conjunctivae clear; ears and nose atraumatic; pharynx clear  Neck: supple; thyroid not palpable  Back: no tenderness  Respiratory: respiratory effort normal; scattered coarse breath sounds  Cardiovascular: S1S2 regular, no murmurs  Abdomen: soft, not tender, not distended, positive BS; no liver or spleen organomegaly; fullness in right quadrant  Genitourinary: no suprapubic tenderness  Musculoskeletal: no muscle tenderness, no joint swelling or tenderness  Neurological/ Psychiatric: AxOx3, judgement and insight normal;  moving all extremities  Skin: no rashes; no palpable lesions; left upper extremity skin laceration clean    Labs: all available labs reviewed                         )-----------( 136      ( 22 Dec 2018 05:17 )             26.5     12    135  |  92<L>  |  22  ----------------------------<  144<H>  4.2   |  34<H>  |  3.43<H>    Ca    8.4<L>      22 Dec 2018 05:17  Phos  4.3     12-  Mg     1.9         TPro  6.3  /  Alb  2.1<L>  /  TBili  0.9  /  DBili  x   /  AST  17  /  ALT  13  /  AlkPhos  120       LIVER FUNCTIONS - ( 21 Dec 2018 12:31 )  Alb: 2.1 g/dL / Pro: 6.3 gm/dL / ALK PHOS: 120 U/L / ALT: 13 U/L / AST: 17 U/L / GGT: x           Urinalysis Basic - ( 21 Dec 2018 13:57 )    Color: Yellow / Appearance: Clear / S.010 / pH: x  Gluc: x / Ketone: Negative  / Bili: Negative / Urobili: Negative mg/dL   Blood: x / Protein: 100 mg/dL / Nitrite: Negative   Leuk Esterase: Negative / RBC: 0-2 /HPF / WBC 3-5   Sq Epi: x / Non Sq Epi: Occasional / Bacteria: x        < from: CT Abdomen and Pelvis w/ Oral Cont (18 @ 21:26) >    EXAM:  CT ABDOMEN AND PELVIS OC                            PROCEDURE DATE:  2018          INTERPRETATION:  Clinical information: Abdominal pain, elevated white   count    Comparison     PROCEDURE:     CT of the Abdomen and Pelvis was performed without intravenous contrast.    Evaluation of abdominal and pelvic viscera, lymph nodes and vasculature   is limited without intravenous contrast.    FINDINGS:    LOWER CHEST: New bibasilar consolidations which may be a combination of   atelectasis and aspiration pneumonia.    ABDOMEN:  LIVER: Cirrhosis. Multiple complex and calcified cysts.  BILE DUCTS: normal caliber  GALLBLADDER: Distended with cholelithiasis. No wall thickening or   pericholecystic fluid.  PANCREAS: Atrophic with a 3 cmintraductal stone upstream ductal   dilatation. Multifocal calcifications suggesting chronic pancreatitis.  SPLEEN: within normal limits  ADRENALS: within normal limits  KIDNEYS: Native kidneys not visualized. Right lower quadrant renal   transplant without hydronephrosis.    PELVIS:  REPRODUCTIVE ORGANS: no pelvic masses  BLADDER: Right base diverticulum. Question mild wall thickening and fat   stranding.    BOWEL: Extensive left colonic diverticulosis. Is unremarkable. Appendix   normal. Mildlydilated small bowel favor ileus.  PERITONEUM: Small but increased from prior upper abdominal ascites. No   pneumoperitoneum.  RETROPERITONEUM: no enlarged retroperitoneal or pelvic nodes.    VESSELS: Upper abdominal varices.  ABDOMINAL WALL: within normal limits.  MUSCULOSKELETAL: within normal limits.    IMPRESSION:     New bibasilar consolidations which may be a combination of atelectasis   and aspiration pneumonia.    Cirrhosis and portal hypertension. Small volume upper abdominal ascites,   also increased from .    Question mild cystitis.    Other incidental comments as above.    < end of copied text >          Radiology: all available radiological tests reviewed      * Epigastric Left flank pain  - + cholelithiasis  - r/o tamara: HIDA in am if deemed necessary  - for now IV abx  - coverage for PNA  - PPI    * ESRD with borderline BP start midodrine    * CAD with LAD stent    * Liver cirrhosis and portal HTN: pt is on amiodarone; ? amio induced toxicity  - new? to review old rec

## 2018-12-22 NOTE — PROGRESS NOTE ADULT - SUBJECTIVE AND OBJECTIVE BOX
CC:Patient is a 63y old  Male who presents with a chief complaint of sepsis (22 Dec 2018 13:20)      Subjective:  Pt seen and examined at bedside with chaperone. Pt is AAOx3, pt in no acute distress. Pt denied c/o fever, chills, chest pain, SOB, abd pain, N/V/D, extremity pain or dysfunction, hemoptysis, hematemesis, hematuria, hematochexia, headache, diplopia, vertigo, dizzyness. Pt states (+) bowel function    ROS:  as abovementioned otherwise negative ROS    Vital Signs Last 24 Hrs  T(C): 36.7 (22 Dec 2018 04:43), Max: 36.7 (21 Dec 2018 15:45)  T(F): 98 (22 Dec 2018 04:43), Max: 98.1 (21 Dec 2018 17:08)  HR: 86 (22 Dec 2018 09:00) (67 - 96)  BP: 110/63 (22 Dec 2018 09:00) (79/50 - 110/63)  BP(mean): 74 (22 Dec 2018 09:00) (47 - 79)  RR: 20 (22 Dec 2018 09:00) (7 - 23)  SpO2: 94% (22 Dec 2018 09:00) (90% - 99%)    Labs:                                8.1    11.23 )-----------( 136      ( 22 Dec 2018 05:17 )             26.5     CBC Full  -  ( 22 Dec 2018 05:17 )  WBC Count : 11.23 K/uL  Hemoglobin : 8.1 g/dL  Hematocrit : 26.5 %  Platelet Count - Automated : 136 K/uL  Mean Cell Volume : 94.6 fl  Mean Cell Hemoglobin : 28.9 pg  Mean Cell Hemoglobin Concentration : 30.6 gm/dL  Auto Neutrophil # : 9.70 K/uL  Auto Lymphocyte # : 0.74 K/uL  Auto Monocyte # : 0.71 K/uL  Auto Eosinophil # : 0.00 K/uL  Auto Basophil # : 0.02 K/uL  Auto Neutrophil % : 86.4 %  Auto Lymphocyte % : 6.6 %  Auto Monocyte % : 6.3 %  Auto Eosinophil % : 0.0 %  Auto Basophil % : 0.2 %    12-22    135  |  92<L>  |  22  ----------------------------<  144<H>  4.2   |  34<H>  |  3.43<H>    Ca    8.4<L>      22 Dec 2018 05:17  Phos  4.3     12-22  Mg     1.9     12-22    TPro  6.3  /  Alb  2.1<L>  /  TBili  0.9  /  DBili  x   /  AST  17  /  ALT  13  /  AlkPhos  120  12-21    LIVER FUNCTIONS - ( 21 Dec 2018 12:31 )  Alb: 2.1 g/dL / Pro: 6.3 gm/dL / ALK PHOS: 120 U/L / ALT: 13 U/L / AST: 17 U/L / GGT: x           PT/INR - ( 22 Dec 2018 05:17 )   PT: 50.7 sec;   INR: 4.36 ratio         PTT - ( 21 Dec 2018 12:31 )  PTT:39.8 sec      Meds:  acetaminophen   Tablet .. 650 milliGRAM(s) Oral every 6 hours PRN  aluminum hydroxide/magnesium hydroxide/simethicone Suspension 30 milliLiter(s) Oral every 6 hours PRN  amiodarone    Tablet 200 milliGRAM(s) Oral daily  cefepime  Injectable. 1000 milliGRAM(s) IV Push daily  cinacalcet 60 milliGRAM(s) Oral <User Schedule>  lactobacillus acidophilus 1 Tablet(s) Oral daily  ondansetron Injectable 4 milliGRAM(s) IV Push every 4 hours PRN  oxyCODONE    IR 5 milliGRAM(s) Oral every 6 hours PRN  pantoprazole    Tablet 40 milliGRAM(s) Oral before breakfast  sevelamer hydrochloride 800 milliGRAM(s) Oral three times a day with meals  vancomycin    Solution 125 milliGRAM(s) Oral every 6 hours      Radiology:  < from: CT Abdomen and Pelvis w/ Oral Cont (12.21.18 @ 21:26) >  EXAM:  CT ABDOMEN AND PELVIS OC                            PROCEDURE DATE:  12/21/2018          INTERPRETATION:  Clinical information: Abdominal pain, elevated white   count    Comparison 12/5    PROCEDURE:     CT of the Abdomen and Pelvis was performed without intravenous contrast.    Evaluation of abdominal and pelvic viscera, lymph nodes and vasculature   is limited without intravenous contrast.    FINDINGS:    LOWER CHEST: New bibasilar consolidations which may be a combination of   atelectasis and aspiration pneumonia.    ABDOMEN:  LIVER: Cirrhosis. Multiple complex and calcified cysts.  BILE DUCTS: normal caliber  GALLBLADDER: Distended with cholelithiasis. No wall thickening or   pericholecystic fluid.  PANCREAS: Atrophic with a 3 cmintraductal stone upstream ductal   dilatation. Multifocal calcifications suggesting chronic pancreatitis.  SPLEEN: within normal limits  ADRENALS: within normal limits  KIDNEYS: Native kidneys not visualized. Right lower quadrant renal   transplant without hydronephrosis.    PELVIS:  REPRODUCTIVE ORGANS: no pelvic masses  BLADDER: Right base diverticulum. Question mild wall thickening and fat   stranding.    BOWEL: Extensive left colonic diverticulosis. Is unremarkable. Appendix   normal. Mildlydilated small bowel favor ileus.  PERITONEUM: Small but increased from prior upper abdominal ascites. No   pneumoperitoneum.  RETROPERITONEUM: no enlarged retroperitoneal or pelvic nodes.    VESSELS: Upper abdominal varices.  ABDOMINAL WALL: within normal limits.  MUSCULOSKELETAL: within normal limits.    IMPRESSION:     New bibasilar consolidations which may be a combination of atelectasis   and aspiration pneumonia.    Cirrhosis and portal hypertension. Small volume upper abdominal ascites,   also increased from 12/5.    Question mild cystitis.    Other incidental comments as above.                      [facility] PRELIMINARY REPORT    EXAM:    CT Abdomen and Pelvis Without Contrast     EXAM DATE/TIME:    12/21/2018 9:40 PM     CLINICAL HISTORY:    63 years old, male; Pain; Abdominal pain; Patient HX: Elevated wbc     TECHNIQUE:    Axial computed tomography images of the abdomen and pelvis without   contrast.    Coronal and sagittal reformatted images were created and reviewed.     COMPARISON:    No relevant prior studies available.     FINDINGS:    Limitations:  Limited without intravenous contrast.    Tubes, catheters and devices:  Partially visualized AICD he/pacemaker   leads.    Lower thorax:  Coronary arterial calcifications. Cardiomegaly. Trace   pericardial effusion/pericardial thickening. Bibasilar atelectasis with   more   focal consolidation of the lower lobes which may represent acute airspace   disease. Continued attention on followup recommended as indicated.Trace   bilateral pleural effusions.     ABDOMEN:    Liver:  Innumerable low attenuation hepatic lesions, some with rim   calcification, incompletely evaluated on this unenhanced study. Nodular   and   shrunken hepatic contour suggestive of cirrhosis.    Gallbladder and bile ducts:  Cholelithiasis.    Pancreas:  Pancreas appears atrophic, suboptimally evaluated on this   unenhanced study.    Spleen:  Suggestion of portal venous hypertension including   splenomegaly,   recanalized paraumbilical vein and varices. Scattered low attenuation   splenic   lesions, incompletely evaluated without intravenous contrast. Further   evaluation with a contrast enhanced study may be obtained on a   non-emergent   basis.    Adrenals: Grossly unremarkable on this unenhanced study.    Kidneys and ureters:  Bilateral native kidneys not clearly visualized,   either   markedly atrophic versus surgically absent. Right lower quadrant   transplant   kidney without hydronephrosis.    Stomach and bowel:  Colonic diverticulosis. Scattered areas of colonic   wall   thickening, nonspecific in the presence of ascites. Acute diverticulitis   and/or   colitis cannot be excluded. Clinical correlation and further evaluation   recommended as indicated. No bowel obstruction.    Appendix:  Visualized without findings to suggest acute appendicitis.     PELVIS:    Bladder:  Partially decompressed urinary bladder with bladder   diverticulum.    Reproductive: Grossly unremarkable on this unenhanced study.     ABDOMEN and PELVIS:    Intraperitoneal space:  Small to moderate ascites.    Bones/joints: No acute fracture. No dislocation.    Soft tissues:  Anasarca/subcutaneous edema.    Vasculature:  Extensive atherosclerotic calcifications.    Lymph nodes: Grossly unremarkable on this unenhanced study.     IMPRESSION:   1. Cholelithiasis.   2. Nodular and shrunken hepatic contour suggestive of cirrhosis.   3. Suggestion of portal venous hypertension including splenomegaly,   recanalized   paraumbilical vein and varices.   4. Colonic diverticulosis. Scattered areas of colonic wall thickening,   nonspecific in the presence of ascites. Acute diverticulitis and/or   colitis   cannot be excluded. Clinical correlation and further evaluation   recommended as   indicated.  5. Small to moderate ascites.   6. Bibasilar atelectasis with more focal consolidation of the lower lobes   which   may represent acute airspace disease. Continued attention on followup   recommended as indicated.   7. Trace bilateral pleural effusions.   8. Other findings as above.                JAVIER WORLEY   This document has been electronically signed. Dec 22 2018  8:51AM    < end of copied text >      Physical exam:  Pt is aaox3  Pt in no acute distress  Resp: CTAB  CVS: S1S2(+)  ABD: bowel sounds (+), soft, non distended, no rebound, no guarding, no rigidity, no skin changes to exam. No tenderness to exam  EXT: no calf tenderness or edema to exam b/l, on VTE prophylaxis  Skin: no adverse skin changes to exam

## 2018-12-23 NOTE — PROGRESS NOTE ADULT - SUBJECTIVE AND OBJECTIVE BOX
COVERING FOR DR. 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 Diff, CAD, PVD, HTN, polycystic kidney disease with failed transplants sent to ED from dialysis for lethargy. Pt with 2 recent admissions for sepsis. Also s/p EGD with gastritis. Recent borderline low BP - has not been taking full doses of BP meds. He was at dialysis yesterday when he became lethargic with lower starting weight pre-HD. He was given IVF during HD, then sent to ED. Renal called for ESRD management.    Today reports improved abdominal pain. c/w L shoulder pain.     No nausea, vomiting or diarrhea. Denies chest pain. +dry cough.  In ED, noted to have a fever of 100.5F rectally. Lactate 2.1. He was given 500ml IV fluid. Started on vanco and zosyn. He was also given stress dose solucortef given history of adrenal insufficiency. He was hypotensive on arrival with improvement while in ED.      - today with improved pain, no sob. On HD now.    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  S/P colonoscopy: last done   AICD (automatic cardioverter/defibrillator) present:   H/O hernia repair  A-V fistula:  left UE  right arm ,  H/O kidney transplant: , ,       MEDICATIONS  (STANDING):  amiodarone    Tablet 200 milliGRAM(s) Oral daily  cefepime  Injectable. 1000 milliGRAM(s) IV Push daily  cinacalcet 60 milliGRAM(s) Oral <User Schedule>  hydrocortisone sodium succinate Injectable 100 milliGRAM(s) IV Push every 8 hours  lactobacillus acidophilus 1 Tablet(s) Oral daily  midodrine 2.5 milliGRAM(s) Oral three times a day  pantoprazole    Tablet 40 milliGRAM(s) Oral before breakfast  sevelamer hydrochloride 800 milliGRAM(s) Oral three times a day with meals  vancomycin    Solution 125 milliGRAM(s) Oral every 6 hours        Allergies    No Known Allergies    Intolerances        SOCIAL HISTORY:  Denies ETOh, Smoking,     FAMILY HISTORY:  Family history of kidney disease (Mother)  Family history of colon cancer (Sibling)      REVIEW OF SYSTEMS:    CONSTITUTIONAL: + weakness, + fevers +lethargy (improving)  EYES/ENT: No visual changes;  No vertigo or throat pain   NECK: No pain or stiffness  RESPIRATORY: +dry cough, +wheezing, no hemoptysis; No shortness of breath  CARDIOVASCULAR: No chest pain or palpitations  GASTROINTESTINAL: + abdominal  pain. No nausea, vomiting, or hematemesis; No diarrhea or constipation. No melena or hematochezia.  GENITOURINARY: No dysuria, frequency or hematuria  NEUROLOGICAL: No numbness or weakness  SKIN: No itching, burning, rashes, or lesions   All other review of systems is negative unless indicated above.    Vital Signs Last 24 Hrs  T(C): 36.7 (23 Dec 2018 11:45), Max: 36.8 (22 Dec 2018 16:31)  T(F): 98 (23 Dec 2018 11:45), Max: 98.3 (22 Dec 2018 16:31)  HR: 65 (23 Dec 2018 12:43) (60 - 86)  BP: 118/74 (23 Dec 2018 12:43) (74/38 - 148/66)  BP(mean): 56 (23 Dec 2018 11:01) (47 - 87)  RR: 17 (23 Dec 2018 12:43) (12 - 23)  SpO2: 95% (23 Dec 2018 11:45) (89% - 100%)    I and O's:        PHYSICAL EXAM:    Constitutional: NAD, awake, alert, responsive  HEENT: PERRLA, EOMI,  MMM  Neck: No LAD, No JVD  Respiratory: bilateral inspiratory wheeze  Cardiovascular: S1 and S2  Gastrointestinal: BS+, soft, NT/ND  Extremities: No peripheral edema LEs, LUE 1+ edema  Neurological: A/O x 3, no focal deficits  Psychiatric: Normal mood, normal affect  : No Amaya  Skin: No rashes  Access: RUE AVF in use    LABS:                        8.1    11 )-----------( 136      ( 22 Dec 2018 05:17 )             26.5         133<L>  |  92<L>  |  46<H>  ----------------------------<  267<H>  4.4   |  30  |  4.91<H>    Ca    7.7<L>      23 Dec 2018 11:45  Phos  4.9       Mg     1.9         TPro  x   /  Alb  1.8<L>  /  TBili  x   /  DBili  x   /  AST  x   /  ALT  x   /  AlkPhos  x       135    |  92     |  22     ----------------------------<  144       22 Dec 2018 05:17  4.2     |  34     |  3.43     137    |  93     |  14     ----------------------------<  135       21 Dec 2018 12:31  3.5     |  36     |  2.61     Ca    8.4        22 Dec 2018 05:17  Ca    8.3        21 Dec 2018 12:31    Phos  4.3       22 Dec 2018 05:17    Mg     1.9       22 Dec 2018 05:17    TPro  6.3    /  Alb  2.1    /  TBili  0.9    /        21 Dec 2018 12:31  DBili  x      /  AST  17     /  ALT  13     /  AlkPhos  120            Urine Studies:  Urinalysis Basic - ( 21 Dec 2018 13:57 )    Color: Yellow / Appearance: Clear / S.010 / pH: x  Gluc: x / Ketone: Negative  / Bili: Negative / Urobili: Negative mg/dL   Blood: x / Protein: 100 mg/dL / Nitrite: Negative   Leuk Esterase: Negative / RBC: 0-2 /HPF / WBC 3-5   Sq Epi: x / Non Sq Epi: Occasional / Bacteria: x            RADIOLOGY & ADDITIONAL STUDIES:

## 2018-12-23 NOTE — PROGRESS NOTE ADULT - SUBJECTIVE AND OBJECTIVE BOX
Patient is a 63y old  Male who presents with a chief complaint of sepsis (22 Dec 2018 14:18)      HPI:  64 yo male with PMH of CAD, ESRD on HD, polycystic kidney disease, systolic CHF s/p AICD, HTN, adrenal insufficiency (on chronic prednisone), hypoparathyroidism, h/o cryptococcal meningitis, h/o c. diff infection sent to ED from dialysis for lethargy. Pt with 2 recent admissions first for sepsis and noted to have klebsiella in blood cultures and second for sepsis and abdominal pain with +occult blood. Pt had CT abdomen with IV contrast on 12/5 which did not show any acute pathology. He underwent a EGD which showed gastritis with no active bleeding. Pt was discharged home and was feeling well a little better until a few days ago. He again started to have worsening abd pain and unable to tolerate PO. Every time he ate he developed worsening pain. His BP also remained on the lower side and his INR has remained elevated as outpatient. He was at dialysis this morning when he became lethargic. His weight was equivalent to his dry weight and was thought to be dehydrated and was given IV fluids and underwent dialysis for electrolytes. He was then sent to ED for further evaluation.    Currently in ED pt only complaining of abd pain which has been chronic. Has not changed since last admission. No nausea, vomiting or diarrhea. Denies chest pain. Does states he has a dry cough in the morning. CXR negative in ED.   He was noted to have a fever of 100.5 rectally. Lactate 2.1. He was given 500ml IV fluid. Started on vanco and zosyn. He was also given stress dose solucortef given history of adrenal insufficiency. He was hypotensive on arrival with improvement while in ED. (21 Dec 2018 14:58)    Patient feels significantly better. No complaints of pain or fever. Eating well.       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  S/P colonoscopy: last done 2016  AICD (automatic cardioverter/defibrillator) present: 2013  H/O hernia repair  A-V fistula: 1995 left UE  right arm 2007,  H/O kidney transplant: 1985, 1995, 1997      MEDICATIONS  (STANDING):  amiodarone    Tablet 200 milliGRAM(s) Oral daily  cefepime  Injectable. 1000 milliGRAM(s) IV Push daily  cinacalcet 60 milliGRAM(s) Oral <User Schedule>  hydrocortisone sodium succinate Injectable 100 milliGRAM(s) IV Push every 8 hours  lactobacillus acidophilus 1 Tablet(s) Oral daily  midodrine 2.5 milliGRAM(s) Oral three times a day  pantoprazole    Tablet 40 milliGRAM(s) Oral before breakfast  sevelamer hydrochloride 800 milliGRAM(s) Oral three times a day with meals  vancomycin    Solution 125 milliGRAM(s) Oral every 6 hours    MEDICATIONS  (PRN):  acetaminophen   Tablet .. 650 milliGRAM(s) Oral every 6 hours PRN Temp greater or equal to 38C (100.4F), Mild Pain (1 - 3)  aluminum hydroxide/magnesium hydroxide/simethicone Suspension 30 milliLiter(s) Oral every 6 hours PRN Dyspepsia  ondansetron Injectable 4 milliGRAM(s) IV Push every 4 hours PRN Nausea and/or Vomiting  oxyCODONE    IR 5 milliGRAM(s) Oral every 6 hours PRN Severe Pain (7 - 10)      Allergies    No Known Allergies    Intolerances        Vital Signs Last 24 Hrs  T(C): 36.4 (23 Dec 2018 06:32), Max: 36.8 (22 Dec 2018 16:31)  T(F): 97.5 (23 Dec 2018 06:32), Max: 98.3 (22 Dec 2018 16:31)  HR: 61 (23 Dec 2018 06:00) (61 - 95)  BP: 102/37 (23 Dec 2018 06:00) (74/38 - 124/70)  BP(mean): 53 (23 Dec 2018 06:00) (47 - 83)  RR: 12 (23 Dec 2018 06:00) (12 - 23)  SpO2: 98% (23 Dec 2018 06:00) (89% - 100%)    PHYSICAL EXAM:      Respiratory: CTAB  Cardiovascular: S1 and S2, RRR, no M/G/R  Gastrointestinal: BS+, soft, NT/ND      LABS:                        8.1    11.23 )-----------( 136      ( 22 Dec 2018 05:17 )             26.5     12-22    135  |  92<L>  |  22  ----------------------------<  144<H>  4.2   |  34<H>  |  3.43<H>    Ca    8.4<L>      22 Dec 2018 05:17  Phos  4.3     12-22  Mg     1.9     12-22    TPro  6.3  /  Alb  2.1<L>  /  TBili  0.9  /  DBili  x   /  AST  17  /  ALT  13  /  AlkPhos  120  12-21    PT/INR - ( 22 Dec 2018 05:17 )   PT: 50.7 sec;   INR: 4.36 ratio         PTT - ( 21 Dec 2018 12:31 )  PTT:39.8 sec  LIVER FUNCTIONS - ( 21 Dec 2018 12:31 )  Alb: 2.1 g/dL / Pro: 6.3 gm/dL / ALK PHOS: 120 U/L / ALT: 13 U/L / AST: 17 U/L / GGT: x             RADIOLOGY & ADDITIONAL STUDIES:

## 2018-12-23 NOTE — PROGRESS NOTE ADULT - SUBJECTIVE AND OBJECTIVE BOX
Patient is a 63y old  Male who presents with a chief complaint of sepsis (22 Dec 2018 12:46)    Date of service: 12-23-18 @ 10:19      Patient sitting in chair  Afebrile since admission, no further shoulder or abdominal pain      ROS: no fever or chills; denies dizziness, no HA, no SOB or cough, no abdominal pain, no diarrhea or constipation; no dysuria, no urinary frequency, no legs pain, no rashes    MEDICATIONS  (STANDING):  amiodarone    Tablet 200 milliGRAM(s) Oral daily  cefepime  Injectable. 1000 milliGRAM(s) IV Push daily  cinacalcet 60 milliGRAM(s) Oral <User Schedule>  hydrocortisone sodium succinate Injectable 100 milliGRAM(s) IV Push every 8 hours  lactobacillus acidophilus 1 Tablet(s) Oral daily  midodrine 2.5 milliGRAM(s) Oral three times a day  pantoprazole    Tablet 40 milliGRAM(s) Oral before breakfast  sevelamer hydrochloride 800 milliGRAM(s) Oral three times a day with meals  vancomycin    Solution 125 milliGRAM(s) Oral every 6 hours    MEDICATIONS  (PRN):  acetaminophen   Tablet .. 650 milliGRAM(s) Oral every 6 hours PRN Temp greater or equal to 38C (100.4F), Mild Pain (1 - 3)  aluminum hydroxide/magnesium hydroxide/simethicone Suspension 30 milliLiter(s) Oral every 6 hours PRN Dyspepsia  ondansetron Injectable 4 milliGRAM(s) IV Push every 4 hours PRN Nausea and/or Vomiting  oxyCODONE    IR 5 milliGRAM(s) Oral every 6 hours PRN Severe Pain (7 - 10)      Vital Signs Last 24 Hrs  T(C): 36.4 (23 Dec 2018 06:32), Max: 36.8 (22 Dec 2018 16:31)  T(F): 97.5 (23 Dec 2018 06:32), Max: 98.3 (22 Dec 2018 16:31)  HR: 78 (23 Dec 2018 09:01) (60 - 95)  BP: 148/66 (23 Dec 2018 09:01) (74/38 - 148/66)  BP(mean): 87 (23 Dec 2018 09:01) (47 - 87)  RR: 18 (23 Dec 2018 09:01) (12 - 23)  SpO2: 96% (23 Dec 2018 09:01) (89% - 100%)    Physical Exam:    PE:    Constitutional: frail looking  HEENT: NC/AT, EOMI, PERRLA, conjunctivae clear; ears and nose atraumatic; pharynx clear  Neck: supple; thyroid not palpable  Back: no tenderness  Respiratory: respiratory effort normal; scattered coarse breath sounds  Cardiovascular: S1S2 regular, no murmurs  Abdomen: soft, not tender, not distended, positive BS; no liver or spleen organomegaly; fullness in right quadrant  Genitourinary: no suprapubic tenderness  Musculoskeletal: no muscle tenderness, no joint swelling or tenderness  Neurological/ Psychiatric: AxOx3, judgement and insight normal;  moving all extremities  Skin: no rashes; no palpable lesions; left upper extremity skin laceration clean    Labs: all available labs reviewed                       Labs:                        8.1    11.23 )-----------( 136      ( 22 Dec 2018 05:17 )             26.5     12-22    135  |  92<L>  |  22  ----------------------------<  144<H>  4.2   |  34<H>  |  3.43<H>    Ca    8.4<L>      22 Dec 2018 05:17  Phos  4.3     12-22  Mg     1.9     12-22    TPro  6.3  /  Alb  2.1<L>  /  TBili  0.9  /  DBili  x   /  AST  17  /  ALT  13  /  AlkPhos  120  12-21           Cultures:       Culture - Urine (collected 12-21-18 @ 13:57)  Source: .Urine None  Final Report (12-22-18 @ 18:07):    <10,000 CFU/ml Normal Urogenital too present    Culture - Blood (collected 12-21-18 @ 12:31)  Source: .Blood Blood-Venous  Preliminary Report (12-22-18 @ 18:02):    No growth to date.          < from: CT Abdomen and Pelvis w/ Oral Cont (12.21.18 @ 21:26) >    EXAM:  CT ABDOMEN AND PELVIS OC                            PROCEDURE DATE:  12/21/2018          INTERPRETATION:  Clinical information: Abdominal pain, elevated white   count    Comparison 12/5    PROCEDURE:     CT of the Abdomen and Pelvis was performed without intravenous contrast.    Evaluation of abdominal and pelvic viscera, lymph nodes and vasculature   is limited without intravenous contrast.    FINDINGS:    LOWER CHEST: New bibasilar consolidations which may be a combination of   atelectasis and aspiration pneumonia.    ABDOMEN:  LIVER: Cirrhosis. Multiple complex and calcified cysts.  BILE DUCTS: normal caliber  GALLBLADDER: Distended with cholelithiasis. No wall thickening or   pericholecystic fluid.  PANCREAS: Atrophic with a 3 cmintraductal stone upstream ductal   dilatation. Multifocal calcifications suggesting chronic pancreatitis.  SPLEEN: within normal limits  ADRENALS: within normal limits  KIDNEYS: Native kidneys not visualized. Right lower quadrant renal   transplant without hydronephrosis.    PELVIS:  REPRODUCTIVE ORGANS: no pelvic masses  BLADDER: Right base diverticulum. Question mild wall thickening and fat   stranding.    BOWEL: Extensive left colonic diverticulosis. Is unremarkable. Appendix   normal. Mildlydilated small bowel favor ileus.  PERITONEUM: Small but increased from prior upper abdominal ascites. No   pneumoperitoneum.  RETROPERITONEUM: no enlarged retroperitoneal or pelvic nodes.    VESSELS: Upper abdominal varices.  ABDOMINAL WALL: within normal limits.  MUSCULOSKELETAL: within normal limits.    IMPRESSION:     New bibasilar consolidations which may be a combination of atelectasis   and aspiration pneumonia.    Cirrhosis and portal hypertension. Small volume upper abdominal ascites,   also increased from 12/5.    Question mild cystitis.    Other incidental comments as above.    < end of copied text >          Radiology: all available radiological tests reviewed    Advanced directives addressed: full resuscitation

## 2018-12-23 NOTE — PROGRESS NOTE ADULT - SUBJECTIVE AND OBJECTIVE BOX
64 yo male with PMH of CAD, ESRD on HD, polycystic kidney disease, systolic CHF s/p AICD, HTN, adrenal insufficiency (on chronic prednisone), hypoparathyroidism, h/o cryptococcal meningitis, h/o c. diff infection, sepsis with Klebsiella pneumoniae on prior hospitalization admitted on 12/21 after his dialysis session for increased lethargy, mid abdominal pain that radiates to left shoulder and upon admission found to have fever to 101 and hypotension which responded to stress dose steroids and iv antibiotics.    12/23: no change in his status; intermittent abd pain mainly after meals    Vital Signs Last 24 Hrs  T(C): 36.7 (23 Dec 2018 11:45), Max: 36.8 (22 Dec 2018 16:31)  T(F): 98 (23 Dec 2018 11:45), Max: 98.3 (22 Dec 2018 16:31)  HR: 65 (23 Dec 2018 12:43) (60 - 86)  BP: 118/74 (23 Dec 2018 12:43) (74/38 - 148/66)  BP(mean): 56 (23 Dec 2018 11:01) (47 - 87)  RR: 17 (23 Dec 2018 12:43) (12 - 23)  SpO2: 95% (23 Dec 2018 11:45) (89% - 100%)    PE:    Constitutional: frail looking  HEENT: NC/AT, EOMI, PERRLA, conjunctivae clear; ears and nose atraumatic; pharynx clear  Neck: supple; thyroid not palpable  Back: no tenderness  Respiratory: respiratory effort normal; scattered coarse breath sounds  Cardiovascular: S1S2 regular, no murmurs  Abdomen: soft, not tender, not distended, positive BS; no liver or spleen organomegaly; fullness in right quadrant  Genitourinary: no suprapubic tenderness  Musculoskeletal: no muscle tenderness, no joint swelling or tenderness  Neurological/ Psychiatric: AxOx3, judgement and insight normal;  moving all extremities  Skin: no rashes; no palpable lesions; left upper extremity skin laceration clean    Labs: all available labs reviewed                        7.6    10.79 )-----------( 141      ( 23 Dec 2018 11:45 )             24.7   12-23    133<L>  |  92<L>  |  46<H>  ----------------------------<  267<H>  4.4   |  30  |  4.91<H>    Ca    7.7<L>      23 Dec 2018 11:45  Phos  4.9     12-23  Mg     1.9     12-22    TPro  x   /  Alb  1.8<L>  /  TBili  x   /  DBili  x   /  AST  x   /  ALT  x   /  AlkPhos  x   12-23                         INTERPRETATION:  Clinical information: Abdominal pain, elevated white   count    Comparison 12/5    PROCEDURE:     CT of the Abdomen and Pelvis was performed without intravenous contrast.    Evaluation of abdominal and pelvic viscera, lymph nodes and vasculature   is limited without intravenous contrast.    FINDINGS:    LOWER CHEST: New bibasilar consolidations which may be a combination of   atelectasis and aspiration pneumonia.    ABDOMEN:  LIVER: Cirrhosis. Multiple complex and calcified cysts.  BILE DUCTS: normal caliber  GALLBLADDER: Distended with cholelithiasis. No wall thickening or   pericholecystic fluid.  PANCREAS: Atrophic with a 3 cmintraductal stone upstream ductal   dilatation. Multifocal calcifications suggesting chronic pancreatitis.  SPLEEN: within normal limits  ADRENALS: within normal limits  KIDNEYS: Native kidneys not visualized. Right lower quadrant renal   transplant without hydronephrosis.    PELVIS:  REPRODUCTIVE ORGANS: no pelvic masses  BLADDER: Right base diverticulum. Question mild wall thickening and fat   stranding.    BOWEL: Extensive left colonic diverticulosis. Is unremarkable. Appendix   normal. Mildlydilated small bowel favor ileus.  PERITONEUM: Small but increased from prior upper abdominal ascites. No   pneumoperitoneum.  RETROPERITONEUM: no enlarged retroperitoneal or pelvic nodes.    VESSELS: Upper abdominal varices.  ABDOMINAL WALL: within normal limits.  MUSCULOSKELETAL: within normal limits.    IMPRESSION:     New bibasilar consolidations which may be a combination of atelectasis   and aspiration pneumonia.    Cirrhosis and portal hypertension. Small volume upper abdominal ascites,   also increased from 12/5.    Question mild cystitis.    Other incidental comments as above.    < end of copied text >          Radiology: all available radiological tests reviewed      * Epigastric Left flank pain  - + cholelithiasis  - r/o tamara: HIDA not poss due to renal failure; get US  - for now IV abx  - coverage for PNA  - PPI    * ESRD with borderline BP he is on  midodrine    * CAD with LAD stent    * Liver cirrhosis and portal HTN: pt is on amiodarone; ? amio induced toxicity  - new? to review old rec    Consult GI re: abd pain and liver cirrhosis 64 yo male with PMH of CAD, ESRD on HD, polycystic kidney disease, systolic CHF s/p AICD, HTN, adrenal insufficiency (on chronic prednisone), hypoparathyroidism, h/o cryptococcal meningitis, h/o c. diff infection, sepsis with Klebsiella pneumoniae on prior hospitalization admitted on 12/21 after his dialysis session for increased lethargy, mid abdominal pain that radiates to left shoulder and upon admission found to have fever to 101 and hypotension which responded to stress dose steroids and iv antibiotics.    12/23: no change in his status; intermittent abd pain mainly after meals    Vital Signs Last 24 Hrs  T(C): 36.7 (23 Dec 2018 11:45), Max: 36.8 (22 Dec 2018 16:31)  T(F): 98 (23 Dec 2018 11:45), Max: 98.3 (22 Dec 2018 16:31)  HR: 65 (23 Dec 2018 12:43) (60 - 86)  BP: 118/74 (23 Dec 2018 12:43) (74/38 - 148/66)  BP(mean): 56 (23 Dec 2018 11:01) (47 - 87)  RR: 17 (23 Dec 2018 12:43) (12 - 23)  SpO2: 95% (23 Dec 2018 11:45) (89% - 100%)    PE:    Constitutional: frail looking  HEENT: NC/AT, EOMI, PERRLA, conjunctivae clear; ears and nose atraumatic; pharynx clear  Neck: supple; thyroid not palpable  Back: no tenderness  Respiratory: respiratory effort normal; scattered coarse breath sounds  Cardiovascular: S1S2 regular, no murmurs  Abdomen: soft, not tender, not distended, positive BS; no liver or spleen organomegaly; fullness in right quadrant  Genitourinary: no suprapubic tenderness  Musculoskeletal: no muscle tenderness, no joint swelling or tenderness  Neurological/ Psychiatric: AxOx3, judgement and insight normal;  moving all extremities  Skin: no rashes; no palpable lesions; left upper extremity skin laceration clean    Labs: all available labs reviewed                        7.6    10.79 )-----------( 141      ( 23 Dec 2018 11:45 )             24.7   12-23    133<L>  |  92<L>  |  46<H>  ----------------------------<  267<H>  4.4   |  30  |  4.91<H>    Ca    7.7<L>      23 Dec 2018 11:45  Phos  4.9     12-23  Mg     1.9     12-22    TPro  x   /  Alb  1.8<L>  /  TBili  x   /  DBili  x   /  AST  x   /  ALT  x   /  AlkPhos  x   12-23                         INTERPRETATION:  Clinical information: Abdominal pain, elevated white   count    Comparison 12/5    PROCEDURE:     CT of the Abdomen and Pelvis was performed without intravenous contrast.    Evaluation of abdominal and pelvic viscera, lymph nodes and vasculature   is limited without intravenous contrast.    FINDINGS:    LOWER CHEST: New bibasilar consolidations which may be a combination of   atelectasis and aspiration pneumonia.    ABDOMEN:  LIVER: Cirrhosis. Multiple complex and calcified cysts.  BILE DUCTS: normal caliber  GALLBLADDER: Distended with cholelithiasis. No wall thickening or   pericholecystic fluid.  PANCREAS: Atrophic with a 3 cmintraductal stone upstream ductal   dilatation. Multifocal calcifications suggesting chronic pancreatitis.  SPLEEN: within normal limits  ADRENALS: within normal limits  KIDNEYS: Native kidneys not visualized. Right lower quadrant renal   transplant without hydronephrosis.    PELVIS:  REPRODUCTIVE ORGANS: no pelvic masses  BLADDER: Right base diverticulum. Question mild wall thickening and fat   stranding.    BOWEL: Extensive left colonic diverticulosis. Is unremarkable. Appendix   normal. Mildlydilated small bowel favor ileus.  PERITONEUM: Small but increased from prior upper abdominal ascites. No   pneumoperitoneum.  RETROPERITONEUM: no enlarged retroperitoneal or pelvic nodes.    VESSELS: Upper abdominal varices.  ABDOMINAL WALL: within normal limits.  MUSCULOSKELETAL: within normal limits.    IMPRESSION:     New bibasilar consolidations which may be a combination of atelectasis   and aspiration pneumonia.    Cirrhosis and portal hypertension. Small volume upper abdominal ascites,   also increased from 12/5.    Question mild cystitis.    Other incidental comments as above.    < end of copied text >          Radiology: all available radiological tests reviewed      * Epigastric Left flank pain  - + cholelithiasis  - r/o tamara: HIDA not poss due to renal failure; get US  - for now IV abx  - coverage for PNA  - PPI    * Anemia: repeat cbc in am    * ESRD with borderline BP he is on  midodrine    * CAD with LAD stent    * Liver cirrhosis and portal HTN: pt is on amiodarone; ? amio induced toxicity  - new? to review old rec    Consult GI re: abd pain and liver cirrhosis

## 2018-12-24 NOTE — DIETITIAN INITIAL EVALUATION ADULT. - PERTINENT LABORATORY DATA
12-23 Na133 mmol/L<L> Glu 267 mg/dL<H> K+ 4.4 mmol/L Cr  4.91 mg/dL<H> BUN 46 mg/dL<H> Phos 4.9 mg/dL<H> Alb 1.8 g/dL<L> PAB n/a

## 2018-12-24 NOTE — DIETITIAN INITIAL EVALUATION ADULT. - OTHER INFO
pt seen 2/2 low BMI: 19.8.  62yo male with PMH of CAD, ESRD on HD, polycystic kidney disease, CHF s/p AICD, HTN, adrenal insufficiency, hypoparathyroidism, h/o cryptococccal meningitis, h/o C.Diff presented to ED for lethargy during HD.  Pt admitted for possible sepsis 2/2 unknown source with recurrent abd pain (x 1 month).  CT showed moderate ascites, mild changes of cirrohosis, and pancreas markedly atrophic with dilated PD and large stone near ampulla.  Pending possible transfer for EUS and definitive therapy of PD stone.  Upon visit, pt appears thin with NFPE significant for NFPE significant for severe thigh/calf and mild temporal muscle wasting.  Diet recall reveals pt meeting <75% of estimated nutr needs x 1 month 2/2 abd pain.  (+) trace Lt/Rt ankle, mild Lt arm edema.  BM (+) 12/21.  nestor score of 16, no PU.  Based on above, pt meets criteria for severe malnutrition in chronic illness.  Labs noted; hyperphosphatemia, on phosphate binders.  RECOMMENDATIONS: 1) change diet to renal and add gelatein BID 2) encourage protein at each meal and snacks to optimize intake 3) add nephrovite daily to ensure 100% RDI met 4) daily wt checks

## 2018-12-24 NOTE — CONSULT NOTE ADULT - ASSESSMENT
A/P:  R/O sepsis, h/o klebsiella bacteremia  Coagulopathy  F/U radiologic studies  Pt will require/benefit from transfer to appropriate tertiary care center for any surgical intervention as clinically indicated/warranted secondary to medical pathology and renal transplant history  IV antibiotics  Serial abd exams  Medical management per primary service  Medical comorbidities of CAD, ESRD on HD, polycystic kidney disease, systolic CHF s/p AICD, HTN, adrenal insufficiency (on chronic prednisone), hypoparathyroidism, h/o cryptococcal meningitis, h/o c. diff infection, PVD, heart failure with reduced ejection fraction  Pt and family aware of and agree with all of the above
62 yo male with multiple medical issues, with recurrent episodes of abdominal pain and fever. Last admission notable for Klebsiella bacteremia. Patient currently looks well with no pain. Awaiting BC on admission. Will discuss with ID possible sources. Will continue to observe for now. Will follow - thanks!
64 yo male with PMH of CAD, ESRD on HD, polycystic kidney disease, systolic CHF s/p AICD, HTN, adrenal insufficiency (on chronic prednisone), hypoparathyroidism, h/o cryptococcal meningitis, h/o c. diff infection, sepsis with Klebsiella pneumoniae on prior hospitalization admitted on 12/21 after his dialysis session for increased lethargy, mid abdominal pain that radiates to left shoulder and upon admission found to have fever to 101 and hypotension which responded to stress dose steroids and iv antibiotics. The patient has one transplanted kidney in abdomen but is on dialysis. He is not on immunosuppressive meds with exception of prednisone 5 mg daily. The patient also notes recent hospitalization for abdominal pain, EGD was done as outpatient found to have gastritis. The patient finds his pain resolved with IVP protonix. No cough, nausea, vomiting, diarrhea or urinary complaints. Still makes small amounts of urine. Upon last hospitalization caught his left arm on iv pole sustained laceration that required sutures.   1. Patient admitted with sepsis of unclear etiology, possibly pneumonia which will treat as health care associated pneumonia given patient is dialysis patient and recently hospitalized  - follow up cultures   - iv hydration and supportive care but monitor closely given patient is dialysis patient  - serial cbc and monitor temperature   - reviewed prior medical records to evaluate for resistant or atypical pathogens   - agree with cefepime as ordered  - will add vancomycin to treat resistant bacteria but intermittently dose given renal function  - imaging suggestive of chronic pancreatitis, could this be cause of abdominal pain referred to left should; will discuss with GI  - will start po vancomycin to prevent cdiff  - will check cmv pcr and cryptococcal serum antigen, patient on chronic steroids with intermediate high dose steroids  2. other issues; CAD, ESRD on HD, polycystic kidney disease, systolic CHF s/p AICD, HTN, adrenal insufficiency (on chronic prednisone), hypoparathyroidism  - per medicine
Gallstones and biliary colic. No evidence of acute cystic duct obstruction on HIDA. Plan diet as tolerated. If symptoms recur, consider lap choley.
Patient is a 62 yo male with ESRD on HD M/[place] at [facility] [place], HFr[place], CAD with stent, hx meningitis due to cryptococcus, C. Diff, CAD, PVD, HTN, polycystic kidney disease with failed transplants sent to ED from dialysis for lethargy. Pt with 2 recent admissions for sepsis. Also s/p EGD with gastritis. Recent borderline low BP - has not been taking full doses of BP meds. He was at dialysis yesterday when he became lethargic with lower starting weight pre-HD. He was given IVF during HD, then sent to ED. Renal called for ESRD management.    1. ESRD on HD - MWF  - completed treatment yesterday  - for HD tomorrow (Sunday during holiday week)  - lytes / volume status acceptable  - BP improving - hold BP meds for SBP<100    2. MBD - cont Renvela  3. Hypotension / Sepsis  - f/u cultures  - IV Abx  - IVF if needed   - hold BP meds    Thank you for consult. Will follow.  Dr. Paniagua to resume care 12/28.

## 2018-12-24 NOTE — PROGRESS NOTE ADULT - SUBJECTIVE AND OBJECTIVE BOX
Date of service: 12-24-18 @ 11:25    Lying in bed in NAD  Feels better  Has postprandial epigastric pain radiating to left shoulder    ROS: no fever or chills; denies dizziness, no HA, no SOB or cough, no diarrhea or constipation; no dysuria, no legs pain, no rashes    MEDICATIONS  (STANDING):  amiodarone    Tablet 200 milliGRAM(s) Oral daily  cefepime  Injectable. 1000 milliGRAM(s) IV Push daily  cinacalcet 60 milliGRAM(s) Oral <User Schedule>  dextrose 5%. 1000 milliLiter(s) (50 mL/Hr) IV Continuous <Continuous>  dextrose 50% Injectable 12.5 Gram(s) IV Push once  dextrose 50% Injectable 25 Gram(s) IV Push once  dextrose 50% Injectable 25 Gram(s) IV Push once  hydrocortisone sodium succinate Injectable 100 milliGRAM(s) IV Push every 8 hours  insulin lispro (HumaLOG) corrective regimen sliding scale   SubCutaneous three times a day before meals  lactobacillus acidophilus 1 Tablet(s) Oral daily  midodrine 2.5 milliGRAM(s) Oral three times a day  pantoprazole    Tablet 40 milliGRAM(s) Oral before breakfast  polyethylene glycol 3350 17 Gram(s) Oral daily  sevelamer hydrochloride 800 milliGRAM(s) Oral three times a day with meals  vancomycin    Solution 125 milliGRAM(s) Oral every 6 hours      Vital Signs Last 24 Hrs  T(C): 36.5 (23 Dec 2018 21:02), Max: 36.7 (23 Dec 2018 11:45)  T(F): 97.7 (23 Dec 2018 21:02), Max: 98 (23 Dec 2018 11:45)  HR: 65 (24 Dec 2018 09:01) (60 - 88)  BP: 140/48 (24 Dec 2018 09:01) (84/59 - 141/43)  BP(mean): 67 (24 Dec 2018 09:01) (39 - 72)  RR: 13 (24 Dec 2018 09:01) (11 - 21)  SpO2: 97% (24 Dec 2018 09:01) (88% - 98%)    Physical Exam:      Constitutional: frail looking  HEENT: NC/AT, EOMI, PERRLA, conjunctivae clear  Neck: supple; thyroid not palpable  Back: no tenderness  Respiratory: respiratory effort normal; scattered coarse breath sounds  Cardiovascular: S1S2 regular, no murmurs  Abdomen: soft, not tender, not distended, positive BS; fullness in right quadrant  Genitourinary: no suprapubic tenderness  Musculoskeletal: no muscle tenderness, no joint swelling or tenderness  Neurological/ Psychiatric: AxOx3, moving all extremities  Skin: no rashes; no palpable lesions    Labs: reviewed                        7.5    7.59  )-----------( 128      ( 24 Dec 2018 05:00 )             25.3     12-23    133<L>  |  92<L>  |  46<H>  ----------------------------<  267<H>  4.4   |  30  |  4.91<H>    Ca    7.7<L>      23 Dec 2018 11:45  Phos  4.9     12-23    TPro  x   /  Alb  1.8<L>  /  TBili  x   /  DBili  x   /  AST  x   /  ALT  x   /  AlkPhos  x   12-23                        8.1    11.23 )-----------( 136      ( 22 Dec 2018 05:17 )             26.5     12-22    135  |  92<L>  |  22  ----------------------------<  144<H>  4.2   |  34<H>  |  3.43<H>    Ca    8.4<L>      22 Dec 2018 05:17  Phos  4.3     12-22  Mg     1.9     12-22    TPro  6.3  /  Alb  2.1<L>  /  TBili  0.9  /  DBili  x   /  AST  17  /  ALT  13  /  AlkPhos  120  12-21           Cultures:       Culture - Urine (collected 12-21-18 @ 13:57)  Source: .Urine None  Final Report (12-22-18 @ 18:07):    <10,000 CFU/ml Normal Urogenital too present    Culture - Blood (collected 12-21-18 @ 12:31)  Source: .Blood Blood-Venous  Preliminary Report (12-22-18 @ 18:02):    No growth to date.          < from: CT Abdomen and Pelvis w/ Oral Cont (12.21.18 @ 21:26) >    EXAM:  CT ABDOMEN AND PELVIS OC                            PROCEDURE DATE:  12/21/2018        ABDOMEN:  LIVER: Cirrhosis. Multiple complex and calcified cysts.  BILE DUCTS: normal caliber  GALLBLADDER: Distended with cholelithiasis. No wall thickening or   pericholecystic fluid.  PANCREAS: Atrophic with a 3 cmintraductal stone upstream ductal   dilatation. Multifocal calcifications suggesting chronic pancreatitis.  SPLEEN: within normal limits  ADRENALS: within normal limits  KIDNEYS: Native kidneys not visualized. Right lower quadrant renal   transplant without hydronephrosis.    PELVIS:  REPRODUCTIVE ORGANS: no pelvic masses  BLADDER: Right base diverticulum. Question mild wall thickening and fat   stranding.    BOWEL: Extensive left colonic diverticulosis. Is unremarkable. Appendix   normal. Mildlydilated small bowel favor ileus.  PERITONEUM: Small but increased from prior upper abdominal ascites. No   pneumoperitoneum.  RETROPERITONEUM: no enlarged retroperitoneal or pelvic nodes.    VESSELS: Upper abdominal varices.  ABDOMINAL WALL: within normal limits.  MUSCULOSKELETAL: within normal limits.    IMPRESSION:     New bibasilar consolidations which may be a combination of atelectasis   and aspiration pneumonia.  Cirrhosis and portal hypertension. Small volume upper abdominal ascites,   also increased from 12/5.  Question mild cystitis.    < end of copied text >          Radiology: all available radiological tests reviewed    Advanced directives addressed: full resuscitation

## 2018-12-24 NOTE — PROGRESS NOTE ADULT - SUBJECTIVE AND OBJECTIVE BOX
62 yo male with PMH of CAD, ESRD on HD, polycystic kidney disease, systolic CHF s/p AICD, HTN, adrenal insufficiency (on chronic prednisone), hypoparathyroidism, h/o cryptococcal meningitis, h/o c. diff infection, sepsis with Klebsiella pneumoniae on prior hospitalization admitted on 12/21 after his dialysis session for increased lethargy, mid abdominal pain that radiates to left shoulder and upon admission found to have fever to 101 and hypotension which responded to stress dose steroids and iv antibiotics.    12/23: no change in his status; intermittent abd pain mainly after meals  12/24: feels better pending HIDA    Vital Signs Last 24 Hrs  T(C): 36.3 (24 Dec 2018 14:17), Max: 36.6 (24 Dec 2018 09:20)  T(F): 97.4 (24 Dec 2018 14:17), Max: 97.9 (24 Dec 2018 09:20)  HR: 62 (24 Dec 2018 14:17) (60 - 81)  BP: 153/79 (24 Dec 2018 14:17) (88/65 - 153/79)  BP(mean): 43 (24 Dec 2018 11:00) (39 - 72)  RR: 16 (24 Dec 2018 14:17) (11 - 21)  SpO2: 98% (24 Dec 2018 14:17) (88% - 98%)  PE:    Constitutional: frail looking  HEENT: NC/AT, EOMI, PERRLA, conjunctivae clear; ears and nose atraumatic; pharynx clear  Neck: supple; thyroid not palpable  Back: no tenderness  Respiratory: respiratory effort normal; scattered coarse breath sounds                        7.5    7.59  )-----------( 128      ( 24 Dec 2018 05:00 )             25.3   12-23    133<L>  |  92<L>  |  46<H>  ----------------------------<  267<H>  4.4   |  30  |  4.91<H>    Ca    7.7<L>      23 Dec 2018 11:45  Phos  4.9     12-23    TPro  x   /  Alb  1.8<L>  /  TBili  x   /  DBili  x   /  AST  x   /  ALT  x   /  AlkPhos  x   12-23  Cardiovascular: S1S2 regular, no murmurs  Abdomen: soft, not tender, not distended, positive BS; no liver or spleen organomegaly; fullness in right quadrant  Genitourinary: no suprapubic tenderness  Musculoskeletal: no muscle tenderness, no joint swelling or tenderness  Neurological/ Psychiatric: AxOx3, judgement and insight normal;  moving all extremities  Skin: no rashes; no palpable lesions; left upper extremity skin laceration clean    Labs: all available labs reviewed                                                 INTERPRETATION:  Clinical information: Abdominal pain, elevated white   count    Comparison 12/5    PROCEDURE:     CT of the Abdomen and Pelvis was performed without intravenous contrast.    Evaluation of abdominal and pelvic viscera, lymph nodes and vasculature   is limited without intravenous contrast.    FINDINGS:    LOWER CHEST: New bibasilar consolidations which may be a combination of   atelectasis and aspiration pneumonia.    ABDOMEN:  LIVER: Cirrhosis. Multiple complex and calcified cysts.  BILE DUCTS: normal caliber  GALLBLADDER: Distended with cholelithiasis. No wall thickening or   pericholecystic fluid.  PANCREAS: Atrophic with a 3 cmintraductal stone upstream ductal   dilatation. Multifocal calcifications suggesting chronic pancreatitis.  SPLEEN: within normal limits  ADRENALS: within normal limits  KIDNEYS: Native kidneys not visualized. Right lower quadrant renal   transplant without hydronephrosis.    PELVIS:  REPRODUCTIVE ORGANS: no pelvic masses  BLADDER: Right base diverticulum. Question mild wall thickening and fat   stranding.    BOWEL: Extensive left colonic diverticulosis. Is unremarkable. Appendix   normal. Mildlydilated small bowel favor ileus.  PERITONEUM: Small but increased from prior upper abdominal ascites. No   pneumoperitoneum.  RETROPERITONEUM: no enlarged retroperitoneal or pelvic nodes.    VESSELS: Upper abdominal varices.  ABDOMINAL WALL: within normal limits.  MUSCULOSKELETAL: within normal limits.    IMPRESSION:     New bibasilar consolidations which may be a combination of atelectasis   and aspiration pneumonia.    Cirrhosis and portal hypertension. Small volume upper abdominal ascites,   also increased from 12/5.    Question mild cystitis.    Other incidental comments as above.    < end of copied text >          Radiology: all available radiological tests reviewed      * Epigastric Left flank pain  - + cholelithiasis  - r/o tamara: HIDA pending  - for now IV abx  - coverage for PNA  - PPI    * Anemia: transfuse    * ESRD with borderline BP he is on  midodrine    * CAD with LAD stent    * Liver cirrhosis and portal HTN: pt is on amiodarone; ? amio induced toxicity  - new? to review old rec    Consult GI re: abd pain and liver cirrhosis 62 yo male with PMH of CAD, ESRD on HD, polycystic kidney disease, systolic CHF s/p AICD, HTN, adrenal insufficiency (on chronic prednisone), hypoparathyroidism, h/o cryptococcal meningitis, h/o c. diff infection, sepsis with Klebsiella pneumoniae on prior hospitalization admitted on 12/21 after his dialysis session for increased lethargy, mid abdominal pain that radiates to left shoulder and upon admission found to have fever to 101 and hypotension which responded to stress dose steroids and iv antibiotics.    12/23: no change in his status; intermittent abd pain mainly after meals  12/24: feels better pending HIDA    Vital Signs Last 24 Hrs  T(C): 36.3 (24 Dec 2018 14:17), Max: 36.6 (24 Dec 2018 09:20)  T(F): 97.4 (24 Dec 2018 14:17), Max: 97.9 (24 Dec 2018 09:20)  HR: 62 (24 Dec 2018 14:17) (60 - 81)  BP: 153/79 (24 Dec 2018 14:17) (88/65 - 153/79)  BP(mean): 43 (24 Dec 2018 11:00) (39 - 72)  RR: 16 (24 Dec 2018 14:17) (11 - 21)  SpO2: 98% (24 Dec 2018 14:17) (88% - 98%)  PE:    Constitutional: frail looking  HEENT: NC/AT, EOMI, PERRLA, conjunctivae clear; ears and nose atraumatic; pharynx clear  Neck: supple; thyroid not palpable  Back: no tenderness  Respiratory: respiratory effort normal; scattered coarse breath sounds                        7.5    7.59  )-----------( 128      ( 24 Dec 2018 05:00 )             25.3   12-23    133<L>  |  92<L>  |  46<H>  ----------------------------<  267<H>  4.4   |  30  |  4.91<H>    Ca    7.7<L>      23 Dec 2018 11:45  Phos  4.9     12-23    TPro  x   /  Alb  1.8<L>  /  TBili  x   /  DBili  x   /  AST  x   /  ALT  x   /  AlkPhos  x   12-23  Cardiovascular: S1S2 regular, no murmurs  Abdomen: soft, not tender, not distended, positive BS; no liver or spleen organomegaly; fullness in right quadrant  Genitourinary: no suprapubic tenderness  Musculoskeletal: no muscle tenderness, no joint swelling or tenderness  Neurological/ Psychiatric: AxOx3, judgement and insight normal;  moving all extremities  Skin: no rashes; no palpable lesions; left upper extremity skin laceration clean    Labs: all available labs reviewed                                                 INTERPRETATION:  Clinical information: Abdominal pain, elevated white   count    Comparison 12/5    PROCEDURE:     CT of the Abdomen and Pelvis was performed without intravenous contrast.    Evaluation of abdominal and pelvic viscera, lymph nodes and vasculature   is limited without intravenous contrast.    FINDINGS:    LOWER CHEST: New bibasilar consolidations which may be a combination of   atelectasis and aspiration pneumonia.    ABDOMEN:  LIVER: Cirrhosis. Multiple complex and calcified cysts.  BILE DUCTS: normal caliber  GALLBLADDER: Distended with cholelithiasis. No wall thickening or   pericholecystic fluid.  PANCREAS: Atrophic with a 3 cmintraductal stone upstream ductal   dilatation. Multifocal calcifications suggesting chronic pancreatitis.  SPLEEN: within normal limits  ADRENALS: within normal limits  KIDNEYS: Native kidneys not visualized. Right lower quadrant renal   transplant without hydronephrosis.    PELVIS:  REPRODUCTIVE ORGANS: no pelvic masses  BLADDER: Right base diverticulum. Question mild wall thickening and fat   stranding.    BOWEL: Extensive left colonic diverticulosis. Is unremarkable. Appendix   normal. Mildlydilated small bowel favor ileus.  PERITONEUM: Small but increased from prior upper abdominal ascites. No   pneumoperitoneum.  RETROPERITONEUM: no enlarged retroperitoneal or pelvic nodes.    VESSELS: Upper abdominal varices.  ABDOMINAL WALL: within normal limits.  MUSCULOSKELETAL: within normal limits.    IMPRESSION:     New bibasilar consolidations which may be a combination of atelectasis   and aspiration pneumonia.    Cirrhosis and portal hypertension. Small volume upper abdominal ascites,   also increased from 12/5.    Question mild cystitis.    Other incidental comments as above.    < end of copied text >          Radiology: all available radiological tests reviewed      * Epigastric Left flank pain  - + cholelithiasis  - r/o tamara: HIDA pending  - for now IV abx  - coverage for PNA  - PPI    * Pancreatic duct stone- chronic  - pending HIDA and decision re: EUS    * Anemia: transfuse    * ESRD with borderline BP he is on  midodrine    * CAD with LAD stent    * Liver cirrhosis and portal HTN: pt is on amiodarone; ? amio induced toxicity  - new? to review old rec  - no rec to stop amio so far

## 2018-12-24 NOTE — DIETITIAN INITIAL EVALUATION ADULT. - ETIOLOGY
inability to consume sufficient energy/protein 2/2 increased needs: ESRD on HD 2/2 abd pain 2/2 altered GI function

## 2018-12-24 NOTE — DIETITIAN INITIAL EVALUATION ADULT. - PERTINENT MEDS FT
MEDICATIONS  (STANDING):  amiodarone    Tablet 200 milliGRAM(s) Oral daily  cefepime  Injectable. 1000 milliGRAM(s) IV Push daily  cinacalcet 60 milliGRAM(s) Oral <User Schedule>  dextrose 5%. 1000 milliLiter(s) (50 mL/Hr) IV Continuous <Continuous>  dextrose 50% Injectable 12.5 Gram(s) IV Push once  dextrose 50% Injectable 25 Gram(s) IV Push once  dextrose 50% Injectable 25 Gram(s) IV Push once  hydrocortisone sodium succinate Injectable 100 milliGRAM(s) IV Push every 8 hours  insulin lispro (HumaLOG) corrective regimen sliding scale   SubCutaneous three times a day before meals  lactobacillus acidophilus 1 Tablet(s) Oral daily  midodrine 2.5 milliGRAM(s) Oral three times a day  pantoprazole    Tablet 40 milliGRAM(s) Oral before breakfast  polyethylene glycol 3350 17 Gram(s) Oral daily  sevelamer hydrochloride 800 milliGRAM(s) Oral three times a day with meals  vancomycin    Solution 125 milliGRAM(s) Oral every 6 hours    MEDICATIONS  (PRN):  acetaminophen   Tablet .. 650 milliGRAM(s) Oral every 6 hours PRN Temp greater or equal to 38C (100.4F), Mild Pain (1 - 3)  aluminum hydroxide/magnesium hydroxide/simethicone Suspension 30 milliLiter(s) Oral every 6 hours PRN Dyspepsia  dextrose 40% Gel 15 Gram(s) Oral once PRN Blood Glucose LESS THAN 70 milliGRAM(s)/deciliter  glucagon  Injectable 1 milliGRAM(s) IntraMuscular once PRN Glucose LESS THAN 70 milligrams/deciliter  ondansetron Injectable 4 milliGRAM(s) IV Push every 4 hours PRN Nausea and/or Vomiting  oxyCODONE    IR 5 milliGRAM(s) Oral every 6 hours PRN Severe Pain (7 - 10)

## 2018-12-24 NOTE — PROGRESS NOTE ADULT - SUBJECTIVE AND OBJECTIVE BOX
Patient is a 63y old  Male who presents with a chief complaint of sepsis (23 Dec 2018 13:00)      HPI:  64 yo male with PMH of CAD, ESRD on HD, polycystic kidney disease, systolic CHF s/p AICD, HTN, adrenal insufficiency (on chronic prednisone), hypoparathyroidism, h/o cryptococcal meningitis, h/o c. diff infection sent to ED from dialysis for lethargy. Pt with 2 recent admissions first for sepsis and noted to have klebsiella in blood cultures and second for sepsis and abdominal pain with +occult blood. Pt had CT abdomen with IV contrast on 12/5 which did not show any acute pathology. He underwent a EGD which showed gastritis with no active bleeding. Pt was discharged home and was feeling well a little better until a few days ago. He again started to have worsening abd pain and unable to tolerate PO. Every time he ate he developed worsening pain. His BP also remained on the lower side and his INR has remained elevated as outpatient. He was at dialysis this morning when he became lethargic. His weight was equivalent to his dry weight and was thought to be dehydrated and was given IV fluids and underwent dialysis for electrolytes. He was then sent to ED for further evaluation.    Currently in ED pt only complaining of abd pain which has been chronic. Has not changed since last admission. No nausea, vomiting or diarrhea. Denies chest pain. Does states he has a dry cough in the morning. CXR negative in ED.   He was noted to have a fever of 100.5 rectally. Lactate 2.1. He was given 500ml IV fluid. Started on vanco and zosyn. He was also given stress dose solucortef given history of adrenal insufficiency. He was hypotensive on arrival with improvement while in ED. (21 Dec 2018 14:58)    Patient is much improved and is tolerating po. CT reviewed - patient has moderate ascites, mild changes of cirrhosis. Pancreas is markedly atrophic with dilated PD and large stone near ampulla.       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  S/P colonoscopy: last done 2016  AICD (automatic cardioverter/defibrillator) present: 2013  H/O hernia repair  A-V fistula: 1995 left UE  right arm 2007,  H/O kidney transplant: 1985, 1995, 1997      MEDICATIONS  (STANDING):  amiodarone    Tablet 200 milliGRAM(s) Oral daily  cefepime  Injectable. 1000 milliGRAM(s) IV Push daily  cinacalcet 60 milliGRAM(s) Oral <User Schedule>  dextrose 5%. 1000 milliLiter(s) (50 mL/Hr) IV Continuous <Continuous>  dextrose 50% Injectable 12.5 Gram(s) IV Push once  dextrose 50% Injectable 25 Gram(s) IV Push once  dextrose 50% Injectable 25 Gram(s) IV Push once  hydrocortisone sodium succinate Injectable 100 milliGRAM(s) IV Push every 8 hours  insulin lispro (HumaLOG) corrective regimen sliding scale   SubCutaneous three times a day before meals  lactobacillus acidophilus 1 Tablet(s) Oral daily  midodrine 2.5 milliGRAM(s) Oral three times a day  pantoprazole    Tablet 40 milliGRAM(s) Oral before breakfast  polyethylene glycol 3350 17 Gram(s) Oral daily  sevelamer hydrochloride 800 milliGRAM(s) Oral three times a day with meals  vancomycin    Solution 125 milliGRAM(s) Oral every 6 hours    MEDICATIONS  (PRN):  acetaminophen   Tablet .. 650 milliGRAM(s) Oral every 6 hours PRN Temp greater or equal to 38C (100.4F), Mild Pain (1 - 3)  aluminum hydroxide/magnesium hydroxide/simethicone Suspension 30 milliLiter(s) Oral every 6 hours PRN Dyspepsia  dextrose 40% Gel 15 Gram(s) Oral once PRN Blood Glucose LESS THAN 70 milliGRAM(s)/deciliter  glucagon  Injectable 1 milliGRAM(s) IntraMuscular once PRN Glucose LESS THAN 70 milligrams/deciliter  ondansetron Injectable 4 milliGRAM(s) IV Push every 4 hours PRN Nausea and/or Vomiting  oxyCODONE    IR 5 milliGRAM(s) Oral every 6 hours PRN Severe Pain (7 - 10)      Allergies    No Known Allergies    Intolerances        Vital Signs Last 24 Hrs  T(C): 36.5 (23 Dec 2018 21:02), Max: 36.7 (23 Dec 2018 11:45)  T(F): 97.7 (23 Dec 2018 21:02), Max: 98 (23 Dec 2018 11:45)  HR: 65 (24 Dec 2018 09:01) (60 - 88)  BP: 140/48 (24 Dec 2018 09:01) (84/59 - 141/43)  BP(mean): 67 (24 Dec 2018 09:01) (39 - 72)  RR: 13 (24 Dec 2018 09:01) (11 - 21)  SpO2: 97% (24 Dec 2018 09:01) (88% - 98%)      Respiratory: CTAB  Cardiovascular: S1 and S2, RRR, no M/G/R  Gastrointestinal: BS+, soft, NT/ND    LABS:                        7.5    7.59  )-----------( 128      ( 24 Dec 2018 05:00 )             25.3     12-23    133<L>  |  92<L>  |  46<H>  ----------------------------<  267<H>  4.4   |  30  |  4.91<H>    Ca    7.7<L>      23 Dec 2018 11:45  Phos  4.9     12-23    TPro  x   /  Alb  1.8<L>  /  TBili  x   /  DBili  x   /  AST  x   /  ALT  x   /  AlkPhos  x   12-23      LIVER FUNCTIONS - ( 23 Dec 2018 11:45 )  Alb: 1.8 g/dL / Pro: x     / ALK PHOS: x     / ALT: x     / AST: x     / GGT: x             RADIOLOGY & ADDITIONAL STUDIES:

## 2018-12-24 NOTE — PROGRESS NOTE ADULT - SUBJECTIVE AND OBJECTIVE BOX
COVERING FOR DR. BRAR     Patient is a 64 yo male with ESRD on HD M/W/F at [facility] [place], HFrEF, CAD with stent, hx meningitis due to cryptococcus, C. Diff, CAD, PVD, HTN, polycystic kidney disease with failed transplants sent to ED from dialysis for lethargy. Pt with 2 recent admissions for sepsis. Also s/p EGD with gastritis. Recent borderline low BP - has not been taking full doses of BP meds. He was at dialysis yesterday when he became lethargic with lower starting weight pre-HD. He was given IVF during HD, then sent to ED. Renal called for ESRD management.    Today reports improved abdominal pain. c/w L shoulder pain.     No nausea, vomiting or diarrhea. Denies chest pain. +dry cough.  In ED, noted to have a fever of 100.5F rectally. Lactate 2.1. He was given 500ml IV fluid. Started on vanco and zosyn. He was also given stress dose solucortef given history of adrenal insufficiency. He was hypotensive on arrival with improvement while in ED.      - today with improved pain, no sob. On HD now.   - no sob, s/p HD yesterday without complication, improved LUE edema    PAST MEDICAL & SURGICAL HISTORY:  Oliguria  Arm swelling: left arm  AV fistula: right UE  Leg pain, anterior, right: right anterior thigh at graft donor site  Irregular heart beat  AICD (automatic cardioverter/defibrillator) present  Dialysis patient  Adrenal insufficiency  Hypoparathyroidism  HFrEF (heart failure with reduced ejection fraction)  Meningitis due to cryptococcus  Clostridium difficile colitis  CAD (coronary artery disease)  Peripheral vascular disease  Hypertension  Polycystic kidney disease  ESRD (end stage renal disease)  Elective surgery: left arm artery replaced with with right thigh used as donor site  Stented coronary artery: ?  S/P colonoscopy: last done   AICD (automatic cardioverter/defibrillator) present:   H/O hernia repair  A-V fistula:  left UE  right arm ,  H/O kidney transplant: , ,       MEDICATIONS  (STANDING):  amiodarone    Tablet 200 milliGRAM(s) Oral daily  cefepime  Injectable. 1000 milliGRAM(s) IV Push daily  cinacalcet 60 milliGRAM(s) Oral <User Schedule>  dextrose 5%. 1000 milliLiter(s) (50 mL/Hr) IV Continuous <Continuous>  dextrose 50% Injectable 12.5 Gram(s) IV Push once  dextrose 50% Injectable 25 Gram(s) IV Push once  dextrose 50% Injectable 25 Gram(s) IV Push once  hydrocortisone sodium succinate Injectable 100 milliGRAM(s) IV Push every 8 hours  insulin lispro (HumaLOG) corrective regimen sliding scale   SubCutaneous three times a day before meals  lactobacillus acidophilus 1 Tablet(s) Oral daily  midodrine 2.5 milliGRAM(s) Oral three times a day  pantoprazole    Tablet 40 milliGRAM(s) Oral before breakfast  polyethylene glycol 3350 17 Gram(s) Oral daily  sevelamer hydrochloride 800 milliGRAM(s) Oral three times a day with meals  vancomycin    Solution 125 milliGRAM(s) Oral every 6 hours          Allergies    No Known Allergies    Intolerances        SOCIAL HISTORY:  Denies ETOh, Smoking,     FAMILY HISTORY:  Family history of kidney disease (Mother)  Family history of colon cancer (Sibling)      REVIEW OF SYSTEMS:    CONSTITUTIONAL: + weakness, + fevers +lethargy (improving)  EYES/ENT: No visual changes;  No vertigo or throat pain   NECK: No pain or stiffness  RESPIRATORY: +dry cough, +wheezing, no hemoptysis; No shortness of breath  CARDIOVASCULAR: No chest pain or palpitations  GASTROINTESTINAL: + abdominal  pain. No nausea, vomiting, or hematemesis; No diarrhea or constipation. No melena or hematochezia.  GENITOURINARY: No dysuria, frequency or hematuria  NEUROLOGICAL: No numbness or weakness  SKIN: No itching, burning, rashes, or lesions   All other review of systems is negative unless indicated above.    Vital Signs Last 24 Hrs  T(C): 36.5 (23 Dec 2018 21:02), Max: 36.7 (23 Dec 2018 11:45)  T(F): 97.7 (23 Dec 2018 21:02), Max: 98 (23 Dec 2018 11:45)  HR: 65 (24 Dec 2018 09:01) (60 - 88)  BP: 140/48 (24 Dec 2018 09:01) (84/59 - 141/43)  BP(mean): 67 (24 Dec 2018 09:01) (39 - 72)  RR: 13 (24 Dec 2018 09:01) (11 - 21)  SpO2: 97% (24 Dec 2018 09:01) (88% - 98%)    I and O's:        PHYSICAL EXAM:    Constitutional: NAD, awake, alert, responsive  HEENT: PERRLA, EOMI,  MMM  Neck: No LAD, No JVD  Respiratory: bilateral inspiratory wheeze  Cardiovascular: S1 and S2  Gastrointestinal: BS+, soft, NT/ND  Extremities: No peripheral edema LEs, LUE 1+ edema (slightly improved)  Neurological: A/O x 3, no focal deficits  Psychiatric: Normal mood, normal affect  : No Amaya  Skin: No rashes  Access: RUE AVF +thrill +bruit    LABS:                        8.1    11. )-----------( 136      ( 22 Dec 2018 05:17 )             26.5       133    |  92     |  46     ----------------------------<  267       23 Dec 2018 11:45  4.4     |  30     |  4.91     135    |  92     |  22     ----------------------------<  144       22 Dec 2018 05:17  4.2     |  34     |  3.43     137    |  93     |  14     ----------------------------<  135       21 Dec 2018 12:31  3.5     |  36     |  2.61     Ca    7.7        23 Dec 2018 11:45  Ca    8.4        22 Dec 2018 05:17    Phos  4.9       23 Dec 2018 11:45  Phos  4.3       22 Dec 2018 05:17    Mg     1.9       22 Dec 2018 05:17    TPro  x      /  Alb  1.8    /  TBili  x      /        23 Dec 2018 11:45  DBili  x      /  AST  x      /  ALT  x      /  AlkPhos  x        TPro  6.3    /  Alb  2.1    /  TBili  0.9    /        21 Dec 2018 12:31  DBili  x      /  AST  17     /  ALT  13     /  AlkPhos  120          137    |  93     |  14     ----------------------------<  135       21 Dec 2018 12:31  3.5     |  36     |  2.61     Ca    8.4        22 Dec 2018 05:17  Ca    8.3        21 Dec 2018 12:31    Phos  4.3       22 Dec 2018 05:17    Mg     1.9       22 Dec 2018 05:17    TPro  6.3    /  Alb  2.1    /  TBili  0.9    /        21 Dec 2018 12:31  DBili  x      /  AST  17     /  ALT  13     /  AlkPhos  120            Urine Studies:  Urinalysis Basic - ( 21 Dec 2018 13:57 )    Color: Yellow / Appearance: Clear / S.010 / pH: x  Gluc: x / Ketone: Negative  / Bili: Negative / Urobili: Negative mg/dL   Blood: x / Protein: 100 mg/dL / Nitrite: Negative   Leuk Esterase: Negative / RBC: 0-2 /HPF / WBC 3-5   Sq Epi: x / Non Sq Epi: Occasional / Bacteria: x            RADIOLOGY & ADDITIONAL STUDIES:

## 2018-12-24 NOTE — DIETITIAN INITIAL EVALUATION ADULT. - ORAL INTAKE PTA
no appetite x 1 month; fluctuating intake, meeting <75% of estimated nutr needs based on recall due to abd pain/fair

## 2018-12-24 NOTE — CONSULT NOTE ADULT - SUBJECTIVE AND OBJECTIVE BOX
64 yo male with PMH of CAD, ESRD on HD, polycystic kidney disease, systolic CHF s/p AICD, HTN, adrenal insufficiency (on chronic prednisone), hypoparathyroidism, h/o cryptococcal meningitis, h/o c. diff infection sent to ED from dialysis for lethargy. Pt with 2 recent admissions first for sepsis and noted to have klebsiella in blood cultures and second for sepsis and abdominal pain with +occult blood. Pt had CT abdomen with IV contrast on 12/5 which did not show any acute pathology. He underwent a EGD which showed gastritis with no active bleeding. Pt was discharged home and was feeling well a little better until a few days ago. He again started to have worsening abd pain and unable to tolerate PO. Every time he ate he developed worsening pain. His BP also remained on the lower side and his INR has remained elevated as outpatient. He was at dialysis this morning when he became lethargic. His weight was equivalent to his dry weight and was thought to be dehydrated and was given IV fluids and underwent dialysis for electrolytes. He was then sent to ED for further evaluation.    Currently in ED pt only complaining of abd pain which has been chronic. Has not changed since last admission. No nausea, vomiting or diarrhea. Denies chest pain. Does states he has a dry cough in the morning. CXR negative in ED.   He was noted to have a fever of 100.5 rectally. Lactate 2.1. He was given 500ml IV fluid. Started on vanco and zosyn. He was also given stress dose solucortef given history of adrenal insufficiency. He was hypotensive on arrival with improvement while in ED. (21 Dec 2018 14:58)    Patient currently feels well with no complaints of pain, and is eating breakfast. Presentation is similar to last several weeks ago.   Pt notes that yesterday he had 2 episodes of post prandial abdominal pain , localized to midepigastrum and RUQ. Pain has since resolved and he is tolerating diet.  Ultrasound shows gallstones and sludge.    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  S/P colonoscopy: last done 2016  AICD (automatic cardioverter/defibrillator) present: 2013  H/O hernia repair  A-V fistula: 1995 left UE  right arm 2007,  H/O kidney transplant: 1985, 1995, 1997    MEDICATIONS  (STANDING):  amiodarone    Tablet 200 milliGRAM(s) Oral daily  cefepime  Injectable. 1000 milliGRAM(s) IV Push daily  cinacalcet 60 milliGRAM(s) Oral <User Schedule>  hydrocortisone sodium succinate Injectable 50 milliGRAM(s) IV Push every 8 hours  lactobacillus acidophilus 1 Tablet(s) Oral daily  pantoprazole    Tablet 40 milliGRAM(s) Oral before breakfast  sevelamer hydrochloride 800 milliGRAM(s) Oral three times a day with meals  Allergies    No Known Allergies    PE  VSS  skin- warm,dry  HEENT- pupils equal, sclerae anicteric  Lungs- clear  Cor- RRR  Abd- + BS soft non tender  Ext- no edema    < from: NM Hepatobiliary Scan w/wo Gall Bladder (12.24.18 @ 16:14) >  IMPRESSION: Hepatobiliary scan demonstrates:     No scan evidence of acute cholecystitis.     Delayed/reduced uptake in the liver, suggestive of impaired hepatic   function.    Heterogeneous tracer distribution in the liver likely related to hepatic   cysts.    < end of copied text >
COVERING FOR DR. BRAR     Patient is a 62 yo male with ESRD on HD M/W/F at [facility], HFrEF, CAD with stent, hx meningitis due to cryptococcus, C. Diff, CAD, PVD, HTN, polycystic kidney disease with failed transplants sent to ED from dialysis for lethargy. Pt with 2 recent admissions for sepsis. Also s/p EGD with gastritis. Recent borderline low BP - has not been taking full doses of BP meds. He was at dialysis yesterday when he became lethargic with lower starting weight pre-HD. He was given IVF during HD, then sent to ED. Renal called for ESRD management.    Today reports improved abdominal pain. c/w L shoulder pain.     No nausea, vomiting or diarrhea. Denies chest pain. +dry cough.  In ED, noted to have a fever of 100.5F rectally. Lactate 2.1. He was given 500ml IV fluid. Started on vanco and zosyn. He was also given stress dose solucortef given history of adrenal insufficiency. He was hypotensive on arrival with improvement while in ED.     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  S/P colonoscopy: last done   AICD (automatic cardioverter/defibrillator) present:   H/O hernia repair  A-V fistula:  left UE  right arm ,  H/O kidney transplant: , ,       MEDICATIONS  (STANDING):  amiodarone    Tablet 200 milliGRAM(s) Oral daily  cefepime  Injectable. 1000 milliGRAM(s) IV Push daily  cinacalcet 60 milliGRAM(s) Oral <User Schedule>  hydrocortisone sodium succinate Injectable 50 milliGRAM(s) IV Push every 8 hours  lactobacillus acidophilus 1 Tablet(s) Oral daily  pantoprazole    Tablet 40 milliGRAM(s) Oral before breakfast  sevelamer hydrochloride 800 milliGRAM(s) Oral three times a day with meals      Allergies    No Known Allergies    Intolerances        SOCIAL HISTORY:  Denies ETOh,Smoking,     FAMILY HISTORY:  Family history of kidney disease (Mother)  Family history of colon cancer (Sibling)      REVIEW OF SYSTEMS:    CONSTITUTIONAL: + weakness, + fevers +lethargy  EYES/ENT: No visual changes;  No vertigo or throat pain   NECK: No pain or stiffness  RESPIRATORY: +dry cough, +wheezing, no hemoptysis; No shortness of breath  CARDIOVASCULAR: No chest pain or palpitations  GASTROINTESTINAL: + abdominal  pain. No nausea, vomiting, or hematemesis; No diarrhea or constipation. No melena or hematochezia.  GENITOURINARY: No dysuria, frequency or hematuria  NEUROLOGICAL: No numbness or weakness  SKIN: No itching, burning, rashes, or lesions   All other review of systems is negative unless indicated above.    Vital Signs Last 24 Hrs  T(C): 36.7 (22 Dec 2018 04:43), Max: 38.1 (21 Dec 2018 13:07)  T(F): 98 (22 Dec 2018 04:43), Max: 100.5 (21 Dec 2018 13:07)  HR: 86 (22 Dec 2018 09:00) (67 - 96)  BP: 110/63 (22 Dec 2018 09:00) (79/50 - 110/63)  BP(mean): 74 (22 Dec 2018 09:00) (47 - 79)  RR: 20 (22 Dec 2018 09:00) (7 - 23)  SpO2: 94% (22 Dec 2018 09:00) (90% - 99%)    I and O's:        PHYSICAL EXAM:    Constitutional: NAD, awake, alert, responsive  HEENT: PERRLA, EOMI,  MMM  Neck: No LAD, No JVD  Respiratory: bilateral inspiratory wheeze  Cardiovascular: S1 and S2  Gastrointestinal: BS+, soft, NT/ND  Extremities: No peripheral edema  Neurological: A/O x 3, no focal deficits  Psychiatric: Normal mood, normal affect  : No Amaya  Skin: No rashes  Access: RUE AVF +thrill +bruit    LABS:                        8.1    11.23 )-----------( 136      ( 22 Dec 2018 05:17 )             26.5       135    |  92     |  22     ----------------------------<  144       22 Dec 2018 05:17  4.2     |  34     |  3.43     137    |  93     |  14     ----------------------------<  135       21 Dec 2018 12:31  3.5     |  36     |  2.61     Ca    8.4        22 Dec 2018 05:17  Ca    8.3        21 Dec 2018 12:31    Phos  4.3       22 Dec 2018 05:17    Mg     1.9       22 Dec 2018 05:17    TPro  6.3    /  Alb  2.1    /  TBili  0.9    /        21 Dec 2018 12:31  DBili  x      /  AST  17     /  ALT  13     /  AlkPhos  120            Urine Studies:  Urinalysis Basic - ( 21 Dec 2018 13:57 )    Color: Yellow / Appearance: Clear / S.010 / pH: x  Gluc: x / Ketone: Negative  / Bili: Negative / Urobili: Negative mg/dL   Blood: x / Protein: 100 mg/dL / Nitrite: Negative   Leuk Esterase: Negative / RBC: 0-2 /HPF / WBC 3-5   Sq Epi: x / Non Sq Epi: Occasional / Bacteria: x            RADIOLOGY & ADDITIONAL STUDIES:
Patient is a 63y old  Male who presents with a chief complaint of sepsis (21 Dec 2018 22:26)      HPI:  64 yo male with PMH of CAD, ESRD on HD, polycystic kidney disease, systolic CHF s/p AICD, HTN, adrenal insufficiency (on chronic prednisone), hypoparathyroidism, h/o cryptococcal meningitis, h/o c. diff infection sent to ED from dialysis for lethargy. Pt with 2 recent admissions first for sepsis and noted to have klebsiella in blood cultures and second for sepsis and abdominal pain with +occult blood. Pt had CT abdomen with IV contrast on 12/5 which did not show any acute pathology. He underwent a EGD which showed gastritis with no active bleeding. Pt was discharged home and was feeling well a little better until a few days ago. He again started to have worsening abd pain and unable to tolerate PO. Every time he ate he developed worsening pain. His BP also remained on the lower side and his INR has remained elevated as outpatient. He was at dialysis this morning when he became lethargic. His weight was equivalent to his dry weight and was thought to be dehydrated and was given IV fluids and underwent dialysis for electrolytes. He was then sent to ED for further evaluation.    Currently in ED pt only complaining of abd pain which has been chronic. Has not changed since last admission. No nausea, vomiting or diarrhea. Denies chest pain. Does states he has a dry cough in the morning. CXR negative in ED.   He was noted to have a fever of 100.5 rectally. Lactate 2.1. He was given 500ml IV fluid. Started on vanco and zosyn. He was also given stress dose solucortef given history of adrenal insufficiency. He was hypotensive on arrival with improvement while in ED. (21 Dec 2018 14:58)    Patient currently feels well with no complaints of pain, and is eating breakfast. Presentation is similar to last several weeks ago.       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  S/P colonoscopy: last done 2016  AICD (automatic cardioverter/defibrillator) present: 2013  H/O hernia repair  A-V fistula: 1995 left UE  right arm 2007,  H/O kidney transplant: 1985, 1995, 1997      MEDICATIONS  (STANDING):  amiodarone    Tablet 200 milliGRAM(s) Oral daily  cefepime  Injectable. 1000 milliGRAM(s) IV Push daily  cinacalcet 60 milliGRAM(s) Oral <User Schedule>  hydrocortisone sodium succinate Injectable 50 milliGRAM(s) IV Push every 8 hours  lactobacillus acidophilus 1 Tablet(s) Oral daily  pantoprazole    Tablet 40 milliGRAM(s) Oral before breakfast  sevelamer hydrochloride 800 milliGRAM(s) Oral three times a day with meals    MEDICATIONS  (PRN):  acetaminophen   Tablet .. 650 milliGRAM(s) Oral every 6 hours PRN Temp greater or equal to 38C (100.4F), Mild Pain (1 - 3)  aluminum hydroxide/magnesium hydroxide/simethicone Suspension 30 milliLiter(s) Oral every 6 hours PRN Dyspepsia  ondansetron Injectable 4 milliGRAM(s) IV Push every 4 hours PRN Nausea and/or Vomiting  oxyCODONE    IR 5 milliGRAM(s) Oral every 6 hours PRN Severe Pain (7 - 10)      Allergies    No Known Allergies    Intolerances        SOCIAL HISTORY:    FAMILY HISTORY:  Family history of kidney disease (Mother)  Family history of colon cancer (Sibling)      Vital Signs Last 24 Hrs  T(C): 36.7 (22 Dec 2018 04:43), Max: 38.1 (21 Dec 2018 13:07)  T(F): 98 (22 Dec 2018 04:43), Max: 100.5 (21 Dec 2018 13:07)  HR: 96 (22 Dec 2018 06:00) (71 - 96)  BP: 98/51 (22 Dec 2018 06:00) (79/50 - 103/60)  BP(mean): 60 (22 Dec 2018 06:00) (47 - 79)  RR: 17 (22 Dec 2018 06:00) (7 - 23)  SpO2: 95% (22 Dec 2018 06:00) (90% - 100%)    PHYSICAL EXAM:    Respiratory: CTAB  Cardiovascular: S1 and S2, RRR, no M/G/R  Gastrointestinal: BS+, soft, NT/ND      LABS:                        8.1    11.23 )-----------( 136      ( 22 Dec 2018 05:17 )             26.5     12-22    135  |  92<L>  |  22  ----------------------------<  144<H>  4.2   |  34<H>  |  3.43<H>    Ca    8.4<L>      22 Dec 2018 05:17  Phos  4.3     12-22  Mg     1.9     12-22    TPro  6.3  /  Alb  2.1<L>  /  TBili  0.9  /  DBili  x   /  AST  17  /  ALT  13  /  AlkPhos  120  12-21    PT/INR - ( 22 Dec 2018 05:17 )   PT: 50.7 sec;   INR: 4.36 ratio         PTT - ( 21 Dec 2018 12:31 )  PTT:39.8 sec  LIVER FUNCTIONS - ( 21 Dec 2018 12:31 )  Alb: 2.1 g/dL / Pro: 6.3 gm/dL / ALK PHOS: 120 U/L / ALT: 13 U/L / AST: 17 U/L / GGT: x             RADIOLOGY & ADDITIONAL STUDIES:
Patient is a 63y old  Male who presents with a chief complaint of sepsis (22 Dec 2018 12:46)    HPI:  64 yo male with PMH of CAD, ESRD on HD, polycystic kidney disease, systolic CHF s/p AICD, HTN, adrenal insufficiency (on chronic prednisone), hypoparathyroidism, h/o cryptococcal meningitis, h/o c. diff infection, sepsis with Klebsiella pneumoniae on prior hospitalization admitted on  after his dialysis session for increased lethargy, mid abdominal pain that radiates to left shoulder and upon admission found to have fever to 101 and hypotension which responded to stress dose steroids and iv antibiotics. The patient has one transplanted kidney in abdomen but is on dialysis. He is not on immunosuppressive meds with exception of prednisone 5 mg daily. The patient also notes recent hospitalization for abdominal pain, EGD was done as outpatient found to have gastritis. The patient finds his pain resolved with IVP protonix. No cough, nausea, vomiting, diarrhea or urinary complaints. Still makes small amounts of urine. Upon last hospitalization caught his left arm on iv pole sustained laceration that required sutures.               PMH: as above  PSH: as above  Meds: per reconciliation sheet, noted below  MEDICATIONS  (STANDING):  amiodarone    Tablet 200 milliGRAM(s) Oral daily  cefepime  Injectable. 1000 milliGRAM(s) IV Push daily  cinacalcet 60 milliGRAM(s) Oral <User Schedule>  lactobacillus acidophilus 1 Tablet(s) Oral daily  pantoprazole    Tablet 40 milliGRAM(s) Oral before breakfast  sevelamer hydrochloride 800 milliGRAM(s) Oral three times a day with meals  vancomycin    Solution 125 milliGRAM(s) Oral every 6 hours    MEDICATIONS  (PRN):  acetaminophen   Tablet .. 650 milliGRAM(s) Oral every 6 hours PRN Temp greater or equal to 38C (100.4F), Mild Pain (1 - 3)  aluminum hydroxide/magnesium hydroxide/simethicone Suspension 30 milliLiter(s) Oral every 6 hours PRN Dyspepsia  ondansetron Injectable 4 milliGRAM(s) IV Push every 4 hours PRN Nausea and/or Vomiting  oxyCODONE    IR 5 milliGRAM(s) Oral every 6 hours PRN Severe Pain (7 - 10)    Allergies    No Known Allergies    Intolerances      Social: no smoking, no alcohol, no illegal drugs; no recent travel, no exposure to TB  FAMILY HISTORY:  Family history of kidney disease (Mother)  Family history of colon cancer (Sibling)     no history of premature cardiovascular disease in first degree relatives  ROS: the patient has no chills, no HA, no dizziness, no sore throat, no blurry vision, no CP, no palpitations, no SOB, no cough,  no diarrhea, no N/V, no dysuria, no leg pain, no claudication, no rash, no joint aches, no rectal pain or bleeding, no night sweats  All other systems reviewed and are negative    Vital Signs Last 24 Hrs  T(C): 36.7 (22 Dec 2018 04:43), Max: 36.7 (21 Dec 2018 15:45)  T(F): 98 (22 Dec 2018 04:43), Max: 98.1 (21 Dec 2018 17:08)  HR: 86 (22 Dec 2018 09:00) (67 - 96)  BP: 110/63 (22 Dec 2018 09:00) (79/50 - 110/63)  BP(mean): 74 (22 Dec 2018 09:00) (47 - 79)  RR: 20 (22 Dec 2018 09:00) (7 - 23)  SpO2: 94% (22 Dec 2018 09:00) (90% - 99%)  Daily     Daily Weight in k.4 (21 Dec 2018 17:08)    PE:    Constitutional: frail looking  HEENT: NC/AT, EOMI, PERRLA, conjunctivae clear; ears and nose atraumatic; pharynx clear  Neck: supple; thyroid not palpable  Back: no tenderness  Respiratory: respiratory effort normal; scattered coarse breath sounds  Cardiovascular: S1S2 regular, no murmurs  Abdomen: soft, not tender, not distended, positive BS; no liver or spleen organomegaly; fullness in right quadrant  Genitourinary: no suprapubic tenderness  Musculoskeletal: no muscle tenderness, no joint swelling or tenderness  Neurological/ Psychiatric: AxOx3, judgement and insight normal;  moving all extremities  Skin: no rashes; no palpable lesions; left upper extremity skin laceration clean    Labs: all available labs reviewed                        8. )-----------( 136      ( 22 Dec 2018 05:17 )             26.5     12-    135  |  92<L>  |    ----------------------------<  144<H>  4.2   |  34<H>  |  3.43<H>    Ca    8.4<L>      22 Dec 2018 05:17  Phos  4.3     12  Mg     1.9         TPro  6.3  /  Alb  2.1<L>  /  TBili  0.9  /  DBili  x   /  AST  17  /  ALT  13  /  AlkPhos  120       LIVER FUNCTIONS - ( 21 Dec 2018 12:31 )  Alb: 2.1 g/dL / Pro: 6.3 gm/dL / ALK PHOS: 120 U/L / ALT: 13 U/L / AST: 17 U/L / GGT: x           Urinalysis Basic - ( 21 Dec 2018 13:57 )    Color: Yellow / Appearance: Clear / S.010 / pH: x  Gluc: x / Ketone: Negative  / Bili: Negative / Urobili: Negative mg/dL   Blood: x / Protein: 100 mg/dL / Nitrite: Negative   Leuk Esterase: Negative / RBC: 0-2 /HPF / WBC 3-5   Sq Epi: x / Non Sq Epi: Occasional / Bacteria: x        < from: CT Abdomen and Pelvis w/ Oral Cont (18 @ 21:26) >    EXAM:  CT ABDOMEN AND PELVIS OC                            PROCEDURE DATE:  2018          INTERPRETATION:  Clinical information: Abdominal pain, elevated white   count    Comparison     PROCEDURE:     CT of the Abdomen and Pelvis was performed without intravenous contrast.    Evaluation of abdominal and pelvic viscera, lymph nodes and vasculature   is limited without intravenous contrast.    FINDINGS:    LOWER CHEST: New bibasilar consolidations which may be a combination of   atelectasis and aspiration pneumonia.    ABDOMEN:  LIVER: Cirrhosis. Multiple complex and calcified cysts.  BILE DUCTS: normal caliber  GALLBLADDER: Distended with cholelithiasis. No wall thickening or   pericholecystic fluid.  PANCREAS: Atrophic with a 3 cmintraductal stone upstream ductal   dilatation. Multifocal calcifications suggesting chronic pancreatitis.  SPLEEN: within normal limits  ADRENALS: within normal limits  KIDNEYS: Native kidneys not visualized. Right lower quadrant renal   transplant without hydronephrosis.    PELVIS:  REPRODUCTIVE ORGANS: no pelvic masses  BLADDER: Right base diverticulum. Question mild wall thickening and fat   stranding.    BOWEL: Extensive left colonic diverticulosis. Is unremarkable. Appendix   normal. Mildlydilated small bowel favor ileus.  PERITONEUM: Small but increased from prior upper abdominal ascites. No   pneumoperitoneum.  RETROPERITONEUM: no enlarged retroperitoneal or pelvic nodes.    VESSELS: Upper abdominal varices.  ABDOMINAL WALL: within normal limits.  MUSCULOSKELETAL: within normal limits.    IMPRESSION:     New bibasilar consolidations which may be a combination of atelectasis   and aspiration pneumonia.    Cirrhosis and portal hypertension. Small volume upper abdominal ascites,   also increased from .    Question mild cystitis.    Other incidental comments as above.    < end of copied text >          Radiology: all available radiological tests reviewed    Advanced directives addressed: full resuscitation
CC:Patient is a 63y old  Male who presents with a chief complaint of sepsis (21 Dec 2018 14:58)      Subjective:  Pt seen and examined at bedside with chaperone earlier today. Pt is AAOx3, pt in no acute distress. Pt has c/o diffuse abd pain, fever, lethargy at HD today, 12/21/18. Pt stated having had similar episode of abd pain leading to admission at [facility] approx 2 weeks prior. Workup at that time demonstrated bacteremia without definitive cause. Pt admitted for r/o sepsis.  Pt denied c/o chills, chest pain, SOB, N/V/D, extremity pain or dysfunction, hemoptysis, hematemesis, hematuria, hematochexia, headache, diplopia, vertigo, dizzyness.    ROS:  abd pain, lethargy, fever, otherwise as abovementioned    PMH: CAD, ESRD on HD, polycystic kidney disease, systolic CHF s/p AICD, HTN, adrenal insufficiency (on chronic prednisone), hypoparathyroidism, h/o cryptococcal meningitis, h/o c. diff infection, PVD, heart failure with reduced ejection fraction,   PSH: s/p multiple renal transplants (3), AV fistula, AICD, hernia repair, s/p stents  Allergies: NKDA  SH: social etoh, denied tobacco or illicit drug use  FH: non contributory at present    Vital Signs Last 24 Hrs  T(C): 36.7 (21 Dec 2018 20:02), Max: 38.1 (21 Dec 2018 13:07)  T(F): 98.1 (21 Dec 2018 20:02), Max: 100.5 (21 Dec 2018 13:07)  HR: 74 (21 Dec 2018 19:00) (71 - 96)  BP: 92/54 (21 Dec 2018 19:00) (82/66 - 103/60)  BP(mean): 64 (21 Dec 2018 19:00) (59 - 64)  RR: 9 (21 Dec 2018 19:00) (7 - 22)  SpO2: 99% (21 Dec 2018 19:00) (90% - 100%)    Labs:                        8.6    9.35  )-----------( 130      ( 21 Dec 2018 12:31 )             28.2     CBC Full  -  ( 21 Dec 2018 12:31 )  WBC Count : 9.35 K/uL  Hemoglobin : 8.6 g/dL  Hematocrit : 28.2 %  Platelet Count - Automated : 130 K/uL  Mean Cell Volume : 95.3 fl  Mean Cell Hemoglobin : 29.1 pg  Mean Cell Hemoglobin Concentration : 30.5 gm/dL  Auto Neutrophil # : 7.72 K/uL  Auto Lymphocyte # : 0.68 K/uL  Auto Monocyte # : 0.79 K/uL  Auto Eosinophil # : 0.07 K/uL  Auto Basophil # : 0.04 K/uL  Auto Neutrophil % : 82.7 %  Auto Lymphocyte % : 7.3 %  Auto Monocyte % : 8.4 %  Auto Eosinophil % : 0.7 %  Auto Basophil % : 0.4 %    12-21    137  |  93<L>  |  14  ----------------------------<  135<H>  3.5   |  36<H>  |  2.61<H>    Ca    8.3<L>      21 Dec 2018 12:31    TPro  6.3  /  Alb  2.1<L>  /  TBili  0.9  /  DBili  x   /  AST  17  /  ALT  13  /  AlkPhos  120  12-21    LIVER FUNCTIONS - ( 21 Dec 2018 12:31 )  Alb: 2.1 g/dL / Pro: 6.3 gm/dL / ALK PHOS: 120 U/L / ALT: 13 U/L / AST: 17 U/L / GGT: x           PT/INR - ( 21 Dec 2018 12:31 )   PT: 48.4 sec;   INR: 4.17 ratio         PTT - ( 21 Dec 2018 12:31 )  PTT:39.8 sec      Meds:  acetaminophen   Tablet .. 650 milliGRAM(s) Oral every 6 hours PRN  aluminum hydroxide/magnesium hydroxide/simethicone Suspension 30 milliLiter(s) Oral every 6 hours PRN  amiodarone    Tablet 200 milliGRAM(s) Oral daily  cefepime  Injectable. 1000 milliGRAM(s) IV Push daily  cinacalcet 60 milliGRAM(s) Oral <User Schedule>  hydrocortisone sodium succinate Injectable 50 milliGRAM(s) IV Push every 8 hours  lactobacillus acidophilus 1 Tablet(s) Oral daily  oxyCODONE    IR 5 milliGRAM(s) Oral every 6 hours PRN  pantoprazole    Tablet 40 milliGRAM(s) Oral before breakfast  sevelamer hydrochloride 800 milliGRAM(s) Oral three times a day with meals      Radiology:  Pending CT abd/pelvis  < from: Xray Chest 1 View AP/PA. (12.21.18 @ 12:44) >  EXAM:  XR CHEST 1 VIEW                            PROCEDURE DATE:  12/21/2018          INTERPRETATION:  Portable chest radiograph dated 12/21/2018.    COMPARISON: 12/8/2018.    CLINICAL INFORMATION: Weakness.    FINDINGS:    The airway is midline.  Some left pleural effusion and left lower lobe partial atelectasis is   again noted unchanged. Segmental atelectasis in the right lower lobe.    The remainder of the lungs are clear.  Cardiomegaly and continued application of the left subclavian vein  pacemaker leads are in the right heart and the coronary sinus.   A vascular stent right subclavian stent is again noted.  The bones are normal.     IMPRESSION:    Left pleural effusion and left lower lobe partial atelectasis are   unchanged.                KULDIP CLARK M.D., ATTENDING RADIOLOGIST  This document has been electronically signed. Dec 21 2018  3:54PM        < end of copied text >    Physical exam:  Pt is AAOx3  Pt in no acute distress  Neuro: CNII-XII grossly intact   Psych: normal affect  HEENT: Normocephalic, atraumatic, DANIEL, EOM wnl  Neck: No crepitus, no ecchymosis, no hematoma, to exam, no JVD, no tracheal deviation  Cardiovascular: S1S2 Present  Respiratory: Respiratory Effort normal; no wheezes, rales or rhonchi to exam, CTAB  ABD: bowel sounds (+), soft, (+) mild tenderness to abd diffusely without pain out of proportion to exam, non distended, no rebound, no guarding, no rigidity, no skin changes to exam. No pelvic instability to exam, no skin changes, negative hernandez's sign to exam  Musculoskeletal: Pt has good capillary refill to digits, no gross calf edema or tenderness to exam.  Skin: no jaundice or icteric sclera to exam b/l, no skin changes to exam

## 2018-12-25 NOTE — PROGRESS NOTE ADULT - SUBJECTIVE AND OBJECTIVE BOX
COVERING FOR DR. 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 Diff, CAD, PVD, HTN, polycystic kidney disease with failed transplants sent to ED from dialysis for lethargy. Pt with 2 recent admissions for sepsis. Also s/p EGD with gastritis. Recent borderline low BP - has not been taking full doses of BP meds. He was at dialysis yesterday when he became lethargic with lower starting weight pre-HD. He was given IVF during HD, then sent to ED. Renal called for ESRD management.    Today reports improved abdominal pain. c/w L shoulder pain.     No nausea, vomiting or diarrhea. Denies chest pain. +dry cough.  In ED, noted to have a fever of 100.5F rectally. Lactate 2.1. He was given 500ml IV fluid. Started on vanco and zosyn. He was also given stress dose solucortef given history of adrenal insufficiency. He was hypotensive on arrival with improvement while in ED.      - today with improved pain, no sob. On HD now.   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  S/P colonoscopy: last done   AICD (automatic cardioverter/defibrillator) present:   H/O hernia repair  A-V fistula:  left UE  right arm ,  H/O kidney transplant: , ,       MEDICATIONS  (STANDING):  amiodarone    Tablet 200 milliGRAM(s) Oral daily  cefepime  Injectable. 1000 milliGRAM(s) IV Push daily  cinacalcet 60 milliGRAM(s) Oral <User Schedule>  dextrose 5%. 1000 milliLiter(s) (50 mL/Hr) IV Continuous <Continuous>  dextrose 50% Injectable 12.5 Gram(s) IV Push once  dextrose 50% Injectable 25 Gram(s) IV Push once  dextrose 50% Injectable 25 Gram(s) IV Push once  insulin lispro (HumaLOG) corrective regimen sliding scale   SubCutaneous three times a day before meals  lactobacillus acidophilus 1 Tablet(s) Oral daily  pantoprazole    Tablet 40 milliGRAM(s) Oral before breakfast  polyethylene glycol 3350 17 Gram(s) Oral daily  sevelamer hydrochloride 800 milliGRAM(s) Oral three times a day with meals  vancomycin    Solution 125 milliGRAM(s) Oral every 6 hours            Allergies    No Known Allergies    Intolerances        SOCIAL HISTORY:  Denies ETOh, Smoking,     FAMILY HISTORY:  Family history of kidney disease (Mother)  Family history of colon cancer (Sibling)      REVIEW OF SYSTEMS:    CONSTITUTIONAL: + weakness, + fevers +lethargy (improving)  EYES/ENT: No visual changes;  No vertigo or throat pain   NECK: No pain or stiffness  RESPIRATORY: +dry cough, +wheezing, no hemoptysis; No shortness of breath  CARDIOVASCULAR: No chest pain or palpitations  GASTROINTESTINAL: + abdominal  pain. No nausea, vomiting, or hematemesis; No diarrhea or constipation. No melena or hematochezia.  GENITOURINARY: No dysuria, frequency or hematuria  NEUROLOGICAL: No numbness or weakness  SKIN: No itching, burning, rashes, or lesions   All other review of systems is negative unless indicated above.    Vital Signs Last 24 Hrs  T(C): 36.2 (25 Dec 2018 17:23), Max: 36.7 (25 Dec 2018 11:31)  T(F): 97.2 (25 Dec 2018 17:23), Max: 98 (25 Dec 2018 11:31)  HR: 72 (25 Dec 2018 17:23) (70 - 81)  BP: 115/75 (25 Dec 2018 17:23) (103/73 - 115/75)  BP(mean): --  RR: 18 (25 Dec 2018 17:23) (18 - 18)  SpO2: 100% (25 Dec 2018 17:23) (96% - 100%)    I and O's:        PHYSICAL EXAM:    Constitutional: NAD, awake, alert, responsive  HEENT: PERRLA, EOMI,  MMM  Neck: No LAD, No JVD  Respiratory: bilateral inspiratory wheeze  Cardiovascular: S1 and S2  Gastrointestinal: BS+, soft, NT/ND  Extremities: No peripheral edema LEs, LUE trace to 1+ edema (slightly improved)  Neurological: A/O x 3, no focal deficits  Psychiatric: Normal mood, normal affect  : No Amaya  Skin: No rashes  Access: RUE AVF +thrill +bruit    LABS:                        8.1    11.23 )-----------( 136      ( 22 Dec 2018 05:17 )             26.5         137  |  97  |  65<H>  ----------------------------<  149<H>  3.5   |  26  |  4.81<H>    Ca    7.6<L>      25 Dec 2018 13:21    TPro  6.2  /  Alb  2.0<L>  /  TBili  0.6  /  DBili  0.3<H>  /  AST  13<L>  /  ALT  15  /  AlkPhos  169<H>      133    |  92     |  46     ----------------------------<  267       23 Dec 2018 11:45  4.4     |  30     |  4.91     135    |  92     |  22     ----------------------------<  144       22 Dec 2018 05:17  4.2     |  34     |  3.43     137    |  93     |  14     ----------------------------<  135       21 Dec 2018 12:31  3.5     |  36     |  2.61     Ca    7.7        23 Dec 2018 11:45  Ca    8.4        22 Dec 2018 05:17    Phos  4.9       23 Dec 2018 11:45  Phos  4.3       22 Dec 2018 05:17    Mg     1.9       22 Dec 2018 05:17    TPro  x      /  Alb  1.8    /  TBili  x      /        23 Dec 2018 11:45  DBili  x      /  AST  x      /  ALT  x      /  AlkPhos  x        TPro  6.3    /  Alb  2.1    /  TBili  0.9    /        21 Dec 2018 12:31  DBili  x      /  AST  17     /  ALT  13     /  AlkPhos  120          137    |  93     |  14     ----------------------------<  135       21 Dec 2018 12:31  3.5     |  36     |  2.61     Ca    8.4        22 Dec 2018 05:17  Ca    8.3        21 Dec 2018 12:31    Phos  4.3       22 Dec 2018 05:17    Mg     1.9       22 Dec 2018 05:17    TPro  6.3    /  Alb  2.1    /  TBili  0.9    /        21 Dec 2018 12:31  DBili  x      /  AST  17     /  ALT  13     /  AlkPhos  120            Urine Studies:  Urinalysis Basic - ( 21 Dec 2018 13:57 )    Color: Yellow / Appearance: Clear / S.010 / pH: x  Gluc: x / Ketone: Negative  / Bili: Negative / Urobili: Negative mg/dL   Blood: x / Protein: 100 mg/dL / Nitrite: Negative   Leuk Esterase: Negative / RBC: 0-2 /HPF / WBC 3-5   Sq Epi: x / Non Sq Epi: Occasional / Bacteria: x            RADIOLOGY & ADDITIONAL STUDIES:

## 2018-12-25 NOTE — PROGRESS NOTE ADULT - SUBJECTIVE AND OBJECTIVE BOX
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  S/P colonoscopy: last done 2016  AICD (automatic cardioverter/defibrillator) present: 2013  H/O hernia repair  A-V fistula: 1995 left UE  right arm 2007,  H/O kidney transplant: 1985, 1995, 1997    MEDICATIONS  (STANDING):  amiodarone    Tablet 200 milliGRAM(s) Oral daily  cefepime  Injectable. 1000 milliGRAM(s) IV Push daily  cinacalcet 60 milliGRAM(s) Oral <User Schedule>  dextrose 5%. 1000 milliLiter(s) (50 mL/Hr) IV Continuous <Continuous>  dextrose 50% Injectable 12.5 Gram(s) IV Push once  dextrose 50% Injectable 25 Gram(s) IV Push once  dextrose 50% Injectable 25 Gram(s) IV Push once  hydrocortisone sodium succinate Injectable 50 milliGRAM(s) IV Push every 8 hours  insulin lispro (HumaLOG) corrective regimen sliding scale   SubCutaneous three times a day before meals  lactobacillus acidophilus 1 Tablet(s) Oral daily  pantoprazole    Tablet 40 milliGRAM(s) Oral before breakfast  polyethylene glycol 3350 17 Gram(s) Oral daily  sevelamer hydrochloride 800 milliGRAM(s) Oral three times a day with meals  vancomycin    Solution 125 milliGRAM(s) Oral every 6 hours    MEDICATIONS  (PRN):  acetaminophen   Tablet .. 650 milliGRAM(s) Oral every 6 hours PRN Temp greater or equal to 38C (100.4F), Mild Pain (1 - 3)  aluminum hydroxide/magnesium hydroxide/simethicone Suspension 30 milliLiter(s) Oral every 6 hours PRN Dyspepsia  dextrose 40% Gel 15 Gram(s) Oral once PRN Blood Glucose LESS THAN 70 milliGRAM(s)/deciliter  glucagon  Injectable 1 milliGRAM(s) IntraMuscular once PRN Glucose LESS THAN 70 milligrams/deciliter  ondansetron Injectable 4 milliGRAM(s) IV Push every 4 hours PRN Nausea and/or Vomiting  oxyCODONE    IR 5 milliGRAM(s) Oral every 6 hours PRN Severe Pain (7 - 10)    Allergies  No Known Allergies    REVIEW OF SYSTEMS:    CONSTITUTIONAL: No weakness, fevers or chills  RESPIRATORY: No cough, wheezing, hemoptysis; No shortness of breath  CARDIOVASCULAR: No chest pain or palpitations  GASTROINTESTINAL: No abdominal or epigastric pain. No nausea, vomiting, or hematemesis; No diarrhea or constipation. No melena or hematochezia.  All other review of systems is negative unless indicated above.    Vital Signs Last 24 Hrs  T(C): 36.3 (25 Dec 2018 05:25), Max: 36.7 (24 Dec 2018 18:47)  T(F): 97.4 (25 Dec 2018 05:25), Max: 98 (24 Dec 2018 18:47)  HR: 70 (25 Dec 2018 05:25) (62 - 82)  BP: 103/73 (25 Dec 2018 05:25) (103/73 - 153/79)  BP(mean): 43 (24 Dec 2018 11:00) (43 - 67)  RR: 16 (24 Dec 2018 18:47) (13 - 17)  SpO2: 96% (25 Dec 2018 05:25) (96% - 100%)    PHYSICAL EXAM:    Constitutional: NAD, well-developed  Respiratory: CTA  Cardiovascular: S1 and S2, RRR  Gastrointestinal: BS+, soft, NT/ND      LABS:                        9.0    6.17  )-----------( 123      ( 25 Dec 2018 06:51 )             29.7     12-23    133<L>  |  92<L>  |  46<H>  ----------------------------<  267<H>  4.4   |  30  |  4.91<H>    Ca    7.7<L>      23 Dec 2018 11:45  Phos  4.9     12-23    TPro  x   /  Alb  1.8<L>  /  TBili  x   /  DBili  x   /  AST  x   /  ALT  x   /  AlkPhos  x   12-23      LIVER FUNCTIONS - ( 23 Dec 2018 11:45 )  Alb: 1.8 g/dL / Pro: x     / ALK PHOS: x     / ALT: x     / AST: x     / GGT: x             RADIOLOGY & ADDITIONAL STUDIES:

## 2018-12-25 NOTE — PROGRESS NOTE ADULT - SUBJECTIVE AND OBJECTIVE BOX
64 yo male with PMH of CAD, ESRD on HD, polycystic kidney disease, systolic CHF s/p AICD, HTN, adrenal insufficiency (on chronic prednisone), hypoparathyroidism, h/o cryptococcal meningitis, h/o c. diff infection, sepsis with Klebsiella pneumoniae on prior hospitalization admitted on 12/21 after his dialysis session for increased lethargy, mid abdominal pain that radiates to left shoulder and upon admission found to have fever to 101 and hypotension which responded to stress dose steroids and iv antibiotics.    12/23: no change in his status; intermittent abd pain mainly after meals  12/24: feels better pending HIDA  12/25: hida negative    Vital Signs Last 24 Hrs  T(C): 36.7 (25 Dec 2018 11:31), Max: 36.7 (24 Dec 2018 18:47)  T(F): 98 (25 Dec 2018 11:31), Max: 98 (24 Dec 2018 18:47)  HR: 81 (25 Dec 2018 11:31) (62 - 82)  BP: 106/56 (25 Dec 2018 11:31) (103/73 - 153/79)  BP(mean): --  RR: 18 (25 Dec 2018 11:31) (16 - 18)  SpO2: 96% (25 Dec 2018 11:31) (96% - 100%)    Constitutional: frail looking  HEENT: NC/AT, EOMI, PERRLA, conjunctivae clear; ears and nose atraumatic; pharynx clear  Neck: supple; thyroid not palpable  Back: no tenderness  Respiratory: respiratory effort normal; scattered coarse breath sounds                        7.5    7.59  )-----------( 128      ( 24 Dec 2018 05:00 )             25.3   12-23    133<L>  |  92<L>  |  46<H>  ----------------------------<  267<H>  4.4   |  30  |  4.91<H>    Ca    7.7<L>      23 Dec 2018 11:45  Phos  4.9     12-23    TPro  x   /  Alb  1.8<L>  /  TBili  x   /  DBili  x   /  AST  x   /  ALT  x   /  AlkPhos  x   12-23  Cardiovascular: S1S2 regular, no murmurs  Abdomen: soft, not tender, not distended, positive BS; no liver or spleen organomegaly; fullness in right quadrant  Genitourinary: no suprapubic tenderness  Musculoskeletal: no muscle tenderness, no joint swelling or tenderness  Neurological/ Psychiatric: AxOx3, judgement and insight normal;  moving all extremities  Skin: no rashes; no palpable lesions; left upper extremity skin laceration clean    Labs: all available labs reviewed                                                 INTERPRETATION:  Clinical information: Abdominal pain, elevated white   count    Comparison 12/5    PROCEDURE:     CT of the Abdomen and Pelvis was performed without intravenous contrast.    Evaluation of abdominal and pelvic viscera, lymph nodes and vasculature   is limited without intravenous contrast.    FINDINGS:    LOWER CHEST: New bibasilar consolidations which may be a combination of   atelectasis and aspiration pneumonia.    ABDOMEN:  LIVER: Cirrhosis. Multiple complex and calcified cysts.  BILE DUCTS: normal caliber  GALLBLADDER: Distended with cholelithiasis. No wall thickening or   pericholecystic fluid.  PANCREAS: Atrophic with a 3 cmintraductal stone upstream ductal   dilatation. Multifocal calcifications suggesting chronic pancreatitis.  SPLEEN: within normal limits  ADRENALS: within normal limits  KIDNEYS: Native kidneys not visualized. Right lower quadrant renal   transplant without hydronephrosis.    PELVIS:  REPRODUCTIVE ORGANS: no pelvic masses  BLADDER: Right base diverticulum. Question mild wall thickening and fat   stranding.    BOWEL: Extensive left colonic diverticulosis. Is unremarkable. Appendix   normal. Mildlydilated small bowel favor ileus.  PERITONEUM: Small but increased from prior upper abdominal ascites. No   pneumoperitoneum.  RETROPERITONEUM: no enlarged retroperitoneal or pelvic nodes.    VESSELS: Upper abdominal varices.  ABDOMINAL WALL: within normal limits.  MUSCULOSKELETAL: within normal limits.    IMPRESSION:     New bibasilar consolidations which may be a combination of atelectasis   and aspiration pneumonia.    Cirrhosis and portal hypertension. Small volume upper abdominal ascites,   also increased from 12/5.    Question mild cystitis.    Other incidental comments as above.    < end of copied text >          Radiology: all available radiological tests reviewed      * Epigastric Left flank pain  - poss symptomatic Pa duct stone ; reeval in am re: eus  - for now IV abx  - coverage for PNA  - PPI  - stat labs r/o jaundice ( vs discoloration from aging, esrd)    * Pancreatic duct stone- chronic  - pending  decision re: EUS    * Anemia: s/p transfusion    * ESRD     * CAD with LAD stent    * Liver cirrhosis and portal HTN: pt is on amiodarone; ? amio induced toxicity  - new? to review old rec  - no rec to stop amio so far

## 2018-12-26 NOTE — CDI QUERY NOTE - NSCDIOTHERTXTBX_GEN_ALL_CORE_HH
Clinical documentation indicates that this patient has Sepsis.        Documentation on chart that the following Specialities documented Sepsis:  H&P: Possible Sepsis  ID: Sepsis  Nephrologist: Sepsis  ED Provider: Sepsis      ID documented HCAP    HR 90  BP 88/50  Lactate 2.1  Pulse Ox 90% on room air     Maxipime intravenous    Intravenous fluid bolus 500 cc      Please clarify known or suspected organism, associated organ failure and status of the sepsis.    STATUS:  Sepsis ruled in  Sepsis is resolving  Sepsis ruled out  Early Sepsis POA, resolved?    PRESENT ON ADMISSION:  Was sepsis present on admission?  If so, please document.

## 2018-12-26 NOTE — PROGRESS NOTE ADULT - SUBJECTIVE AND OBJECTIVE BOX
COVERING FOR DR. 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 Diff, CAD, PVD, HTN, polycystic kidney disease with failed transplants sent to ED from dialysis for lethargy. Pt with 2 recent admissions for sepsis. Also s/p EGD with gastritis. Recent borderline low BP - has not been taking full doses of BP meds. He was at dialysis yesterday when he became lethargic with lower starting weight pre-HD. He was given IVF during HD, then sent to ED. Renal called for ESRD management.    Today reports improved abdominal pain. c/w L shoulder pain.     No nausea, vomiting or diarrhea. Denies chest pain. +dry cough.  In ED, noted to have a fever of 100.5F rectally. Lactate 2.1. He was given 500ml IV fluid. Started on vanco and zosyn. He was also given stress dose solucortef given history of adrenal insufficiency. He was hypotensive on arrival with improvement while in ED.      - today with improved pain, no sob. On HD now.   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  S/P colonoscopy: last done   AICD (automatic cardioverter/defibrillator) present:   H/O hernia repair  A-V fistula:  left UE  right arm ,  H/O kidney transplant: , ,       MEDICATIONS  (STANDING):  amiodarone    Tablet 200 milliGRAM(s) Oral daily  cefepime  Injectable. 1000 milliGRAM(s) IV Push daily  cinacalcet 60 milliGRAM(s) Oral <User Schedule>  dextrose 5%. 1000 milliLiter(s) (50 mL/Hr) IV Continuous <Continuous>  dextrose 50% Injectable 12.5 Gram(s) IV Push once  dextrose 50% Injectable 25 Gram(s) IV Push once  dextrose 50% Injectable 25 Gram(s) IV Push once  insulin lispro (HumaLOG) corrective regimen sliding scale   SubCutaneous three times a day before meals  lactobacillus acidophilus 1 Tablet(s) Oral daily  pantoprazole    Tablet 40 milliGRAM(s) Oral before breakfast  polyethylene glycol 3350 17 Gram(s) Oral daily  sevelamer hydrochloride 800 milliGRAM(s) Oral three times a day with meals  vancomycin    Solution 125 milliGRAM(s) Oral every 6 hours            Allergies    No Known Allergies    Intolerances        SOCIAL HISTORY:  Denies ETOh, Smoking,     FAMILY HISTORY:  Family history of kidney disease (Mother)  Family history of colon cancer (Sibling)      REVIEW OF SYSTEMS:    CONSTITUTIONAL: + weakness, + fevers +lethargy (improving)  EYES/ENT: No visual changes;  No vertigo or throat pain   NECK: No pain or stiffness  RESPIRATORY: +dry cough, +wheezing, no hemoptysis; No shortness of breath  CARDIOVASCULAR: No chest pain or palpitations  GASTROINTESTINAL: + abdominal  pain. No nausea, vomiting, or hematemesis; No diarrhea or constipation. No melena or hematochezia.  GENITOURINARY: No dysuria, frequency or hematuria  NEUROLOGICAL: No numbness or weakness  SKIN: No itching, burning, rashes, or lesions   All other review of systems is negative unless indicated above.    Vital Signs Last 24 Hrs  T(C): 36.1 (26 Dec 2018 12:04), Max: 36.4 (26 Dec 2018 05:21)  T(F): 97 (26 Dec 2018 12:04), Max: 97.5 (26 Dec 2018 05:21)  HR: 76 (26 Dec 2018 12:04) (60 - 97)  BP: 105/66 (26 Dec 2018 12:04) (88/60 - 129/67)  BP(mean): --  RR: 16 (26 Dec 2018 12:04) (16 - 19)  SpO2: 99% (26 Dec 2018 05:21) (99% - 100%)    I and O's:        PHYSICAL EXAM: seen on HD    Constitutional: NAD, awake, alert, responsive  HEENT: PERRLA, EOMI,  MMM  Neck: No LAD, No JVD  Respiratory: CTAB, no wheeze  Cardiovascular: S1 and S2  Gastrointestinal: BS+, soft, NT/ND  Extremities: No peripheral edema LEs, LUE trace edema (slightly improved)  Neurological: A/O x 3, no focal deficits  Psychiatric: Normal mood, normal affect  : No Amaya  Skin: No rashes  Access: RUE AVF in use    LABS:                        8.1    11.23 )-----------( 136      ( 22 Dec 2018 05:17 )             26.5       139    |  98     |  80     ----------------------------<  118       26 Dec 2018 06:25  4.0     |  26     |  5.62     137    |  97     |  65     ----------------------------<  149       25 Dec 2018 13:21  3.5     |  26     |  4.81     133    |  92     |  46     ----------------------------<  267       23 Dec 2018 11:45  4.4     |  30     |  4.91     Ca    7.7        26 Dec 2018 06:25  Ca    7.6        25 Dec 2018 13:21    Phos  3.9       26 Dec 2018 06:25  Phos  4.9       23 Dec 2018 11:45      TPro  x      /  Alb  1.9    /  TBili  x      /        26 Dec 2018 06:25  DBili  x      /  AST  x      /  ALT  x      /  AlkPhos  x        TPro  6.2    /  Alb  2.0    /  TBili  0.6    /        25 Dec 2018 13:21  DBili  0.3    /  AST  13     /  ALT  15     /  AlkPhos  169          135    |  92     |  22     ----------------------------<  144       22 Dec 2018 05:17  4.2     |  34     |  3.43     137    |  93     |  14     ----------------------------<  135       21 Dec 2018 12:31  3.5     |  36     |  2.61     Ca    7.7        23 Dec 2018 11:45  Ca    8.4        22 Dec 2018 05:17    Phos  4.9       23 Dec 2018 11:45  Phos  4.3       22 Dec 2018 05:17    Mg     1.9       22 Dec 2018 05:17    TPro  x      /  Alb  1.8    /  TBili  x      /        23 Dec 2018 11:45  DBili  x      /  AST  x      /  ALT  x      /  AlkPhos  x        TPro  6.3    /  Alb  2.1    /  TBili  0.9    /        21 Dec 2018 12:31  DBili  x      /  AST  17     /  ALT  13     /  AlkPhos  120          137    |  93     |  14     ----------------------------<  135       21 Dec 2018 12:31  3.5     |  36     |  2.61     Ca    8.4        22 Dec 2018 05:17  Ca    8.3        21 Dec 2018 12:31    Phos  4.3       22 Dec 2018 05:17    Mg     1.9       22 Dec 2018 05:17    TPro  6.3    /  Alb  2.1    /  TBili  0.9    /        21 Dec 2018 12:31  DBili  x      /  AST  17     /  ALT  13     /  AlkPhos  120            Urine Studies:  Urinalysis Basic - ( 21 Dec 2018 13:57 )    Color: Yellow / Appearance: Clear / S.010 / pH: x  Gluc: x / Ketone: Negative  / Bili: Negative / Urobili: Negative mg/dL   Blood: x / Protein: 100 mg/dL / Nitrite: Negative   Leuk Esterase: Negative / RBC: 0-2 /HPF / WBC 3-5   Sq Epi: x / Non Sq Epi: Occasional / Bacteria: x            RADIOLOGY & ADDITIONAL STUDIES:

## 2018-12-26 NOTE — PROGRESS NOTE ADULT - SUBJECTIVE AND OBJECTIVE BOX
Date of service: 12-26-18 @ 11:41    Lying in bed in NAD  Undergoing HD  Weak looking  Has abdominal discomfort radiating to right shoulder today    ROS: no fever or chills; denies dizziness, no HA, no SOB or cough, no diarrhea or constipation; no dysuria, no legs pain, no rashes    MEDICATIONS  (STANDING):  amiodarone    Tablet 200 milliGRAM(s) Oral daily  cefepime  Injectable. 1000 milliGRAM(s) IV Push daily  cinacalcet 60 milliGRAM(s) Oral <User Schedule>  dextrose 5%. 1000 milliLiter(s) (50 mL/Hr) IV Continuous <Continuous>  dextrose 50% Injectable 12.5 Gram(s) IV Push once  dextrose 50% Injectable 25 Gram(s) IV Push once  dextrose 50% Injectable 25 Gram(s) IV Push once  insulin lispro (HumaLOG) corrective regimen sliding scale   SubCutaneous three times a day before meals  lactobacillus acidophilus 1 Tablet(s) Oral daily  pantoprazole    Tablet 40 milliGRAM(s) Oral before breakfast  polyethylene glycol 3350 17 Gram(s) Oral daily  predniSONE   Tablet 20 milliGRAM(s) Oral daily  sevelamer hydrochloride 800 milliGRAM(s) Oral three times a day with meals  vancomycin    Solution 125 milliGRAM(s) Oral every 6 hours      Vital Signs Last 24 Hrs  T(C): 36.1 (26 Dec 2018 08:39), Max: 36.4 (26 Dec 2018 05:21)  T(F): 97 (26 Dec 2018 08:39), Max: 97.5 (26 Dec 2018 05:21)  HR: 69 (26 Dec 2018 11:37) (60 - 97)  BP: 122/51 (26 Dec 2018 11:37) (88/60 - 129/67)  BP(mean): --  RR: 17 (26 Dec 2018 11:37) (16 - 19)  SpO2: 99% (26 Dec 2018 05:21) (99% - 100%)    Physical Exam:      Constitutional: frail looking  HEENT: NC/AT, EOMI, PERRLA, conjunctivae clear  Neck: supple; thyroid not palpable  Back: no tenderness  Respiratory: respiratory effort normal; scattered coarse breath sounds  Cardiovascular: S1S2 regular, no murmurs  Abdomen: soft, not tender, not distended, positive BS; fullness in right quadrant  Genitourinary: no suprapubic tenderness  Musculoskeletal: no muscle tenderness, no joint swelling or tenderness  Neurological/ Psychiatric: AxOx3, moving all extremities  Skin: no rashes; no palpable lesions    Labs: reviewed                        9.2    10.12 )-----------( 117      ( 26 Dec 2018 06:25 )             29.5     12-26    139  |  98  |  80<H>  ----------------------------<  118<H>  4.0   |  26  |  5.62<H>    Ca    7.7<L>      26 Dec 2018 06:25  Phos  3.9     12-26    TPro  x   /  Alb  1.9<L>  /  TBili  x   /  DBili  x   /  AST  x   /  ALT  x   /  AlkPhos  x   12-26                        7.5    7.59  )-----------( 128      ( 24 Dec 2018 05:00 )             25.3     12-23    133<L>  |  92<L>  |  46<H>  ----------------------------<  267<H>  4.4   |  30  |  4.91<H>    Ca    7.7<L>      23 Dec 2018 11:45  Phos  4.9     12-23    TPro  x   /  Alb  1.8<L>  /  TBili  x   /  DBili  x   /  AST  x   /  ALT  x   /  AlkPhos  x   12-23                        8.1    11.23 )-----------( 136      ( 22 Dec 2018 05:17 )             26.5     12-22    135  |  92<L>  |  22  ----------------------------<  144<H>  4.2   |  34<H>  |  3.43<H>    Ca    8.4<L>      22 Dec 2018 05:17  Phos  4.3     12-22  Mg     1.9     12-22    TPro  6.3  /  Alb  2.1<L>  /  TBili  0.9  /  DBili  x   /  AST  17  /  ALT  13  /  AlkPhos  120  12-21           Cultures:       Culture - Urine (collected 12-21-18 @ 13:57)  Source: .Urine None  Final Report (12-22-18 @ 18:07):    <10,000 CFU/ml Normal Urogenital too present    Culture - Blood (collected 12-21-18 @ 12:31)  Source: .Blood Blood-Venous  Preliminary Report (12-22-18 @ 18:02):    No growth to date.          < from: CT Abdomen and Pelvis w/ Oral Cont (12.21.18 @ 21:26) >    EXAM:  CT ABDOMEN AND PELVIS OC                            PROCEDURE DATE:  12/21/2018        ABDOMEN:  LIVER: Cirrhosis. Multiple complex and calcified cysts.  BILE DUCTS: normal caliber  GALLBLADDER: Distended with cholelithiasis. No wall thickening or   pericholecystic fluid.  PANCREAS: Atrophic with a 3 cmintraductal stone upstream ductal   dilatation. Multifocal calcifications suggesting chronic pancreatitis.  SPLEEN: within normal limits  ADRENALS: within normal limits  KIDNEYS: Native kidneys not visualized. Right lower quadrant renal   transplant without hydronephrosis.    PELVIS:  REPRODUCTIVE ORGANS: no pelvic masses  BLADDER: Right base diverticulum. Question mild wall thickening and fat   stranding.    BOWEL: Extensive left colonic diverticulosis. Is unremarkable. Appendix   normal. Mildlydilated small bowel favor ileus.  PERITONEUM: Small but increased from prior upper abdominal ascites. No   pneumoperitoneum.  RETROPERITONEUM: no enlarged retroperitoneal or pelvic nodes.    VESSELS: Upper abdominal varices.  ABDOMINAL WALL: within normal limits.  MUSCULOSKELETAL: within normal limits.    IMPRESSION:     New bibasilar consolidations which may be a combination of atelectasis   and aspiration pneumonia.  Cirrhosis and portal hypertension. Small volume upper abdominal ascites,   also increased from 12/5.  Question mild cystitis.    < end of copied text >          Radiology: all available radiological tests reviewed    Advanced directives addressed: full resuscitation

## 2018-12-26 NOTE — PROGRESS NOTE ADULT - SUBJECTIVE AND OBJECTIVE BOX
Patient is a 63y old  Male who presents with a chief complaint of sepsis (25 Dec 2018 19:00)      HPI:  62 yo male with PMH of CAD, ESRD on HD, polycystic kidney disease, systolic CHF s/p AICD, HTN, adrenal insufficiency (on chronic prednisone), hypoparathyroidism, h/o cryptococcal meningitis, h/o c. diff infection sent to ED from dialysis for lethargy. Pt with 2 recent admissions first for sepsis and noted to have klebsiella in blood cultures and second for sepsis and abdominal pain with +occult blood. Pt had CT abdomen with IV contrast on 12/5 which did not show any acute pathology. He underwent a EGD which showed gastritis with no active bleeding. Pt was discharged home and was feeling well a little better until a few days ago. He again started to have worsening abd pain and unable to tolerate PO. Every time he ate he developed worsening pain. His BP also remained on the lower side and his INR has remained elevated as outpatient. He was at dialysis this morning when he became lethargic. His weight was equivalent to his dry weight and was thought to be dehydrated and was given IV fluids and underwent dialysis for electrolytes. He was then sent to ED for further evaluation.    Currently in ED pt only complaining of abd pain which has been chronic. Has not changed since last admission. No nausea, vomiting or diarrhea. Denies chest pain. Does states he has a dry cough in the morning. CXR negative in ED.   He was noted to have a fever of 100.5 rectally. Lactate 2.1. He was given 500ml IV fluid. Started on vanco and zosyn. He was also given stress dose solucortef given history of adrenal insufficiency. He was hypotensive on arrival with improvement while in ED. (21 Dec 2018 14:58)    Patient had episode of pain this AM - awoke from sleep. Now better. No fever.       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  S/P colonoscopy: last done 2016  AICD (automatic cardioverter/defibrillator) present: 2013  H/O hernia repair  A-V fistula: 1995 left UE  right arm 2007,  H/O kidney transplant: 1985, 1995, 1997      MEDICATIONS  (STANDING):  amiodarone    Tablet 200 milliGRAM(s) Oral daily  cefepime  Injectable. 1000 milliGRAM(s) IV Push daily  cinacalcet 60 milliGRAM(s) Oral <User Schedule>  dextrose 5%. 1000 milliLiter(s) (50 mL/Hr) IV Continuous <Continuous>  dextrose 50% Injectable 12.5 Gram(s) IV Push once  dextrose 50% Injectable 25 Gram(s) IV Push once  dextrose 50% Injectable 25 Gram(s) IV Push once  insulin lispro (HumaLOG) corrective regimen sliding scale   SubCutaneous three times a day before meals  lactobacillus acidophilus 1 Tablet(s) Oral daily  pantoprazole    Tablet 40 milliGRAM(s) Oral before breakfast  polyethylene glycol 3350 17 Gram(s) Oral daily  predniSONE   Tablet 20 milliGRAM(s) Oral daily  sevelamer hydrochloride 800 milliGRAM(s) Oral three times a day with meals  vancomycin    Solution 125 milliGRAM(s) Oral every 6 hours    MEDICATIONS  (PRN):  acetaminophen   Tablet .. 650 milliGRAM(s) Oral every 6 hours PRN Temp greater or equal to 38C (100.4F), Mild Pain (1 - 3)  aluminum hydroxide/magnesium hydroxide/simethicone Suspension 30 milliLiter(s) Oral every 6 hours PRN Dyspepsia  dextrose 40% Gel 15 Gram(s) Oral once PRN Blood Glucose LESS THAN 70 milliGRAM(s)/deciliter  glucagon  Injectable 1 milliGRAM(s) IntraMuscular once PRN Glucose LESS THAN 70 milligrams/deciliter  ondansetron Injectable 4 milliGRAM(s) IV Push every 4 hours PRN Nausea and/or Vomiting  oxyCODONE    IR 5 milliGRAM(s) Oral every 6 hours PRN Severe Pain (7 - 10)      Allergies    No Known Allergies    Intolerances        Vital Signs Last 24 Hrs  T(C): 36.4 (26 Dec 2018 05:21), Max: 36.7 (25 Dec 2018 11:31)  T(F): 97.5 (26 Dec 2018 05:21), Max: 98 (25 Dec 2018 11:31)  HR: 97 (26 Dec 2018 05:21) (72 - 97)  BP: 126/93 (26 Dec 2018 05:21) (106/56 - 126/93)  BP(mean): --  RR: 19 (26 Dec 2018 05:21) (18 - 19)  SpO2: 99% (26 Dec 2018 05:21) (96% - 100%)    PHYSICAL EXAM:    Respiratory: CTAB  Cardiovascular: S1 and S2, RRR, no M/G/R  Gastrointestinal: BS+, soft, NT/ND    LABS:                        9.2    10.12 )-----------( 117      ( 26 Dec 2018 06:25 )             29.5     12-25    137  |  97  |  65<H>  ----------------------------<  149<H>  3.5   |  26  |  4.81<H>    Ca    7.6<L>      25 Dec 2018 13:21    TPro  6.2  /  Alb  2.0<L>  /  TBili  0.6  /  DBili  0.3<H>  /  AST  13<L>  /  ALT  15  /  AlkPhos  169<H>  12-25      LIVER FUNCTIONS - ( 25 Dec 2018 13:21 )  Alb: 2.0 g/dL / Pro: 6.2 gm/dL / ALK PHOS: 169 U/L / ALT: 15 U/L / AST: 13 U/L / GGT: x             RADIOLOGY & ADDITIONAL STUDIES:

## 2018-12-26 NOTE — CHART NOTE - NSCHARTNOTEFT_GEN_A_CORE
Upon Nutritional Assessment by the Registered Dietitian your patient was determined to meet criteria / has evidence of the following diagnosis/diagnoses:          [ ]  Mild Protein Calorie Malnutrition        [ ]  Moderate Protein Calorie Malnutrition        [x] Severe Protein Calorie Malnutrition        [ ] Unspecified Protein Calorie Malnutrition        [ ] Underweight / BMI <19        [ ] Morbid Obesity / BMI > 40      Findings:  Upon visit, pt appears thin with NFPE significant for NFPE significant for severe thigh/calf and mild temporal muscle wasting.  Diet recall reveals pt meeting <75% of estimated nutr needs x 1 month 2/2 abd pain.  (+) trace Lt/Rt ankle, mild Lt arm edema.  BM (+) 12/21.  nestor score of 16, no PU.  Based on above, pt meets criteria for severe malnutrition in chronic illness.     Findings as based on:  •  Comprehensive nutrition assessment and consultation  •  Calorie counts (nutrient intake analysis)  •  Food acceptance and intake status from observations by staff  •  Follow up  •  Patient education  •  Intervention secondary to interdisciplinary rounds  •   concerns      Treatment:    The following diet has been recommended:  1) change diet to renal and add gelatein BID   2) encourage protein at each meal and snacks to optimize intake 3  ) add nephrovite daily to ensure 100% RDI met   4) daily wt checks     PROVIDER Section:     By signing this assessment you are acknowledging and agree with the diagnosis/diagnoses assigned by the Registered Dietitian    Comments:
Notified by RN, pt refuses to take coumadin 1mg tonight (in the setting of A-fib) and will only take 0.5mg since she doesn't want her INR to be supra therapeutic.   Pt INR today is 1.72. Has been explained to pt her INR will be checked daily and her doses will be adjusted accordingly.   Pt is knowledgeable about warfarin and INR and demonstrates good understanding. Pt still requests to take 0.5mg Coumadin tonight.     félix Johnson, PGY3

## 2018-12-26 NOTE — PROGRESS NOTE ADULT - SUBJECTIVE AND OBJECTIVE BOX
64 yo male with PMH of CAD, ESRD on HD, polycystic kidney disease, systolic CHF s/p AICD, HTN, adrenal insufficiency (on chronic prednisone), hypoparathyroidism, h/o cryptococcal meningitis, h/o c. diff infection, sepsis with Klebsiella pneumoniae on prior hospitalization admitted on 12/21 after his dialysis session for increased lethargy, mid abdominal pain that radiates to left shoulder and upon admission found to have fever to 101 and hypotension which responded to stress dose steroids and iv antibiotics.    12/23: no change in his status; intermittent abd pain mainly after meals  12/24: feels better pending HIDA  12/25: hida negative  12/26: had significant abd pain this am    Vital Signs Last 24 Hrs  T(C): 36.1 (26 Dec 2018 12:04), Max: 36.4 (26 Dec 2018 05:21)  T(F): 97 (26 Dec 2018 12:04), Max: 97.5 (26 Dec 2018 05:21)  HR: 74 (26 Dec 2018 12:35) (60 - 97)  BP: 110/68 (26 Dec 2018 12:35) (88/60 - 129/67)  BP(mean): --  RR: 17 (26 Dec 2018 12:35) (16 - 19)  SpO2: 99% (26 Dec 2018 05:21) (99% - 100%)                         Constitutional: frail looking  HEENT: NC/AT, EOMI, PERRLA, conjunctivae clear; ears and nose atraumatic; pharynx clear  Neck: supple; thyroid not palpable  Back: no tenderness  Respiratory: respiratory effort normal; scattered coarse breath sounds  Abd : mild epig tenderness, + BS  Extr: no edema clubbing + cyanosis                          9.2    10.12 )-----------( 117      ( 26 Dec 2018 06:25 )             29.5   12-26    139  |  98  |  80<H>  ----------------------------<  118<H>  4.0   |  26  |  5.62<H>    Ca    7.7<L>      26 Dec 2018 06:25  Phos  3.9     12-26    TPro  x   /  Alb  1.9<L>  /  TBili  x   /  DBili  x   /  AST  x   /  ALT  x   /  AlkPhos  x   12-26                                                                       INTERPRETATION:  Clinical information: Abdominal pain, elevated white   count    Comparison 12/5    PROCEDURE:     CT of the Abdomen and Pelvis was performed without intravenous contrast.    Evaluation of abdominal and pelvic viscera, lymph nodes and vasculature   is limited without intravenous contrast.    FINDINGS:    LOWER CHEST: New bibasilar consolidations which may be a combination of   atelectasis and aspiration pneumonia.    ABDOMEN:  LIVER: Cirrhosis. Multiple complex and calcified cysts.  BILE DUCTS: normal caliber  GALLBLADDER: Distended with cholelithiasis. No wall thickening or   pericholecystic fluid.  PANCREAS: Atrophic with a 3 cmintraductal stone upstream ductal   dilatation. Multifocal calcifications suggesting chronic pancreatitis.  SPLEEN: within normal limits  ADRENALS: within normal limits  KIDNEYS: Native kidneys not visualized. Right lower quadrant renal   transplant without hydronephrosis.    PELVIS:  REPRODUCTIVE ORGANS: no pelvic masses  BLADDER: Right base diverticulum. Question mild wall thickening and fat   stranding.    BOWEL: Extensive left colonic diverticulosis. Is unremarkable. Appendix   normal. Mildlydilated small bowel favor ileus.  PERITONEUM: Small but increased from prior upper abdominal ascites. No   pneumoperitoneum.  RETROPERITONEUM: no enlarged retroperitoneal or pelvic nodes.    VESSELS: Upper abdominal varices.  ABDOMINAL WALL: within normal limits.  MUSCULOSKELETAL: within normal limits.    IMPRESSION:     New bibasilar consolidations which may be a combination of atelectasis   and aspiration pneumonia.    Cirrhosis and portal hypertension. Small volume upper abdominal ascites,   also increased from 12/5.    Question mild cystitis.    Other incidental comments as above.    < end of copied text >          Radiology: all available radiological tests reviewed      * Epigastric Left flank pain  - poss symptomatic Pa duct stone ; frequent adm for infections no source found; pt reluctant to leave again without answer  - HIDA was neg for tamara but as per sx he has poss symptomatic cholelithiasis/ billiary colic and he reccomends lap choel at some point  - for now IV abx  - coverage for PNA  - received stress dose steroids now tapering ( on chr prednisone, hypotensive on adm)      * Pancreatic duct stone- chronic  - pending  decision re: EUS; pt had pain this am  - per Dr. Villanueva outpt f/up    * Anemia: s/p transfusion    * ESRD     * CAD with LAD stent    * Liver cirrhosis and portal HTN: pt is on amiodarone; ? amio induced toxicity  - per GI will beeval as outpt re: Amio; he is in sinus rhythm    * PPM/ h/o AF  - elevated INR on adm  - pending INR  - no rec to stop amio so far    * Renal transplant- failed; on low dose prednisone maintenance     Dispo: if pain free dc home or transfer to [place]; taper prednisone; pt with multiple infectious complications in the past 62 yo male with PMH of CAD, ESRD on HD, polycystic kidney disease, systolic CHF s/p AICD, HTN, adrenal insufficiency (on chronic prednisone), hypoparathyroidism, h/o cryptococcal meningitis, h/o c. diff infection, sepsis with Klebsiella pneumoniae on prior hospitalization admitted on 12/21 after his dialysis session for increased lethargy, mid abdominal pain that radiates to left shoulder and upon admission found to have fever to 101 and hypotension which responded to stress dose steroids and iv antibiotics.    12/23: no change in his status; intermittent abd pain mainly after meals  12/24: feels better pending HIDA  12/25: hida negative  12/26: had significant abd pain this am    Vital Signs Last 24 Hrs  T(C): 36.1 (26 Dec 2018 12:04), Max: 36.4 (26 Dec 2018 05:21)  T(F): 97 (26 Dec 2018 12:04), Max: 97.5 (26 Dec 2018 05:21)  HR: 74 (26 Dec 2018 12:35) (60 - 97)  BP: 110/68 (26 Dec 2018 12:35) (88/60 - 129/67)  BP(mean): --  RR: 17 (26 Dec 2018 12:35) (16 - 19)  SpO2: 99% (26 Dec 2018 05:21) (99% - 100%)                         Constitutional: frail looking  HEENT: NC/AT, EOMI, PERRLA, conjunctivae clear; ears and nose atraumatic; pharynx clear  Neck: supple; thyroid not palpable  Back: no tenderness  Respiratory: respiratory effort normal; scattered coarse breath sounds  Abd : mild epig tenderness, + BS  Extr: no edema clubbing + cyanosis                          9.2    10.12 )-----------( 117      ( 26 Dec 2018 06:25 )             29.5   12-26    139  |  98  |  80<H>  ----------------------------<  118<H>  4.0   |  26  |  5.62<H>    Ca    7.7<L>      26 Dec 2018 06:25  Phos  3.9     12-26    TPro  x   /  Alb  1.9<L>  /  TBili  x   /  DBili  x   /  AST  x   /  ALT  x   /  AlkPhos  x   12-26                                                                       INTERPRETATION:  Clinical information: Abdominal pain, elevated white   count    Comparison 12/5    PROCEDURE:     CT of the Abdomen and Pelvis was performed without intravenous contrast.    Evaluation of abdominal and pelvic viscera, lymph nodes and vasculature   is limited without intravenous contrast.    FINDINGS:    LOWER CHEST: New bibasilar consolidations which may be a combination of   atelectasis and aspiration pneumonia.    ABDOMEN:  LIVER: Cirrhosis. Multiple complex and calcified cysts.  BILE DUCTS: normal caliber  GALLBLADDER: Distended with cholelithiasis. No wall thickening or   pericholecystic fluid.  PANCREAS: Atrophic with a 3 cmintraductal stone upstream ductal   dilatation. Multifocal calcifications suggesting chronic pancreatitis.  SPLEEN: within normal limits  ADRENALS: within normal limits  KIDNEYS: Native kidneys not visualized. Right lower quadrant renal   transplant without hydronephrosis.    PELVIS:  REPRODUCTIVE ORGANS: no pelvic masses  BLADDER: Right base diverticulum. Question mild wall thickening and fat   stranding.    BOWEL: Extensive left colonic diverticulosis. Is unremarkable. Appendix   normal. Mildlydilated small bowel favor ileus.  PERITONEUM: Small but increased from prior upper abdominal ascites. No   pneumoperitoneum.  RETROPERITONEUM: no enlarged retroperitoneal or pelvic nodes.    VESSELS: Upper abdominal varices.  ABDOMINAL WALL: within normal limits.  MUSCULOSKELETAL: within normal limits.    IMPRESSION:     New bibasilar consolidations which may be a combination of atelectasis   and aspiration pneumonia.    Cirrhosis and portal hypertension. Small volume upper abdominal ascites,   also increased from 12/5.    Question mild cystitis.    Other incidental comments as above.    < end of copied text >          Radiology: all available radiological tests reviewed      * Epigastric Left flank pain, s/p sepsis on admission, no source identified  - poss symptomatic Pa duct stone ; frequent adm for infections no source found; pt reluctant to leave again without answer  - HIDA was neg for tamara but as per sx he has poss symptomatic cholelithiasis/ billiary colic and he reccomends lap choel at some point  - for now IV abx  - coverage for PNA  - received stress dose steroids now tapering ( on chr prednisone, hypotensive on adm)      * Pancreatic duct stone- chronic  - pending  decision re: EUS; pt had pain this am  - per Dr. Villanueva outpt f/up    * Anemia: s/p transfusion    * ESRD     * CAD with LAD stent    * Liver cirrhosis and portal HTN: pt is on amiodarone; ? amio induced toxicity  - per GI will beeval as outpt re: Amio; he is in sinus rhythm    * PPM/ h/o AF  - elevated INR on adm  - pending INR  - no rec to stop amio so far    * Renal transplant- failed; on low dose prednisone maintenance     Dispo: if pain free dc home or transfer to [place]; taper prednisone; pt with multiple infectious complications in the past

## 2018-12-27 NOTE — DISCHARGE NOTE ADULT - CARE PROVIDER_API CALL
Cesar Coello), Cardiovascular Disease; Internal Medicine  [address] [place] [address]  Phone: [phone]  Fax: [phone]    Candis Sauer), Internal Medicine; Nephrology  [address] [place] [address]  Phone: [phone]  Fax: [phone]

## 2018-12-27 NOTE — DISCHARGE NOTE ADULT - PATIENT PORTAL LINK FT
You can access the [facility] Patient Portal, offered by [facility], by registering with the following website: [email or web address]

## 2018-12-27 NOTE — DISCHARGE NOTE ADULT - MEDICATION SUMMARY - MEDICATIONS TO STOP TAKING
I will STOP taking the medications listed below when I get home from the hospital:    losartan 25 mg oral tablet  -- 0.5 tab(s) by mouth once a day

## 2018-12-27 NOTE — DISCHARGE NOTE ADULT - PLAN OF CARE
resolution of abdominal pain Follow up with GI Dr. Willis outpatient to discuss stone retrieval.   - take all meds as prescribed.  - continue antibiotics.  - decrease prednisone to 10mg (2tabs) tomorrow on 12/28 then down to 5mg daily on Saturday.   - A1c = 6.1% --> you are a pre-diabetic. Start Sitagliptin (Januvia) however DO NOT take if skipping meals. Continue dialysis outpatient.

## 2018-12-27 NOTE — PROGRESS NOTE ADULT - SUBJECTIVE AND OBJECTIVE BOX
COVERING FOR DR. BRAR     Patient is a 62 yo male with ESRD on HD M/W/F at [facility] [place], HFrEF, CAD with stent, hx meningitis due to cryptococcus, C. Diff, CAD, PVD, HTN, polycystic kidney disease with failed transplants sent to ED from dialysis for lethargy. Pt with 2 recent admissions for sepsis. Also s/p EGD with gastritis. Recent borderline low BP - has not been taking full doses of BP meds. He was at dialysis yesterday when he became lethargic with lower starting weight pre-HD. He was given IVF during HD, then sent to ED. Renal called for ESRD management.    Today reports improved abdominal pain. c/w L shoulder pain.     No nausea, vomiting or diarrhea. Denies chest pain. +dry cough.  In ED, noted to have a fever of 100.5F rectally. Lactate 2.1. He was given 500ml IV fluid. Started on vanco and zosyn. He was also given stress dose solucortef given history of adrenal insufficiency. He was hypotensive on arrival with improvement while in ED.      - today with improved pain, no sob. On HD now.   - no sob, s/p HD yesterday without complication, improved LUE edema   - no sob, LUE arm edema improved   - HD today, no sob  - s/p HD yesterday, uneventful, feels better today, no sob    PAST MEDICAL & SURGICAL HISTORY:  Oliguria  Arm swelling: left arm  AV fistula: right UE  Leg pain, anterior, right: right anterior thigh at graft donor site  Irregular heart beat  AICD (automatic cardioverter/defibrillator) present  Dialysis patient  Adrenal insufficiency  Hypoparathyroidism  HFrEF (heart failure with reduced ejection fraction)  Meningitis due to cryptococcus  Clostridium difficile colitis  CAD (coronary artery disease)  Peripheral vascular disease  Hypertension  Polycystic kidney disease  ESRD (end stage renal disease)  Elective surgery: left arm artery replaced with with right thigh used as donor site  Stented coronary artery: ?  S/P colonoscopy: last done   AICD (automatic cardioverter/defibrillator) present:   H/O hernia repair  A-V fistula:  left UE  right arm ,  H/O kidney transplant: , ,     MEDICATIONS  (STANDING):  amiodarone    Tablet 200 milliGRAM(s) Oral daily  cefepime  Injectable. 1000 milliGRAM(s) IV Push daily  cinacalcet 60 milliGRAM(s) Oral <User Schedule>  dextrose 5%. 1000 milliLiter(s) (50 mL/Hr) IV Continuous <Continuous>  dextrose 50% Injectable 12.5 Gram(s) IV Push once  dextrose 50% Injectable 25 Gram(s) IV Push once  dextrose 50% Injectable 25 Gram(s) IV Push once  insulin lispro (HumaLOG) corrective regimen sliding scale   SubCutaneous three times a day before meals  lactobacillus acidophilus 1 Tablet(s) Oral daily  pantoprazole    Tablet 40 milliGRAM(s) Oral before breakfast  polyethylene glycol 3350 17 Gram(s) Oral daily  predniSONE   Tablet 15 milliGRAM(s) Oral daily  sevelamer hydrochloride 2400 milliGRAM(s) Oral three times a day with meals  vancomycin    Solution 125 milliGRAM(s) Oral every 6 hours              Allergies    No Known Allergies    Intolerances        SOCIAL HISTORY:  Denies ETOh, Smoking,     FAMILY HISTORY:  Family history of kidney disease (Mother)  Family history of colon cancer (Sibling)      REVIEW OF SYSTEMS:    CONSTITUTIONAL: + weakness, + fevers +lethargy (improving / resolved)  EYES/ENT: No visual changes;  No vertigo or throat pain   NECK: No pain or stiffness  RESPIRATORY: +dry cough, no wheezing, no hemoptysis; No shortness of breath  CARDIOVASCULAR: No chest pain or palpitations  GASTROINTESTINAL: + abdominal  pain. No nausea, vomiting, or hematemesis; No diarrhea or constipation. No melena or hematochezia.  GENITOURINARY: No dysuria, frequency or hematuria  NEUROLOGICAL: No numbness or weakness  SKIN: No itching, burning, rashes, or lesions   All other review of systems is negative unless indicated above.    Vital Signs Last 24 Hrs  T(C): 36.4 (27 Dec 2018 04:30), Max: 36.6 (26 Dec 2018 16:56)  T(F): 97.6 (27 Dec 2018 04:30), Max: 97.8 (26 Dec 2018 16:56)  HR: 75 (27 Dec 2018 04:30) (60 - 79)  BP: 96/57 (27 Dec 2018 04:30) (96/57 - 136/93)  BP(mean): --  RR: 17 (27 Dec 2018 04:30) (16 - 17)  SpO2: 95% (27 Dec 2018 04:30) (95% - 96%)    I and O's:        PHYSICAL EXAM:     Constitutional: NAD, awake, alert, responsive  HEENT: PERRLA, EOMI,  MMM  Neck: No LAD, No JVD  Respiratory: CTAB, no wheeze  Cardiovascular: S1 and S2  Gastrointestinal: BS+, soft, NT/ND  Extremities: No peripheral edema LEs, LUE trace edema (slightly improved)  Neurological: A/O x 3, no focal deficits  Psychiatric: Normal mood, normal affect  : No Amaya  Skin: No rashes  Access: RUE AVF +thrill +bruit    LABS:                        8.1    11.23 )-----------( 136      ( 22 Dec 2018 05:17 )             26.5     TPro  x   /  Alb  1.9<L>  /  TBili  x   /  DBili  x   /  AST  x   /  ALT  x   /  AlkPhos  x       139    |  98     |  80     ----------------------------<  118       26 Dec 2018 06:25  4.0     |  26     |  5.62     137    |  97     |  65     ----------------------------<  149       25 Dec 2018 13:21  3.5     |  26     |  4.81     133    |  92     |  46     ----------------------------<  267       23 Dec 2018 11:45  4.4     |  30     |  4.91     Ca    7.7        26 Dec 2018 06:25  Ca    7.6        25 Dec 2018 13:21    Phos  3.9       26 Dec 2018 06:25  Phos  4.9       23 Dec 2018 11:45      TPro  x      /  Alb  1.9    /  TBili  x      /        26 Dec 2018 06:25  DBili  x      /  AST  x      /  ALT  x      /  AlkPhos  x        TPro  6.2    /  Alb  2.0    /  TBili  0.6    /        25 Dec 2018 13:21  DBili  0.3    /  AST  13     /  ALT  15     /  AlkPhos  169          135    |  92     |  22     ----------------------------<  144       22 Dec 2018 05:17  4.2     |  34     |  3.43     137    |  93     |  14     ----------------------------<  135       21 Dec 2018 12:31  3.5     |  36     |  2.61     Ca    7.7        23 Dec 2018 11:45  Ca    8.4        22 Dec 2018 05:17    Phos  4.9       23 Dec 2018 11:45  Phos  4.3       22 Dec 2018 05:17    Mg     1.9       22 Dec 2018 05:17    TPro  x      /  Alb  1.8    /  TBili  x      /        23 Dec 2018 11:45  DBili  x      /  AST  x      /  ALT  x      /  AlkPhos  x        TPro  6.3    /  Alb  2.1    /  TBili  0.9    /        21 Dec 2018 12:31  DBili  x      /  AST  17     /  ALT  13     /  AlkPhos  120          137    |  93     |  14     ----------------------------<  135       21 Dec 2018 12:31  3.5     |  36     |  2.61     Ca    8.4        22 Dec 2018 05:17  Ca    8.3        21 Dec 2018 12:31    Phos  4.3       22 Dec 2018 05:17    Mg     1.9       22 Dec 2018 05:17    TPro  6.3    /  Alb  2.1    /  TBili  0.9    /        21 Dec 2018 12:31  DBili  x      /  AST  17     /  ALT  13     /  AlkPhos  120            Urine Studies:  Urinalysis Basic - ( 21 Dec 2018 13:57 )    Color: Yellow / Appearance: Clear / S.010 / pH: x  Gluc: x / Ketone: Negative  / Bili: Negative / Urobili: Negative mg/dL   Blood: x / Protein: 100 mg/dL / Nitrite: Negative   Leuk Esterase: Negative / RBC: 0-2 /HPF / WBC 3-5   Sq Epi: x / Non Sq Epi: Occasional / Bacteria: x            RADIOLOGY & ADDITIONAL STUDIES:

## 2018-12-27 NOTE — PROGRESS NOTE ADULT - ASSESSMENT
64yo male with multiple medical problems with postprandial abd pain that can radiate to left shoulder  pt improved this AM  unclear if pain due to gastritis, biliary disease, pancreatic stone with dilation    for now advance diet and see if tolerated  continue PPI
64 yo male with recurrent abdominal pain, usually postprandial. CT has large PD stone. This may well be cause for abdominal pain. Will consider transfer for EUS/ERCP/.
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 Diff, CAD, PVD, HTN, polycystic kidney disease with failed transplants sent to ED from dialysis for lethargy. Pt with 2 recent admissions for sepsis. Also s/p EGD with gastritis. Recent borderline low BP - has not been taking full doses of BP meds. He was at dialysis yesterday when he became lethargic with lower starting weight pre-HD. He was given IVF during HD, then sent to ED. Renal called for ESRD management.    1. ESRD on HD - MWF  - s/p HD yesterday, uneventful, tolerated, UF ~1.5-2L  - today BP improved -hold BP meds for SBP<100  - lytes / volume status acceptable  - HD tomorrow (if discharged, will go for regular outpatient HD)    2. MBD - cont Renvela    3. Hypotension / Sepsis  - IV Abx  - hold BP meds for SBP<110    4. MBD - cont Renvela    5. Anemia - transfusions per medicine    Dr. Paniagua to resume care 12/28.
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 Diff, CAD, PVD, HTN, polycystic kidney disease with failed transplants sent to ED from dialysis for lethargy. Pt with 2 recent admissions for sepsis. Also s/p EGD with gastritis. Recent borderline low BP - has not been taking full doses of BP meds. He was at dialysis yesterday when he became lethargic with lower starting weight pre-HD. He was given IVF during HD, then sent to ED. Renal called for ESRD management.    1. ESRD on HD - MWF  - s/p HD Sunday, no complications, UF ~2kg  - today BP improved -hold BP meds for SBP<100  - lytes / volume status acceptable  - next HD tomorrow    2. MBD - cont Renvela    3. Hypotension / Sepsis  - IV Abx  - hold BP meds for SBP<110    4. MBD - cont Renvela    5. Anemia - transfusions per medicine, ok to transfuse OFF dialysis    Dr. Paniagua to resume care 12/28.
62 yo male with ESRD, polycystic disease, and recurrent abdominal pain with fevers. The CT does show a large pancreatic duct stone (which has been present in the past). This could possibly be etiology of pain. Getting US and HIDA today. May consider transfer for possible EUS and definitive therapy of PD stone.
62 yo male with PMH of CAD, ESRD on HD, polycystic kidney disease, systolic CHF s/p AICD, HTN, adrenal insufficiency (on chronic prednisone), hypoparathyroidism, h/o cryptococcal meningitis, h/o c. diff infection, sepsis with Klebsiella pneumoniae on prior hospitalization admitted on 12/21 after his dialysis session for increased lethargy, mid abdominal pain that radiates to left shoulder and upon admission found to have fever to 101 and hypotension which responded to stress dose steroids and iv antibiotics. The patient has one transplanted kidney in abdomen but is on dialysis. He is not on immunosuppressive meds with exception of prednisone 5 mg daily. The patient also notes recent hospitalization for abdominal pain, EGD was done as outpatient found to have gastritis. The patient finds his pain resolved with IVP protonix. No cough, nausea, vomiting, diarrhea or urinary complaints. Still makes small amounts of urine. Upon last hospitalization caught his left arm on iv pole sustained laceration that required sutures.     1. Sepsis improving. Probable acute cholecystitis. Gallstones. Recent sepsis with KLPN. Prior CDAD.  -postprandial abdominal pain is improving  -sepsis is resolving  -on cefepime IV # 3 and vancomycin PO  -s/p vancomycin IV intermittently  -tolerating abx well so far; no side effects noted  -fever is resolved  - follow up cultures   - imaging suggestive of chronic pancreatitis and gallstones  -plan for HIDA scan  -continue IV abx coverage  -old chart reviewed to assess prior cultures  -monitor temps  -f/u CBC  -supportive care  2. Other issues; CAD, ESRD on HD, polycystic kidney disease, systolic CHF s/p AICD, HTN, adrenal insufficiency (on chronic prednisone), hypoparathyroidism  - per medicine
62 yo male with PMH of CAD, ESRD on HD, polycystic kidney disease, systolic CHF s/p AICD, HTN, adrenal insufficiency (on chronic prednisone), hypoparathyroidism, h/o cryptococcal meningitis, h/o c. diff infection, sepsis with Klebsiella pneumoniae on prior hospitalization admitted on 12/21 after his dialysis session for increased lethargy, mid abdominal pain that radiates to left shoulder and upon admission found to have fever to 101 and hypotension which responded to stress dose steroids and iv antibiotics. The patient has one transplanted kidney in abdomen but is on dialysis. He is not on immunosuppressive meds with exception of prednisone 5 mg daily. The patient also notes recent hospitalization for abdominal pain, EGD was done as outpatient found to have gastritis. The patient finds his pain resolved with IVP protonix. No cough, nausea, vomiting, diarrhea or urinary complaints. Still makes small amounts of urine. Upon last hospitalization caught his left arm on iv pole sustained laceration that required sutures.   1. Patient admitted with sepsis of unclear etiology, possibly pneumonia which will treat as health care associated pneumonia given patient is dialysis patient and recently hospitalized  - follow up cultures   - iv hydration and supportive care but monitor closely given patient is dialysis patient  - serial cbc and monitor temperature   - reviewed prior medical records to evaluate for resistant or atypical pathogens   - day #2 cefepime  - tolerating antibiotics without rashes or side effects   - will add vancomycin to treat resistant bacteria but intermittently dose given renal function  - imaging suggestive of chronic pancreatitis, could this be cause of abdominal pain referred to left should; will discuss with GI  -  started po vancomycin to prevent cdiff  - will check cmv pcr and cryptococcal serum antigen, patient on chronic steroids with intermediate high dose steroids  2. other issues; CAD, ESRD on HD, polycystic kidney disease, systolic CHF s/p AICD, HTN, adrenal insufficiency (on chronic prednisone), hypoparathyroidism  - per medicine
62 yo male with multiple medical issues, now with ?symptomatic PD stone. Patient appears stable for discharge to address this as outpatient (already spoke to Dr Willis, who will see patient).
64 yo male with PMH of CAD, ESRD on HD, polycystic kidney disease, systolic CHF s/p AICD, HTN, adrenal insufficiency (on chronic prednisone), hypoparathyroidism, h/o cryptococcal meningitis, h/o c. diff infection, sepsis with Klebsiella pneumoniae on prior hospitalization admitted on 12/21 after his dialysis session for increased lethargy, mid abdominal pain that radiates to left shoulder and upon admission found to have fever to 101 and hypotension which responded to stress dose steroids and iv antibiotics. The patient has one transplanted kidney in abdomen but is on dialysis. He is not on immunosuppressive meds with exception of prednisone 5 mg daily. The patient also notes recent hospitalization for abdominal pain, EGD was done as outpatient found to have gastritis. The patient finds his pain resolved with IVP protonix. No cough, nausea, vomiting, diarrhea or urinary complaints. Still makes small amounts of urine. Upon last hospitalization caught his left arm on iv pole sustained laceration that required sutures.     1. Pancreatic duct stone. ?superimposed infection Sepsis resolved. Gallstones. Recent sepsis with KLPN. Prior CDAD.  -postprandial abdominal pain is felt to be related to pancreatic duct stone  -sepsis is resolving  -HIDA scan is negative; cholecystitis is unlikely  -on cefepime IV # 5 and vancomycin PO  -tolerating abx well so far; no side effects noted  -fever is resolved  -plan for ERCP  - follow up cultures   - imaging suggestive of chronic pancreatitis and gallstones  -plan for HIDA scan  -continue IV abx coverage  -old chart reviewed to assess prior cultures  -monitor temps  -f/u CBC  -supportive care  2. Other issues; CAD, ESRD on HD, polycystic kidney disease, systolic CHF s/p AICD, HTN, adrenal insufficiency (on chronic prednisone), hypoparathyroidism  - per medicine
64 yo male with recurrent fever and abdominal pain of unknown etiology. Will review imaging - patient did not have suggestion of chronic pancreatitis on prior imaging. Agree that gallium or indium scan may be of benefit given bacteremia.
A/P:  Abd pain, not clinically correlated to intrabdominal pathology on radiologic studies  Medical management per primary service   Medical comorbidities of CAD, ESRD on HD, polycystic kidney disease, systolic CHF s/p AICD, HTN, adrenal insufficiency (on chronic prednisone), hypoparathyroidism, h/o cryptococcal meningitis, h/o c. diff infection, PVD, heart failure with reduced ejection fraction  No acute general surgical intervention for pt  Reconsult prn surgical needs
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 Diff, CAD, PVD, HTN, polycystic kidney disease with failed transplants sent to ED from dialysis for lethargy. Pt with 2 recent admissions for sepsis. Also s/p EGD with gastritis. Recent borderline low BP - has not been taking full doses of BP meds. He was at dialysis yesterday when he became lethargic with lower starting weight pre-HD. He was given IVF during HD, then sent to ED. Renal called for ESRD management.    1. ESRD on HD - MWF  - on HD now, UF ~1.8-2.0L net  - today BP improved -hold BP meds for SBP<100  - lytes / volume status acceptable    2. MBD - cont Renvela    3. Hypotension / Sepsis  - IV Abx  - hold BP meds for SBP<110    4. MBD - cont Renvela    5. Anemia - transfusions per medicine    Dr. Paniagua to resume care 12/28.
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 Diff, CAD, PVD, HTN, polycystic kidney disease with failed transplants sent to ED from dialysis for lethargy. Pt with 2 recent admissions for sepsis. Also s/p EGD with gastritis. Recent borderline low BP - has not been taking full doses of BP meds. He was at dialysis yesterday when he became lethargic with lower starting weight pre-HD. He was given IVF during HD, then sent to ED. Renal called for ESRD management.    1. ESRD on HD - MWF  - on HD now - tolerating well  - UF ~2-2.5 as BP permits  - lytes / volume status acceptable  - BP improving - hold BP meds for SBP<100    2. MBD - cont Renvela  3. Hypotension / Sepsis  - f/u cultures  - IV Abx  - IVF if needed - today improved BP   - hold BP meds    4. MBD - cont Renvela    Dr. Paniagua to resume care 12/28.
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 Diff, CAD, PVD, HTN, polycystic kidney disease with failed transplants sent to ED from dialysis for lethargy. Pt with 2 recent admissions for sepsis. Also s/p EGD with gastritis. Recent borderline low BP - has not been taking full doses of BP meds. He was at dialysis yesterday when he became lethargic with lower starting weight pre-HD. He was given IVF during HD, then sent to ED. Renal called for ESRD management.    1. ESRD on HD - MWF  - s/p HD yesterday, no complications, UF ~2kg  - today BP improved -hold BP meds for SBP<100  - lytes / volume status acceptable    2. MBD - cont Renvela    3. Hypotension / Sepsis  - f/u cultures  - IV Abx  - IVF if needed - today improved BP   - hold BP meds    4. MBD - cont Renvela    5. Anemia - transfusions per medicine, ok to transfuse OFF dialysis    Dr. Paniagua to resume care 12/28.

## 2018-12-27 NOTE — DISCHARGE NOTE ADULT - CARE PLAN
Principal Discharge DX:	Pancreatic duct calculus  Goal:	resolution of abdominal pain  Assessment and plan of treatment:	Follow up with GI Dr. Willis outpatient to discuss stone retrieval.   - take all meds as prescribed.  - continue antibiotics.  - decrease prednisone to 10mg (2tabs) tomorrow on 12/28 then down to 5mg daily on Saturday.   - A1c = 6.1% --> you are a pre-diabetic. Start Sitagliptin (Januvia) however DO NOT take if skipping meals.  Secondary Diagnosis:	Dialysis patient  Assessment and plan of treatment:	Continue dialysis outpatient.

## 2018-12-27 NOTE — PROGRESS NOTE ADULT - REASON FOR ADMISSION
sepsis

## 2018-12-27 NOTE — PROGRESS NOTE ADULT - PROVIDER SPECIALTY LIST ADULT
Gastroenterology
Hospitalist
Infectious Disease
Nephrology
Surgery
Gastroenterology
Gastroenterology
Nephrology
Nephrology

## 2018-12-27 NOTE — DISCHARGE NOTE ADULT - HOSPITAL COURSE
CC: abd pain    HPI: This is a 64 yo male with PMH of CAD, ESRD on HD, polycystic kidney disease, systolic CHF s/p AICD, HTN, adrenal insufficiency (on chronic prednisone), hypoparathyroidism, h/o cryptococcal meningitis, h/o c. diff infection, sepsis with Klebsiella pneumoniae on prior hospitalization admitted on 12/21 after his dialysis session for increased lethargy, mid abdominal pain that radiates to left shoulder and upon admission found to have fever to 101 and hypotension which responded to stress dose steroids and iv antibiotics.    12/23: no change in his status; intermittent abd pain mainly after meals  12/24: feels better pending HIDA  12/25: hida negative  12/26: had significant abd pain this am    12/27: Chart reviewed, doing well. Mild abd pain post-prandially otherwise no fevers and eager for dc. Understands plan to f/u outpatient with GI and surgery.     ROS: neg unless stated above.     Vital Signs Last 24 Hrs  T(C): 36.7 (27 Dec 2018 10:44), Max: 36.7 (27 Dec 2018 10:44)  T(F): 98 (27 Dec 2018 10:44), Max: 98 (27 Dec 2018 10:44)  HR: 78 (27 Dec 2018 10:44) (75 - 79)  BP: 104/65 (27 Dec 2018 10:44) (96/57 - 136/93)  BP(mean): --  RR: 17 (27 Dec 2018 10:44) (17 - 17)  SpO2: 96% (27 Dec 2018 10:44) (95% - 96%)    PHYSICAL EXAM:  Constitutional: NAD, awake and alert, well-developed middle aged male.  HEENT: PERR, EOMI, Normal Hearing, MMM  Neck: Soft and supple, No LAD, No JVD  Respiratory: Breath sounds are clear bilaterally, No wheezing, rales or rhonchi  Cardiovascular: S1 and S2, regular rate and rhythm, no Murmurs, gallops or rubs  Gastrointestinal: Bowel Sounds present, soft, mild abd tenderness upper abd otherwise no guarding or rebounding.   Extremities: left arm edema decreasing per patient, nontender.   Vascular: 2+ peripheral pulses  Neurological: A/O x 3, no focal deficits  Musculoskeletal: 5/5 strength b/l upper and lower extremities  Skin: No rashes    LABS: All Labs Reviewed:    A&P  * Epigastric Left flank pain, s/p sepsis on admission, no source identified.  - poss symptomatic Pa duct stone ; frequent adm for infections no source found --> will f/u with Dr. Willis GI outpt.   - CASS was neg for tamara but as per sx he has poss symptomatic cholelithiasis/ billiary colic and he reccomends lap choel at some point  - completed 6 days of IV Cefepime. DC on PO Ceftin to complete 14 day course per ID. No signs of bacteremia this admission but did have Kleb sepsis earlier in the month on prior admission.   - coverage for PNA  - received stress dose steroids now tapering ( on chr prednisone, hypotensive on adm)    * Pancreatic duct stone- chronic  - seen by GI, currently without abd pain.     * Anemia: s/p transfusion    * ESRD - resume outpatient. No signs of volume overload.     * CAD with LAD stent - cont home meds.    * Liver cirrhosis and portal HTN: pt is on amiodarone; ? amio induced toxicity  - per GI will beeval as outpt re: Amio; he is in sinus rhythm    * PPM/ h/o AF  - elevated INR on adm -> mildly supratherpuetic. Discussed increasing coumadin dose tonight with repeat on Monday outpatient with PCP.   - no rec to stop amio so far    * Renal transplant- failed; on low dose prednisone maintenance     DC home today.   Total time > 35 mins. Discussed w/ID.

## 2018-12-27 NOTE — DISCHARGE NOTE ADULT - MEDICATION SUMMARY - MEDICATIONS TO TAKE
I will START or STAY ON the medications listed below when I get home from the hospital:    predniSONE 5 mg oral tablet  -- 1 tab(s) by mouth once a day  -- Indication: For chronic steroids    oxyCODONE 5 mg oral tablet  -- 1 tab(s) by mouth every 6 hours, As needed, Severe Pain (7 - 10) MDD:20 mg  -- Indication: For Severe pain    acetaminophen 500 mg oral tablet  -- 2 tab(s) by mouth every 6 hours, As Needed - for moderate pain  -- Indication: For pain    amiodarone 200 mg oral tablet  -- 0.5 tab(s) by mouth once a day  -- Indication: For a. fib    warfarin 1 mg oral tablet  -- 1 tab(s) by mouth once a day (at bedtime)  ***Held off for past week****  -- Do not take this drug if you are pregnant.  It is very important that you take or use this exactly as directed.  Do not skip doses or discontinue unless directed by your doctor.  Obtain medical advice before taking any non-prescription drugs as some may affect the action of this medication.    -- Indication: For blood thinner    SITagliptin 25 mg oral tablet  -- 1 tab(s) by mouth once a day for elevated blood glucose  -- Do not drink alcoholic beverages when taking this medication.    -- Indication: For pre-diabetes --> DO NOT TAKE IF SKIPPING MEALS    carvedilol 3.125 mg oral tablet  -- 1 tab(s) by mouth every 12 hours  -- Indication: For high blood pressure    Sensipar 30 mg oral tablet  -- 1 tab(s) by mouth 4 times a week    ***Monday, Wednesday, Friday, and Saturday***  -- Indication: For home med    cefuroxime 250 mg oral tablet  -- 1 tab(s) by mouth 2 times a day   -- Finish all this medication unless otherwise directed by prescriber.  Medication should be taken with plenty of water.  Take with food or milk.    -- Indication: For infection    vancomycin 125 mg oral capsule  -- 1 cap(s) by mouth every 6 hours      -- Indication: For hx of c. diff    Renvela 800 mg oral tablet  -- 3 tab(s) by mouth 3 times a day (with meals)  -- Indication: For home med    lactobacillus acidophilus oral capsule  -- 1 tab(s) by mouth once a day   -- Indication: For probiotics    pantoprazole 40 mg oral delayed release tablet  -- 1 tab(s) by mouth 2 times a day  -- Indication: For reflux    cholecalciferol 1000 intl units oral capsule  -- 1 cap(s) by mouth once a day  -- Indication: For vitamin d

## 2018-12-27 NOTE — PROGRESS NOTE ADULT - SUBJECTIVE AND OBJECTIVE BOX
Patient is a 63y old  Male who presents with a chief complaint of sepsis (26 Dec 2018 12:37)      HPI:  62 yo male with PMH of CAD, ESRD on HD, polycystic kidney disease, systolic CHF s/p AICD, HTN, adrenal insufficiency (on chronic prednisone), hypoparathyroidism, h/o cryptococcal meningitis, h/o c. diff infection sent to ED from dialysis for lethargy. Pt with 2 recent admissions first for sepsis and noted to have klebsiella in blood cultures and second for sepsis and abdominal pain with +occult blood. Pt had CT abdomen with IV contrast on 12/5 which did not show any acute pathology. He underwent a EGD which showed gastritis with no active bleeding. Pt was discharged home and was feeling well a little better until a few days ago. He again started to have worsening abd pain and unable to tolerate PO. Every time he ate he developed worsening pain. His BP also remained on the lower side and his INR has remained elevated as outpatient. He was at dialysis this morning when he became lethargic. His weight was equivalent to his dry weight and was thought to be dehydrated and was given IV fluids and underwent dialysis for electrolytes. He was then sent to ED for further evaluation.    Currently in ED pt only complaining of abd pain which has been chronic. Has not changed since last admission. No nausea, vomiting or diarrhea. Denies chest pain. Does states he has a dry cough in the morning. CXR negative in ED.   He was noted to have a fever of 100.5 rectally. Lactate 2.1. He was given 500ml IV fluid. Started on vanco and zosyn. He was also given stress dose solucortef given history of adrenal insufficiency. He was hypotensive on arrival with improvement while in ED. (21 Dec 2018 14:58)    Patient with minimal complaints of pain. Anxious to leave.      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  S/P colonoscopy: last done 2016  AICD (automatic cardioverter/defibrillator) present: 2013  H/O hernia repair  A-V fistula: 1995 left UE  right arm 2007,  H/O kidney transplant: 1985, 1995, 1997      MEDICATIONS  (STANDING):  amiodarone    Tablet 200 milliGRAM(s) Oral daily  cefepime  Injectable. 1000 milliGRAM(s) IV Push daily  cinacalcet 60 milliGRAM(s) Oral <User Schedule>  dextrose 5%. 1000 milliLiter(s) (50 mL/Hr) IV Continuous <Continuous>  dextrose 50% Injectable 12.5 Gram(s) IV Push once  dextrose 50% Injectable 25 Gram(s) IV Push once  dextrose 50% Injectable 25 Gram(s) IV Push once  insulin lispro (HumaLOG) corrective regimen sliding scale   SubCutaneous three times a day before meals  lactobacillus acidophilus 1 Tablet(s) Oral daily  pantoprazole    Tablet 40 milliGRAM(s) Oral before breakfast  polyethylene glycol 3350 17 Gram(s) Oral daily  predniSONE   Tablet 15 milliGRAM(s) Oral daily  sevelamer hydrochloride 2400 milliGRAM(s) Oral three times a day with meals  vancomycin    Solution 125 milliGRAM(s) Oral every 6 hours    MEDICATIONS  (PRN):  acetaminophen   Tablet .. 650 milliGRAM(s) Oral every 6 hours PRN Temp greater or equal to 38C (100.4F), Mild Pain (1 - 3)  aluminum hydroxide/magnesium hydroxide/simethicone Suspension 30 milliLiter(s) Oral every 6 hours PRN Dyspepsia  dextrose 40% Gel 15 Gram(s) Oral once PRN Blood Glucose LESS THAN 70 milliGRAM(s)/deciliter  glucagon  Injectable 1 milliGRAM(s) IntraMuscular once PRN Glucose LESS THAN 70 milligrams/deciliter  ondansetron Injectable 4 milliGRAM(s) IV Push every 4 hours PRN Nausea and/or Vomiting  oxyCODONE    IR 5 milliGRAM(s) Oral every 6 hours PRN Severe Pain (7 - 10)      Allergies    No Known Allergies    Intolerances          Vital Signs Last 24 Hrs  T(C): 36.4 (27 Dec 2018 04:30), Max: 36.6 (26 Dec 2018 16:56)  T(F): 97.6 (27 Dec 2018 04:30), Max: 97.8 (26 Dec 2018 16:56)  HR: 75 (27 Dec 2018 04:30) (60 - 79)  BP: 96/57 (27 Dec 2018 04:30) (88/60 - 136/93)  BP(mean): --  RR: 17 (27 Dec 2018 04:30) (16 - 17)  SpO2: 95% (27 Dec 2018 04:30) (95% - 96%)    PHYSICAL EXAM:    Respiratory: CTAB  Cardiovascular: S1 and S2, RRR, no M/G/R  Gastrointestinal: BS+, soft, NT/ND    LABS:                        8.6    8.20  )-----------( 115      ( 27 Dec 2018 06:27 )             28.0     12-26    139  |  98  |  80<H>  ----------------------------<  118<H>  4.0   |  26  |  5.62<H>    Ca    7.7<L>      26 Dec 2018 06:25  Phos  3.9     12-26    TPro  x   /  Alb  1.9<L>  /  TBili  x   /  DBili  x   /  AST  x   /  ALT  x   /  AlkPhos  x   12-26    PT/INR - ( 27 Dec 2018 06:27 )   PT: 21.4 sec;   INR: 1.89 ratio           LIVER FUNCTIONS - ( 26 Dec 2018 06:25 )  Alb: 1.9 g/dL / Pro: x     / ALK PHOS: x     / ALT: x     / AST: x     / GGT: x             RADIOLOGY & ADDITIONAL STUDIES:

## 2019-01-01 ENCOUNTER — OUTPATIENT (OUTPATIENT)
Dept: OUTPATIENT SERVICES | Facility: HOSPITAL | Age: 64
LOS: 1 days | Discharge: HOME | End: 2019-01-01
Payer: MEDICARE

## 2019-01-01 ENCOUNTER — TRANSCRIPTION ENCOUNTER (OUTPATIENT)
Age: 64
End: 2019-01-01

## 2019-01-01 ENCOUNTER — INPATIENT (INPATIENT)
Facility: HOSPITAL | Age: 64
LOS: 3 days | Discharge: HOME | End: 2019-04-14
Attending: HOSPITALIST | Admitting: HOSPITALIST
Payer: MEDICARE

## 2019-01-01 ENCOUNTER — INPATIENT (INPATIENT)
Facility: HOSPITAL | Age: 64
LOS: 8 days | Discharge: HOME HEALTH | End: 2019-04-26
Attending: INTERNAL MEDICINE | Admitting: INTERNAL MEDICINE
Payer: MEDICARE

## 2019-01-01 ENCOUNTER — RECORD ABSTRACTING (OUTPATIENT)
Age: 64
End: 2019-01-01

## 2019-01-01 ENCOUNTER — APPOINTMENT (OUTPATIENT)
Dept: GASTROENTEROLOGY | Facility: CLINIC | Age: 64
End: 2019-01-01
Payer: MEDICARE

## 2019-01-01 ENCOUNTER — OUTPATIENT (OUTPATIENT)
Dept: OUTPATIENT SERVICES | Facility: HOSPITAL | Age: 64
LOS: 1 days | Discharge: HOME | End: 2019-01-01

## 2019-01-01 ENCOUNTER — APPOINTMENT (OUTPATIENT)
Dept: ELECTROPHYSIOLOGY | Facility: CLINIC | Age: 64
End: 2019-01-01
Payer: MEDICARE

## 2019-01-01 VITALS
OXYGEN SATURATION: 99 % | HEART RATE: 63 BPM | DIASTOLIC BLOOD PRESSURE: 92 MMHG | TEMPERATURE: 99 F | SYSTOLIC BLOOD PRESSURE: 134 MMHG | RESPIRATION RATE: 17 BRPM

## 2019-01-01 VITALS — HEIGHT: 60 IN | WEIGHT: 139.99 LBS

## 2019-01-01 VITALS
HEIGHT: 72 IN | RESPIRATION RATE: 17 BRPM | WEIGHT: 140 LBS | DIASTOLIC BLOOD PRESSURE: 69 MMHG | DIASTOLIC BLOOD PRESSURE: 105 MMHG | SYSTOLIC BLOOD PRESSURE: 139 MMHG | HEART RATE: 58 BPM | HEART RATE: 62 BPM | TEMPERATURE: 99 F | BODY MASS INDEX: 18.96 KG/M2 | SYSTOLIC BLOOD PRESSURE: 166 MMHG | TEMPERATURE: 94.7 F

## 2019-01-01 VITALS — DIASTOLIC BLOOD PRESSURE: 80 MMHG | HEART RATE: 87 BPM | SYSTOLIC BLOOD PRESSURE: 130 MMHG

## 2019-01-01 VITALS
OXYGEN SATURATION: 99 % | RESPIRATION RATE: 16 BRPM | SYSTOLIC BLOOD PRESSURE: 130 MMHG | DIASTOLIC BLOOD PRESSURE: 78 MMHG | HEART RATE: 61 BPM | TEMPERATURE: 98 F

## 2019-01-01 VITALS — WEIGHT: 139.99 LBS | HEIGHT: 72 IN

## 2019-01-01 DIAGNOSIS — I48.91 UNSPECIFIED ATRIAL FIBRILLATION: ICD-10-CM

## 2019-01-01 DIAGNOSIS — Z94.0 KIDNEY TRANSPLANT STATUS: Chronic | ICD-10-CM

## 2019-01-01 DIAGNOSIS — I13.2 HYPERTENSIVE HEART AND CHRONIC KIDNEY DISEASE WITH HEART FAILURE AND WITH STAGE 5 CHRONIC KIDNEY DISEASE, OR END STAGE RENAL DISEASE: ICD-10-CM

## 2019-01-01 DIAGNOSIS — Z95.810 PRESENCE OF AUTOMATIC (IMPLANTABLE) CARDIAC DEFIBRILLATOR: ICD-10-CM

## 2019-01-01 DIAGNOSIS — I77.0 ARTERIOVENOUS FISTULA, ACQUIRED: Chronic | ICD-10-CM

## 2019-01-01 DIAGNOSIS — Z98.890 OTHER SPECIFIED POSTPROCEDURAL STATES: Chronic | ICD-10-CM

## 2019-01-01 DIAGNOSIS — K81.0 ACUTE CHOLECYSTITIS: ICD-10-CM

## 2019-01-01 DIAGNOSIS — Z99.2 DEPENDENCE ON RENAL DIALYSIS: ICD-10-CM

## 2019-01-01 DIAGNOSIS — Z95.5 PRESENCE OF CORONARY ANGIOPLASTY IMPLANT AND GRAFT: Chronic | ICD-10-CM

## 2019-01-01 DIAGNOSIS — K86.1 OTHER CHRONIC PANCREATITIS: ICD-10-CM

## 2019-01-01 DIAGNOSIS — D63.1 ANEMIA IN CHRONIC KIDNEY DISEASE: ICD-10-CM

## 2019-01-01 DIAGNOSIS — A41.9 SEPSIS, UNSPECIFIED ORGANISM: ICD-10-CM

## 2019-01-01 DIAGNOSIS — I50.20 UNSPECIFIED SYSTOLIC (CONGESTIVE) HEART FAILURE: ICD-10-CM

## 2019-01-01 DIAGNOSIS — I25.10 ATHEROSCLEROTIC HEART DISEASE OF NATIVE CORONARY ARTERY WITHOUT ANGINA PECTORIS: ICD-10-CM

## 2019-01-01 DIAGNOSIS — Z41.9 ENCOUNTER FOR PROCEDURE FOR PURPOSES OTHER THAN REMEDYING HEALTH STATE, UNSPECIFIED: Chronic | ICD-10-CM

## 2019-01-01 DIAGNOSIS — Z94.0 KIDNEY TRANSPLANT STATUS: ICD-10-CM

## 2019-01-01 DIAGNOSIS — M10.9 GOUT, UNSPECIFIED: ICD-10-CM

## 2019-01-01 DIAGNOSIS — A41.59 OTHER GRAM-NEGATIVE SEPSIS: ICD-10-CM

## 2019-01-01 DIAGNOSIS — D64.9 ANEMIA, UNSPECIFIED: ICD-10-CM

## 2019-01-01 DIAGNOSIS — Z98.890 OTHER SPECIFIED POSTPROCEDURAL STATES: ICD-10-CM

## 2019-01-01 DIAGNOSIS — I95.9 HYPOTENSION, UNSPECIFIED: ICD-10-CM

## 2019-01-01 DIAGNOSIS — Z79.01 LONG TERM (CURRENT) USE OF ANTICOAGULANTS: ICD-10-CM

## 2019-01-01 DIAGNOSIS — Z95.810 PRESENCE OF AUTOMATIC (IMPLANTABLE) CARDIAC DEFIBRILLATOR: Chronic | ICD-10-CM

## 2019-01-01 DIAGNOSIS — E27.40 UNSPECIFIED ADRENOCORTICAL INSUFFICIENCY: ICD-10-CM

## 2019-01-01 DIAGNOSIS — Z87.448 PERSONAL HISTORY OF OTHER DISEASES OF URINARY SYSTEM: ICD-10-CM

## 2019-01-01 DIAGNOSIS — I42.9 CARDIOMYOPATHY, UNSPECIFIED: ICD-10-CM

## 2019-01-01 DIAGNOSIS — K83.09 OTHER CHOLANGITIS: ICD-10-CM

## 2019-01-01 DIAGNOSIS — I10 ESSENTIAL (PRIMARY) HYPERTENSION: ICD-10-CM

## 2019-01-01 DIAGNOSIS — I50.9 HEART FAILURE, UNSPECIFIED: ICD-10-CM

## 2019-01-01 DIAGNOSIS — N18.6 END STAGE RENAL DISEASE: ICD-10-CM

## 2019-01-01 DIAGNOSIS — Z79.899 OTHER LONG TERM (CURRENT) DRUG THERAPY: ICD-10-CM

## 2019-01-01 DIAGNOSIS — K57.32 DIVERTICULITIS OF LARGE INTESTINE W/OUT PERFORATION OR ABSCESS W/OUT BLEEDING: ICD-10-CM

## 2019-01-01 DIAGNOSIS — Z79.52 LONG TERM (CURRENT) USE OF SYSTEMIC STEROIDS: ICD-10-CM

## 2019-01-01 DIAGNOSIS — E43 UNSPECIFIED SEVERE PROTEIN-CALORIE MALNUTRITION: ICD-10-CM

## 2019-01-01 DIAGNOSIS — I73.9 PERIPHERAL VASCULAR DISEASE, UNSPECIFIED: ICD-10-CM

## 2019-01-01 DIAGNOSIS — I51.9 HEART DISEASE, UNSPECIFIED: ICD-10-CM

## 2019-01-01 DIAGNOSIS — I42.0 DILATED CARDIOMYOPATHY: ICD-10-CM

## 2019-01-01 DIAGNOSIS — E20.9 HYPOPARATHYROIDISM, UNSPECIFIED: ICD-10-CM

## 2019-01-01 DIAGNOSIS — Z43.5 ENCOUNTER FOR ATTENTION TO CYSTOSTOMY: ICD-10-CM

## 2019-01-01 DIAGNOSIS — Z95.5 PRESENCE OF CORONARY ANGIOPLASTY IMPLANT AND GRAFT: ICD-10-CM

## 2019-01-01 LAB
-  AMIKACIN: SIGNIFICANT CHANGE UP
-  AMPICILLIN/SULBACTAM: SIGNIFICANT CHANGE UP
-  AMPICILLIN: SIGNIFICANT CHANGE UP
-  AZTREONAM: SIGNIFICANT CHANGE UP
-  CEFAZOLIN: SIGNIFICANT CHANGE UP
-  CEFEPIME: SIGNIFICANT CHANGE UP
-  CEFOXITIN: SIGNIFICANT CHANGE UP
-  CEFTRIAXONE: SIGNIFICANT CHANGE UP
-  CIPROFLOXACIN: SIGNIFICANT CHANGE UP
-  ERTAPENEM: SIGNIFICANT CHANGE UP
-  GENTAMICIN: SIGNIFICANT CHANGE UP
-  IMIPENEM: SIGNIFICANT CHANGE UP
-  LEVOFLOXACIN: SIGNIFICANT CHANGE UP
-  MEROPENEM: SIGNIFICANT CHANGE UP
-  PIPERACILLIN/TAZOBACTAM: SIGNIFICANT CHANGE UP
-  TOBRAMYCIN: SIGNIFICANT CHANGE UP
-  TRIMETHOPRIM/SULFAMETHOXAZOLE: SIGNIFICANT CHANGE UP
ALBUMIN SERPL ELPH-MCNC: 2.1 G/DL — LOW (ref 3.3–5)
ALBUMIN SERPL ELPH-MCNC: 2.2 G/DL — LOW (ref 3.3–5)
ALBUMIN SERPL ELPH-MCNC: 2.2 G/DL — LOW (ref 3.3–5)
ALBUMIN SERPL ELPH-MCNC: 2.3 G/DL — LOW (ref 3.3–5)
ALBUMIN SERPL ELPH-MCNC: 2.3 G/DL — LOW (ref 3.3–5)
ALBUMIN SERPL ELPH-MCNC: 2.4 G/DL — LOW (ref 3.3–5)
ALBUMIN SERPL ELPH-MCNC: 2.4 G/DL — LOW (ref 3.3–5)
ALBUMIN SERPL ELPH-MCNC: 2.5 G/DL — LOW (ref 3.3–5)
ALBUMIN SERPL ELPH-MCNC: 2.5 G/DL — LOW (ref 3.3–5)
ALBUMIN SERPL ELPH-MCNC: 2.6 G/DL — LOW (ref 3.3–5)
ALBUMIN SERPL ELPH-MCNC: 2.6 G/DL — LOW (ref 3.3–5)
ALBUMIN SERPL ELPH-MCNC: 2.8 G/DL — LOW (ref 3.3–5)
ALP SERPL-CCNC: 72 U/L — SIGNIFICANT CHANGE UP (ref 40–120)
ALP SERPL-CCNC: 76 U/L — SIGNIFICANT CHANGE UP (ref 40–120)
ALP SERPL-CCNC: 77 U/L — SIGNIFICANT CHANGE UP (ref 40–120)
ALP SERPL-CCNC: 80 U/L — SIGNIFICANT CHANGE UP (ref 40–120)
ALP SERPL-CCNC: 88 U/L — SIGNIFICANT CHANGE UP (ref 40–120)
ALP SERPL-CCNC: 95 U/L — SIGNIFICANT CHANGE UP (ref 40–120)
ALT FLD-CCNC: 6 U/L — LOW (ref 12–78)
ALT FLD-CCNC: <6 U/L — LOW (ref 12–78)
ANION GAP SERPL CALC-SCNC: 11 MMOL/L — SIGNIFICANT CHANGE UP (ref 5–17)
ANION GAP SERPL CALC-SCNC: 11 MMOL/L — SIGNIFICANT CHANGE UP (ref 5–17)
ANION GAP SERPL CALC-SCNC: 12 MMOL/L — SIGNIFICANT CHANGE UP (ref 5–17)
ANION GAP SERPL CALC-SCNC: 13 MMOL/L — SIGNIFICANT CHANGE UP (ref 5–17)
ANION GAP SERPL CALC-SCNC: 13 MMOL/L — SIGNIFICANT CHANGE UP (ref 5–17)
ANION GAP SERPL CALC-SCNC: 14 MMOL/L — SIGNIFICANT CHANGE UP (ref 5–17)
ANION GAP SERPL CALC-SCNC: 15 MMOL/L — SIGNIFICANT CHANGE UP (ref 5–17)
ANION GAP SERPL CALC-SCNC: 16 MMOL/L — SIGNIFICANT CHANGE UP (ref 5–17)
ANION GAP SERPL CALC-SCNC: 17 MMOL/L — SIGNIFICANT CHANGE UP (ref 5–17)
ANION GAP SERPL CALC-SCNC: 5 MMOL/L — SIGNIFICANT CHANGE UP (ref 5–17)
ANION GAP SERPL CALC-SCNC: 8 MMOL/L — SIGNIFICANT CHANGE UP (ref 5–17)
ANION GAP SERPL CALC-SCNC: 8 MMOL/L — SIGNIFICANT CHANGE UP (ref 5–17)
ANISOCYTOSIS BLD QL: SLIGHT — SIGNIFICANT CHANGE UP
APPEARANCE UR: CLEAR — SIGNIFICANT CHANGE UP
APPEARANCE UR: CLEAR — SIGNIFICANT CHANGE UP
APTT BLD: 25.6 SEC — LOW (ref 27.5–36.3)
APTT BLD: 29.8 SEC — SIGNIFICANT CHANGE UP (ref 27.5–36.3)
APTT BLD: 30.6 SEC — SIGNIFICANT CHANGE UP (ref 27.5–36.3)
APTT BLD: 30.9 SEC — SIGNIFICANT CHANGE UP (ref 27.5–36.3)
APTT BLD: 32.7 SEC — SIGNIFICANT CHANGE UP (ref 27.5–36.3)
APTT BLD: 34.2 SEC — SIGNIFICANT CHANGE UP (ref 27.5–36.3)
APTT BLD: 37.6 SEC — HIGH (ref 27.5–36.3)
APTT BLD: 39 SEC — HIGH (ref 27.5–36.3)
AST SERPL-CCNC: 12 U/L — LOW (ref 15–37)
AST SERPL-CCNC: 13 U/L — LOW (ref 15–37)
AST SERPL-CCNC: 18 U/L — SIGNIFICANT CHANGE UP (ref 15–37)
AST SERPL-CCNC: 22 U/L — SIGNIFICANT CHANGE UP (ref 15–37)
AST SERPL-CCNC: 5 U/L — LOW (ref 15–37)
AST SERPL-CCNC: 9 U/L — LOW (ref 15–37)
BACTERIA # UR AUTO: ABNORMAL
BASO STIPL BLD QL SMEAR: PRESENT — SIGNIFICANT CHANGE UP
BASOPHILS # BLD AUTO: 0 K/UL — SIGNIFICANT CHANGE UP (ref 0–0.2)
BASOPHILS # BLD AUTO: 0.01 K/UL — SIGNIFICANT CHANGE UP (ref 0–0.2)
BASOPHILS # BLD AUTO: 0.01 K/UL — SIGNIFICANT CHANGE UP (ref 0–0.2)
BASOPHILS # BLD AUTO: 0.02 K/UL — SIGNIFICANT CHANGE UP (ref 0–0.2)
BASOPHILS NFR BLD AUTO: 0 % — SIGNIFICANT CHANGE UP (ref 0–2)
BASOPHILS NFR BLD AUTO: 0.1 % — SIGNIFICANT CHANGE UP (ref 0–2)
BASOPHILS NFR BLD AUTO: 0.1 % — SIGNIFICANT CHANGE UP (ref 0–2)
BASOPHILS NFR BLD AUTO: 0.3 % — SIGNIFICANT CHANGE UP (ref 0–2)
BILIRUB SERPL-MCNC: 0.6 MG/DL — SIGNIFICANT CHANGE UP (ref 0.2–1.2)
BILIRUB SERPL-MCNC: 0.8 MG/DL — SIGNIFICANT CHANGE UP (ref 0.2–1.2)
BILIRUB SERPL-MCNC: 0.8 MG/DL — SIGNIFICANT CHANGE UP (ref 0.2–1.2)
BILIRUB SERPL-MCNC: 0.9 MG/DL — SIGNIFICANT CHANGE UP (ref 0.2–1.2)
BILIRUB SERPL-MCNC: 0.9 MG/DL — SIGNIFICANT CHANGE UP (ref 0.2–1.2)
BILIRUB SERPL-MCNC: 1 MG/DL — SIGNIFICANT CHANGE UP (ref 0.2–1.2)
BILIRUB UR-MCNC: NEGATIVE — SIGNIFICANT CHANGE UP
BILIRUB UR-MCNC: NEGATIVE — SIGNIFICANT CHANGE UP
BLD GP AB SCN SERPL QL: SIGNIFICANT CHANGE UP
BUN SERPL-MCNC: 100 MG/DL — HIGH (ref 7–23)
BUN SERPL-MCNC: 100 MG/DL — HIGH (ref 7–23)
BUN SERPL-MCNC: 106 MG/DL — HIGH (ref 7–23)
BUN SERPL-MCNC: 18 MG/DL — SIGNIFICANT CHANGE UP (ref 7–23)
BUN SERPL-MCNC: 33 MG/DL — HIGH (ref 7–23)
BUN SERPL-MCNC: 45 MG/DL — HIGH (ref 7–23)
BUN SERPL-MCNC: 46 MG/DL — HIGH (ref 7–23)
BUN SERPL-MCNC: 46 MG/DL — HIGH (ref 7–23)
BUN SERPL-MCNC: 56 MG/DL — HIGH (ref 7–23)
BUN SERPL-MCNC: 57 MG/DL — HIGH (ref 7–23)
BUN SERPL-MCNC: 59 MG/DL — HIGH (ref 7–23)
BUN SERPL-MCNC: 68 MG/DL — HIGH (ref 7–23)
BUN SERPL-MCNC: 70 MG/DL — HIGH (ref 7–23)
BUN SERPL-MCNC: 84 MG/DL — HIGH (ref 7–23)
BUN SERPL-MCNC: 89 MG/DL — HIGH (ref 7–23)
CALCIUM SERPL-MCNC: 7.5 MG/DL — LOW (ref 8.5–10.1)
CALCIUM SERPL-MCNC: 7.5 MG/DL — LOW (ref 8.5–10.1)
CALCIUM SERPL-MCNC: 7.7 MG/DL — LOW (ref 8.5–10.1)
CALCIUM SERPL-MCNC: 7.8 MG/DL — LOW (ref 8.5–10.1)
CALCIUM SERPL-MCNC: 7.9 MG/DL — LOW (ref 8.4–10.5)
CALCIUM SERPL-MCNC: 7.9 MG/DL — LOW (ref 8.5–10.1)
CALCIUM SERPL-MCNC: 7.9 MG/DL — LOW (ref 8.5–10.1)
CALCIUM SERPL-MCNC: 8 MG/DL — LOW (ref 8.5–10.1)
CALCIUM SERPL-MCNC: 8.1 MG/DL — LOW (ref 8.5–10.1)
CALCIUM SERPL-MCNC: 8.1 MG/DL — LOW (ref 8.5–10.1)
CALCIUM SERPL-MCNC: 8.2 MG/DL — LOW (ref 8.5–10.1)
CALCIUM SERPL-MCNC: 8.2 MG/DL — LOW (ref 8.5–10.1)
CALCIUM SERPL-MCNC: 8.4 MG/DL — LOW (ref 8.5–10.1)
CHLORIDE SERPL-SCNC: 100 MMOL/L — SIGNIFICANT CHANGE UP (ref 96–108)
CHLORIDE SERPL-SCNC: 101 MMOL/L — SIGNIFICANT CHANGE UP (ref 96–108)
CHLORIDE SERPL-SCNC: 101 MMOL/L — SIGNIFICANT CHANGE UP (ref 96–108)
CHLORIDE SERPL-SCNC: 96 MMOL/L — SIGNIFICANT CHANGE UP (ref 96–108)
CHLORIDE SERPL-SCNC: 96 MMOL/L — SIGNIFICANT CHANGE UP (ref 96–108)
CHLORIDE SERPL-SCNC: 97 MMOL/L — SIGNIFICANT CHANGE UP (ref 96–108)
CHLORIDE SERPL-SCNC: 98 MMOL/L — SIGNIFICANT CHANGE UP (ref 96–108)
CHLORIDE SERPL-SCNC: 99 MMOL/L — SIGNIFICANT CHANGE UP (ref 96–108)
CHLORIDE SERPL-SCNC: 99 MMOL/L — SIGNIFICANT CHANGE UP (ref 96–108)
CO2 SERPL-SCNC: 22 MMOL/L — SIGNIFICANT CHANGE UP (ref 22–31)
CO2 SERPL-SCNC: 23 MMOL/L — SIGNIFICANT CHANGE UP (ref 22–31)
CO2 SERPL-SCNC: 24 MMOL/L — SIGNIFICANT CHANGE UP (ref 22–31)
CO2 SERPL-SCNC: 25 MMOL/L — SIGNIFICANT CHANGE UP (ref 22–31)
CO2 SERPL-SCNC: 25 MMOL/L — SIGNIFICANT CHANGE UP (ref 22–31)
CO2 SERPL-SCNC: 27 MMOL/L — SIGNIFICANT CHANGE UP (ref 22–31)
CO2 SERPL-SCNC: 27 MMOL/L — SIGNIFICANT CHANGE UP (ref 22–31)
CO2 SERPL-SCNC: 28 MMOL/L — SIGNIFICANT CHANGE UP (ref 22–31)
CO2 SERPL-SCNC: 30 MMOL/L — SIGNIFICANT CHANGE UP (ref 22–31)
CO2 SERPL-SCNC: 30 MMOL/L — SIGNIFICANT CHANGE UP (ref 22–31)
CO2 SERPL-SCNC: 36 MMOL/L — HIGH (ref 22–31)
CO2 SERPL-SCNC: 36 MMOL/L — HIGH (ref 22–31)
COLOR SPEC: YELLOW — SIGNIFICANT CHANGE UP
COLOR SPEC: YELLOW — SIGNIFICANT CHANGE UP
CREAT SERPL-MCNC: 3.16 MG/DL — HIGH (ref 0.5–1.3)
CREAT SERPL-MCNC: 3.96 MG/DL — HIGH (ref 0.5–1.3)
CREAT SERPL-MCNC: 4.05 MG/DL — HIGH (ref 0.5–1.3)
CREAT SERPL-MCNC: 4.33 MG/DL — HIGH (ref 0.5–1.3)
CREAT SERPL-MCNC: 4.35 MG/DL — HIGH (ref 0.5–1.3)
CREAT SERPL-MCNC: 4.46 MG/DL — HIGH (ref 0.5–1.3)
CREAT SERPL-MCNC: 4.64 MG/DL — HIGH (ref 0.5–1.3)
CREAT SERPL-MCNC: 4.66 MG/DL — HIGH (ref 0.5–1.3)
CREAT SERPL-MCNC: 5.25 MG/DL — HIGH (ref 0.5–1.3)
CREAT SERPL-MCNC: 5.31 MG/DL — HIGH (ref 0.5–1.3)
CREAT SERPL-MCNC: 5.75 MG/DL — HIGH (ref 0.5–1.3)
CREAT SERPL-MCNC: 5.87 MG/DL — HIGH (ref 0.5–1.3)
CREAT SERPL-MCNC: 5.88 MG/DL — HIGH (ref 0.5–1.3)
CREAT SERPL-MCNC: 6.28 MG/DL — HIGH (ref 0.5–1.3)
CULTURE RESULTS: NO GROWTH — SIGNIFICANT CHANGE UP
CULTURE RESULTS: SIGNIFICANT CHANGE UP
DIFF PNL FLD: ABNORMAL
DIFF PNL FLD: NEGATIVE — SIGNIFICANT CHANGE UP
E COLI DNA BLD POS QL NAA+NON-PROBE: SIGNIFICANT CHANGE UP
ELLIPTOCYTES BLD QL SMEAR: SLIGHT — SIGNIFICANT CHANGE UP
EOSINOPHIL # BLD AUTO: 0 K/UL — SIGNIFICANT CHANGE UP (ref 0–0.5)
EOSINOPHIL # BLD AUTO: 0.01 K/UL — SIGNIFICANT CHANGE UP (ref 0–0.5)
EOSINOPHIL # BLD AUTO: 0.16 K/UL — SIGNIFICANT CHANGE UP (ref 0–0.5)
EOSINOPHIL NFR BLD AUTO: 0 % — SIGNIFICANT CHANGE UP (ref 0–6)
EOSINOPHIL NFR BLD AUTO: 0.1 % — SIGNIFICANT CHANGE UP (ref 0–6)
EOSINOPHIL NFR BLD AUTO: 2.5 % — SIGNIFICANT CHANGE UP (ref 0–6)
EPI CELLS # UR: SIGNIFICANT CHANGE UP
GLUCOSE BLDC GLUCOMTR-MCNC: 107 MG/DL — HIGH (ref 70–99)
GLUCOSE BLDC GLUCOMTR-MCNC: 119 MG/DL — HIGH (ref 70–99)
GLUCOSE BLDC GLUCOMTR-MCNC: 123 MG/DL — HIGH (ref 70–99)
GLUCOSE BLDC GLUCOMTR-MCNC: 125 MG/DL — HIGH (ref 70–99)
GLUCOSE BLDC GLUCOMTR-MCNC: 133 MG/DL — HIGH (ref 70–99)
GLUCOSE BLDC GLUCOMTR-MCNC: 143 MG/DL — HIGH (ref 70–99)
GLUCOSE BLDC GLUCOMTR-MCNC: 146 MG/DL — HIGH (ref 70–99)
GLUCOSE BLDC GLUCOMTR-MCNC: 148 MG/DL — HIGH (ref 70–99)
GLUCOSE BLDC GLUCOMTR-MCNC: 151 MG/DL — HIGH (ref 70–99)
GLUCOSE BLDC GLUCOMTR-MCNC: 157 MG/DL — HIGH (ref 70–99)
GLUCOSE BLDC GLUCOMTR-MCNC: 167 MG/DL — HIGH (ref 70–99)
GLUCOSE BLDC GLUCOMTR-MCNC: 181 MG/DL — HIGH (ref 70–99)
GLUCOSE BLDC GLUCOMTR-MCNC: 182 MG/DL — HIGH (ref 70–99)
GLUCOSE BLDC GLUCOMTR-MCNC: 184 MG/DL — HIGH (ref 70–99)
GLUCOSE BLDC GLUCOMTR-MCNC: 185 MG/DL — HIGH (ref 70–99)
GLUCOSE BLDC GLUCOMTR-MCNC: 194 MG/DL — HIGH (ref 70–99)
GLUCOSE BLDC GLUCOMTR-MCNC: 204 MG/DL — HIGH (ref 70–99)
GLUCOSE BLDC GLUCOMTR-MCNC: 242 MG/DL — HIGH (ref 70–99)
GLUCOSE SERPL-MCNC: 113 MG/DL — HIGH (ref 70–99)
GLUCOSE SERPL-MCNC: 127 MG/DL — HIGH (ref 70–99)
GLUCOSE SERPL-MCNC: 143 MG/DL — HIGH (ref 70–99)
GLUCOSE SERPL-MCNC: 157 MG/DL — HIGH (ref 70–99)
GLUCOSE SERPL-MCNC: 161 MG/DL — HIGH (ref 70–99)
GLUCOSE SERPL-MCNC: 163 MG/DL — HIGH (ref 70–99)
GLUCOSE SERPL-MCNC: 172 MG/DL — HIGH (ref 70–99)
GLUCOSE SERPL-MCNC: 179 MG/DL — HIGH (ref 70–99)
GLUCOSE SERPL-MCNC: 189 MG/DL — HIGH (ref 70–99)
GLUCOSE SERPL-MCNC: 227 MG/DL — HIGH (ref 70–99)
GLUCOSE SERPL-MCNC: 252 MG/DL — HIGH (ref 70–99)
GLUCOSE SERPL-MCNC: 267 MG/DL — HIGH (ref 70–99)
GLUCOSE SERPL-MCNC: 92 MG/DL — SIGNIFICANT CHANGE UP (ref 70–99)
GLUCOSE SERPL-MCNC: 98 MG/DL — SIGNIFICANT CHANGE UP (ref 70–99)
GLUCOSE UR QL: 50 MG/DL
GLUCOSE UR QL: 50 MG/DL
GRAM STN FLD: SIGNIFICANT CHANGE UP
HAV IGM SER-ACNC: SIGNIFICANT CHANGE UP
HBA1C BLD-MCNC: 5.3 % — SIGNIFICANT CHANGE UP (ref 4–5.6)
HBV CORE IGM SER-ACNC: SIGNIFICANT CHANGE UP
HBV SURFACE AG SER-ACNC: SIGNIFICANT CHANGE UP
HCT VFR BLD CALC: 21.5 % — LOW (ref 39–50)
HCT VFR BLD CALC: 29.6 % — LOW (ref 39–50)
HCT VFR BLD CALC: 30.1 % — LOW (ref 39–50)
HCT VFR BLD CALC: 30.6 % — LOW (ref 39–50)
HCT VFR BLD CALC: 31.9 % — LOW (ref 39–50)
HCT VFR BLD CALC: 32.1 % — LOW (ref 39–50)
HCT VFR BLD CALC: 32.6 % — LOW (ref 39–50)
HCT VFR BLD CALC: 32.8 % — LOW (ref 39–50)
HCT VFR BLD CALC: 32.8 % — LOW (ref 39–50)
HCT VFR BLD CALC: 33.2 % — LOW (ref 39–50)
HCT VFR BLD CALC: 33.6 % — LOW (ref 39–50)
HCT VFR BLD CALC: 33.7 % — LOW (ref 39–50)
HCT VFR BLD CALC: 34.1 % — LOW (ref 39–50)
HCV AB S/CO SERPL IA: 0.15 S/CO — SIGNIFICANT CHANGE UP (ref 0–0.99)
HCV AB S/CO SERPL IA: 0.16 S/CO — SIGNIFICANT CHANGE UP (ref 0–0.99)
HCV AB S/CO SERPL IA: 0.2 S/CO — SIGNIFICANT CHANGE UP (ref 0–0.99)
HCV AB S/CO SERPL IA: 0.38 S/CO — SIGNIFICANT CHANGE UP
HCV AB SERPL-IMP: SIGNIFICANT CHANGE UP
HGB BLD-MCNC: 10 G/DL — LOW (ref 13–17)
HGB BLD-MCNC: 10.1 G/DL — LOW (ref 13–17)
HGB BLD-MCNC: 10.2 G/DL — LOW (ref 13–17)
HGB BLD-MCNC: 6.4 G/DL — CRITICAL LOW (ref 13–17)
HGB BLD-MCNC: 9.1 G/DL — LOW (ref 13–17)
HGB BLD-MCNC: 9.2 G/DL — LOW (ref 13–17)
HGB BLD-MCNC: 9.3 G/DL — LOW (ref 13–17)
HGB BLD-MCNC: 9.7 G/DL — LOW (ref 13–17)
HGB BLD-MCNC: 9.7 G/DL — LOW (ref 13–17)
HGB BLD-MCNC: 9.8 G/DL — LOW (ref 13–17)
HGB BLD-MCNC: 9.9 G/DL — LOW (ref 13–17)
HYALINE CASTS # UR AUTO: NEGATIVE /LPF — SIGNIFICANT CHANGE UP
HYPOCHROMIA BLD QL: SLIGHT — SIGNIFICANT CHANGE UP
IMM GRANULOCYTES NFR BLD AUTO: 0.2 % — SIGNIFICANT CHANGE UP (ref 0–1.5)
IMM GRANULOCYTES NFR BLD AUTO: 0.3 % — SIGNIFICANT CHANGE UP (ref 0–1.5)
IMM GRANULOCYTES NFR BLD AUTO: 0.3 % — SIGNIFICANT CHANGE UP (ref 0–1.5)
IMM GRANULOCYTES NFR BLD AUTO: 0.4 % — SIGNIFICANT CHANGE UP (ref 0–1.5)
IMM GRANULOCYTES NFR BLD AUTO: 0.4 % — SIGNIFICANT CHANGE UP (ref 0–1.5)
IMM GRANULOCYTES NFR BLD AUTO: 0.6 % — SIGNIFICANT CHANGE UP (ref 0–1.5)
IMM GRANULOCYTES NFR BLD AUTO: 0.8 % — SIGNIFICANT CHANGE UP (ref 0–1.5)
IMM GRANULOCYTES NFR BLD AUTO: 0.9 % — SIGNIFICANT CHANGE UP (ref 0–1.5)
INR BLD: 1.22 RATIO — HIGH (ref 0.88–1.16)
INR BLD: 1.26 RATIO — HIGH (ref 0.88–1.16)
INR BLD: 1.49 RATIO — HIGH (ref 0.88–1.16)
INR BLD: 1.62 RATIO — HIGH (ref 0.88–1.16)
INR BLD: 1.8 RATIO — HIGH (ref 0.88–1.16)
INR BLD: 1.91 RATIO — HIGH (ref 0.88–1.16)
INR BLD: 1.98 RATIO — HIGH (ref 0.88–1.16)
INR BLD: 1.98 RATIO — HIGH (ref 0.88–1.16)
INR BLD: 2.06 RATIO — HIGH (ref 0.88–1.16)
INR BLD: 2.11 RATIO — HIGH (ref 0.88–1.16)
INR BLD: 2.39 RATIO — HIGH (ref 0.88–1.16)
INR BLD: 2.49 RATIO — HIGH (ref 0.88–1.16)
INR BLD: 2.59 RATIO — HIGH (ref 0.88–1.16)
INR BLD: 3.35 RATIO — HIGH (ref 0.88–1.16)
INR BLD: 3.46 RATIO — HIGH (ref 0.88–1.16)
KETONES UR-MCNC: NEGATIVE — SIGNIFICANT CHANGE UP
KETONES UR-MCNC: NEGATIVE — SIGNIFICANT CHANGE UP
LACTATE SERPL-SCNC: 1.8 MMOL/L — SIGNIFICANT CHANGE UP (ref 0.7–2)
LACTATE SERPL-SCNC: 1.9 MMOL/L — SIGNIFICANT CHANGE UP (ref 0.7–2)
LEUKOCYTE ESTERASE UR-ACNC: ABNORMAL
LEUKOCYTE ESTERASE UR-ACNC: NEGATIVE — SIGNIFICANT CHANGE UP
LIDOCAIN IGE QN: 34 U/L — LOW (ref 73–393)
LIDOCAIN IGE QN: 52 U/L — LOW (ref 73–393)
LYMPHOCYTES # BLD AUTO: 0.36 K/UL — LOW (ref 1–3.3)
LYMPHOCYTES # BLD AUTO: 0.36 K/UL — LOW (ref 1–3.3)
LYMPHOCYTES # BLD AUTO: 0.38 K/UL — LOW (ref 1–3.3)
LYMPHOCYTES # BLD AUTO: 0.43 K/UL — LOW (ref 1–3.3)
LYMPHOCYTES # BLD AUTO: 0.52 K/UL — LOW (ref 1–3.3)
LYMPHOCYTES # BLD AUTO: 0.6 K/UL — LOW (ref 1–3.3)
LYMPHOCYTES # BLD AUTO: 0.85 K/UL — LOW (ref 1–3.3)
LYMPHOCYTES # BLD AUTO: 0.94 K/UL — LOW (ref 1–3.3)
LYMPHOCYTES # BLD AUTO: 13.2 % — SIGNIFICANT CHANGE UP (ref 13–44)
LYMPHOCYTES # BLD AUTO: 13.7 % — SIGNIFICANT CHANGE UP (ref 13–44)
LYMPHOCYTES # BLD AUTO: 4.4 % — LOW (ref 13–44)
LYMPHOCYTES # BLD AUTO: 5.3 % — LOW (ref 13–44)
LYMPHOCYTES # BLD AUTO: 5.9 % — LOW (ref 13–44)
LYMPHOCYTES # BLD AUTO: 6.3 % — LOW (ref 13–44)
LYMPHOCYTES # BLD AUTO: 6.7 % — LOW (ref 13–44)
LYMPHOCYTES # BLD AUTO: 9.8 % — LOW (ref 13–44)
MACROCYTES BLD QL: SLIGHT — SIGNIFICANT CHANGE UP
MAGNESIUM SERPL-MCNC: 2.1 MG/DL — SIGNIFICANT CHANGE UP (ref 1.6–2.6)
MANUAL SMEAR VERIFICATION: SIGNIFICANT CHANGE UP
MANUAL SMEAR VERIFICATION: SIGNIFICANT CHANGE UP
MCHC RBC-ENTMCNC: 29 PG — SIGNIFICANT CHANGE UP (ref 27–34)
MCHC RBC-ENTMCNC: 29.1 PG — SIGNIFICANT CHANGE UP (ref 27–34)
MCHC RBC-ENTMCNC: 29.2 PG — SIGNIFICANT CHANGE UP (ref 27–34)
MCHC RBC-ENTMCNC: 29.3 PG — SIGNIFICANT CHANGE UP (ref 27–34)
MCHC RBC-ENTMCNC: 29.4 PG — SIGNIFICANT CHANGE UP (ref 27–34)
MCHC RBC-ENTMCNC: 29.5 PG — SIGNIFICANT CHANGE UP (ref 27–34)
MCHC RBC-ENTMCNC: 29.5 PG — SIGNIFICANT CHANGE UP (ref 27–34)
MCHC RBC-ENTMCNC: 29.6 GM/DL — LOW (ref 32–36)
MCHC RBC-ENTMCNC: 29.6 PG — SIGNIFICANT CHANGE UP (ref 27–34)
MCHC RBC-ENTMCNC: 29.7 GM/DL — LOW (ref 32–36)
MCHC RBC-ENTMCNC: 29.7 PG — SIGNIFICANT CHANGE UP (ref 27–34)
MCHC RBC-ENTMCNC: 29.7 PG — SIGNIFICANT CHANGE UP (ref 27–34)
MCHC RBC-ENTMCNC: 29.8 GM/DL — LOW (ref 32–36)
MCHC RBC-ENTMCNC: 29.8 GM/DL — LOW (ref 32–36)
MCHC RBC-ENTMCNC: 29.9 PG — SIGNIFICANT CHANGE UP (ref 27–34)
MCHC RBC-ENTMCNC: 30 PG — SIGNIFICANT CHANGE UP (ref 27–34)
MCHC RBC-ENTMCNC: 30.1 GM/DL — LOW (ref 32–36)
MCHC RBC-ENTMCNC: 30.2 GM/DL — LOW (ref 32–36)
MCHC RBC-ENTMCNC: 30.2 GM/DL — LOW (ref 32–36)
MCHC RBC-ENTMCNC: 30.2 PG — SIGNIFICANT CHANGE UP (ref 27–34)
MCHC RBC-ENTMCNC: 30.3 GM/DL — LOW (ref 32–36)
MCHC RBC-ENTMCNC: 30.4 GM/DL — LOW (ref 32–36)
MCHC RBC-ENTMCNC: 30.4 GM/DL — LOW (ref 32–36)
MCHC RBC-ENTMCNC: 30.8 GM/DL — LOW (ref 32–36)
MCHC RBC-ENTMCNC: 30.9 GM/DL — LOW (ref 32–36)
MCHC RBC-ENTMCNC: 31.1 GM/DL — LOW (ref 32–36)
MCV RBC AUTO: 101.4 FL — HIGH (ref 80–100)
MCV RBC AUTO: 94.7 FL — SIGNIFICANT CHANGE UP (ref 80–100)
MCV RBC AUTO: 95.2 FL — SIGNIFICANT CHANGE UP (ref 80–100)
MCV RBC AUTO: 95.5 FL — SIGNIFICANT CHANGE UP (ref 80–100)
MCV RBC AUTO: 96.2 FL — SIGNIFICANT CHANGE UP (ref 80–100)
MCV RBC AUTO: 96.5 FL — SIGNIFICANT CHANGE UP (ref 80–100)
MCV RBC AUTO: 97.4 FL — SIGNIFICANT CHANGE UP (ref 80–100)
MCV RBC AUTO: 97.6 FL — SIGNIFICANT CHANGE UP (ref 80–100)
MCV RBC AUTO: 97.8 FL — SIGNIFICANT CHANGE UP (ref 80–100)
MCV RBC AUTO: 99.1 FL — SIGNIFICANT CHANGE UP (ref 80–100)
MCV RBC AUTO: 99.1 FL — SIGNIFICANT CHANGE UP (ref 80–100)
MCV RBC AUTO: 99.4 FL — SIGNIFICANT CHANGE UP (ref 80–100)
MCV RBC AUTO: 99.7 FL — SIGNIFICANT CHANGE UP (ref 80–100)
METHOD TYPE: SIGNIFICANT CHANGE UP
METHOD TYPE: SIGNIFICANT CHANGE UP
MONOCYTES # BLD AUTO: 0.25 K/UL — SIGNIFICANT CHANGE UP (ref 0–0.9)
MONOCYTES # BLD AUTO: 0.41 K/UL — SIGNIFICANT CHANGE UP (ref 0–0.9)
MONOCYTES # BLD AUTO: 0.42 K/UL — SIGNIFICANT CHANGE UP (ref 0–0.9)
MONOCYTES # BLD AUTO: 0.66 K/UL — SIGNIFICANT CHANGE UP (ref 0–0.9)
MONOCYTES # BLD AUTO: 0.67 K/UL — SIGNIFICANT CHANGE UP (ref 0–0.9)
MONOCYTES # BLD AUTO: 0.73 K/UL — SIGNIFICANT CHANGE UP (ref 0–0.9)
MONOCYTES # BLD AUTO: 0.85 K/UL — SIGNIFICANT CHANGE UP (ref 0–0.9)
MONOCYTES # BLD AUTO: 0.95 K/UL — HIGH (ref 0–0.9)
MONOCYTES NFR BLD AUTO: 10 % — SIGNIFICANT CHANGE UP (ref 2–14)
MONOCYTES NFR BLD AUTO: 10.2 % — SIGNIFICANT CHANGE UP (ref 2–14)
MONOCYTES NFR BLD AUTO: 10.7 % — SIGNIFICANT CHANGE UP (ref 2–14)
MONOCYTES NFR BLD AUTO: 10.9 % — SIGNIFICANT CHANGE UP (ref 2–14)
MONOCYTES NFR BLD AUTO: 5.7 % — SIGNIFICANT CHANGE UP (ref 2–14)
MONOCYTES NFR BLD AUTO: 6 % — SIGNIFICANT CHANGE UP (ref 2–14)
MONOCYTES NFR BLD AUTO: 6.5 % — SIGNIFICANT CHANGE UP (ref 2–14)
MONOCYTES NFR BLD AUTO: 8.2 % — SIGNIFICANT CHANGE UP (ref 2–14)
NEUTROPHILS # BLD AUTO: 3.71 K/UL — SIGNIFICANT CHANGE UP (ref 1.8–7.4)
NEUTROPHILS # BLD AUTO: 4.75 K/UL — SIGNIFICANT CHANGE UP (ref 1.8–7.4)
NEUTROPHILS # BLD AUTO: 5.15 K/UL — SIGNIFICANT CHANGE UP (ref 1.8–7.4)
NEUTROPHILS # BLD AUTO: 5.58 K/UL — SIGNIFICANT CHANGE UP (ref 1.8–7.4)
NEUTROPHILS # BLD AUTO: 6.01 K/UL — SIGNIFICANT CHANGE UP (ref 1.8–7.4)
NEUTROPHILS # BLD AUTO: 6.35 K/UL — SIGNIFICANT CHANGE UP (ref 1.8–7.4)
NEUTROPHILS # BLD AUTO: 7.09 K/UL — SIGNIFICANT CHANGE UP (ref 1.8–7.4)
NEUTROPHILS # BLD AUTO: 7.83 K/UL — HIGH (ref 1.8–7.4)
NEUTROPHILS NFR BLD AUTO: 73.5 % — SIGNIFICANT CHANGE UP (ref 43–77)
NEUTROPHILS NFR BLD AUTO: 75.3 % — SIGNIFICANT CHANGE UP (ref 43–77)
NEUTROPHILS NFR BLD AUTO: 81.5 % — HIGH (ref 43–77)
NEUTROPHILS NFR BLD AUTO: 82.9 % — HIGH (ref 43–77)
NEUTROPHILS NFR BLD AUTO: 84.3 % — HIGH (ref 43–77)
NEUTROPHILS NFR BLD AUTO: 86.8 % — HIGH (ref 43–77)
NEUTROPHILS NFR BLD AUTO: 87 % — HIGH (ref 43–77)
NEUTROPHILS NFR BLD AUTO: 88.2 % — HIGH (ref 43–77)
NITRITE UR-MCNC: NEGATIVE — SIGNIFICANT CHANGE UP
NITRITE UR-MCNC: NEGATIVE — SIGNIFICANT CHANGE UP
NRBC # BLD: 0 /100 WBCS — SIGNIFICANT CHANGE UP (ref 0–0)
ORGANISM # SPEC MICROSCOPIC CNT: SIGNIFICANT CHANGE UP
OVALOCYTES BLD QL SMEAR: SLIGHT — SIGNIFICANT CHANGE UP
PH UR: 8 — SIGNIFICANT CHANGE UP (ref 5–8)
PH UR: 9 — HIGH (ref 5–8)
PHOSPHATE SERPL-MCNC: 3.4 MG/DL — SIGNIFICANT CHANGE UP (ref 2.5–4.5)
PHOSPHATE SERPL-MCNC: 4 MG/DL — SIGNIFICANT CHANGE UP (ref 2.5–4.5)
PHOSPHATE SERPL-MCNC: 4.2 MG/DL — SIGNIFICANT CHANGE UP (ref 2.5–4.5)
PHOSPHATE SERPL-MCNC: 4.9 MG/DL — HIGH (ref 2.5–4.5)
PHOSPHATE SERPL-MCNC: 5 MG/DL — HIGH (ref 2.5–4.5)
PHOSPHATE SERPL-MCNC: 5.3 MG/DL — HIGH (ref 2.5–4.5)
PHOSPHATE SERPL-MCNC: 5.9 MG/DL — HIGH (ref 2.5–4.5)
PHOSPHATE SERPL-MCNC: 6.2 MG/DL — HIGH (ref 2.5–4.5)
PLAT MORPH BLD: NORMAL — SIGNIFICANT CHANGE UP
PLAT MORPH BLD: NORMAL — SIGNIFICANT CHANGE UP
PLATELET # BLD AUTO: 100 K/UL — LOW (ref 150–400)
PLATELET # BLD AUTO: 101 K/UL — LOW (ref 150–400)
PLATELET # BLD AUTO: 117 K/UL — LOW (ref 150–400)
PLATELET # BLD AUTO: 119 K/UL — LOW (ref 150–400)
PLATELET # BLD AUTO: 70 K/UL — LOW (ref 150–400)
PLATELET # BLD AUTO: 76 K/UL — LOW (ref 150–400)
PLATELET # BLD AUTO: 78 K/UL — LOW (ref 150–400)
PLATELET # BLD AUTO: 82 K/UL — LOW (ref 150–400)
PLATELET # BLD AUTO: 90 K/UL — LOW (ref 150–400)
PLATELET # BLD AUTO: 90 K/UL — LOW (ref 150–400)
PLATELET # BLD AUTO: 97 K/UL — LOW (ref 150–400)
POIKILOCYTOSIS BLD QL AUTO: SLIGHT — SIGNIFICANT CHANGE UP
POTASSIUM SERPL-MCNC: 3.3 MMOL/L — LOW (ref 3.5–5.3)
POTASSIUM SERPL-MCNC: 3.6 MMOL/L — SIGNIFICANT CHANGE UP (ref 3.5–5.3)
POTASSIUM SERPL-MCNC: 4 MMOL/L — SIGNIFICANT CHANGE UP (ref 3.5–5.3)
POTASSIUM SERPL-MCNC: 4.1 MMOL/L — SIGNIFICANT CHANGE UP (ref 3.5–5.3)
POTASSIUM SERPL-MCNC: 4.3 MMOL/L — SIGNIFICANT CHANGE UP (ref 3.5–5.3)
POTASSIUM SERPL-MCNC: 4.3 MMOL/L — SIGNIFICANT CHANGE UP (ref 3.5–5.3)
POTASSIUM SERPL-MCNC: 4.4 MMOL/L — SIGNIFICANT CHANGE UP (ref 3.5–5.3)
POTASSIUM SERPL-MCNC: 4.5 MMOL/L — SIGNIFICANT CHANGE UP (ref 3.5–5.3)
POTASSIUM SERPL-MCNC: 4.6 MMOL/L — SIGNIFICANT CHANGE UP (ref 3.5–5.3)
POTASSIUM SERPL-MCNC: 4.6 MMOL/L — SIGNIFICANT CHANGE UP (ref 3.5–5.3)
POTASSIUM SERPL-MCNC: 4.8 MMOL/L — SIGNIFICANT CHANGE UP (ref 3.5–5.3)
POTASSIUM SERPL-MCNC: 4.9 MMOL/L — SIGNIFICANT CHANGE UP (ref 3.5–5.3)
POTASSIUM SERPL-MCNC: 5 MMOL/L — SIGNIFICANT CHANGE UP (ref 3.5–5.3)
POTASSIUM SERPL-MCNC: 5.2 MMOL/L — SIGNIFICANT CHANGE UP (ref 3.5–5.3)
POTASSIUM SERPL-SCNC: 3.3 MMOL/L — LOW (ref 3.5–5.3)
POTASSIUM SERPL-SCNC: 3.6 MMOL/L — SIGNIFICANT CHANGE UP (ref 3.5–5.3)
POTASSIUM SERPL-SCNC: 4 MMOL/L — SIGNIFICANT CHANGE UP (ref 3.5–5.3)
POTASSIUM SERPL-SCNC: 4.1 MMOL/L — SIGNIFICANT CHANGE UP (ref 3.5–5.3)
POTASSIUM SERPL-SCNC: 4.3 MMOL/L — SIGNIFICANT CHANGE UP (ref 3.5–5.3)
POTASSIUM SERPL-SCNC: 4.3 MMOL/L — SIGNIFICANT CHANGE UP (ref 3.5–5.3)
POTASSIUM SERPL-SCNC: 4.4 MMOL/L — SIGNIFICANT CHANGE UP (ref 3.5–5.3)
POTASSIUM SERPL-SCNC: 4.5 MMOL/L — SIGNIFICANT CHANGE UP (ref 3.5–5.3)
POTASSIUM SERPL-SCNC: 4.6 MMOL/L — SIGNIFICANT CHANGE UP (ref 3.5–5.3)
POTASSIUM SERPL-SCNC: 4.6 MMOL/L — SIGNIFICANT CHANGE UP (ref 3.5–5.3)
POTASSIUM SERPL-SCNC: 4.8 MMOL/L — SIGNIFICANT CHANGE UP (ref 3.5–5.3)
POTASSIUM SERPL-SCNC: 4.9 MMOL/L — SIGNIFICANT CHANGE UP (ref 3.5–5.3)
POTASSIUM SERPL-SCNC: 5 MMOL/L — SIGNIFICANT CHANGE UP (ref 3.5–5.3)
POTASSIUM SERPL-SCNC: 5.2 MMOL/L — SIGNIFICANT CHANGE UP (ref 3.5–5.3)
PROT SERPL-MCNC: 5.7 GM/DL — LOW (ref 6–8.3)
PROT SERPL-MCNC: 5.8 GM/DL — LOW (ref 6–8.3)
PROT SERPL-MCNC: 6 GM/DL — SIGNIFICANT CHANGE UP (ref 6–8.3)
PROT SERPL-MCNC: 6 GM/DL — SIGNIFICANT CHANGE UP (ref 6–8.3)
PROT SERPL-MCNC: 6.3 GM/DL — SIGNIFICANT CHANGE UP (ref 6–8.3)
PROT SERPL-MCNC: 6.4 GM/DL — SIGNIFICANT CHANGE UP (ref 6–8.3)
PROT UR-MCNC: 100 MG/DL
PROT UR-MCNC: 100 MG/DL
PROTHROM AB SERPL-ACNC: 13.6 SEC — HIGH (ref 10–12.9)
PROTHROM AB SERPL-ACNC: 14.1 SEC — HIGH (ref 10–12.9)
PROTHROM AB SERPL-ACNC: 16.7 SEC — HIGH (ref 10–12.9)
PROTHROM AB SERPL-ACNC: 18.3 SEC — HIGH (ref 10–12.9)
PROTHROM AB SERPL-ACNC: 20.4 SEC — HIGH (ref 10–12.9)
PROTHROM AB SERPL-ACNC: 21.6 SEC — HIGH (ref 10–12.9)
PROTHROM AB SERPL-ACNC: 22.4 SEC — HIGH (ref 10–12.9)
PROTHROM AB SERPL-ACNC: 22.5 SEC — HIGH (ref 10–12.9)
PROTHROM AB SERPL-ACNC: 23.4 SEC — HIGH (ref 10–12.9)
PROTHROM AB SERPL-ACNC: 24 SEC — HIGH (ref 10–12.9)
PROTHROM AB SERPL-ACNC: 27.3 SEC — HIGH (ref 10–12.9)
PROTHROM AB SERPL-ACNC: 28.4 SEC — HIGH (ref 10–12.9)
PROTHROM AB SERPL-ACNC: 29.6 SEC — HIGH (ref 10–12.9)
PROTHROM AB SERPL-ACNC: 38.6 SEC — HIGH (ref 10–12.9)
PROTHROM AB SERPL-ACNC: 39.9 SEC — HIGH (ref 10–12.9)
PTH-INTACT FLD-MCNC: 256 PG/ML — HIGH (ref 15–65)
RBC # BLD: 2.12 M/UL — LOW (ref 4.2–5.8)
RBC # BLD: 3.1 M/UL — LOW (ref 4.2–5.8)
RBC # BLD: 3.13 M/UL — LOW (ref 4.2–5.8)
RBC # BLD: 3.18 M/UL — LOW (ref 4.2–5.8)
RBC # BLD: 3.27 M/UL — LOW (ref 4.2–5.8)
RBC # BLD: 3.29 M/UL — LOW (ref 4.2–5.8)
RBC # BLD: 3.31 M/UL — LOW (ref 4.2–5.8)
RBC # BLD: 3.35 M/UL — LOW (ref 4.2–5.8)
RBC # BLD: 3.38 M/UL — LOW (ref 4.2–5.8)
RBC # BLD: 3.4 M/UL — LOW (ref 4.2–5.8)
RBC # BLD: 3.44 M/UL — LOW (ref 4.2–5.8)
RBC # BLD: 3.45 M/UL — LOW (ref 4.2–5.8)
RBC # BLD: 3.46 M/UL — LOW (ref 4.2–5.8)
RBC # FLD: 14.9 % — HIGH (ref 10.3–14.5)
RBC # FLD: 15 % — HIGH (ref 10.3–14.5)
RBC # FLD: 15.1 % — HIGH (ref 10.3–14.5)
RBC # FLD: 15.1 % — HIGH (ref 10.3–14.5)
RBC # FLD: 15.9 % — HIGH (ref 10.3–14.5)
RBC # FLD: 16 % — HIGH (ref 10.3–14.5)
RBC # FLD: 16.1 % — HIGH (ref 10.3–14.5)
RBC # FLD: 19.9 % — HIGH (ref 10.3–14.5)
RBC BLD AUTO: ABNORMAL
RBC BLD AUTO: SIGNIFICANT CHANGE UP
RBC CASTS # UR COMP ASSIST: NEGATIVE /HPF — SIGNIFICANT CHANGE UP (ref 0–4)
SODIUM SERPL-SCNC: 128 MMOL/L — LOW (ref 135–145)
SODIUM SERPL-SCNC: 133 MMOL/L — LOW (ref 135–145)
SODIUM SERPL-SCNC: 134 MMOL/L — LOW (ref 135–145)
SODIUM SERPL-SCNC: 135 MMOL/L — SIGNIFICANT CHANGE UP (ref 135–145)
SODIUM SERPL-SCNC: 136 MMOL/L — SIGNIFICANT CHANGE UP (ref 135–145)
SODIUM SERPL-SCNC: 137 MMOL/L — SIGNIFICANT CHANGE UP (ref 135–145)
SODIUM SERPL-SCNC: 138 MMOL/L — SIGNIFICANT CHANGE UP (ref 135–145)
SODIUM SERPL-SCNC: 139 MMOL/L — SIGNIFICANT CHANGE UP (ref 135–145)
SODIUM SERPL-SCNC: 139 MMOL/L — SIGNIFICANT CHANGE UP (ref 135–145)
SODIUM SERPL-SCNC: 140 MMOL/L — SIGNIFICANT CHANGE UP (ref 135–145)
SODIUM SERPL-SCNC: 141 MMOL/L — SIGNIFICANT CHANGE UP (ref 135–145)
SODIUM SERPL-SCNC: 142 MMOL/L — SIGNIFICANT CHANGE UP (ref 135–145)
SP GR SPEC: 1.01 — SIGNIFICANT CHANGE UP (ref 1.01–1.02)
SP GR SPEC: 1.01 — SIGNIFICANT CHANGE UP (ref 1.01–1.02)
SPECIMEN SOURCE: SIGNIFICANT CHANGE UP
TYPE + AB SCN PNL BLD: SIGNIFICANT CHANGE UP
UROBILINOGEN FLD QL: NEGATIVE MG/DL — SIGNIFICANT CHANGE UP
UROBILINOGEN FLD QL: NEGATIVE MG/DL — SIGNIFICANT CHANGE UP
WBC # BLD: 4.06 K/UL — SIGNIFICANT CHANGE UP (ref 3.8–10.5)
WBC # BLD: 4.4 K/UL — SIGNIFICANT CHANGE UP (ref 3.8–10.5)
WBC # BLD: 5.43 K/UL — SIGNIFICANT CHANGE UP (ref 3.8–10.5)
WBC # BLD: 5.73 K/UL — SIGNIFICANT CHANGE UP (ref 3.8–10.5)
WBC # BLD: 5.82 K/UL — SIGNIFICANT CHANGE UP (ref 3.8–10.5)
WBC # BLD: 6.42 K/UL — SIGNIFICANT CHANGE UP (ref 3.8–10.5)
WBC # BLD: 6.46 K/UL — SIGNIFICANT CHANGE UP (ref 3.8–10.5)
WBC # BLD: 6.56 K/UL — SIGNIFICANT CHANGE UP (ref 3.8–10.5)
WBC # BLD: 6.82 K/UL — SIGNIFICANT CHANGE UP (ref 3.8–10.5)
WBC # BLD: 6.84 K/UL — SIGNIFICANT CHANGE UP (ref 3.8–10.5)
WBC # BLD: 7.79 K/UL — SIGNIFICANT CHANGE UP (ref 3.8–10.5)
WBC # BLD: 8.17 K/UL — SIGNIFICANT CHANGE UP (ref 3.8–10.5)
WBC # BLD: 9.46 K/UL — SIGNIFICANT CHANGE UP (ref 3.8–10.5)
WBC # FLD AUTO: 4.06 K/UL — SIGNIFICANT CHANGE UP (ref 3.8–10.5)
WBC # FLD AUTO: 4.4 K/UL — SIGNIFICANT CHANGE UP (ref 3.8–10.5)
WBC # FLD AUTO: 5.43 K/UL — SIGNIFICANT CHANGE UP (ref 3.8–10.5)
WBC # FLD AUTO: 5.73 K/UL — SIGNIFICANT CHANGE UP (ref 3.8–10.5)
WBC # FLD AUTO: 5.82 K/UL — SIGNIFICANT CHANGE UP (ref 3.8–10.5)
WBC # FLD AUTO: 6.42 K/UL — SIGNIFICANT CHANGE UP (ref 3.8–10.5)
WBC # FLD AUTO: 6.46 K/UL — SIGNIFICANT CHANGE UP (ref 3.8–10.5)
WBC # FLD AUTO: 6.56 K/UL — SIGNIFICANT CHANGE UP (ref 3.8–10.5)
WBC # FLD AUTO: 6.82 K/UL — SIGNIFICANT CHANGE UP (ref 3.8–10.5)
WBC # FLD AUTO: 6.84 K/UL — SIGNIFICANT CHANGE UP (ref 3.8–10.5)
WBC # FLD AUTO: 7.79 K/UL — SIGNIFICANT CHANGE UP (ref 3.8–10.5)
WBC # FLD AUTO: 8.17 K/UL — SIGNIFICANT CHANGE UP (ref 3.8–10.5)
WBC # FLD AUTO: 9.46 K/UL — SIGNIFICANT CHANGE UP (ref 3.8–10.5)
WBC UR QL: SIGNIFICANT CHANGE UP

## 2019-01-01 PROCEDURE — 93296 REM INTERROG EVL PM/IDS: CPT

## 2019-01-01 PROCEDURE — 99291 CRITICAL CARE FIRST HOUR: CPT

## 2019-01-01 PROCEDURE — 93306 TTE W/DOPPLER COMPLETE: CPT | Mod: 26

## 2019-01-01 PROCEDURE — 71046 X-RAY EXAM CHEST 2 VIEWS: CPT | Mod: 26

## 2019-01-01 PROCEDURE — 76700 US EXAM ABDOM COMPLETE: CPT | Mod: 26

## 2019-01-01 PROCEDURE — 78226 HEPATOBILIARY SYSTEM IMAGING: CPT | Mod: 26

## 2019-01-01 PROCEDURE — 93295 DEV INTERROG REMOTE 1/2/MLT: CPT

## 2019-01-01 PROCEDURE — 47531 INJECTION FOR CHOLANGIOGRAM: CPT

## 2019-01-01 PROCEDURE — 76705 ECHO EXAM OF ABDOMEN: CPT | Mod: 26

## 2019-01-01 PROCEDURE — 93010 ELECTROCARDIOGRAM REPORT: CPT

## 2019-01-01 PROCEDURE — 93010 ELECTROCARDIOGRAM REPORT: CPT | Mod: 76

## 2019-01-01 PROCEDURE — 99285 EMERGENCY DEPT VISIT HI MDM: CPT

## 2019-01-01 PROCEDURE — 74176 CT ABD & PELVIS W/O CONTRAST: CPT | Mod: 26

## 2019-01-01 PROCEDURE — 99232 SBSQ HOSP IP/OBS MODERATE 35: CPT

## 2019-01-01 PROCEDURE — 99223 1ST HOSP IP/OBS HIGH 75: CPT

## 2019-01-01 PROCEDURE — 47490 INCISION OF GALLBLADDER: CPT

## 2019-01-01 PROCEDURE — 99214 OFFICE O/P EST MOD 30 MIN: CPT

## 2019-01-01 RX ORDER — AMIODARONE HCL/D5W 150MG/0.1L
150-5 PLASTIC BAG, INJECTION (ML) INTRAVENOUS
Refills: 0 | Status: ACTIVE | COMMUNITY

## 2019-01-01 RX ORDER — GLUCAGON INJECTION, SOLUTION 0.5 MG/.1ML
1 INJECTION, SOLUTION SUBCUTANEOUS ONCE
Qty: 0 | Refills: 0 | Status: DISCONTINUED | OUTPATIENT
Start: 2019-01-01 | End: 2019-01-01

## 2019-01-01 RX ORDER — ONDANSETRON 8 MG/1
4 TABLET, FILM COATED ORAL ONCE
Qty: 0 | Refills: 0 | Status: COMPLETED | OUTPATIENT
Start: 2019-01-01 | End: 2019-01-01

## 2019-01-01 RX ORDER — VANCOMYCIN HCL 1 G
125 VIAL (EA) INTRAVENOUS EVERY 6 HOURS
Qty: 0 | Refills: 0 | Status: DISCONTINUED | OUTPATIENT
Start: 2019-01-01 | End: 2019-01-01

## 2019-01-01 RX ORDER — VANCOMYCIN HCL 1 G
1 VIAL (EA) INTRAVENOUS
Qty: 84 | Refills: 0 | OUTPATIENT
Start: 2019-01-01 | End: 2019-01-01

## 2019-01-01 RX ORDER — CHOLECALCIFEROL (VITAMIN D3) 125 MCG
1 CAPSULE ORAL
Qty: 0 | Refills: 0 | COMMUNITY

## 2019-01-01 RX ORDER — CHOLECALCIFEROL (VITAMIN D3) 125 MCG
1000 CAPSULE ORAL
Qty: 0 | Refills: 0 | Status: DISCONTINUED | OUTPATIENT
Start: 2019-01-01 | End: 2019-01-01

## 2019-01-01 RX ORDER — SEVELAMER CARBONATE 2400 MG/1
3 POWDER, FOR SUSPENSION ORAL
Qty: 0 | Refills: 0 | COMMUNITY

## 2019-01-01 RX ORDER — WARFARIN SODIUM 2.5 MG/1
1 TABLET ORAL ONCE
Qty: 0 | Refills: 0 | Status: COMPLETED | OUTPATIENT
Start: 2019-01-01 | End: 2019-01-01

## 2019-01-01 RX ORDER — METOPROLOL TARTRATE 25 MG/1
25 TABLET, FILM COATED ORAL
Refills: 0 | Status: ACTIVE | COMMUNITY

## 2019-01-01 RX ORDER — LOSARTAN POTASSIUM 25 MG/1
25 TABLET, FILM COATED ORAL
Refills: 0 | Status: ACTIVE | COMMUNITY

## 2019-01-01 RX ORDER — CEFAZOLIN SODIUM 1 G
2000 VIAL (EA) INJECTION
Qty: 0 | Refills: 0 | Status: DISCONTINUED | OUTPATIENT
Start: 2019-01-01 | End: 2019-01-01

## 2019-01-01 RX ORDER — URSODIOL 250 MG/1
300 TABLET, FILM COATED ORAL
Qty: 0 | Refills: 0 | Status: DISCONTINUED | OUTPATIENT
Start: 2019-01-01 | End: 2019-01-01

## 2019-01-01 RX ORDER — HYDROCORTISONE 20 MG
50 TABLET ORAL ONCE
Qty: 0 | Refills: 0 | Status: COMPLETED | OUTPATIENT
Start: 2019-01-01 | End: 2019-01-01

## 2019-01-01 RX ORDER — PIPERACILLIN AND TAZOBACTAM 4; .5 G/20ML; G/20ML
3.38 INJECTION, POWDER, LYOPHILIZED, FOR SOLUTION INTRAVENOUS ONCE
Qty: 0 | Refills: 0 | Status: COMPLETED | OUTPATIENT
Start: 2019-01-01 | End: 2019-01-01

## 2019-01-01 RX ORDER — URSODIOL 250 MG/1
1 TABLET, FILM COATED ORAL
Qty: 0 | Refills: 0 | COMMUNITY

## 2019-01-01 RX ORDER — WARFARIN SODIUM 2.5 MG/1
1 TABLET ORAL DAILY
Qty: 0 | Refills: 0 | Status: DISCONTINUED | OUTPATIENT
Start: 2019-01-01 | End: 2019-01-01

## 2019-01-01 RX ORDER — VANCOMYCIN HCL 1 G
1000 VIAL (EA) INTRAVENOUS ONCE
Qty: 0 | Refills: 0 | Status: COMPLETED | OUTPATIENT
Start: 2019-01-01 | End: 2019-01-01

## 2019-01-01 RX ORDER — HYDROCORTISONE 20 MG
30 TABLET ORAL
Qty: 0 | Refills: 0 | Status: DISCONTINUED | OUTPATIENT
Start: 2019-01-01 | End: 2019-01-01

## 2019-01-01 RX ORDER — CINACALCET 30 MG/1
30 TABLET, FILM COATED ORAL
Qty: 0 | Refills: 0 | Status: DISCONTINUED | OUTPATIENT
Start: 2019-01-01 | End: 2019-01-01

## 2019-01-01 RX ORDER — HEPARIN SODIUM 5000 [USP'U]/ML
5000 INJECTION INTRAVENOUS; SUBCUTANEOUS EVERY 8 HOURS
Qty: 0 | Refills: 0 | Status: DISCONTINUED | OUTPATIENT
Start: 2019-01-01 | End: 2019-01-01

## 2019-01-01 RX ORDER — SODIUM CHLORIDE 9 MG/ML
1000 INJECTION, SOLUTION INTRAVENOUS
Qty: 0 | Refills: 0 | Status: DISCONTINUED | OUTPATIENT
Start: 2019-01-01 | End: 2019-01-01

## 2019-01-01 RX ORDER — FENTANYL CITRATE 50 UG/ML
25 INJECTION INTRAVENOUS
Qty: 0 | Refills: 0 | Status: DISCONTINUED | OUTPATIENT
Start: 2019-01-01 | End: 2019-01-01

## 2019-01-01 RX ORDER — ACETAMINOPHEN 500 MG
650 TABLET ORAL EVERY 6 HOURS
Qty: 0 | Refills: 0 | Status: DISCONTINUED | OUTPATIENT
Start: 2019-01-01 | End: 2019-01-01

## 2019-01-01 RX ORDER — CINACALCET 30 MG/1
1 TABLET, FILM COATED ORAL
Qty: 0 | Refills: 0 | COMMUNITY

## 2019-01-01 RX ORDER — WARFARIN SODIUM 2.5 MG/1
2 TABLET ORAL ONCE
Qty: 0 | Refills: 0 | Status: COMPLETED | OUTPATIENT
Start: 2019-01-01 | End: 2019-01-01

## 2019-01-01 RX ORDER — ALPRAZOLAM 0.25 MG
0.25 TABLET ORAL ONCE
Qty: 0 | Refills: 0 | Status: DISCONTINUED | OUTPATIENT
Start: 2019-01-01 | End: 2019-01-01

## 2019-01-01 RX ORDER — ACETAMINOPHEN 500 MG
650 TABLET ORAL ONCE
Qty: 0 | Refills: 0 | Status: COMPLETED | OUTPATIENT
Start: 2019-01-01 | End: 2019-01-01

## 2019-01-01 RX ORDER — SODIUM CHLORIDE 9 MG/ML
1000 INJECTION INTRAMUSCULAR; INTRAVENOUS; SUBCUTANEOUS ONCE
Qty: 0 | Refills: 0 | Status: COMPLETED | OUTPATIENT
Start: 2019-01-01 | End: 2019-01-01

## 2019-01-01 RX ORDER — DEXTROSE 50 % IN WATER 50 %
15 SYRINGE (ML) INTRAVENOUS ONCE
Qty: 0 | Refills: 0 | Status: DISCONTINUED | OUTPATIENT
Start: 2019-01-01 | End: 2019-01-01

## 2019-01-01 RX ORDER — CEFTRIAXONE 500 MG/1
INJECTION, POWDER, FOR SOLUTION INTRAMUSCULAR; INTRAVENOUS
Qty: 0 | Refills: 0 | Status: DISCONTINUED | OUTPATIENT
Start: 2019-01-01 | End: 2019-01-01

## 2019-01-01 RX ORDER — LOSARTAN POTASSIUM 100 MG/1
1 TABLET, FILM COATED ORAL
Qty: 0 | Refills: 0 | COMMUNITY

## 2019-01-01 RX ORDER — HYDROCORTISONE 20 MG
30 TABLET ORAL
Qty: 0 | Refills: 0 | Status: COMPLETED | OUTPATIENT
Start: 2019-01-01 | End: 2019-01-01

## 2019-01-01 RX ORDER — HYDROMORPHONE HYDROCHLORIDE 2 MG/ML
1 INJECTION INTRAMUSCULAR; INTRAVENOUS; SUBCUTANEOUS ONCE
Qty: 0 | Refills: 0 | Status: DISCONTINUED | OUTPATIENT
Start: 2019-01-01 | End: 2019-01-01

## 2019-01-01 RX ORDER — DEXTROSE 50 % IN WATER 50 %
12.5 SYRINGE (ML) INTRAVENOUS ONCE
Qty: 0 | Refills: 0 | Status: DISCONTINUED | OUTPATIENT
Start: 2019-01-01 | End: 2019-01-01

## 2019-01-01 RX ORDER — SODIUM CHLORIDE 9 MG/ML
1000 INJECTION INTRAMUSCULAR; INTRAVENOUS; SUBCUTANEOUS
Qty: 0 | Refills: 0 | Status: DISCONTINUED | OUTPATIENT
Start: 2019-01-01 | End: 2019-01-01

## 2019-01-01 RX ORDER — PANTOPRAZOLE SODIUM 20 MG/1
40 TABLET, DELAYED RELEASE ORAL
Qty: 0 | Refills: 0 | Status: DISCONTINUED | OUTPATIENT
Start: 2019-01-01 | End: 2019-01-01

## 2019-01-01 RX ORDER — CEFAZOLIN SODIUM 1 G
3000 VIAL (EA) INJECTION ONCE
Qty: 0 | Refills: 0 | Status: COMPLETED | OUTPATIENT
Start: 2019-01-01 | End: 2019-01-01

## 2019-01-01 RX ORDER — AMIODARONE HYDROCHLORIDE 400 MG/1
100 TABLET ORAL DAILY
Qty: 0 | Refills: 0 | Status: DISCONTINUED | OUTPATIENT
Start: 2019-01-01 | End: 2019-01-01

## 2019-01-01 RX ORDER — PHYTONADIONE (VIT K1) 5 MG
5 TABLET ORAL ONCE
Qty: 0 | Refills: 0 | Status: COMPLETED | OUTPATIENT
Start: 2019-01-01 | End: 2019-01-01

## 2019-01-01 RX ORDER — CEFEPIME 1 G/1
1000 INJECTION, POWDER, FOR SOLUTION INTRAMUSCULAR; INTRAVENOUS ONCE
Qty: 0 | Refills: 0 | Status: COMPLETED | OUTPATIENT
Start: 2019-01-01 | End: 2019-01-01

## 2019-01-01 RX ORDER — GENTAMICIN SULFATE 40 MG/ML
150 VIAL (ML) INJECTION ONCE
Qty: 0 | Refills: 0 | Status: COMPLETED | OUTPATIENT
Start: 2019-01-01 | End: 2019-01-01

## 2019-01-01 RX ORDER — PIPERACILLIN AND TAZOBACTAM 4; .5 G/20ML; G/20ML
3.38 INJECTION, POWDER, LYOPHILIZED, FOR SOLUTION INTRAVENOUS ONCE
Qty: 0 | Refills: 0 | Status: DISCONTINUED | OUTPATIENT
Start: 2019-01-01 | End: 2019-01-01

## 2019-01-01 RX ORDER — INSULIN LISPRO 100/ML
VIAL (ML) SUBCUTANEOUS AT BEDTIME
Qty: 0 | Refills: 0 | Status: DISCONTINUED | OUTPATIENT
Start: 2019-01-01 | End: 2019-01-01

## 2019-01-01 RX ORDER — CEFAZOLIN SODIUM 1 G
2 VIAL (EA) INJECTION
Qty: 0 | Refills: 0 | COMMUNITY
Start: 2019-01-01

## 2019-01-01 RX ORDER — OXYCODONE AND ACETAMINOPHEN 5; 325 MG/1; MG/1
5-325 TABLET ORAL EVERY 6 HOURS
Refills: 0 | Status: ACTIVE | COMMUNITY

## 2019-01-01 RX ORDER — SEVELAMER CARBONATE 2400 MG/1
2400 POWDER, FOR SUSPENSION ORAL ONCE
Qty: 0 | Refills: 0 | Status: COMPLETED | OUTPATIENT
Start: 2019-01-01 | End: 2019-01-01

## 2019-01-01 RX ORDER — ACETAMINOPHEN 500 MG
1000 TABLET ORAL ONCE
Qty: 0 | Refills: 0 | Status: COMPLETED | OUTPATIENT
Start: 2019-01-01 | End: 2019-01-01

## 2019-01-01 RX ORDER — INSULIN LISPRO 100/ML
VIAL (ML) SUBCUTANEOUS
Qty: 0 | Refills: 0 | Status: DISCONTINUED | OUTPATIENT
Start: 2019-01-01 | End: 2019-01-01

## 2019-01-01 RX ORDER — VANCOMYCIN HCL 1 G
1 VIAL (EA) INTRAVENOUS
Qty: 56 | Refills: 0 | OUTPATIENT
Start: 2019-01-01 | End: 2019-01-01

## 2019-01-01 RX ORDER — MEROPENEM 1 G/30ML
0 INJECTION INTRAVENOUS
Qty: 0 | Refills: 0 | COMMUNITY
Start: 2019-01-01

## 2019-01-01 RX ORDER — HEPARIN SODIUM 5000 [USP'U]/ML
5000 INJECTION INTRAVENOUS; SUBCUTANEOUS EVERY 12 HOURS
Qty: 0 | Refills: 0 | Status: DISCONTINUED | OUTPATIENT
Start: 2019-01-01 | End: 2019-01-01

## 2019-01-01 RX ORDER — PIPERACILLIN AND TAZOBACTAM 4; .5 G/20ML; G/20ML
3.38 INJECTION, POWDER, LYOPHILIZED, FOR SOLUTION INTRAVENOUS EVERY 12 HOURS
Qty: 0 | Refills: 0 | Status: DISCONTINUED | OUTPATIENT
Start: 2019-01-01 | End: 2019-01-01

## 2019-01-01 RX ORDER — MEROPENEM 1 G/30ML
500 INJECTION INTRAVENOUS ONCE
Qty: 0 | Refills: 0 | Status: COMPLETED | OUTPATIENT
Start: 2019-01-01 | End: 2019-01-01

## 2019-01-01 RX ORDER — DEXTROSE 50 % IN WATER 50 %
25 SYRINGE (ML) INTRAVENOUS ONCE
Qty: 0 | Refills: 0 | Status: DISCONTINUED | OUTPATIENT
Start: 2019-01-01 | End: 2019-01-01

## 2019-01-01 RX ORDER — VANCOMYCIN HCL 1 G
2 VIAL (EA) INTRAVENOUS
Qty: 0 | Refills: 0 | COMMUNITY
Start: 2019-01-01 | End: 2019-01-01

## 2019-01-01 RX ORDER — CEFEPIME 1 G/1
1000 INJECTION, POWDER, FOR SOLUTION INTRAMUSCULAR; INTRAVENOUS ONCE
Qty: 0 | Refills: 0 | Status: DISCONTINUED | OUTPATIENT
Start: 2019-01-01 | End: 2019-01-01

## 2019-01-01 RX ORDER — HYDROCORTISONE 20 MG
100 TABLET ORAL ONCE
Qty: 0 | Refills: 0 | Status: COMPLETED | OUTPATIENT
Start: 2019-01-01 | End: 2019-01-01

## 2019-01-01 RX ORDER — METOPROLOL TARTRATE 50 MG
1 TABLET ORAL
Qty: 0 | Refills: 0 | COMMUNITY

## 2019-01-01 RX ORDER — ERYTHROPOIETIN 10000 [IU]/ML
10000 INJECTION, SOLUTION INTRAVENOUS; SUBCUTANEOUS
Qty: 0 | Refills: 0 | Status: DISCONTINUED | OUTPATIENT
Start: 2019-01-01 | End: 2019-01-01

## 2019-01-01 RX ORDER — HYDROCORTISONE 20 MG
50 TABLET ORAL EVERY 8 HOURS
Qty: 0 | Refills: 0 | Status: DISCONTINUED | OUTPATIENT
Start: 2019-01-01 | End: 2019-01-01

## 2019-01-01 RX ORDER — SEVELAMER CARBONATE 2400 MG/1
2400 POWDER, FOR SUSPENSION ORAL
Qty: 0 | Refills: 0 | Status: DISCONTINUED | OUTPATIENT
Start: 2019-01-01 | End: 2019-01-01

## 2019-01-01 RX ORDER — AMIODARONE HYDROCHLORIDE 400 MG/1
0.5 TABLET ORAL
Qty: 0 | Refills: 0 | COMMUNITY

## 2019-01-01 RX ORDER — ONDANSETRON 8 MG/1
4 TABLET, FILM COATED ORAL EVERY 6 HOURS
Qty: 0 | Refills: 0 | Status: DISCONTINUED | OUTPATIENT
Start: 2019-01-01 | End: 2019-01-01

## 2019-01-01 RX ORDER — ACETAMINOPHEN 500 MG
2 TABLET ORAL
Qty: 0 | Refills: 0 | COMMUNITY

## 2019-01-01 RX ORDER — PANTOPRAZOLE 40 MG/1
40 TABLET, DELAYED RELEASE ORAL TWICE DAILY
Refills: 0 | Status: ACTIVE | COMMUNITY

## 2019-01-01 RX ORDER — HYDROCORTISONE 20 MG
40 TABLET ORAL
Qty: 0 | Refills: 0 | Status: COMPLETED | OUTPATIENT
Start: 2019-01-01 | End: 2019-01-01

## 2019-01-01 RX ORDER — URSODIOL 300 MG/1
300 CAPSULE ORAL
Refills: 0 | Status: ACTIVE | COMMUNITY

## 2019-01-01 RX ORDER — MEROPENEM 1 G/30ML
500 INJECTION INTRAVENOUS EVERY 24 HOURS
Qty: 0 | Refills: 0 | Status: DISCONTINUED | OUTPATIENT
Start: 2019-01-01 | End: 2019-01-01

## 2019-01-01 RX ORDER — OXYCODONE HYDROCHLORIDE 5 MG/1
5 TABLET ORAL ONCE
Qty: 0 | Refills: 0 | Status: DISCONTINUED | OUTPATIENT
Start: 2019-01-01 | End: 2019-01-01

## 2019-01-01 RX ORDER — MEROPENEM 1 G/30ML
INJECTION INTRAVENOUS
Qty: 0 | Refills: 0 | Status: DISCONTINUED | OUTPATIENT
Start: 2019-01-01 | End: 2019-01-01

## 2019-01-01 RX ORDER — ONDANSETRON 8 MG/1
4 TABLET, FILM COATED ORAL ONCE
Qty: 0 | Refills: 0 | Status: DISCONTINUED | OUTPATIENT
Start: 2019-01-01 | End: 2019-01-01

## 2019-01-01 RX ORDER — ASPIRIN 81 MG
81 TABLET, DELAYED RELEASE (ENTERIC COATED) ORAL
Refills: 0 | Status: ACTIVE | COMMUNITY

## 2019-01-01 RX ORDER — LACTOBACILLUS ACIDOPHILUS 100MM CELL
1 CAPSULE ORAL
Qty: 0 | Refills: 0 | COMMUNITY

## 2019-01-01 RX ORDER — SEVELAMER CARBONATE 800 MG/1
800 TABLET, FILM COATED ORAL
Refills: 0 | Status: ACTIVE | COMMUNITY

## 2019-01-01 RX ORDER — OXYCODONE AND ACETAMINOPHEN 5; 325 MG/1; MG/1
1 TABLET ORAL EVERY 6 HOURS
Qty: 0 | Refills: 0 | Status: DISCONTINUED | OUTPATIENT
Start: 2019-01-01 | End: 2019-01-01

## 2019-01-01 RX ORDER — ALPRAZOLAM 0.25 MG
0.5 TABLET ORAL ONCE
Qty: 0 | Refills: 0 | Status: DISCONTINUED | OUTPATIENT
Start: 2019-01-01 | End: 2019-01-01

## 2019-01-01 RX ORDER — METOPROLOL TARTRATE 50 MG
25 TABLET ORAL DAILY
Qty: 0 | Refills: 0 | Status: DISCONTINUED | OUTPATIENT
Start: 2019-01-01 | End: 2019-01-01

## 2019-01-01 RX ORDER — LOSARTAN POTASSIUM 100 MG/1
25 TABLET, FILM COATED ORAL DAILY
Qty: 0 | Refills: 0 | Status: DISCONTINUED | OUTPATIENT
Start: 2019-01-01 | End: 2019-01-01

## 2019-01-01 RX ORDER — WARFARIN SODIUM 2.5 MG/1
1 TABLET ORAL ONCE
Qty: 0 | Refills: 0 | Status: DISCONTINUED | OUTPATIENT
Start: 2019-01-01 | End: 2019-01-01

## 2019-01-01 RX ORDER — CEFTRIAXONE 500 MG/1
1000 INJECTION, POWDER, FOR SOLUTION INTRAMUSCULAR; INTRAVENOUS ONCE
Qty: 0 | Refills: 0 | Status: DISCONTINUED | OUTPATIENT
Start: 2019-01-01 | End: 2019-01-01

## 2019-01-01 RX ORDER — SODIUM CHLORIDE 9 MG/ML
500 INJECTION INTRAMUSCULAR; INTRAVENOUS; SUBCUTANEOUS ONCE
Qty: 0 | Refills: 0 | Status: COMPLETED | OUTPATIENT
Start: 2019-01-01 | End: 2019-01-01

## 2019-01-01 RX ADMIN — Medication 2: at 12:36

## 2019-01-01 RX ADMIN — SEVELAMER CARBONATE 2400 MILLIGRAM(S): 2400 POWDER, FOR SUSPENSION ORAL at 17:35

## 2019-01-01 RX ADMIN — PANTOPRAZOLE SODIUM 40 MILLIGRAM(S): 20 TABLET, DELAYED RELEASE ORAL at 06:22

## 2019-01-01 RX ADMIN — PIPERACILLIN AND TAZOBACTAM 25 GRAM(S): 4; .5 INJECTION, POWDER, LYOPHILIZED, FOR SOLUTION INTRAVENOUS at 19:01

## 2019-01-01 RX ADMIN — Medication 4: at 12:00

## 2019-01-01 RX ADMIN — AMIODARONE HYDROCHLORIDE 100 MILLIGRAM(S): 400 TABLET ORAL at 06:39

## 2019-01-01 RX ADMIN — Medication 20 MILLIGRAM(S): at 06:19

## 2019-01-01 RX ADMIN — OXYCODONE AND ACETAMINOPHEN 1 TABLET(S): 5; 325 TABLET ORAL at 00:00

## 2019-01-01 RX ADMIN — URSODIOL 300 MILLIGRAM(S): 250 TABLET, FILM COATED ORAL at 17:35

## 2019-01-01 RX ADMIN — URSODIOL 300 MILLIGRAM(S): 250 TABLET, FILM COATED ORAL at 17:57

## 2019-01-01 RX ADMIN — Medication 50 MILLIGRAM(S): at 17:39

## 2019-01-01 RX ADMIN — SEVELAMER CARBONATE 800 MILLIGRAM(S): 2400 POWDER, FOR SUSPENSION ORAL at 15:55

## 2019-01-01 RX ADMIN — PANTOPRAZOLE SODIUM 40 MILLIGRAM(S): 20 TABLET, DELAYED RELEASE ORAL at 17:51

## 2019-01-01 RX ADMIN — AMIODARONE HYDROCHLORIDE 100 MILLIGRAM(S): 400 TABLET ORAL at 06:24

## 2019-01-01 RX ADMIN — ERYTHROPOIETIN 10000 UNIT(S): 10000 INJECTION, SOLUTION INTRAVENOUS; SUBCUTANEOUS at 10:49

## 2019-01-01 RX ADMIN — Medication 125 MILLIGRAM(S): at 17:51

## 2019-01-01 RX ADMIN — PANTOPRAZOLE SODIUM 40 MILLIGRAM(S): 20 TABLET, DELAYED RELEASE ORAL at 06:39

## 2019-01-01 RX ADMIN — CINACALCET 30 MILLIGRAM(S): 30 TABLET, FILM COATED ORAL at 06:08

## 2019-01-01 RX ADMIN — SEVELAMER CARBONATE 2400 MILLIGRAM(S): 2400 POWDER, FOR SUSPENSION ORAL at 06:32

## 2019-01-01 RX ADMIN — AMIODARONE HYDROCHLORIDE 100 MILLIGRAM(S): 400 TABLET ORAL at 05:49

## 2019-01-01 RX ADMIN — SEVELAMER CARBONATE 2400 MILLIGRAM(S): 2400 POWDER, FOR SUSPENSION ORAL at 17:20

## 2019-01-01 RX ADMIN — Medication 30 MILLIGRAM(S): at 06:03

## 2019-01-01 RX ADMIN — Medication 25 MILLIGRAM(S): at 06:08

## 2019-01-01 RX ADMIN — PANTOPRAZOLE SODIUM 40 MILLIGRAM(S): 20 TABLET, DELAYED RELEASE ORAL at 06:08

## 2019-01-01 RX ADMIN — PANTOPRAZOLE SODIUM 40 MILLIGRAM(S): 20 TABLET, DELAYED RELEASE ORAL at 06:03

## 2019-01-01 RX ADMIN — Medication 125 MILLIGRAM(S): at 05:36

## 2019-01-01 RX ADMIN — PANTOPRAZOLE SODIUM 40 MILLIGRAM(S): 20 TABLET, DELAYED RELEASE ORAL at 17:40

## 2019-01-01 RX ADMIN — Medication 50 MILLIGRAM(S): at 23:05

## 2019-01-01 RX ADMIN — Medication 50 MILLIGRAM(S): at 17:23

## 2019-01-01 RX ADMIN — SEVELAMER CARBONATE 2400 MILLIGRAM(S): 2400 POWDER, FOR SUSPENSION ORAL at 13:25

## 2019-01-01 RX ADMIN — Medication 25 MILLIGRAM(S): at 06:03

## 2019-01-01 RX ADMIN — MEROPENEM 100 MILLIGRAM(S): 1 INJECTION INTRAVENOUS at 16:07

## 2019-01-01 RX ADMIN — Medication 40 MILLIGRAM(S): at 06:07

## 2019-01-01 RX ADMIN — PANTOPRAZOLE SODIUM 40 MILLIGRAM(S): 20 TABLET, DELAYED RELEASE ORAL at 05:32

## 2019-01-01 RX ADMIN — Medication 125 MILLIGRAM(S): at 06:03

## 2019-01-01 RX ADMIN — AMIODARONE HYDROCHLORIDE 100 MILLIGRAM(S): 400 TABLET ORAL at 05:10

## 2019-01-01 RX ADMIN — URSODIOL 300 MILLIGRAM(S): 250 TABLET, FILM COATED ORAL at 06:06

## 2019-01-01 RX ADMIN — HEPARIN SODIUM 5000 UNIT(S): 5000 INJECTION INTRAVENOUS; SUBCUTANEOUS at 06:07

## 2019-01-01 RX ADMIN — PANTOPRAZOLE SODIUM 40 MILLIGRAM(S): 20 TABLET, DELAYED RELEASE ORAL at 18:16

## 2019-01-01 RX ADMIN — URSODIOL 300 MILLIGRAM(S): 250 TABLET, FILM COATED ORAL at 05:10

## 2019-01-01 RX ADMIN — Medication 50 MILLIGRAM(S): at 22:54

## 2019-01-01 RX ADMIN — WARFARIN SODIUM 2 MILLIGRAM(S): 2.5 TABLET ORAL at 22:55

## 2019-01-01 RX ADMIN — Medication 125 MILLIGRAM(S): at 06:06

## 2019-01-01 RX ADMIN — Medication 250 MILLIGRAM(S): at 15:41

## 2019-01-01 RX ADMIN — Medication 125 MILLIGRAM(S): at 06:08

## 2019-01-01 RX ADMIN — Medication 125 MILLIGRAM(S): at 15:24

## 2019-01-01 RX ADMIN — Medication 125 MILLIGRAM(S): at 17:56

## 2019-01-01 RX ADMIN — AMIODARONE HYDROCHLORIDE 100 MILLIGRAM(S): 400 TABLET ORAL at 06:15

## 2019-01-01 RX ADMIN — WARFARIN SODIUM 1 MILLIGRAM(S): 2.5 TABLET ORAL at 10:27

## 2019-01-01 RX ADMIN — ERYTHROPOIETIN 10000 UNIT(S): 10000 INJECTION, SOLUTION INTRAVENOUS; SUBCUTANEOUS at 09:46

## 2019-01-01 RX ADMIN — HEPARIN SODIUM 5000 UNIT(S): 5000 INJECTION INTRAVENOUS; SUBCUTANEOUS at 05:11

## 2019-01-01 RX ADMIN — ERYTHROPOIETIN 10000 UNIT(S): 10000 INJECTION, SOLUTION INTRAVENOUS; SUBCUTANEOUS at 09:48

## 2019-01-01 RX ADMIN — AMIODARONE HYDROCHLORIDE 100 MILLIGRAM(S): 400 TABLET ORAL at 05:36

## 2019-01-01 RX ADMIN — Medication 20 MILLIGRAM(S): at 17:51

## 2019-01-01 RX ADMIN — PANTOPRAZOLE SODIUM 40 MILLIGRAM(S): 20 TABLET, DELAYED RELEASE ORAL at 17:39

## 2019-01-01 RX ADMIN — WARFARIN SODIUM 1 MILLIGRAM(S): 2.5 TABLET ORAL at 21:18

## 2019-01-01 RX ADMIN — SEVELAMER CARBONATE 2400 MILLIGRAM(S): 2400 POWDER, FOR SUSPENSION ORAL at 14:13

## 2019-01-01 RX ADMIN — WARFARIN SODIUM 1 MILLIGRAM(S): 2.5 TABLET ORAL at 22:03

## 2019-01-01 RX ADMIN — Medication 1000 UNIT(S): at 15:57

## 2019-01-01 RX ADMIN — URSODIOL 300 MILLIGRAM(S): 250 TABLET, FILM COATED ORAL at 16:40

## 2019-01-01 RX ADMIN — PANTOPRAZOLE SODIUM 40 MILLIGRAM(S): 20 TABLET, DELAYED RELEASE ORAL at 17:36

## 2019-01-01 RX ADMIN — CINACALCET 30 MILLIGRAM(S): 30 TABLET, FILM COATED ORAL at 16:40

## 2019-01-01 RX ADMIN — HEPARIN SODIUM 5000 UNIT(S): 5000 INJECTION INTRAVENOUS; SUBCUTANEOUS at 23:47

## 2019-01-01 RX ADMIN — MEROPENEM 100 MILLIGRAM(S): 1 INJECTION INTRAVENOUS at 13:25

## 2019-01-01 RX ADMIN — OXYCODONE AND ACETAMINOPHEN 1 TABLET(S): 5; 325 TABLET ORAL at 16:41

## 2019-01-01 RX ADMIN — Medication 50 MILLIGRAM(S): at 21:36

## 2019-01-01 RX ADMIN — Medication 25 MILLIGRAM(S): at 17:36

## 2019-01-01 RX ADMIN — Medication 50 MILLIGRAM(S): at 13:30

## 2019-01-01 RX ADMIN — ONDANSETRON 4 MILLIGRAM(S): 8 TABLET, FILM COATED ORAL at 14:57

## 2019-01-01 RX ADMIN — HEPARIN SODIUM 5000 UNIT(S): 5000 INJECTION INTRAVENOUS; SUBCUTANEOUS at 21:03

## 2019-01-01 RX ADMIN — URSODIOL 300 MILLIGRAM(S): 250 TABLET, FILM COATED ORAL at 17:16

## 2019-01-01 RX ADMIN — WARFARIN SODIUM 1 MILLIGRAM(S): 2.5 TABLET ORAL at 21:26

## 2019-01-01 RX ADMIN — PIPERACILLIN AND TAZOBACTAM 3.38 GRAM(S): 4; .5 INJECTION, POWDER, LYOPHILIZED, FOR SOLUTION INTRAVENOUS at 14:59

## 2019-01-01 RX ADMIN — OXYCODONE AND ACETAMINOPHEN 1 TABLET(S): 5; 325 TABLET ORAL at 01:22

## 2019-01-01 RX ADMIN — Medication 650 MILLIGRAM(S): at 20:22

## 2019-01-01 RX ADMIN — AMIODARONE HYDROCHLORIDE 100 MILLIGRAM(S): 400 TABLET ORAL at 06:03

## 2019-01-01 RX ADMIN — Medication 20 MILLIGRAM(S): at 17:44

## 2019-01-01 RX ADMIN — SEVELAMER CARBONATE 2400 MILLIGRAM(S): 2400 POWDER, FOR SUSPENSION ORAL at 12:38

## 2019-01-01 RX ADMIN — LOSARTAN POTASSIUM 25 MILLIGRAM(S): 100 TABLET, FILM COATED ORAL at 06:20

## 2019-01-01 RX ADMIN — Medication 125 MILLIGRAM(S): at 18:16

## 2019-01-01 RX ADMIN — ONDANSETRON 4 MILLIGRAM(S): 8 TABLET, FILM COATED ORAL at 12:05

## 2019-01-01 RX ADMIN — Medication 40 MILLIGRAM(S): at 06:02

## 2019-01-01 RX ADMIN — Medication 30 MILLIGRAM(S): at 17:37

## 2019-01-01 RX ADMIN — Medication 125 MILLIGRAM(S): at 23:07

## 2019-01-01 RX ADMIN — PANTOPRAZOLE SODIUM 40 MILLIGRAM(S): 20 TABLET, DELAYED RELEASE ORAL at 06:20

## 2019-01-01 RX ADMIN — Medication 20 MILLIGRAM(S): at 05:10

## 2019-01-01 RX ADMIN — URSODIOL 300 MILLIGRAM(S): 250 TABLET, FILM COATED ORAL at 19:29

## 2019-01-01 RX ADMIN — PIPERACILLIN AND TAZOBACTAM 25 GRAM(S): 4; .5 INJECTION, POWDER, LYOPHILIZED, FOR SOLUTION INTRAVENOUS at 11:39

## 2019-01-01 RX ADMIN — PANTOPRAZOLE SODIUM 40 MILLIGRAM(S): 20 TABLET, DELAYED RELEASE ORAL at 05:30

## 2019-01-01 RX ADMIN — Medication 20 MILLIGRAM(S): at 05:36

## 2019-01-01 RX ADMIN — Medication 50 MILLIGRAM(S): at 22:28

## 2019-01-01 RX ADMIN — Medication 50 MILLIGRAM(S): at 05:35

## 2019-01-01 RX ADMIN — SODIUM CHLORIDE 1000 MILLILITER(S): 9 INJECTION INTRAMUSCULAR; INTRAVENOUS; SUBCUTANEOUS at 14:59

## 2019-01-01 RX ADMIN — Medication 125 MILLIGRAM(S): at 23:04

## 2019-01-01 RX ADMIN — Medication 30 MILLIGRAM(S): at 06:04

## 2019-01-01 RX ADMIN — SEVELAMER CARBONATE 2400 MILLIGRAM(S): 2400 POWDER, FOR SUSPENSION ORAL at 18:40

## 2019-01-01 RX ADMIN — URSODIOL 300 MILLIGRAM(S): 250 TABLET, FILM COATED ORAL at 06:08

## 2019-01-01 RX ADMIN — CEFEPIME 1000 MILLIGRAM(S): 1 INJECTION, POWDER, FOR SOLUTION INTRAMUSCULAR; INTRAVENOUS at 16:53

## 2019-01-01 RX ADMIN — ERYTHROPOIETIN 10000 UNIT(S): 10000 INJECTION, SOLUTION INTRAVENOUS; SUBCUTANEOUS at 15:18

## 2019-01-01 RX ADMIN — Medication 125 MILLIGRAM(S): at 14:49

## 2019-01-01 RX ADMIN — HEPARIN SODIUM 5000 UNIT(S): 5000 INJECTION INTRAVENOUS; SUBCUTANEOUS at 05:32

## 2019-01-01 RX ADMIN — Medication 125 MILLIGRAM(S): at 17:39

## 2019-01-01 RX ADMIN — SEVELAMER CARBONATE 2400 MILLIGRAM(S): 2400 POWDER, FOR SUSPENSION ORAL at 12:56

## 2019-01-01 RX ADMIN — URSODIOL 300 MILLIGRAM(S): 250 TABLET, FILM COATED ORAL at 06:01

## 2019-01-01 RX ADMIN — Medication 40 MILLIGRAM(S): at 15:55

## 2019-01-01 RX ADMIN — PANTOPRAZOLE SODIUM 40 MILLIGRAM(S): 20 TABLET, DELAYED RELEASE ORAL at 05:11

## 2019-01-01 RX ADMIN — OXYCODONE AND ACETAMINOPHEN 1 TABLET(S): 5; 325 TABLET ORAL at 11:17

## 2019-01-01 RX ADMIN — Medication 25 MILLIGRAM(S): at 05:37

## 2019-01-01 RX ADMIN — SEVELAMER CARBONATE 2400 MILLIGRAM(S): 2400 POWDER, FOR SUSPENSION ORAL at 12:01

## 2019-01-01 RX ADMIN — PIPERACILLIN AND TAZOBACTAM 200 GRAM(S): 4; .5 INJECTION, POWDER, LYOPHILIZED, FOR SOLUTION INTRAVENOUS at 14:01

## 2019-01-01 RX ADMIN — MEROPENEM 100 MILLIGRAM(S): 1 INJECTION INTRAVENOUS at 14:25

## 2019-01-01 RX ADMIN — Medication 100 MILLIGRAM(S): at 13:46

## 2019-01-01 RX ADMIN — Medication 125 MILLIGRAM(S): at 06:02

## 2019-01-01 RX ADMIN — Medication 100 MILLIGRAM(S): at 19:54

## 2019-01-01 RX ADMIN — Medication 2: at 17:16

## 2019-01-01 RX ADMIN — Medication 125 MILLIGRAM(S): at 13:25

## 2019-01-01 RX ADMIN — CINACALCET 30 MILLIGRAM(S): 30 TABLET, FILM COATED ORAL at 11:04

## 2019-01-01 RX ADMIN — URSODIOL 300 MILLIGRAM(S): 250 TABLET, FILM COATED ORAL at 17:36

## 2019-01-01 RX ADMIN — Medication 125 MILLIGRAM(S): at 00:48

## 2019-01-01 RX ADMIN — PANTOPRAZOLE SODIUM 40 MILLIGRAM(S): 20 TABLET, DELAYED RELEASE ORAL at 19:32

## 2019-01-01 RX ADMIN — SODIUM CHLORIDE 1000 MILLILITER(S): 9 INJECTION INTRAMUSCULAR; INTRAVENOUS; SUBCUTANEOUS at 17:26

## 2019-01-01 RX ADMIN — Medication 125 MILLIGRAM(S): at 14:13

## 2019-01-01 RX ADMIN — SEVELAMER CARBONATE 2400 MILLIGRAM(S): 2400 POWDER, FOR SUSPENSION ORAL at 17:44

## 2019-01-01 RX ADMIN — Medication 650 MILLIGRAM(S): at 20:50

## 2019-01-01 RX ADMIN — Medication 650 MILLIGRAM(S): at 18:53

## 2019-01-01 RX ADMIN — SODIUM CHLORIDE 1000 MILLILITER(S): 9 INJECTION INTRAMUSCULAR; INTRAVENOUS; SUBCUTANEOUS at 12:05

## 2019-01-01 RX ADMIN — Medication 125 MILLIGRAM(S): at 11:05

## 2019-01-01 RX ADMIN — WARFARIN SODIUM 1 MILLIGRAM(S): 2.5 TABLET ORAL at 23:05

## 2019-01-01 RX ADMIN — Medication 25 MILLIGRAM(S): at 06:01

## 2019-01-01 RX ADMIN — Medication 50 MILLIGRAM(S): at 06:24

## 2019-01-01 RX ADMIN — PANTOPRAZOLE SODIUM 40 MILLIGRAM(S): 20 TABLET, DELAYED RELEASE ORAL at 17:16

## 2019-01-01 RX ADMIN — Medication 125 MILLIGRAM(S): at 19:30

## 2019-01-01 RX ADMIN — Medication 2: at 05:37

## 2019-01-01 RX ADMIN — AMIODARONE HYDROCHLORIDE 100 MILLIGRAM(S): 400 TABLET ORAL at 06:19

## 2019-01-01 RX ADMIN — SEVELAMER CARBONATE 2400 MILLIGRAM(S): 2400 POWDER, FOR SUSPENSION ORAL at 08:15

## 2019-01-01 RX ADMIN — SEVELAMER CARBONATE 2400 MILLIGRAM(S): 2400 POWDER, FOR SUSPENSION ORAL at 16:53

## 2019-01-01 RX ADMIN — Medication 125 MILLIGRAM(S): at 22:21

## 2019-01-01 RX ADMIN — URSODIOL 300 MILLIGRAM(S): 250 TABLET, FILM COATED ORAL at 06:03

## 2019-01-01 RX ADMIN — LOSARTAN POTASSIUM 25 MILLIGRAM(S): 100 TABLET, FILM COATED ORAL at 06:03

## 2019-01-01 RX ADMIN — Medication 50 MILLIGRAM(S): at 06:08

## 2019-01-01 RX ADMIN — SEVELAMER CARBONATE 2400 MILLIGRAM(S): 2400 POWDER, FOR SUSPENSION ORAL at 08:30

## 2019-01-01 RX ADMIN — URSODIOL 300 MILLIGRAM(S): 250 TABLET, FILM COATED ORAL at 17:51

## 2019-01-01 RX ADMIN — Medication 1000 MILLIGRAM(S): at 15:41

## 2019-01-01 RX ADMIN — AMIODARONE HYDROCHLORIDE 100 MILLIGRAM(S): 400 TABLET ORAL at 06:01

## 2019-01-01 RX ADMIN — Medication 50 MILLIGRAM(S): at 14:57

## 2019-01-01 RX ADMIN — PANTOPRAZOLE SODIUM 40 MILLIGRAM(S): 20 TABLET, DELAYED RELEASE ORAL at 05:36

## 2019-01-01 RX ADMIN — LOSARTAN POTASSIUM 25 MILLIGRAM(S): 100 TABLET, FILM COATED ORAL at 06:23

## 2019-01-01 RX ADMIN — PANTOPRAZOLE SODIUM 40 MILLIGRAM(S): 20 TABLET, DELAYED RELEASE ORAL at 06:01

## 2019-01-01 RX ADMIN — Medication 125 MILLIGRAM(S): at 06:39

## 2019-01-01 RX ADMIN — Medication 125 MILLIGRAM(S): at 17:36

## 2019-01-01 RX ADMIN — Medication 125 MILLIGRAM(S): at 23:05

## 2019-01-01 RX ADMIN — URSODIOL 300 MILLIGRAM(S): 250 TABLET, FILM COATED ORAL at 06:20

## 2019-01-01 RX ADMIN — Medication 101 MILLIGRAM(S): at 15:33

## 2019-01-01 RX ADMIN — Medication 40 MILLIGRAM(S): at 05:50

## 2019-01-01 RX ADMIN — URSODIOL 300 MILLIGRAM(S): 250 TABLET, FILM COATED ORAL at 05:36

## 2019-01-01 RX ADMIN — HEPARIN SODIUM 5000 UNIT(S): 5000 INJECTION INTRAVENOUS; SUBCUTANEOUS at 05:30

## 2019-01-01 RX ADMIN — PANTOPRAZOLE SODIUM 40 MILLIGRAM(S): 20 TABLET, DELAYED RELEASE ORAL at 05:49

## 2019-01-01 RX ADMIN — Medication 125 MILLIGRAM(S): at 23:10

## 2019-01-01 RX ADMIN — Medication 125 MILLIGRAM(S): at 16:40

## 2019-01-01 RX ADMIN — SEVELAMER CARBONATE 2400 MILLIGRAM(S): 2400 POWDER, FOR SUSPENSION ORAL at 08:26

## 2019-01-01 RX ADMIN — Medication 200 MILLIGRAM(S): at 19:29

## 2019-01-01 RX ADMIN — Medication 125 MILLIGRAM(S): at 12:24

## 2019-01-01 RX ADMIN — Medication 2: at 16:41

## 2019-01-01 RX ADMIN — SEVELAMER CARBONATE 2400 MILLIGRAM(S): 2400 POWDER, FOR SUSPENSION ORAL at 17:51

## 2019-01-01 RX ADMIN — Medication 400 MILLIGRAM(S): at 15:13

## 2019-01-01 RX ADMIN — Medication 0.25 MILLIGRAM(S): at 23:10

## 2019-01-01 RX ADMIN — SEVELAMER CARBONATE 800 MILLIGRAM(S): 2400 POWDER, FOR SUSPENSION ORAL at 13:46

## 2019-01-01 RX ADMIN — SEVELAMER CARBONATE 2400 MILLIGRAM(S): 2400 POWDER, FOR SUSPENSION ORAL at 08:49

## 2019-01-01 RX ADMIN — Medication 0.5 MILLIGRAM(S): at 06:47

## 2019-01-01 RX ADMIN — SEVELAMER CARBONATE 2400 MILLIGRAM(S): 2400 POWDER, FOR SUSPENSION ORAL at 12:29

## 2019-01-01 RX ADMIN — Medication 125 MILLIGRAM(S): at 23:47

## 2019-01-01 RX ADMIN — Medication 125 MILLIGRAM(S): at 06:20

## 2019-01-01 RX ADMIN — Medication 40 MILLIGRAM(S): at 18:16

## 2019-01-01 RX ADMIN — MEROPENEM 100 MILLIGRAM(S): 1 INJECTION INTRAVENOUS at 14:45

## 2019-01-01 RX ADMIN — OXYCODONE AND ACETAMINOPHEN 1 TABLET(S): 5; 325 TABLET ORAL at 10:27

## 2019-01-01 RX ADMIN — Medication 125 MILLIGRAM(S): at 18:55

## 2019-01-01 RX ADMIN — Medication 125 MILLIGRAM(S): at 12:44

## 2019-01-01 RX ADMIN — SODIUM CHLORIDE 500 MILLILITER(S): 9 INJECTION INTRAMUSCULAR; INTRAVENOUS; SUBCUTANEOUS at 19:54

## 2019-01-01 RX ADMIN — AMIODARONE HYDROCHLORIDE 100 MILLIGRAM(S): 400 TABLET ORAL at 05:32

## 2019-01-01 RX ADMIN — Medication 125 MILLIGRAM(S): at 06:22

## 2019-01-01 RX ADMIN — Medication 125 MILLIGRAM(S): at 17:21

## 2019-01-01 RX ADMIN — PIPERACILLIN AND TAZOBACTAM 25 GRAM(S): 4; .5 INJECTION, POWDER, LYOPHILIZED, FOR SOLUTION INTRAVENOUS at 12:23

## 2019-01-01 RX ADMIN — Medication 125 MILLIGRAM(S): at 17:44

## 2019-01-01 RX ADMIN — OXYCODONE AND ACETAMINOPHEN 1 TABLET(S): 5; 325 TABLET ORAL at 21:29

## 2019-01-01 RX ADMIN — URSODIOL 300 MILLIGRAM(S): 250 TABLET, FILM COATED ORAL at 06:39

## 2019-01-01 RX ADMIN — HEPARIN SODIUM 5000 UNIT(S): 5000 INJECTION INTRAVENOUS; SUBCUTANEOUS at 13:37

## 2019-01-01 RX ADMIN — Medication 125 MILLIGRAM(S): at 00:42

## 2019-01-01 RX ADMIN — SEVELAMER CARBONATE 2400 MILLIGRAM(S): 2400 POWDER, FOR SUSPENSION ORAL at 12:44

## 2019-01-01 RX ADMIN — Medication 25 MILLIGRAM(S): at 06:23

## 2019-01-01 RX ADMIN — Medication 2: at 17:33

## 2019-01-01 RX ADMIN — HEPARIN SODIUM 5000 UNIT(S): 5000 INJECTION INTRAVENOUS; SUBCUTANEOUS at 17:42

## 2019-01-01 RX ADMIN — ERYTHROPOIETIN 10000 UNIT(S): 10000 INJECTION, SOLUTION INTRAVENOUS; SUBCUTANEOUS at 16:39

## 2019-01-01 RX ADMIN — MEROPENEM 100 MILLIGRAM(S): 1 INJECTION INTRAVENOUS at 14:49

## 2019-01-01 RX ADMIN — Medication 125 MILLIGRAM(S): at 23:44

## 2019-01-01 RX ADMIN — ERYTHROPOIETIN 10000 UNIT(S): 10000 INJECTION, SOLUTION INTRAVENOUS; SUBCUTANEOUS at 08:16

## 2019-01-01 RX ADMIN — HEPARIN SODIUM 5000 UNIT(S): 5000 INJECTION INTRAVENOUS; SUBCUTANEOUS at 21:26

## 2019-01-01 RX ADMIN — URSODIOL 300 MILLIGRAM(S): 250 TABLET, FILM COATED ORAL at 05:51

## 2019-01-01 RX ADMIN — Medication 25 MILLIGRAM(S): at 06:20

## 2019-01-01 RX ADMIN — PANTOPRAZOLE SODIUM 40 MILLIGRAM(S): 20 TABLET, DELAYED RELEASE ORAL at 17:23

## 2019-01-01 RX ADMIN — Medication 50 MILLIGRAM(S): at 06:39

## 2019-01-01 RX ADMIN — Medication 125 MILLIGRAM(S): at 05:10

## 2019-01-01 RX ADMIN — Medication 400 MILLIGRAM(S): at 15:27

## 2019-01-01 RX ADMIN — OXYCODONE AND ACETAMINOPHEN 1 TABLET(S): 5; 325 TABLET ORAL at 03:35

## 2019-01-01 RX ADMIN — Medication 125 MILLIGRAM(S): at 13:36

## 2019-01-01 RX ADMIN — URSODIOL 300 MILLIGRAM(S): 250 TABLET, FILM COATED ORAL at 17:44

## 2019-01-01 RX ADMIN — Medication 650 MILLIGRAM(S): at 22:30

## 2019-01-01 RX ADMIN — SEVELAMER CARBONATE 2400 MILLIGRAM(S): 2400 POWDER, FOR SUSPENSION ORAL at 07:22

## 2019-01-01 RX ADMIN — Medication 125 MILLIGRAM(S): at 11:13

## 2019-01-01 RX ADMIN — OXYCODONE AND ACETAMINOPHEN 1 TABLET(S): 5; 325 TABLET ORAL at 00:52

## 2019-01-01 RX ADMIN — HEPARIN SODIUM 5000 UNIT(S): 5000 INJECTION INTRAVENOUS; SUBCUTANEOUS at 14:13

## 2019-01-01 RX ADMIN — PANTOPRAZOLE SODIUM 40 MILLIGRAM(S): 20 TABLET, DELAYED RELEASE ORAL at 17:57

## 2019-01-01 RX ADMIN — URSODIOL 300 MILLIGRAM(S): 250 TABLET, FILM COATED ORAL at 18:16

## 2019-01-01 RX ADMIN — MEROPENEM 100 MILLIGRAM(S): 1 INJECTION INTRAVENOUS at 14:26

## 2019-01-01 RX ADMIN — Medication 125 MILLIGRAM(S): at 05:49

## 2019-01-01 RX ADMIN — OXYCODONE HYDROCHLORIDE 5 MILLIGRAM(S): 5 TABLET ORAL at 15:13

## 2019-01-01 RX ADMIN — SEVELAMER CARBONATE 2400 MILLIGRAM(S): 2400 POWDER, FOR SUSPENSION ORAL at 13:14

## 2019-01-01 RX ADMIN — MEROPENEM 100 MILLIGRAM(S): 1 INJECTION INTRAVENOUS at 14:14

## 2019-01-01 RX ADMIN — Medication 650 MILLIGRAM(S): at 22:00

## 2019-01-01 RX ADMIN — Medication 30 MILLIGRAM(S): at 18:00

## 2019-01-01 RX ADMIN — PANTOPRAZOLE SODIUM 40 MILLIGRAM(S): 20 TABLET, DELAYED RELEASE ORAL at 16:40

## 2019-01-01 RX ADMIN — AMIODARONE HYDROCHLORIDE 100 MILLIGRAM(S): 400 TABLET ORAL at 05:27

## 2019-01-01 RX ADMIN — CINACALCET 30 MILLIGRAM(S): 30 TABLET, FILM COATED ORAL at 06:03

## 2019-01-01 RX ADMIN — SEVELAMER CARBONATE 2400 MILLIGRAM(S): 2400 POWDER, FOR SUSPENSION ORAL at 18:45

## 2019-01-01 RX ADMIN — Medication 125 MILLIGRAM(S): at 23:24

## 2019-01-01 RX ADMIN — Medication 650 MILLIGRAM(S): at 15:24

## 2019-01-01 RX ADMIN — URSODIOL 300 MILLIGRAM(S): 250 TABLET, FILM COATED ORAL at 17:39

## 2019-01-01 RX ADMIN — HYDROMORPHONE HYDROCHLORIDE 1 MILLIGRAM(S): 2 INJECTION INTRAMUSCULAR; INTRAVENOUS; SUBCUTANEOUS at 11:38

## 2019-01-01 RX ADMIN — SEVELAMER CARBONATE 2400 MILLIGRAM(S): 2400 POWDER, FOR SUSPENSION ORAL at 22:22

## 2019-01-01 RX ADMIN — SEVELAMER CARBONATE 2400 MILLIGRAM(S): 2400 POWDER, FOR SUSPENSION ORAL at 17:57

## 2019-01-01 RX ADMIN — SEVELAMER CARBONATE 2400 MILLIGRAM(S): 2400 POWDER, FOR SUSPENSION ORAL at 08:16

## 2019-01-01 RX ADMIN — URSODIOL 300 MILLIGRAM(S): 250 TABLET, FILM COATED ORAL at 06:23

## 2019-01-01 RX ADMIN — Medication 1000 UNIT(S): at 14:13

## 2019-01-01 RX ADMIN — SEVELAMER CARBONATE 2400 MILLIGRAM(S): 2400 POWDER, FOR SUSPENSION ORAL at 17:37

## 2019-01-01 RX ADMIN — SEVELAMER CARBONATE 2400 MILLIGRAM(S): 2400 POWDER, FOR SUSPENSION ORAL at 17:16

## 2019-01-01 RX ADMIN — Medication 650 MILLIGRAM(S): at 18:09

## 2019-01-01 RX ADMIN — Medication 200 MILLIGRAM(S): at 15:58

## 2019-01-01 RX ADMIN — AMIODARONE HYDROCHLORIDE 100 MILLIGRAM(S): 400 TABLET ORAL at 14:13

## 2019-01-01 RX ADMIN — CINACALCET 30 MILLIGRAM(S): 30 TABLET, FILM COATED ORAL at 05:58

## 2019-01-01 RX ADMIN — PANTOPRAZOLE SODIUM 40 MILLIGRAM(S): 20 TABLET, DELAYED RELEASE ORAL at 17:45

## 2019-01-01 RX ADMIN — SODIUM CHLORIDE 500 MILLILITER(S): 9 INJECTION INTRAMUSCULAR; INTRAVENOUS; SUBCUTANEOUS at 20:30

## 2019-01-01 RX ADMIN — SODIUM CHLORIDE 1000 MILLILITER(S): 9 INJECTION INTRAMUSCULAR; INTRAVENOUS; SUBCUTANEOUS at 15:27

## 2019-01-01 RX ADMIN — HEPARIN SODIUM 5000 UNIT(S): 5000 INJECTION INTRAVENOUS; SUBCUTANEOUS at 18:55

## 2019-01-01 RX ADMIN — Medication 125 MILLIGRAM(S): at 05:31

## 2019-01-01 RX ADMIN — Medication 125 MILLIGRAM(S): at 23:18

## 2019-01-01 RX ADMIN — Medication 50 MILLIGRAM(S): at 05:51

## 2019-01-01 RX ADMIN — Medication 50 MILLIGRAM(S): at 13:13

## 2019-01-01 RX ADMIN — Medication 125 MILLIGRAM(S): at 13:37

## 2019-01-01 RX ADMIN — HEPARIN SODIUM 5000 UNIT(S): 5000 INJECTION INTRAVENOUS; SUBCUTANEOUS at 06:03

## 2019-01-01 RX ADMIN — Medication 125 MILLIGRAM(S): at 17:16

## 2019-01-01 RX ADMIN — CINACALCET 30 MILLIGRAM(S): 30 TABLET, FILM COATED ORAL at 05:10

## 2019-01-01 RX ADMIN — URSODIOL 300 MILLIGRAM(S): 250 TABLET, FILM COATED ORAL at 17:21

## 2019-01-01 RX ADMIN — OXYCODONE AND ACETAMINOPHEN 1 TABLET(S): 5; 325 TABLET ORAL at 23:18

## 2019-01-01 RX ADMIN — OXYCODONE AND ACETAMINOPHEN 1 TABLET(S): 5; 325 TABLET ORAL at 23:07

## 2019-01-01 RX ADMIN — Medication 2: at 12:54

## 2019-01-01 RX ADMIN — SEVELAMER CARBONATE 2400 MILLIGRAM(S): 2400 POWDER, FOR SUSPENSION ORAL at 11:21

## 2019-02-25 NOTE — DISCHARGE NOTE ADULT - NS AS DC FU [facility] LV FUNCTION ASSESSMENT
Ear Cerumen Removal  Date/Time: 2/25/2019 1:59 PM  Performed by: Pankaj Monroy MD  Authorized by: Pankaj Monroy MD     Consent:     Consent obtained:  Verbal    Consent given by:  Patient and parent    Risks discussed:  Pain and TM perforation    Alternatives discussed:  Observation  Procedure details:     Location:  L ear and R ear    Procedure type: irrigation    Post-procedure details: Inspection:  TM intact    Hearing quality:  Normal    Patient tolerance of procedure:   Tolerated well, no immediate complications yes

## 2019-03-22 NOTE — ED STATDOCS - PMH
Monitor. 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

## 2019-04-03 PROBLEM — Z95.810 BIVENTRICULAR ICD (IMPLANTABLE CARDIOVERTER-DEFIBRILLATOR) IN PLACE: Status: ACTIVE | Noted: 2017-12-12

## 2019-04-03 PROBLEM — I50.9 CHF (CONGESTIVE HEART FAILURE): Status: ACTIVE | Noted: 2017-03-01

## 2019-04-08 NOTE — DISCHARGE NOTE ADULT - NS AS DC VTE INSTRUCTION
Coumadin/Warfarin - Dietary Advice.../Coumadin/Warfarin - Potential for adverse drug reactions and interactions/Coumadin/Warfarin - Follow-up monitoring.../Coumadin/Warfarin - Compliance... Statement Selected

## 2019-04-10 NOTE — H&P ADULT - NSICDXPASTSURGICALHX_GEN_ALL_CORE_FT
PAST SURGICAL HISTORY:  A-V fistula 1995 left UE  right arm 2007,    AICD (automatic cardioverter/defibrillator) present 2013    Elective surgery left arm artery replaced with with right thigh used as donor site    H/O hernia repair     H/O kidney transplant 1985, 1995, 1997    S/P colonoscopy last done 2016    Stented coronary artery 2008?

## 2019-04-10 NOTE — ED ADULT NURSE NOTE - NSIMPLEMENTINTERV_GEN_ALL_ED
Implemented All Universal Safety Interventions:  [place] to call system. Call bell, personal items and telephone within reach. Instruct patient to call for assistance. Room bathroom lighting operational. Non-slip footwear when patient is off stretcher. Physically safe environment: no spills, clutter or unnecessary equipment. Stretcher in lowest position, wheels locked, appropriate side rails in place.

## 2019-04-10 NOTE — CONSULT NOTE ADULT - SUBJECTIVE AND OBJECTIVE BOX
Patient is a 63y old  Male who presents with a chief complaint of   HPI:  2 y/o M with a PMHx of AICD s/p defibrillator, CAD, ESRD on dialysis, Hypoparathyroidism, HTN, PVD, PKD presenting to the ED Pt is on dialysis (MWF) and had treatment today. Towards the end of dialysis, pt developed abdominal pain and nausea. Pt was then sent to ED to r/o pancreatitis. Pt had a clean out of gallbladder 2 weeks ago at [place]. Denies diarrhea, cholecystectomy, CP, vomiting, or fevers.  ROS:.  [X] A ten-point review of systems was otherwise negative except as noted.  Systemic:	[ ] Fever	[ ] Chills	[ ] Night sweats    [ ] Fatigue	[ ] Other  [] Cardiovascular:  [] Pulmonary:  [] Renal/Urologic:  [] Gastrointestinal: abdominal pain, vomiting  [] Metabolic:  [] Neurologic:  [] Hematologic:  [] ENT:  [] Ophthalmologic:  [] Musculoskeletal:    [ ] Due to altered mental status/intubation, subjective information were not able to be obtained from the patient. History was obtained, to the extent possible, from review of the chart and collateral sources of information.    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  S/P colonoscopy: last done 2016  AICD (automatic cardioverter/defibrillator) present: 2013  H/O hernia repair  A-V fistula: 1995 left UE  right arm 2007,  H/O kidney transplant: 1985, 1995, 1997    FAMILY HISTORY:  Family history of kidney disease (Mother)  Family history of colon cancer (Sibling)    Social History:    Alcohol: Denied  Smoking: Denied  Drug Use: Denied        Allergies    No Known Allergies    Intolerances      MEDICATIONS  (STANDING):  sodium chloride 0.9% Bolus 1000 milliLiter(s) IV Bolus once  vancomycin  IVPB 1000 milliGRAM(s) IV Intermittent once    Vital Signs Last 24 Hrs  T(C): 38.9 (10 Apr 2019 15:04), Max: 38.9 (10 Apr 2019 15:04)  T(F): 102.1 (10 Apr 2019 15:04), Max: 102.1 (10 Apr 2019 15:04)  HR: 90 (10 Apr 2019 15:04) (80 - 90)  BP: 87/55 (10 Apr 2019 15:04) (87/55 - 125/81)  BP(mean): --  RR: 16 (10 Apr 2019 15:04) (16 - 18)  SpO2: 94% (10 Apr 2019 15:04) (94% - 100%)  PHYSICAL EXAM:  Constitutional: NAD, GCS: 15/15  AOX3  Eyes:  WNL  ENMT:  WNL  Neck:  WNL, non tender  Back: Non tender  Respiratory: CTABL  Cardiovascular:  S1+S2+0  Gastrointestinal: Soft, ND , NT  Genitourinary:  WNL  Extremities: NV intact  Vascular:  Intact  Neurological: No focal neurological deficit,  CN, motor and sensory system grossly intact.  Skin: WNL  Musculoskeletal: WNL  Psychiatric: Grossly WNL      Labs:                          9.9    6.46  )-----------( 90       ( 10 Apr 2019 11:45 )             32.8       04-10    140  |  96  |  33<H>  ----------------------------<  92  3.6   |  36<H>  |  3.16<H>    Ca    8.4<L>      10 Apr 2019 11:45    TPro  6.4  /  Alb  2.8<L>  /  TBili  1.0  /  DBili  x   /  AST  9<L>  /  ALT  <6<L>  /  AlkPhos  95  04-10      PT/INR - ( 10 Apr 2019 11:45 )   PT: 18.3 sec;   INR: 1.62 ratio         PTT - ( 10 Apr 2019 11:45 )  PTT:30.6 sec  Lactate, Blood (04.10.19 @ 11:45)    Lactate, Blood: 1.9 mmol/L    Lipase, Serum (04.10.19 @ 11:45)    Lipase, Serum: 52 U/L      Radiology Results:    < from: US Abdomen Complete (04.10.19 @ 12:53) >  IMPRESSION:    Wall thickening, luminal distention, and multiple stones. Reyes sign is   absent. Findings a clinical fracture cholecystitis.    12 mm common bile duct.    Chronicpancreatitis.     Right lower quadrant renal allograft measures 7.7 cm in length and shows   no hydronephrosis or perinephric collection.     Polycystic liver disease    < end of copied text >  < from: CT Abdomen and Pelvis No Cont (04.10.19 @ 12:52) >  MPRESSION:     No free air or evidence of bowel perforation.    Gallstones within a distended gallbladder.    Polycystic liver disease.    Chronic pancreatitis with decreased diffuse dilatation of the main   pancreatic duct and persistent large intraductal calculus.    Small ascites.      < end of copied text > Patient is a 63y old  Male who presents with a chief complaint of   HPI:  4 y/o M with a PMHx of AICD s/p defibrillator, CAD, ESRD on dialysis, Hypoparathyroidism, HTN, PVD, PKD presenting to the ED Pt is on dialysis (MWF) and had treatment today. Towards the end of dialysis, pt developed abdominal pain and nausea. Pt was then sent to ED to r/o pancreatitis. Pt had a ERCP 2 weeks ago at [facility]. Denies diarrhea, cholecystectomy, CP, vomiting, or fevers. long history of Klebsiella sepsis from unknown source. was deemed not a surgical candidate for cholecystectomy in the past, Cirrhosis, EF 20 %  ROS:.  [X] A ten-point review of systems was otherwise negative except as noted.  Systemic:	[ ] Fever	[ ] Chills	[ ] Night sweats    [ ] Fatigue	[ ] Other  [] Cardiovascular:  [] Pulmonary:  [] Renal/Urologic:  [] Gastrointestinal: abdominal pain, vomiting  [] Metabolic:  [] Neurologic:  [] Hematologic:  [] ENT:  [] Ophthalmologic:  [] Musculoskeletal:    [ ] Due to altered mental status/intubation, subjective information were not able to be obtained from the patient. History was obtained, to the extent possible, from review of the chart and collateral sources of information.    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  S/P colonoscopy: last done 2016  AICD (automatic cardioverter/defibrillator) present: 2013  H/O hernia repair  A-V fistula: 1995 left UE  right arm 2007,  H/O kidney transplant: 1985, 1995, 1997    FAMILY HISTORY:  Family history of kidney disease (Mother)  Family history of colon cancer (Sibling)    Social History:    Alcohol: Denied  Smoking: Denied  Drug Use: Denied        Allergies    No Known Allergies    Intolerances      MEDICATIONS  (STANDING):  sodium chloride 0.9% Bolus 1000 milliLiter(s) IV Bolus once  vancomycin  IVPB 1000 milliGRAM(s) IV Intermittent once    Vital Signs Last 24 Hrs  T(C): 38.9 (10 Apr 2019 15:04), Max: 38.9 (10 Apr 2019 15:04)  T(F): 102.1 (10 Apr 2019 15:04), Max: 102.1 (10 Apr 2019 15:04)  HR: 90 (10 Apr 2019 15:04) (80 - 90)  BP: 87/55 (10 Apr 2019 15:04) (87/55 - 125/81)  BP(mean): --  RR: 16 (10 Apr 2019 15:04) (16 - 18)  SpO2: 94% (10 Apr 2019 15:04) (94% - 100%)  PHYSICAL EXAM:  Constitutional: NAD, GCS: 15/15  AOX3  Eyes:  WNL  ENMT:  WNL  Neck:  WNL, non tender  Back: Non tender  Respiratory: CTABL  Cardiovascular:  S1+S2+0  Gastrointestinal: Soft, ND, epigastric tenderness no rebound.  Genitourinary:  WNL  Extremities: NV intact  Vascular:  Intact  Neurological: No focal neurological deficit,  CN, motor and sensory system grossly intact.  Skin: WNL  Musculoskeletal: WNL  Psychiatric: Grossly WNL      Labs:                          9.9    6.46  )-----------( 90       ( 10 Apr 2019 11:45 )             32.8       04-10    140  |  96  |  33<H>  ----------------------------<  92  3.6   |  36<H>  |  3.16<H>    Ca    8.4<L>      10 Apr 2019 11:45    TPro  6.4  /  Alb  2.8<L>  /  TBili  1.0  /  DBili  x   /  AST  9<L>  /  ALT  <6<L>  /  AlkPhos  95  04-10      PT/INR - ( 10 Apr 2019 11:45 )   PT: 18.3 sec;   INR: 1.62 ratio         PTT - ( 10 Apr 2019 11:45 )  PTT:30.6 sec  Lactate, Blood (04.10.19 @ 11:45)    Lactate, Blood: 1.9 mmol/L    Lipase, Serum (04.10.19 @ 11:45)    Lipase, Serum: 52 U/L      Radiology Results:    < from: US Abdomen Complete (04.10.19 @ 12:53) >  IMPRESSION:    Wall thickening, luminal distention, and multiple stones. Reyes sign is   absent. Findings a clinical fracture cholecystitis.    12 mm common bile duct.    Chronicpancreatitis.     Right lower quadrant renal allograft measures 7.7 cm in length and shows   no hydronephrosis or perinephric collection.     Polycystic liver disease    < end of copied text >  < from: CT Abdomen and Pelvis No Cont (04.10.19 @ 12:52) >  MPRESSION:     No free air or evidence of bowel perforation.    Gallstones within a distended gallbladder.    Polycystic liver disease.    Chronic pancreatitis with decreased diffuse dilatation of the main   pancreatic duct and persistent large intraductal calculus.    Small ascites.      < end of copied text >

## 2019-04-10 NOTE — H&P ADULT - NSICDXPASTMEDICALHX_GEN_ALL_CORE_FT
PAST MEDICAL HISTORY:  Adrenal insufficiency     AICD (automatic cardioverter/defibrillator) present     Arm swelling left arm    AV fistula right UE    CAD (coronary artery disease)     Clostridium difficile colitis     Dialysis patient     ESRD (end stage renal disease)     HFrEF (heart failure with reduced ejection fraction)     Hypertension     Hypoparathyroidism     Irregular heart beat     Leg pain, anterior, right right anterior thigh at graft donor site    Meningitis due to cryptococcus     Oliguria     Peripheral vascular disease     Polycystic kidney disease

## 2019-04-10 NOTE — SEPSIS NOTE - ASSESSMENT
Patient with sepsis  bp now ok, lactate was normal  need to limit fluids secondary chf and renal failuer

## 2019-04-10 NOTE — ED PROVIDER NOTE - PROGRESS NOTE DETAILS
Stacie SMITH for ED attending, Dr. Infante: called yb Dr. Bee. Pt is on dialysis. Finished dialysis today but at the end abd pain, nausea and vomiting. Recently had procedure ERCP for biliary sludge 1 week ago at [facility]. Sent in to r/o pancreatitis or worsening abdominal pathology. Stacie SMITH for ED attending, Dr. Infante: called by Dr. Bee. Pt is on dialysis. Finished dialysis today but at the end abd pain, nausea and vomiting. Recently had procedure ERCP for biliary sludge 1 week ago at [facility]. Sent in to r/o pancreatitis or worsening abdominal pathology. Stacie SMITH for ED attending, Dr. Infante: Ultrasound with cholecystitis. Surgery paged. Stacie SMITH for ED attending, Dr. Infante: spoke with surgery; Dr. Burnette states that pt will likely need another ERCP and will not do cholecystectomy here at this time. rec for transfer. spoke with pt regarding theses issues, states he wants to be transferred to [facility]. spoke with transfer center at [facility]. awaiting accepting. Reymundo Infante M.D., Attending Physician accepted to  Dr. Zhang. Reymundo Infante M.D., Attending Physician pt became febrile and hypotensive - vanc added to abx. transfer canceled. ICU paged. Reymundo Infante M.D., Attending Physician spoke with Dr. Beasley of ICU will see patient shortly. Reymundo Infante M.D., Attending Physician patient seen by Dr. Patel

## 2019-04-10 NOTE — H&P ADULT - NSHPLABSRESULTS_GEN_ALL_CORE
CBC Full  -  ( 10 Apr 2019 11:45 )  WBC Count : 6.46 K/uL  RBC Count : 3.31 M/uL  Hemoglobin : 9.9 g/dL  Hematocrit : 32.8 %  Platelet Count - Automated : 90 K/uL  Mean Cell Volume : 99.1 fl  Mean Cell Hemoglobin : 29.9 pg  Mean Cell Hemoglobin Concentration : 30.2 gm/dL  Auto Neutrophil # : 4.75 K/uL  Auto Lymphocyte # : 0.85 K/uL  Auto Monocyte # : 0.66 K/uL  Auto Eosinophil # : 0.16 K/uL  Auto Basophil # : 0.02 K/uL  Auto Neutrophil % : 73.5 %  Auto Lymphocyte % : 13.2 %  Auto Monocyte % : 10.2 %  Auto Eosinophil % : 2.5 %  Auto Basophil % : 0.3 %      04-10    140  |  96  |  33<H>  ----------------------------<  92  3.6   |  36<H>  |  3.16<H>    Ca    8.4<L>      10 Apr 2019 11:45    TPro  6.4  /  Alb  2.8<L>  /  TBili  1.0  /  DBili  x   /  AST  9<L>  /  ALT  <6<L>  /  AlkPhos  95  04-10

## 2019-04-10 NOTE — ED PROVIDER NOTE - CRITICAL CARE PROVIDED
documentation/interpretation of diagnostic studies/consultation with other physicians/direct patient care (not related to procedure)/consult w/ pt's family directly relating to pts condition/additional history taking

## 2019-04-10 NOTE — CONSULT NOTE ADULT - ASSESSMENT
62 yo male with epigastric pain. No evidence of pancreatitis. ?Cholecystitis vs possible ruptured cyst. Would recommend ID evaluation (seen Dr Srivastava in the past), pain control, stress dose steroids.

## 2019-04-10 NOTE — CONSULT NOTE ADULT - SUBJECTIVE AND OBJECTIVE BOX
Patient is a 63y old  Male who presents with a chief complaint of abdominal pain. Patient has complicated medical history, and has had recurrent Klesbiella sepsis. ERCP with papillotomy and removal of CBD stone was done at [facility] one week ago. Patient felt well until last pm - epigastric pain radiating to back. CT - no evidence of acute pancreatitis. Thickened GB wall, and some free fluid.     HPI:      PAST MEDICAL & SURGICAL HISTORY:  Oliguria  Arm swelling: left arm  AV fistula: right UE  Leg pain, anterior, right: right anterior thigh at graft donor site  Irregular heart beat  AICD (automatic cardioverter/defibrillator) present  Dialysis patient  Adrenal insufficiency  Hypoparathyroidism  HFrEF (heart failure with reduced ejection fraction)  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  S/P colonoscopy: last done 2016  AICD (automatic cardioverter/defibrillator) present: 2013  H/O hernia repair  A-V fistula: 1995 left UE  right arm 2007,  H/O kidney transplant: 1985, 1995, 1997      MEDICATIONS  (STANDING):    MEDICATIONS  (PRN):      Allergies    No Known Allergies    Intolerances        SOCIAL HISTORY:    FAMILY HISTORY:  Family history of kidney disease (Mother)  Family history of colon cancer (Sibling)      Vital Signs Last 24 Hrs  T(C): 36.6 (10 Apr 2019 10:59), Max: 36.6 (10 Apr 2019 10:59)  T(F): 97.9 (10 Apr 2019 10:59), Max: 97.9 (10 Apr 2019 10:59)  HR: 83 (10 Apr 2019 11:24) (81 - 83)  BP: 125/81 (10 Apr 2019 11:24) (111/69 - 125/81)  BP(mean): --  RR: 17 (10 Apr 2019 11:24) (17 - 18)  SpO2: 100% (10 Apr 2019 11:24) (100% - 100%)    PHYSICAL EXAM:    Respiratory: CTAB  Cardiovascular: S1 and S2, RRR, no M/G/R  Gastrointestinal: BS+, soft, minimal epigastric tenderness  Extremities: No peripheral edema  Vascular: 2+ peripheral pulses  Neurological: A/O x 3, no focal deficits  Psychiatric: Normal mood, normal affect  Skin: No rashes    LABS:                        9.9    6.46  )-----------( 90       ( 10 Apr 2019 11:45 )             32.8     04-10    140  |  96  |  33<H>  ----------------------------<  92  3.6   |  36<H>  |  3.16<H>    Ca    8.4<L>      10 Apr 2019 11:45    TPro  6.4  /  Alb  2.8<L>  /  TBili  1.0  /  DBili  x   /  AST  9<L>  /  ALT  <6<L>  /  AlkPhos  95  04-10    PT/INR - ( 10 Apr 2019 11:45 )   PT: 18.3 sec;   INR: 1.62 ratio         PTT - ( 10 Apr 2019 11:45 )  PTT:30.6 sec  LIVER FUNCTIONS - ( 10 Apr 2019 11:45 )  Alb: 2.8 g/dL / Pro: 6.4 gm/dL / ALK PHOS: 95 U/L / ALT: <6 U/L / AST: 9 U/L / GGT: x             RADIOLOGY & ADDITIONAL STUDIES:

## 2019-04-10 NOTE — ED PROVIDER NOTE - OBJECTIVE STATEMENT
62 y/o M with a PMHx of AICD s/p defibrillator, CAD, ESRD on dialysis, Hypoparathyroidism, HTN, PVD, PKD presenting to the ED Pt is on dialysis (MWF) and had treatment today. Towards the end of dialysis, pt developed abdominal pain and nausea. Pt was then sent to ED to r/o pancreatitis. Pt had a clean out of gallbladder 2 weeks ago at [facility]. Denies diarrhea, cholecystectomy, CP, vomiting, or fevers. GI: Dr. Graham.

## 2019-04-10 NOTE — H&P ADULT - NSHPPHYSICALEXAM_GEN_ALL_CORE
General - flush, but in no distress  HEENT - nc/at  neck no jvd  lungs clear  cv paced  abdomen - epigastric tenderness, hold abrasion lower abd  extremities - fistula in right arm

## 2019-04-10 NOTE — H&P ADULT - HISTORY OF PRESENT ILLNESS
This patient is 63 year old male with PMH signficiant for                     ESRD secondary Polycystic kidney disease, s/p bilateral nephrectomoy has transplant which has failed                     has had multiple episodes sepsis Klebsiella,                      cardiomuopathy, on coumadin                     chronic pancreatitis                      hx. adrenal insufficiency  The patient had ERCP last week with removal of stones and spincterotomy at [facility], He arrives today with fever to 102, epigastric abdominal pain was unable to tolerate  dialysis, borderline bp. received 1 lter of fluid bp now 100  u/s showed gall stones, ct showes cbp 12 mm but no dlated intrahapatic ducts, normal lipase and amylase

## 2019-04-10 NOTE — ED PROVIDER NOTE - CARE PLAN
Principal Discharge DX:	Abdominal pain Principal Discharge DX:	Abdominal pain  Secondary Diagnosis:	Sepsis, due to unspecified organism

## 2019-04-10 NOTE — ED ADULT NURSE REASSESSMENT NOTE - NS ED NURSE REASSESS COMMENT FT1
Dr. Patel contacted and aware of pt refusal of HIDA scan at this time. Dr. Patel to be coming to pt bedside. Pt medicated as ordered and will cont to monitor.
Dr. Diaz aware of pts temperature of 102.1, zosyn already given. Vanco started. Blood cultures put in after abx given. As per Dr Diaz, blood cultures x2 should be drawn now, phlebotomy paged.

## 2019-04-10 NOTE — CONSULT NOTE ADULT - ASSESSMENT
63 y old male with multiple medical problems, ch pancreatitis, cirrhosis S/p ERCP 2 weeks ago with abdominal pain, fever, nausea, LFTS WNL GB distension on imaging  Surgery consulted to r/O Acute tamara, febrile in ER with mild hypotension.  IV hydration  IV antibiotics  DVT /GI prophylaxis  Serial exam  May need MRCP/ERCP again CBD 12 mm  HIDA to R/O acute tamara, if positive , considering CBD status and Cirrhosis would recommend IR per cholecystostomy  medical evaluation  No acute intervention will follow HIDA 63 y old male with multiple medical problems, ch pancreatitis, cirrhosis S/p ERCP 2 weeks ago with abdominal pain, fever, nausea, LFTS WNL GB distension on imaging  Surgery consulted to r/O Acute tamara, febrile in ER with mild hypotension.  IV hydration  IV antibiotics  DVT /GI prophylaxis  Serial exam  May need MRCP/ERCP again CBD 12 mm  HIDA to R/O acute tamara, if positive , considering CBD status and Cirrhosis , CHF, EF 20 %  would recommend IR per cholecystostomy not a surgical candidate  medical evaluation  No acute intervention will follow HIDA, so far is refusing  HIDA if antibiotics are not effective IR evaluation is suggested.

## 2019-04-10 NOTE — ED ADULT NURSE NOTE - CHIEF COMPLAINT QUOTE
Patient comes to ED for nausea, vomiting and dizziness since last night. pt had a clean out of gallbladder 2 weeks ago at [facility] . pt denies any diarrhea. pt denies gallbladder removal. pt denies any chest pain

## 2019-04-10 NOTE — ED PROVIDER NOTE - NS ED ROS FT
Constitutional: No fever or chills  Eyes: No visual changes  HEENT: No throat pain  CV: No chest pain  Resp: No SOB no cough  GI: +abdominal pain, +nausea  : No dysuria  MSK: No musculoskeletal pain  Skin: No rash  Neuro: No headache

## 2019-04-10 NOTE — ED ADULT NURSE NOTE - OBJECTIVE STATEMENT
Pt comes in complaining of generalized abd pain, n/v that began last night. Pt had gallbladder procedure 2 weeks ago.

## 2019-04-10 NOTE — ED ADULT TRIAGE NOTE - CHIEF COMPLAINT QUOTE
Patient comes to ED for nausea, vomiting and dizziness since last night. pt had a clean out of gallbladder 2 weeks ago at [facility] . pt denies any diarrhea. pt denies gallbladder removal. pt denies any chest pain

## 2019-04-10 NOTE — ED PROVIDER NOTE - NS_ ATTENDINGSCRIBEDETAILS _ED_A_ED_FT
I, Reymundo Infante MD,  performed the initial face to face bedside interview with this patient regarding history of present illness, review of symptoms and relevant past medical, social and family history.  I completed an independent physical examination.  I was the initial provider who evaluated this patient.  The history, relevant review of systems, past medical and surgical history, medical decision making, and physical examination was documented by the scribe in my presence and I attest to the accuracy of the documentation.

## 2019-04-10 NOTE — H&P ADULT - NSICDXFAMILYHX_GEN_ALL_CORE_FT
FAMILY HISTORY:  Mother  Still living? Unknown  Family history of kidney disease, Age at diagnosis: Age Unknown    Sibling  Still living? Unknown  Family history of colon cancer, Age at diagnosis: Age Unknown

## 2019-04-10 NOTE — H&P ADULT - ASSESSMENT
Patient with fever, r/o sepsis  hx. heart failure, esrd on dialysis  adrenal insufficiency  blood cultures drawn  abx. got vanco zosyn,   lfts normal doubt cholangitis, possible cholecystitis  on coumadin for afib  hida scan  will have to judiciously use fluid given heart failure, renal failure, bp now ok  will monitor in icu , has not needed pressors

## 2019-04-10 NOTE — ED PROVIDER NOTE - PHYSICAL EXAMINATION
Constitutional: mild distress AAOx3  Eyes: PERRLA EOMI  Head: Normocephalic atraumatic  Mouth: MMM  Cardiac: regular rate   Resp: Lungs CTAB  GI: +epigastric TTP no rebound or guarding.   Neuro: CN2-12 intact  Skin: No rashes

## 2019-04-11 NOTE — CONSULT NOTE ADULT - SUBJECTIVE AND OBJECTIVE BOX
This patient is 63 year old male with PMH signficiant [place] [facility] [place] Polycystic kidney disease, s/p bilateral nephrectomy, CAD w stent, meningitis sec to cryptococcus, C diff, CAD, PVD, HTN,  has renal transplant which has failed x 2   recenlty has had multiple episodes sepsis sec to Klebsiella, chronic pancreatitis w hx of panreactic and GB stones, hx. adrenal insufficiency.   Patient had ERCP last week with removal of stones and spincterotomy at [place], He arrived with fever to 102, epigastric abdominal pain was unable to tolerate full HD on Wednesday   In ED received 1 lter of fluid . u/s showed gall stones, normal lipase and amylase   + pancreatic intraductal stones   EXAM:  CT ABDOMEN AND PELVIS                        BILE DUCTS: Nondilated.  GALLBLADDER: Gallstones.  SPLEEN: Stable mild splenomegaly.  PANCREAS: Parenchymal calcifications suggestive of chronic pancreatitis.   Decreased diffuse main duct dilatation but persistent large intraductal   calculus.        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  S/P colonoscopy: last done   AICD (automatic cardioverter/defibrillator) present:   H/O hernia repair  A-V fistula:  left UE  right arm ,  H/O kidney transplant: , ,       Home Medications:  acetaminophen 500 mg oral tablet: 2 tab(s) orally every 6 hours, As Needed - for moderate pain (21 Dec 2018 16:07)  amiodarone 200 mg oral tablet: 0.5 tab(s) orally once a day (21 Dec 2018 16:07)  cholecalciferol 1000 intl units oral capsule: 1 cap(s) orally once a day (21 Dec 2018 16:07)  pantoprazole 40 mg oral delayed release tablet: 1 tab(s) orally 2 times a day (21 Dec 2018 16:07)  predniSONE 5 mg oral tablet: 1 tab(s) orally once a day (21 Dec 2018 16:07)  Renvela 800 mg oral tablet: 3 tab(s) orally 3 times a day (with meals) (21 Dec 2018 16:07)  Sensipar 30 mg oral tablet: 1 tab(s) orally 4 times a week    ***Monday, Wednesday, Friday, and Saturday*** (21 Dec 2018 16:07)  vancomycin 125 mg oral capsule: 1 cap(s) orally every 6 hours     (27 Dec 2018 16:18)    MEDICATIONS  (STANDING):  amiodarone    Tablet 100 milliGRAM(s) Oral daily  dextrose 5%. 1000 milliLiter(s) (50 mL/Hr) IV Continuous <Continuous>  dextrose 50% Injectable 12.5 Gram(s) IV Push once  dextrose 50% Injectable 25 Gram(s) IV Push once  dextrose 50% Injectable 25 Gram(s) IV Push once  epoetin amee Injectable 01712 Unit(s) IV Push <User Schedule>  heparin  Injectable 5000 Unit(s) SubCutaneous every 12 hours  insulin lispro (HumaLOG) corrective regimen sliding scale   SubCutaneous three times a day before meals  insulin lispro (HumaLOG) corrective regimen sliding scale   SubCutaneous at bedtime  pantoprazole    Tablet 40 milliGRAM(s) Oral before breakfast  piperacillin/tazobactam IVPB. 3.375 Gram(s) IV Intermittent every 12 hours  predniSONE   Tablet 40 milliGRAM(s) Oral once  sevelamer carbonate 800 milliGRAM(s) Oral three times a day  vancomycin    Solution 125 milliGRAM(s) Oral every 6 hours  warfarin 1 milliGRAM(s) Oral once      Allergies    No Known Allergies    Intolerances        SOCIAL HISTORY:  Denies ETOh,Smoking,     FAMILY HISTORY:  Family history of kidney disease (Mother)  Family history of colon cancer (Sibling)      REVIEW OF SYSTEMS:    CONSTITUTIONAL: No weakness, fevers or chills  EYES/ENT: No visual changes;  No vertigo or throat pain   NECK: No pain or stiffness  RESPIRATORY: No cough, wheezing, hemoptysis; No shortness of breath  CARDIOVASCULAR: No chest pain or palpitations  GASTROINTESTINAL: No abdominal or epigastric pain. No nausea, vomiting, or hematemesis; No diarrhea or constipation. No melena or hematochezia.  GENITOURINARY: No dysuria, frequency or hematuria  NEUROLOGICAL: No numbness or weakness  SKIN: No itching, burning, rashes, or lesions   All other review of systems is negative unless indicated above.    Vital Signs Last 24 Hrs  T(C): 36.5 (2019 10:00), Max: 37.8 (10 Apr 2019 16:57)  T(F): 97.7 (2019 10:00), Max: 100 (10 Apr 2019 16:57)  HR: 65 (2019 10:00) (61 - 99)  BP: 95/68 (2019 10:00) (84/56 - 109/73)  BP(mean): 78 (2019 10:00) (65 - 85)  RR: 15 (2019 10:00) (12 - 18)  SpO2: 99% (2019 10:00) (83% - 100%)    I and O's:    04-10 @ 07:01  -  - @ 07:00  --------------------------------------------------------  IN: 400 mL / OUT: 300 mL / NET: 100 mL        Weight (kg): 65.5 ( @ 06:28)    PHYSICAL EXAM:    Constitutional: NAD  HEENT: PERRLA, EOMI,  MMM  Neck: No LAD, No JVD  Respiratory: CTAB  Cardiovascular: S1 and S2  Gastrointestinal: BS+, soft, NT/ND  Extremities: No peripheral edema  Neurological: A/O x 3, no focal deficits  Psychiatric: Normal mood, normal affect  : No Amaya  Skin: No rashes  Access: AVF ++     LABS:                                        10.0   5.43  )-----------( 97       ( 2019 06:07 )             33.6                         9.9    6.46  )-----------( 90       ( 10 Apr 2019 11:45 )             32.8         141    |  101    |  46     ----------------------------<  127       2019 06:07  4.8     |  28     |  4.05     140    |  96     |  33     ----------------------------<  92        10 Apr 2019 11:45  3.6     |  36     |  3.16     Ca    8.1        2019 06:07  Ca    8.4        10 Apr 2019 11:45        TPro  6.0    /  Alb  2.6    /  TBili  0.8    /        2019 06:07  DBili  x      /  AST  12     /  ALT  <6     /  AlkPhos  88       TPro  6.4    /  Alb  2.8    /  TBili  1.0    /        10 Apr 2019 11:45  DBili  x      /  AST  9      /  ALT  <6     /  AlkPhos  95           Urine Studies:  Urinalysis Basic - ( 10 Apr 2019 07:00 )    Color: Yellow / Appearance: Clear / S.015 / pH: x  Gluc: x / Ketone: Negative  / Bili: Negative / Urobili: Negative mg/dL   Blood: x / Protein: 100 mg/dL / Nitrite: Negative   Leuk Esterase: Negative / RBC: 11-25 /HPF / WBC 0-2   Sq Epi: x / Non Sq Epi: Occasional / Bacteria: Occasional            RADIOLOGY & ADDITIONAL STUDIES:    as above

## 2019-04-11 NOTE — CONSULT NOTE ADULT - ASSESSMENT
62 yo male with ESRD on HD ( PCKD), s.p failed transplant x 2, w recent admssion for Klebsiella bacteremia w sepsis . found to have pancreatic and GB stones, s.p recent ERCP and sphincterotomy this past week.  Presented with Fever and abd pains while on HD     Fevers w Abd pains sec to possible sepsis after recent ERCP   Chronic pancreatitis hx = but amylase, lipase normal    await cultures r/o recurrent Klebsiella    was on recent abx perioprocedure    ID eval    GI eval for persistent GB sludge and stone in pancreatic duct  - may need repeat ERCP for full removal of stones vs surgery      ESRD on HD MWF   contnued HD support    Anemia of CKD   SACHIN at HD    Thank you for the courtesy of this consult. We will follow this patient with you.   Management is subject to change if new information becomes available or patient condition changes.

## 2019-04-11 NOTE — PATIENT PROFILE ADULT - IS THERE A SUSPICION OF ABUSE/NEGLIGENCE?
Health Maintenance Summary     Topic Due On Due Status Completed On    IMMUNIZATION - DTaP/Tdap/Td May 15, 2025 Not Due May 15, 2015    Immunization-Influenza Sep 1, 2018 Not Due     Depression Screening Sep 15, 1993 Overdue           Patient is due for topics as listed above, he wishes to discuss with provider . no

## 2019-04-11 NOTE — CONSULT NOTE ADULT - ASSESSMENT
62 yo male with PMH of CAD, ESRD on HD, polycystic kidney disease, systolic CHF s/p AICD, HTN, adrenal insufficiency (on chronic prednisone), hypoparathyroidism, h/o cryptococcal meningitis, h/o c. diff infection, prior sepsis with Klebsiella pneumoniae, pancreatic duct stone, chronic pancreatitis cardiomyopathy on coumadin, adrenal insufficiency was admitted on 4/10 for abdominal pain and fever. The patient had ERCP the week PTA with removal of stones and spincterotomy at [place], He developed fever to 102F and severe epigastric abdominal pain and came to the hospital. He received zosyn IV and vancomycin IV.    1. Febrile syndrome. Possible sepsis. Probable acute cholangitis s/p recent ERCP/CBD stones removal. Acute cholecystitis. Gallstones. Prior CDAD. ESRD on HD.  -obtain BC x 2  -agree with zosyn 3.375 gm IV q12h  -add vancomycin 125 mg PO q6h  -reason for abx use and side effects reviewed with patient; monitor BMP   - follow up cultures   -US imaging suggestive of chronic pancreatitis and gallstones  -monitor temps  -f/u CBC  -supportive care  2. Other issues; CAD, ESRD on HD, polycystic kidney disease, systolic CHF s/p AICD, HTN, adrenal insufficiency (on chronic prednisone), hypoparathyroidism  - per medicine 62 yo male with PMH of CAD, ESRD on HD, polycystic kidney disease, systolic CHF s/p AICD, HTN, adrenal insufficiency (on chronic prednisone), hypoparathyroidism, h/o cryptococcal meningitis, h/o c. diff infection, prior sepsis with Klebsiella pneumoniae, pancreatic duct stone, chronic pancreatitis cardiomyopathy on coumadin, adrenal insufficiency was admitted on 4/10 for abdominal pain and fever. The patient had ERCP the week PTA with removal of stones and spincterotomy at [place], He developed fever to 102F and severe epigastric abdominal pain and came to the hospital. He received zosyn IV and vancomycin IV.    1. Febrile syndrome. Possible sepsis. Probable acute cholangitis s/p recent ERCP/CBD stones removal. Possible acute cholecystitis. Gallstones. Prior CDAD. ESRD on HD.  -obtain BC x 2  -agree with zosyn 3.375 gm IV q12h  -add vancomycin 125 mg PO q6h  -reason for abx use and side effects reviewed with patient; monitor BMP   - follow up cultures   -US imaging suggestive of chronic pancreatitis and gallstones  -monitor temps  -f/u CBC  -supportive care  2. Other issues; CAD, ESRD on HD, polycystic kidney disease, systolic CHF s/p AICD, HTN, adrenal insufficiency (on chronic prednisone), hypoparathyroidism  - per medicine

## 2019-04-11 NOTE — CONSULT NOTE ADULT - SUBJECTIVE AND OBJECTIVE BOX
Patient is a 63y old  Male who presents with a chief complaint of abdominal pain and fever    HPI:  64 yo male with PMH of CAD, ESRD on HD, polycystic kidney disease, systolic CHF s/p AICD, HTN, adrenal insufficiency (on chronic prednisone), hypoparathyroidism, h/o cryptococcal meningitis, h/o c. diff infection, prior sepsis with Klebsiella pneumoniae, pancreatic duct stone, chronic pancreatitis cardiomyopathy on coumadin, adrenal insufficiency was admitted on 4/10 for abdominal pain and fever. The patient had ERCP the week PTAwith removal of stones and spincterotomy at [place], He developed fever to 102F and severe epigastric abdominal pain and came to the hospital. He received zosyn IV and vancomycin IV.    PMH: as above  PSH: as above  Meds: per reconciliation sheet, noted below  MEDICATIONS  (STANDING):  amiodarone    Tablet 100 milliGRAM(s) Oral daily  dextrose 5%. 1000 milliLiter(s) (50 mL/Hr) IV Continuous <Continuous>  dextrose 50% Injectable 12.5 Gram(s) IV Push once  dextrose 50% Injectable 25 Gram(s) IV Push once  dextrose 50% Injectable 25 Gram(s) IV Push once  heparin  Injectable 5000 Unit(s) SubCutaneous every 12 hours  insulin lispro (HumaLOG) corrective regimen sliding scale   SubCutaneous three times a day before meals  insulin lispro (HumaLOG) corrective regimen sliding scale   SubCutaneous at bedtime  pantoprazole    Tablet 40 milliGRAM(s) Oral before breakfast  piperacillin/tazobactam IVPB. 3.375 Gram(s) IV Intermittent every 12 hours  predniSONE   Tablet 40 milliGRAM(s) Oral once  sevelamer carbonate 800 milliGRAM(s) Oral three times a day  sodium chloride 0.9%. 1000 milliLiter(s) (50 mL/Hr) IV Continuous <Continuous>  warfarin 1 milliGRAM(s) Oral once    MEDICATIONS  (PRN):  acetaminophen   Tablet .. 650 milliGRAM(s) Oral every 6 hours PRN Temp greater or equal to 38C (100.4F), Mild Pain (1 - 3)  dextrose 40% Gel 15 Gram(s) Oral once PRN Blood Glucose LESS THAN 70 milliGRAM(s)/deciliter  glucagon  Injectable 1 milliGRAM(s) IntraMuscular once PRN Glucose LESS THAN 70 milligrams/deciliter    Allergies    No Known Allergies    Intolerances      Social: no smoking, no alcohol, no illegal drugs; no recent travel, no exposure to TB  FAMILY HISTORY:  Family history of kidney disease (Mother)  Family history of colon cancer (Sibling)    no history of premature cardiovascular disease in first degree relatives    ROS: the patient denies HA, no seizures, no dizziness, no sore throat, no nasal congestion, no blurry vision, no CP, no palpitations, no SOB, no cough, has abdominal pain, no diarrhea, no N/V, no dysuria, no leg pain, no claudication, no rash, no joint aches, no rectal pain or bleeding, no night sweats  All other systems reviewed and are negative    Vital Signs Last 24 Hrs  T(C): 36.5 (2019 10:00), Max: 38.9 (10 Apr 2019 15:04)  T(F): 97.7 (2019 10:00), Max: 102.1 (10 Apr 2019 15:04)  HR: 65 (2019 10:00) (61 - 99)  BP: 95/68 (2019 10:00) (84/56 - 109/73)  BP(mean): 78 (2019 10:00) (65 - 85)  RR: 15 (2019 10:00) (12 - 18)  SpO2: 99% (2019 10:00) (83% - 100%)  Daily     Daily Weight in k.5 (10 Apr 2019 21:00)    PE:    Constitutional: frail looking  HEENT: NC/AT, EOMI, PERRLA, conjunctivae clear; ears and nose atraumatic; pharynx benign  Neck: supple; thyroid not palpable  Back: no tenderness  Respiratory: respiratory effort normal; clear to auscultation  Cardiovascular: S1S2 regular, no murmurs  Abdomen: soft, has epigastric tender, not distended, positive BS; no liver or spleen organomegaly  Genitourinary: no suprapubic tenderness  Lymphatic: no LN palpable  Musculoskeletal: no muscle tenderness, no joint swelling or tenderness  Extremities: no pedal edema  Neurological/ Psychiatric: AxOx3, judgement and insight normal; moving all extremities  Skin: no rashes; no palpable lesions    Labs: all available labs reviewed                        10.0   5.43  )-----------( 97       ( 2019 06:07 )             33.6     04-11    141  |  101  |  46<H>  ----------------------------<  127<H>  4.8   |  28  |  4.05<H>    Ca    8.1<L>      2019 06:07    TPro  6.0  /  Alb  2.6<L>  /  TBili  0.8  /  DBili  x   /  AST  12<L>  /  ALT  <6<L>  /  AlkPhos  88  04-11     LIVER FUNCTIONS - ( 2019 06:07 )  Alb: 2.6 g/dL / Pro: 6.0 gm/dL / ALK PHOS: 88 U/L / ALT: <6 U/L / AST: 12 U/L / GGT: x           Urinalysis Basic - ( 10 Apr 2019 07:00 )    Color: Yellow / Appearance: Clear / S.015 / pH: x  Gluc: x / Ketone: Negative  / Bili: Negative / Urobili: Negative mg/dL   Blood: x / Protein: 100 mg/dL / Nitrite: Negative   Leuk Esterase: Negative / RBC: 11-25 /HPF / WBC 0-2   Sq Epi: x / Non Sq Epi: Occasional / Bacteria: Occasional        Radiology: all available radiological tests reviewed    < from: US Abdomen Complete (04.10.19 @ 12:53) >  Wall thickening, luminal distention, and multiple stones. Reyes sign is   absent. Findings a clinical fracture cholecystitis.    12 mm common bile duct.    Chronicpancreatitis.     Right lower quadrant renal allograft measures 7.7 cm in length and shows   no hydronephrosis or perinephric collection.     Polycystic liver disease    < end of copied text >      Advanced directives addressed: full resuscitation

## 2019-04-14 NOTE — DISCHARGE NOTE PROVIDER - NSDCCPCAREPLAN_GEN_ALL_CORE_FT
PRINCIPAL DISCHARGE DIAGNOSIS  Diagnosis: Cholangitis  Assessment and Plan of Treatment:       SECONDARY DISCHARGE DIAGNOSES  Diagnosis: Sepsis, due to unspecified organism  Assessment and Plan of Treatment:

## 2019-04-14 NOTE — DISCHARGE NOTE PROVIDER - PROVIDER TOKENS
PROVIDER:[TOKEN:[[id number]]],FREE:[LAST:[your doctor at [facility] for ERCP],PHONE:[(   )    -],FAX:[(   )    -]],PROVIDER:[TOKEN:[[id number]]],PROVIDER:[TOKEN:[[id number]]]

## 2019-04-14 NOTE — DISCHARGE NOTE NURSING/CASE MANAGEMENT/SOCIAL WORK - NSDCDPATPORTLINK_GEN_ALL_CORE
You can access the [facility] Patient Portal, offered by [facility], by registering with the following website: [email or web address]

## 2019-04-14 NOTE — PROGRESS NOTE ADULT - PROVIDER SPECIALTY LIST ADULT
Critical Care
Gastroenterology
Gastroenterology
Hospitalist
Hospitalist
Infectious Disease
Nephrology
Critical Care

## 2019-04-14 NOTE — PROGRESS NOTE ADULT - SUBJECTIVE AND OBJECTIVE BOX
CC: Weakness    63y old M with a PMHx of ESRD secondary Polycystic kidney disease, s/p bilateral nephrectomoy has transplant which has failed has had multiple episodes sepsis Klebsiella,   cardiomyopathy on coumadin, chronic pancreatitis, hx. adrenal insufficiency. He had an ERCP last week with removal of stones and sphincterotomy at [facility], He arrives today with fever to 102, epigastric abdominal pain was unable to tolerate dialysis, borderline bp. received 1 lter of fluid bp now 100. US showed gall stones, ct showed cbp 12 mm but no dilated intrahepatic ducts, normal lipase and amylase (10 Apr 2019 16:34)     - Patient seen and examined. Events noted. Case d/w Dr. Heard. On arrival he feels better. No further abdominal pain. He denies any (-)n/v/d. On stress steroids.      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  S/P colonoscopy: last done   AICD (automatic cardioverter/defibrillator) present:   H/O hernia repair  A-V fistula:  left UE  right arm ,  H/O kidney transplant: , ,       FAMILY HISTORY:  Family history of kidney disease (Mother)  Family history of colon cancer (Sibling)      Social Hx:    Allergies    No Known Allergies    Intolerances        63y    Height (cm): 182.88 (04-10 @ 10:28)  Weight (kg): 65.5 ( @ 06:28)  BMI (kg/m2): 19.6 ( @ 06:28)    ICU Vital Signs Last 24 Hrs  T(C): 35.8 (2019 05:44), Max: 38.9 (10 Apr 2019 15:04)  T(F): 96.5 (2019 05:44), Max: 102.1 (10 Apr 2019 15:04)  HR: 67 (2019 08:00) (61 - 99)  BP: 109/73 (2019 08:00) (84/56 - 125/81)  BP(mean): 85 (2019 08:00) (65 - 85)  ABP: --  ABP(mean): --  RR: 16 (2019 08:00) (12 - 18)  SpO2: 98% (2019 08:00) (83% - 100%)          I&O's Summary    10 Apr 2019 07:01  -  2019 07:00  --------------------------------------------------------  IN: 400 mL / OUT: 300 mL / NET: 100 mL                              10.0   5.43  )-----------( 97       ( 2019 06:07 )             33.6       04-11    141  |  101  |  46<H>  ----------------------------<  127<H>  4.8   |  28  |  4.05<H>    Ca    8.1<L>      2019 06:07    TPro  6.0  /  Alb  2.6<L>  /  TBili  0.8  /  DBili  x   /  AST  12<L>  /  ALT  <6<L>  /  AlkPhos  88  04-11      CAPILLARY BLOOD GLUCOSE      POCT Blood Glucose.: 119 mg/dL (2019 07:22)  POCT Blood Glucose.: 123 mg/dL (10 Apr 2019 22:52)      LIVER FUNCTIONS - ( 2019 06:07 )  Alb: 2.6 g/dL / Pro: 6.0 gm/dL / ALK PHOS: 88 U/L / ALT: <6 U/L / AST: 12 U/L / GGT: x                   PT/INR - ( 2019 06:07 )   PT: 20.4 sec;   INR: 1.80 ratio         PTT - ( 10 Apr 2019 11:45 )  PTT:30.6 sec        Urinalysis Basic - ( 10 Apr 2019 07:00 )    Color: Yellow / Appearance: Clear / S.015 / pH: x  Gluc: x / Ketone: Negative  / Bili: Negative / Urobili: Negative mg/dL   Blood: x / Protein: 100 mg/dL / Nitrite: Negative   Leuk Esterase: Negative / RBC: 11-25 /HPF / WBC 0-2   Sq Epi: x / Non Sq Epi: Occasional / Bacteria: Occasional        MEDICATIONS  (STANDING):  amiodarone    Tablet 100 milliGRAM(s) Oral daily  dextrose 5%. 1000 milliLiter(s) (50 mL/Hr) IV Continuous <Continuous>  dextrose 50% Injectable 12.5 Gram(s) IV Push once  dextrose 50% Injectable 25 Gram(s) IV Push once  dextrose 50% Injectable 25 Gram(s) IV Push once  heparin  Injectable 5000 Unit(s) SubCutaneous every 12 hours  hydrocortisone sodium succinate Injectable 50 milliGRAM(s) IV Push every 8 hours  insulin lispro (HumaLOG) corrective regimen sliding scale   SubCutaneous three times a day before meals  insulin lispro (HumaLOG) corrective regimen sliding scale   SubCutaneous at bedtime  pantoprazole    Tablet 40 milliGRAM(s) Oral before breakfast  sevelamer carbonate 800 milliGRAM(s) Oral three times a day  sodium chloride 0.9%. 1000 milliLiter(s) (50 mL/Hr) IV Continuous <Continuous>    MEDICATIONS  (PRN):  acetaminophen   Tablet .. 650 milliGRAM(s) Oral every 6 hours PRN Temp greater or equal to 38C (100.4F), Mild Pain (1 - 3)  dextrose 40% Gel 15 Gram(s) Oral once PRN Blood Glucose LESS THAN 70 milliGRAM(s)/deciliter  glucagon  Injectable 1 milliGRAM(s) IntraMuscular once PRN Glucose LESS THAN 70 milligrams/deciliter            Advanced Directives:  Discussed with:    Visit Information:    ** Time is exclusive of billed procedures and/or teaching and/or routine family updates.
CC: abdominal (12 Apr 2019 12:55)    HPI:  63 year old male with PMH of ESRD secondary Polycystic kidney disease, s/p bilateral nephrectomy,  had  transplant which has failed x 2, Cardiomyopathy,   systolic CHF s/p AICD, HTN, adrenal insufficiency (on chronic prednisone), hypoparathyroidism, h/o cryptococcal meningitis, h/o c. diff infection  has had multiple episodes sepsis Klebsiella du eto Cholangitis, s/p recent ERCP ans stone extraction  and sphincterotomy at [place], He arrived to ED  with fever to 102, epigastric abdominal pain was unable to tolerate dialysis due to borderline BP .   In ED: was hypotensive despite IV bolus, u/s showed gall stones, ct showes cbp 12 mm but no dilated intrahepatic ducts, normal lipase and amylase. Pt was admitted to ICU for further Tx and monitoring. Started on IV abxs. BP improved, Pt did not require pressors. Hemodynamically stable, was transferred to [facility] floor. Was evaluated by ID and Abxs adjusted. Also was seen by GI and diet advanced      INTERVAL HPI/ OVERNIGHT EVENTS:  Chart reviewed, Pt was seen and examined, reports feeling much better, no pain, no fevers or chills. Pts diet was advanced, had regular diet at lunch, tolerates well so  far. Results and POC d/w Pt     Vital Signs Last 24 Hrs  T(C): 36.4 (12 Apr 2019 12:29), Max: 36.9 (11 Apr 2019 20:28)  T(F): 97.5 (12 Apr 2019 12:29), Max: 98.5 (11 Apr 2019 20:28)  HR: 58 (12 Apr 2019 12:29) (56 - 61)  BP: 138/97 (12 Apr 2019 12:29) (105/66 - 138/97)  RR: 18 (12 Apr 2019 12:29) (17 - 18)  SpO2: 100% (12 Apr 2019 12:29) (98% - 100%)        REVIEW OF SYSTEMS:  All other review of systems is negative unless indicated above.      PHYSICAL EXAM:  General: Well developed;  in no acute distress  Eyes: PERRLA, EOMI; conjunctiva and sclera clear  Head: Normocephalic; atraumatic  ENMT: No nasal discharge; airway clear  Neck: Supple; non tender; no masses  Respiratory: Good air entry b/l. No wheezes, rales or rhonchi  Cardiovascular: Regular rate and rhythm. S1 and S2 Normal; No murmurs   Gastrointestinal: Soft non-tender non-distended; Normal bowel sounds  Genitourinary: No suprapubic  tenderness  Extremities: Preserved  range of motion, + 1 ankle  edema  Vascular: Peripheral pulses palpable 2+ bilaterally  Neurological: Alert and oriented x4, non focal   Skin: Warm and dry. No acute rash  Lymph Nodes: No acute cervical adenopathy  Musculoskeletal: Normal muscle tone, without deformities  Psychiatric: Cooperative and appropriate        LABS:              10.1   5.82  )-----------( 117      ( 12 Apr 2019 08:00 )             32.8     12 Apr 2019 08:00    134    |  97     |  68     ----------------------------<  172    4.5     |  25     |  5.25     Ca    7.5        12 Apr 2019 08:00  Phos  6.2       12 Apr 2019 08:00    TPro  x      /  Alb  2.6    /  TBili  x      /  DBili  x      /  AST  x      /  ALT  x      /  AlkPhos  x      12 Apr 2019 08:00    PT/INR - ( 12 Apr 2019 08:00 )   PT: 29.6 sec;   INR: 2.59 ratio           CAPILLARY BLOOD GLUCOSE  POCT Blood Glucose.: 184 mg/dL (12 Apr 2019 16:39)  POCT Blood Glucose.: 125 mg/dL (12 Apr 2019 12:26)  POCT Blood Glucose.: 157 mg/dL (12 Apr 2019 05:26)  POCT Blood Glucose.: 167 mg/dL (11 Apr 2019 21:31)    LIVER FUNCTIONS - ( 12 Apr 2019 08:00 )  Alb: 2.6 g/dL / Pro: x     / ALK PHOS: x     / ALT: x     / AST: x     / GGT: x             Hemoglobin A1C, Whole Blood: 5.3 % (04-11-19 @ 06:07)    MEDICATIONS  (STANDING):  amiodarone    Tablet 100 milliGRAM(s) Oral daily  ceFAZolin   IVPB 2000 milliGRAM(s) IV Intermittent <User Schedule>  cinacalcet 30 milliGRAM(s) Oral <User Schedule>  dextrose 5%. 1000 milliLiter(s) (50 mL/Hr) IV Continuous <Continuous>  dextrose 50% Injectable 12.5 Gram(s) IV Push once  dextrose 50% Injectable 25 Gram(s) IV Push once  dextrose 50% Injectable 25 Gram(s) IV Push once  epoetin amee Injectable 12419 Unit(s) IV Push <User Schedule>  insulin lispro (HumaLOG) corrective regimen sliding scale   SubCutaneous three times a day before meals  insulin lispro (HumaLOG) corrective regimen sliding scale   SubCutaneous at bedtime  pantoprazole    Tablet 40 milliGRAM(s) Oral two times a day  predniSONE   Tablet 40 milliGRAM(s) Oral daily  sevelamer carbonate 2400 milliGRAM(s) Oral three times a day  ursodiol Capsule 300 milliGRAM(s) Oral two times a day  vancomycin    Solution 125 milliGRAM(s) Oral every 6 hours  warfarin 1 milliGRAM(s) Oral once    MEDICATIONS  (PRN):  acetaminophen   Tablet .. 650 milliGRAM(s) Oral every 6 hours PRN Temp greater or equal to 38C (100.4F), Mild Pain (1 - 3)  dextrose 40% Gel 15 Gram(s) Oral once PRN Blood Glucose LESS THAN 70 milliGRAM(s)/deciliter  glucagon  Injectable 1 milliGRAM(s) IntraMuscular once PRN Glucose LESS THAN 70 milligrams/deciliter      RADIOLOGY & ADDITIONAL TESTS:
CC: abdominal (12 Apr 2019 12:55)    HPI:  63 year old male with PMH of ESRD secondary Polycystic kidney disease, s/p bilateral nephrectomy,  had  transplant which has failed x 2, Cardiomyopathy,   systolic CHF s/p AICD, HTN, adrenal insufficiency (on chronic prednisone), hypoparathyroidism, h/o cryptococcal meningitis, h/o c. diff infection  has had multiple episodes sepsis Klebsiella du eto Cholangitis, s/p recent ERCP ans stone extraction  and sphincterotomy at [place], He arrived to ED  with fever to 102, epigastric abdominal pain was unable to tolerate dialysis due to borderline BP .   In ED: was hypotensive despite IV bolus, u/s showed gall stones, ct showes cbp 12 mm but no dilated intrahepatic ducts, normal lipase and amylase. Pt was admitted to ICU for further Tx and monitoring. Started on IV abxs. BP improved, Pt did not require pressors. Hemodynamically stable, was transferred to [facility] floor. Was evaluated by ID and Abxs adjusted. Also was seen by GI and diet advanced      INTERVAL HPI/ OVERNIGHT EVENTS:   feeling better today.  no cp, sob. no n/v/d      REVIEW OF SYSTEMS:  All other review of systems is negative unless indicated above.    Vital Signs Last 24 Hrs  T(C): 36.4 (13 Apr 2019 11:24), Max: 36.9 (12 Apr 2019 21:00)  T(F): 97.6 (13 Apr 2019 11:24), Max: 98.5 (12 Apr 2019 21:00)  HR: 61 (13 Apr 2019 11:24) (60 - 62)  BP: 132/84 (13 Apr 2019 11:24) (103/73 - 132/84)  BP(mean): --  RR: 16 (13 Apr 2019 11:24) (16 - 17)  SpO2: 96% (13 Apr 2019 11:24) (96% - 100%)    GEN: lying in bed, NAD  HEENT:   NC/AT, pupils equal and reactive, EOMI  CV:  +S1, +S2, RRR  RESP:   lungs clear to auscultation bilaterally, no wheeze, rales, rhonchi   BREAST:  not examined  GI:  abdomen soft, non-tender, non-distended, normoactive BS  MSK:   normal muscle tone  EXT:  1+ edema   NEURO:  AAOX3, no focal neurological deficits  SKIN:  no rashes                              10.1   5.82  )-----------( 117      ( 12 Apr 2019 08:00 )             32.8     04-13    139  |  100  |  56<H>  ----------------------------<  113<H>  4.3   |  27  |  4.33<H>    Ca    7.5<L>      13 Apr 2019 07:04  Phos  4.0     04-13    TPro  x   /  Alb  2.6<L>  /  TBili  x   /  DBili  x   /  AST  x   /  ALT  x   /  AlkPhos  x   04-12        LIVER FUNCTIONS - ( 12 Apr 2019 08:00 )  Alb: 2.6 g/dL / Pro: x     / ALK PHOS: x     / ALT: x     / AST: x     / GGT: x           PT/INR - ( 12 Apr 2019 08:00 )   PT: 29.6 sec;   INR: 2.59 ratio         MEDICATIONS  (STANDING):  amiodarone    Tablet 100 milliGRAM(s) Oral daily  ceFAZolin   IVPB 2000 milliGRAM(s) IV Intermittent <User Schedule>  cinacalcet 30 milliGRAM(s) Oral <User Schedule>  dextrose 5%. 1000 milliLiter(s) (50 mL/Hr) IV Continuous <Continuous>  dextrose 50% Injectable 12.5 Gram(s) IV Push once  dextrose 50% Injectable 25 Gram(s) IV Push once  dextrose 50% Injectable 25 Gram(s) IV Push once  epoetin amee Injectable 20684 Unit(s) IV Push <User Schedule>  insulin lispro (HumaLOG) corrective regimen sliding scale   SubCutaneous three times a day before meals  insulin lispro (HumaLOG) corrective regimen sliding scale   SubCutaneous at bedtime  metoprolol tartrate 25 milliGRAM(s) Oral daily  pantoprazole    Tablet 40 milliGRAM(s) Oral two times a day  predniSONE   Tablet 20 milliGRAM(s) Oral daily  sevelamer carbonate 2400 milliGRAM(s) Oral three times a day with meals  ursodiol Capsule 300 milliGRAM(s) Oral two times a day  vancomycin    Solution 125 milliGRAM(s) Oral every 6 hours  warfarin 1 milliGRAM(s) Oral daily    MEDICATIONS  (PRN):  acetaminophen   Tablet .. 650 milliGRAM(s) Oral every 6 hours PRN Temp greater or equal to 38C (100.4F), Mild Pain (1 - 3)  dextrose 40% Gel 15 Gram(s) Oral once PRN Blood Glucose LESS THAN 70 milliGRAM(s)/deciliter  glucagon  Injectable 1 milliGRAM(s) IntraMuscular once PRN Glucose LESS THAN 70 milligrams/deciliter                                  RADIOLOGY & ADDITIONAL TESTS:
Date of service: 19 @ 12:56    OOB to chair  Underwent HD today  Feels better  BP is WNL  Denies pain    ROS: no fever or chills; denies dizziness, no HA, no SOB or cough, no abdominal pain, no diarrhea or constipation; no dysuria, no legs pain, no rashes    MEDICATIONS  (STANDING):  amiodarone    Tablet 100 milliGRAM(s) Oral daily  ceFAZolin   IVPB 2000 milliGRAM(s) IV Intermittent <User Schedule>  dextrose 5%. 1000 milliLiter(s) (50 mL/Hr) IV Continuous <Continuous>  dextrose 50% Injectable 12.5 Gram(s) IV Push once  dextrose 50% Injectable 25 Gram(s) IV Push once  dextrose 50% Injectable 25 Gram(s) IV Push once  epoetin amee Injectable 70640 Unit(s) IV Push <User Schedule>  heparin  Injectable 5000 Unit(s) SubCutaneous every 12 hours  insulin lispro (HumaLOG) corrective regimen sliding scale   SubCutaneous three times a day before meals  insulin lispro (HumaLOG) corrective regimen sliding scale   SubCutaneous at bedtime  pantoprazole    Tablet 40 milliGRAM(s) Oral before breakfast  predniSONE   Tablet 40 milliGRAM(s) Oral daily  sevelamer carbonate 800 milliGRAM(s) Oral three times a day  vancomycin    Solution 125 milliGRAM(s) Oral every 6 hours      Vital Signs Last 24 Hrs  T(C): 36.4 (2019 12:29), Max: 36.9 (2019 20:28)  T(F): 97.5 (2019 12:29), Max: 98.5 (2019 20:28)  HR: 58 (2019 12:29) (56 - 61)  BP: 138/97 (2019 12:29) (105/66 - 138/97)  BP(mean): --  RR: 18 (2019 12:29) (17 - 18)  SpO2: 100% (2019 12:29) (98% - 100%)    Physical Exam:      Constitutional: frail looking  HEENT: NC/AT, EOMI, PERRLA, conjunctivae clear  Neck: supple; thyroid not palpable  Back: no tenderness  Respiratory: respiratory effort normal; clear to auscultation  Cardiovascular: S1S2 regular, no murmurs  Abdomen: soft, mild epigastric tender, not distended, positive BS  Genitourinary: no suprapubic tenderness  Lymphatic: no LN palpable  Musculoskeletal: no muscle tenderness, no joint swelling or tenderness  Extremities: no pedal edema  Neurological/ Psychiatric: AxOx3, moving all extremities  Skin: no rashes; no palpable lesions    Labs: reviewed                        10.1   5.82  )-----------( 117      ( 2019 08:00 )             32.8     04-12    134<L>  |  97  |  68<H>  ----------------------------<  172<H>  4.5   |  25  |  5.25<H>    Ca    7.5<L>      2019 08:00  Phos  6.2     04-12    TPro  x   /  Alb  2.6<L>  /  TBili  x   /  DBili  x   /  AST  x   /  ALT  x   /  AlkPhos  x   12                        10.0   5.43  )-----------( 97       ( 2019 06:07 )             33.6     04-11    141  |  101  |  46<H>  ----------------------------<  127<H>  4.8   |  28  |  4.05<H>    Ca    8.1<L>      2019 06:07    TPro  6.0  /  Alb  2.6<L>  /  TBili  0.8  /  DBili  x   /  AST  12<L>  /  ALT  <6<L>  /  AlkPhos  88  04-11     LIVER FUNCTIONS - ( 2019 06:07 )  Alb: 2.6 g/dL / Pro: 6.0 gm/dL / ALK PHOS: 88 U/L / ALT: <6 U/L / AST: 12 U/L / GGT: x           Urinalysis Basic - ( 10 Apr 2019 07:00 )    Color: Yellow / Appearance: Clear / S.015 / pH: x  Gluc: x / Ketone: Negative  / Bili: Negative / Urobili: Negative mg/dL   Blood: x / Protein: 100 mg/dL / Nitrite: Negative   Leuk Esterase: Negative / RBC: 11-25 /HPF / WBC 0-2   Sq Epi: x / Non Sq Epi: Occasional / Bacteria: Occasional      Culture - Blood (collected 10 Apr 2019 16:29)  Source: .Blood None  Preliminary Report (2019 23:02):    No growth to date.    Culture - Blood (collected 10 Apr 2019 16:28)  Source: .Blood None  Preliminary Report (2019 23:02):    No growth to date.      Radiology: all available radiological tests reviewed    < from: US Abdomen Complete (04.10.19 @ 12:53) >  Wall thickening, luminal distention, and multiple stones. Reyes sign is   absent. Findings a clinical fracture cholecystitis.    12 mm common bile duct.    Chronicpancreatitis.     Right lower quadrant renal allograft measures 7.7 cm in length and shows   no hydronephrosis or perinephric collection.     Polycystic liver disease    < end of copied text >      Advanced directives addressed: full resuscitation
Patient is a 63y Male who reports no complaints overnight. No cp or SOB. Eager to leave, feels back to baseline    REVIEW OF SYSTEMS:    CONSTITUTIONAL: No weakness, fevers or chills  RESPIRATORY: No cough, wheezing, hemoptysis; No shortness of breath  CARDIOVASCULAR: No chest pain or palpitations  GENITOURINARY: No dysuria, frequency or hematuria  All other review of systems is negative unless indicated above.    MEDICATIONS  (STANDING):  amiodarone    Tablet 100 milliGRAM(s) Oral daily  ceFAZolin   IVPB 2000 milliGRAM(s) IV Intermittent <User Schedule>  cinacalcet 30 milliGRAM(s) Oral <User Schedule>  dextrose 5%. 1000 milliLiter(s) (50 mL/Hr) IV Continuous <Continuous>  dextrose 50% Injectable 12.5 Gram(s) IV Push once  dextrose 50% Injectable 25 Gram(s) IV Push once  dextrose 50% Injectable 25 Gram(s) IV Push once  epoetin amee Injectable 85382 Unit(s) IV Push <User Schedule>  insulin lispro (HumaLOG) corrective regimen sliding scale   SubCutaneous three times a day before meals  insulin lispro (HumaLOG) corrective regimen sliding scale   SubCutaneous at bedtime  metoprolol tartrate 25 milliGRAM(s) Oral daily  pantoprazole    Tablet 40 milliGRAM(s) Oral two times a day  predniSONE   Tablet 20 milliGRAM(s) Oral daily  sevelamer carbonate 2400 milliGRAM(s) Oral three times a day with meals  ursodiol Capsule 300 milliGRAM(s) Oral two times a day  vancomycin    Solution 125 milliGRAM(s) Oral every 6 hours  warfarin 1 milliGRAM(s) Oral daily    MEDICATIONS  (PRN):  acetaminophen   Tablet .. 650 milliGRAM(s) Oral every 6 hours PRN Temp greater or equal to 38C (100.4F), Mild Pain (1 - 3)  dextrose 40% Gel 15 Gram(s) Oral once PRN Blood Glucose LESS THAN 70 milliGRAM(s)/deciliter  glucagon  Injectable 1 milliGRAM(s) IntraMuscular once PRN Glucose LESS THAN 70 milligrams/deciliter        T(C): , Max: 36.9 (04-12-19 @ 21:00)  T(F): , Max: 98.5 (04-12-19 @ 21:00)  HR: 61 (04-13-19 @ 11:24)  BP: 132/84 (04-13-19 @ 11:24)  BP(mean): --  RR: 16 (04-13-19 @ 11:24)  SpO2: 96% (04-13-19 @ 11:24)  Wt(kg): --    04-12 @ 07:01  -  04-13 @ 07:00  --------------------------------------------------------  IN: 630 mL / OUT: 150 mL / NET: 480 mL          PHYSICAL EXAM:    Constitutional: NAD, frail >then stated age  HEENT: PERRLA, EOMI,  MMM  Neck: No LAD, No JVD  Respiratory: good aeration b/l  Cardiovascular: S1 and S2, RRR  Gastrointestinal: BS+, soft, NT/ND  Extremities: No peripheral edema, frail  Neurological: A/O x 3, no focal deficits  Psychiatric: Normal mood, normal affect  : No Amaya  Skin: No rashes  Access: Not applicable        LABS:                        10.1   5.82  )-----------( 117      ( 12 Apr 2019 08:00 )             32.8     13 Apr 2019 07:04    139    |  100    |  56     ----------------------------<  113    4.3     |  27     |  4.33   12 Apr 2019 08:00    134    |  97     |  68     ----------------------------<  172    4.5     |  25     |  5.25   11 Apr 2019 17:01    140    |  99     |  59     ----------------------------<  143    4.9     |  27     |  4.66   11 Apr 2019 06:07    141    |  101    |  46     ----------------------------<  127    4.8     |  28     |  4.05   10 Apr 2019 11:45    140    |  96     |  33     ----------------------------<  92     3.6     |  36     |  3.16     Ca    7.5        13 Apr 2019 07:04  Ca    7.5        12 Apr 2019 08:00  Ca    7.9        11 Apr 2019 17:01  Ca    8.1        11 Apr 2019 06:07  Ca    8.4        10 Apr 2019 11:45  Phos  4.0       13 Apr 2019 07:04  Phos  6.2       12 Apr 2019 08:00  Phos  5.9       11 Apr 2019 17:01    TPro  x      /  Alb  2.6    /  TBili  x      /  DBili  x      /  AST  x      /  ALT  x      /  AlkPhos  x      12 Apr 2019 08:00  TPro  6.0    /  Alb  2.6    /  TBili  0.8    /  DBili  x      /  AST  12     /  ALT  <6     /  AlkPhos  88     11 Apr 2019 06:07  TPro  6.4    /  Alb  2.8    /  TBili  1.0    /  DBili  x      /  AST  9      /  ALT  <6     /  AlkPhos  95     10 Apr 2019 11:45          Urine Studies:          RADIOLOGY & ADDITIONAL STUDIES:
Patient is a 63y Male who reports no complaints overnight. Wants to leave, [facility] born last night    REVIEW OF SYSTEMS:    CONSTITUTIONAL: No weakness, fevers or chills  RESPIRATORY: No cough, wheezing, hemoptysis; No shortness of breath  CARDIOVASCULAR: No chest pain or palpitations  GENITOURINARY: No dysuria, frequency or hematuria  All other review of systems is negative unless indicated above.    MEDICATIONS  (STANDING):  amiodarone    Tablet 100 milliGRAM(s) Oral daily  ceFAZolin   IVPB 2000 milliGRAM(s) IV Intermittent <User Schedule>  cinacalcet 30 milliGRAM(s) Oral <User Schedule>  dextrose 5%. 1000 milliLiter(s) (50 mL/Hr) IV Continuous <Continuous>  dextrose 50% Injectable 12.5 Gram(s) IV Push once  dextrose 50% Injectable 25 Gram(s) IV Push once  dextrose 50% Injectable 25 Gram(s) IV Push once  epoetin amee Injectable 49479 Unit(s) IV Push <User Schedule>  insulin lispro (HumaLOG) corrective regimen sliding scale   SubCutaneous three times a day before meals  insulin lispro (HumaLOG) corrective regimen sliding scale   SubCutaneous at bedtime  metoprolol tartrate 25 milliGRAM(s) Oral daily  pantoprazole    Tablet 40 milliGRAM(s) Oral two times a day  predniSONE   Tablet 20 milliGRAM(s) Oral daily  sevelamer carbonate 2400 milliGRAM(s) Oral three times a day with meals  ursodiol Capsule 300 milliGRAM(s) Oral two times a day  vancomycin    Solution 125 milliGRAM(s) Oral every 6 hours  warfarin 1 milliGRAM(s) Oral daily    MEDICATIONS  (PRN):  acetaminophen   Tablet .. 650 milliGRAM(s) Oral every 6 hours PRN Temp greater or equal to 38C (100.4F), Mild Pain (1 - 3)  dextrose 40% Gel 15 Gram(s) Oral once PRN Blood Glucose LESS THAN 70 milliGRAM(s)/deciliter  glucagon  Injectable 1 milliGRAM(s) IntraMuscular once PRN Glucose LESS THAN 70 milligrams/deciliter        T(C): , Max: 36.7 (04-14-19 @ 05:31)  T(F): , Max: 98 (04-14-19 @ 05:31)  HR: 61 (04-14-19 @ 11:52)  BP: 130/78 (04-14-19 @ 11:52)  BP(mean): --  RR: 16 (04-14-19 @ 11:52)  SpO2: 99% (04-14-19 @ 11:52)  Wt(kg): --        PHYSICAL EXAM:    Constitutional: NAD, frail  HEENT: PERRLA, EOMI,  MMM  Neck: No LAD, No JVD  Respiratory: good aertion  Cardiovascular: S1 and S2, RRR  Gastrointestinal: BS+, soft, NT/ND  Extremities: No peripheral edema  Neurological: A/O x 3, no focal deficits  : No Amaya  Skin: No rashes  Access: vf        LABS:    13 Apr 2019 07:04    139    |  100    |  56     ----------------------------<  113    4.3     |  27     |  4.33   12 Apr 2019 08:00    134    |  97     |  68     ----------------------------<  172    4.5     |  25     |  5.25   11 Apr 2019 17:01    140    |  99     |  59     ----------------------------<  143    4.9     |  27     |  4.66   11 Apr 2019 06:07    141    |  101    |  46     ----------------------------<  127    4.8     |  28     |  4.05     Ca    7.5        13 Apr 2019 07:04  Ca    7.5        12 Apr 2019 08:00  Ca    7.9        11 Apr 2019 17:01  Ca    8.1        11 Apr 2019 06:07  Phos  4.0       13 Apr 2019 07:04  Phos  6.2       12 Apr 2019 08:00  Phos  5.9       11 Apr 2019 17:01    TPro  x      /  Alb  2.6    /  TBili  x      /  DBili  x      /  AST  x      /  ALT  x      /  AlkPhos  x      12 Apr 2019 08:00  TPro  6.0    /  Alb  2.6    /  TBili  0.8    /  DBili  x      /  AST  12     /  ALT  <6     /  AlkPhos  88     11 Apr 2019 06:07          Urine Studies:          RADIOLOGY & ADDITIONAL STUDIES:
Patient is a 63y old  Male who presents with a chief complaint of abdominal (10 Apr 2019 16:34)      HPI:  pt feeling much better this AM  minimal abdominal pain  I spoke to Dr Kothari who did pt's ercp last week - feel that likely due to procedure without further intervention needed at this time      PAST MEDICAL & SURGICAL HISTORY:  Oliguria  Arm swelling: left arm  AV fistula: right UE  Leg pain, anterior, right: right anterior thigh at graft donor site  Irregular heart beat  AICD (automatic cardioverter/defibrillator) present  Dialysis patient  Adrenal insufficiency  Hypoparathyroidism  HFrEF (heart failure with reduced ejection fraction)  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  S/P colonoscopy: last done 2016  AICD (automatic cardioverter/defibrillator) present: 2013  H/O hernia repair  A-V fistula: 1995 left UE  right arm 2007,  H/O kidney transplant: 1985, 1995, 1997      MEDICATIONS  (STANDING):  amiodarone    Tablet 100 milliGRAM(s) Oral daily  dextrose 5%. 1000 milliLiter(s) (50 mL/Hr) IV Continuous <Continuous>  dextrose 50% Injectable 12.5 Gram(s) IV Push once  dextrose 50% Injectable 25 Gram(s) IV Push once  dextrose 50% Injectable 25 Gram(s) IV Push once  heparin  Injectable 5000 Unit(s) SubCutaneous every 12 hours  hydrocortisone sodium succinate Injectable 50 milliGRAM(s) IV Push every 8 hours  insulin lispro (HumaLOG) corrective regimen sliding scale   SubCutaneous three times a day before meals  insulin lispro (HumaLOG) corrective regimen sliding scale   SubCutaneous at bedtime  pantoprazole    Tablet 40 milliGRAM(s) Oral before breakfast  sevelamer carbonate 800 milliGRAM(s) Oral three times a day  sodium chloride 0.9%. 1000 milliLiter(s) (50 mL/Hr) IV Continuous <Continuous>    MEDICATIONS  (PRN):  acetaminophen   Tablet .. 650 milliGRAM(s) Oral every 6 hours PRN Temp greater or equal to 38C (100.4F), Mild Pain (1 - 3)  dextrose 40% Gel 15 Gram(s) Oral once PRN Blood Glucose LESS THAN 70 milliGRAM(s)/deciliter  glucagon  Injectable 1 milliGRAM(s) IntraMuscular once PRN Glucose LESS THAN 70 milligrams/deciliter      Allergies    No Known Allergies    Intolerances        REVIEW OF SYSTEMS:    CONSTITUTIONAL: No weakness, fevers or chills  RESPIRATORY: No cough, wheezing, hemoptysis; No shortness of breath  CARDIOVASCULAR: No chest pain or palpitations  GASTROINTESTINAL: as above  All other review of systems is negative unless indicated above.    Vital Signs Last 24 Hrs  T(C): 35.8 (11 Apr 2019 05:44), Max: 38.9 (10 Apr 2019 15:04)  T(F): 96.5 (11 Apr 2019 05:44), Max: 102.1 (10 Apr 2019 15:04)  HR: 67 (11 Apr 2019 08:00) (61 - 99)  BP: 109/73 (11 Apr 2019 08:00) (84/56 - 125/81)  BP(mean): 85 (11 Apr 2019 08:00) (65 - 85)  RR: 16 (11 Apr 2019 08:00) (12 - 18)  SpO2: 98% (11 Apr 2019 08:00) (83% - 100%)    PHYSICAL EXAM:    Constitutional: NAD, well-developed  Respiratory: CTA  Cardiovascular: S1 and S2, RRR  Gastrointestinal: BS+, soft, minimal ruq tenderness    LABS:                        10.0   5.43  )-----------( 97       ( 11 Apr 2019 06:07 )             33.6     04-11    141  |  101  |  46<H>  ----------------------------<  127<H>  4.8   |  28  |  4.05<H>    Ca    8.1<L>      11 Apr 2019 06:07    TPro  6.0  /  Alb  2.6<L>  /  TBili  0.8  /  DBili  x   /  AST  12<L>  /  ALT  <6<L>  /  AlkPhos  88  04-11    PT/INR - ( 11 Apr 2019 06:07 )   PT: 20.4 sec;   INR: 1.80 ratio         PTT - ( 10 Apr 2019 11:45 )  PTT:30.6 sec  LIVER FUNCTIONS - ( 11 Apr 2019 06:07 )  Alb: 2.6 g/dL / Pro: 6.0 gm/dL / ALK PHOS: 88 U/L / ALT: <6 U/L / AST: 12 U/L / GGT: x             RADIOLOGY & ADDITIONAL STUDIES:
Patient is a 63y old  Male who presents with a chief complaint of abdominal (11 Apr 2019 15:44)      HPI:  This patient is 63 year old male with PMH signficiant for                     ESRD secondary Polycystic kidney disease, s/p bilateral nephrectomoy has transplant which has failed                     has had multiple episodes sepsis Klebsiella,                      cardiomuopathy, on coumadin                     chronic pancreatitis                      hx. adrenal insufficiency  The patient had ERCP last week with removal of stones and spincterotomy at [facility], He arrives today with fever to 102, epigastric abdominal pain was unable to tolerate  dialysis, borderline bp. received 1 lter of fluid bp now 100  u/s showed gall stones, ct showes cbp 12 mm but no dlated intrahapatic ducts, normal lipase and amylase (10 Apr 2019 16:34)    Patient feels back to baseline. Afebrile. On HD. Hungry.      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  S/P colonoscopy: last done 2016  AICD (automatic cardioverter/defibrillator) present: 2013  H/O hernia repair  A-V fistula: 1995 left UE  right arm 2007,  H/O kidney transplant: 1985, 1995, 1997      MEDICATIONS  (STANDING):  amiodarone    Tablet 100 milliGRAM(s) Oral daily  dextrose 5%. 1000 milliLiter(s) (50 mL/Hr) IV Continuous <Continuous>  dextrose 50% Injectable 12.5 Gram(s) IV Push once  dextrose 50% Injectable 25 Gram(s) IV Push once  dextrose 50% Injectable 25 Gram(s) IV Push once  epoetin amee Injectable 31573 Unit(s) IV Push <User Schedule>  heparin  Injectable 5000 Unit(s) SubCutaneous every 12 hours  insulin lispro (HumaLOG) corrective regimen sliding scale   SubCutaneous three times a day before meals  insulin lispro (HumaLOG) corrective regimen sliding scale   SubCutaneous at bedtime  pantoprazole    Tablet 40 milliGRAM(s) Oral before breakfast  piperacillin/tazobactam IVPB. 3.375 Gram(s) IV Intermittent every 12 hours  predniSONE   Tablet 40 milliGRAM(s) Oral daily  sevelamer carbonate 800 milliGRAM(s) Oral three times a day  vancomycin    Solution 125 milliGRAM(s) Oral every 6 hours    MEDICATIONS  (PRN):  acetaminophen   Tablet .. 650 milliGRAM(s) Oral every 6 hours PRN Temp greater or equal to 38C (100.4F), Mild Pain (1 - 3)  dextrose 40% Gel 15 Gram(s) Oral once PRN Blood Glucose LESS THAN 70 milliGRAM(s)/deciliter  glucagon  Injectable 1 milliGRAM(s) IntraMuscular once PRN Glucose LESS THAN 70 milligrams/deciliter      Allergies    No Known Allergies    Intolerances          Vital Signs Last 24 Hrs  T(C): 36.7 (12 Apr 2019 08:00), Max: 36.9 (11 Apr 2019 20:28)  T(F): 98 (12 Apr 2019 08:00), Max: 98.5 (11 Apr 2019 20:28)  HR: 60 (12 Apr 2019 08:36) (58 - 66)  BP: 114/70 (12 Apr 2019 08:36) (87/62 - 114/81)  BP(mean): 78 (11 Apr 2019 10:00) (71 - 78)  RR: 18 (12 Apr 2019 08:36) (15 - 18)  SpO2: 98% (12 Apr 2019 05:18) (98% - 100%)    PHYSICAL EXAM:    Respiratory: CTAB  Cardiovascular: S1 and S2, RRR, no M/G/R  Gastrointestinal: BS+, soft, NT/ND  LABS:                        10.1   5.82  )-----------( 117      ( 12 Apr 2019 08:00 )             32.8     04-12    134<L>  |  97  |  68<H>  ----------------------------<  172<H>  4.5   |  25  |  5.25<H>    Ca    7.5<L>      12 Apr 2019 08:00  Phos  6.2     04-12    TPro  x   /  Alb  2.6<L>  /  TBili  x   /  DBili  x   /  AST  x   /  ALT  x   /  AlkPhos  x   04-12    PT/INR - ( 12 Apr 2019 08:00 )   PT: 29.6 sec;   INR: 2.59 ratio         PTT - ( 10 Apr 2019 11:45 )  PTT:30.6 sec  LIVER FUNCTIONS - ( 12 Apr 2019 08:00 )  Alb: 2.6 g/dL / Pro: x     / ALK PHOS: x     / ALT: x     / AST: x     / GGT: x             RADIOLOGY & ADDITIONAL STUDIES:
This patient is 63 year old male with PMH signficiant for ESRD on HD MWF at [facility] , Polycystic kidney disease, s/p bilateral nephrectomy, CAD w stent, meningitis sec to cryptococcus, C diff, CAD, PVD, HTN,  has renal transplant which has failed x 2   recenlty has had multiple episodes sepsis sec to Klebsiella, chronic pancreatitis w hx of panreactic and GB stones, hx. adrenal insufficiency.   Patient had ERCP last week with removal of stones and spincterotomy at [place], He arrived with fever to 102, epigastric abdominal pain was unable to tolerate full HD on Wednesday   In ED received 1 lter of fluid . u/s showed gall stones, normal lipase and amylase   + pancreatic intraductal stones   EXAM:  CT ABDOMEN AND PELVIS                        BILE DUCTS: Nondilated.  GALLBLADDER: Gallstones.  SPLEEN: Stable mild splenomegaly.  PANCREAS: Parenchymal calcifications suggestive of chronic pancreatitis.   Decreased diffuse main duct dilatation but persistent large intraductal   calculus.      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  S/P colonoscopy: last done   AICD (automatic cardioverter/defibrillator) present:   H/O hernia repair  A-V fistula:  left UE  right arm ,  H/O kidney transplant: , ,       Home Medications:  acetaminophen 500 mg oral tablet: 2 tab(s) orally every 6 hours, As Needed - for moderate pain (21 Dec 2018 16:07)  amiodarone 200 mg oral tablet: 0.5 tab(s) orally once a day (21 Dec 2018 16:07)  cholecalciferol 1000 intl units oral capsule: 1 cap(s) orally once a day (21 Dec 2018 16:07)  pantoprazole 40 mg oral delayed release tablet: 1 tab(s) orally 2 times a day (21 Dec 2018 16:07)  predniSONE 5 mg oral tablet: 1 tab(s) orally once a day (21 Dec 2018 16:07)  Renvela 800 mg oral tablet: 3 tab(s) orally 3 times a day (with meals) (21 Dec 2018 16:07)  Sensipar 30 mg oral tablet: 1 tab(s) orally 4 times a week    ***Monday, Wednesday, Friday, and Saturday*** (21 Dec 2018 16:07)  vancomycin 125 mg oral capsule: 1 cap(s) orally every 6 hours     (27 Dec 2018 16:18)    MEDICATIONS  (STANDING):  amiodarone    Tablet 100 milliGRAM(s) Oral daily  dextrose 5%. 1000 milliLiter(s) (50 mL/Hr) IV Continuous <Continuous>  dextrose 50% Injectable 12.5 Gram(s) IV Push once  dextrose 50% Injectable 25 Gram(s) IV Push once  dextrose 50% Injectable 25 Gram(s) IV Push once  epoetin amee Injectable 84632 Unit(s) IV Push <User Schedule>  heparin  Injectable 5000 Unit(s) SubCutaneous every 12 hours  insulin lispro (HumaLOG) corrective regimen sliding scale   SubCutaneous three times a day before meals  insulin lispro (HumaLOG) corrective regimen sliding scale   SubCutaneous at bedtime  pantoprazole    Tablet 40 milliGRAM(s) Oral before breakfast  piperacillin/tazobactam IVPB. 3.375 Gram(s) IV Intermittent every 12 hours  predniSONE   Tablet 40 milliGRAM(s) Oral daily  sevelamer carbonate 800 milliGRAM(s) Oral three times a day  vancomycin    Solution 125 milliGRAM(s) Oral every 6 hours        Allergies    No Known Allergies    Intolerances        SOCIAL HISTORY:  Denies ETOh,Smoking,     FAMILY HISTORY:  Family history of kidney disease (Mother)  Family history of colon cancer (Sibling)      REVIEW OF SYSTEMS:    CONSTITUTIONAL: No weakness, fevers or chills  EYES/ENT: No visual changes;  No vertigo or throat pain   NECK: No pain or stiffness  RESPIRATORY: No cough, wheezing, hemoptysis; No shortness of breath  CARDIOVASCULAR: No chest pain or palpitations  GASTROINTESTINAL: No abdominal or epigastric pain. No nausea, vomiting, or hematemesis; No diarrhea or constipation. No melena or hematochezia.  GENITOURINARY: No dysuria, frequency or hematuria  NEUROLOGICAL: No numbness or weakness  SKIN: No itching, burning, rashes, or lesions   All other review of systems is negative unless indicated above.    Vital Signs Last 24 Hrs  T(C): 36.7 (2019 08:00), Max: 36.9 (2019 20:28)  T(F): 98 (2019 08:00), Max: 98.5 (2019 20:28)  HR: 61 (2019 10:32) (56 - 61)  BP: 115/73 (2019 10:32) (105/66 - 129/70)  BP(mean): --  RR: 18 (2019 10:32) (17 - 18)  SpO2: 98% (2019 05:18) (98% - 100%)    PHYSICAL EXAM:    Constitutional: NAD  HEENT: PERRLA, EOMI,  MMM  Neck: No LAD, No JVD  Respiratory: CTAB  Cardiovascular: S1 and S2  Gastrointestinal: BS+, soft, NT/ND  Extremities: No peripheral edema  Neurological: A/O x 3, no focal deficits  Psychiatric: Normal mood, normal affect  : No Amaya  Skin: No rashes  Access: AVF ++     LABS:                                        10.1   5.82  )-----------( 117      ( 2019 08:00 )             32.8                         10.1   6.84  )-----------( 119      ( 2019 17:01 )             34.1       134    |  97     |  68     ----------------------------<  172       2019 08:00  4.5     |  25     |  5.25     140    |  99     |  59     ----------------------------<  143       2019 17:01  4.9     |  27     |  4.66     141    |  101    |  46     ----------------------------<  127       2019 06:07  4.8     |  28     |  4.05     Ca    7.5        2019 08:00  Ca    7.9        2019 17:01    Phos  6.2       2019 08:00  Phos  5.9       2019 17:01      TPro  x      /  Alb  2.6    /  TBili  x      /        2019 08:00  DBili  x      /  AST  x      /  ALT  x      /  AlkPhos  x        TPro  6.0    /  Alb  2.6    /  TBili  0.8    /        2019 06:07  DBili  x      /  AST  12     /  ALT  <6     /  AlkPhos  88         Urine Studies:  Urinalysis Basic - ( 10 Apr 2019 07:00 )    Color: Yellow / Appearance: Clear / S.015 / pH: x  Gluc: x / Ketone: Negative  / Bili: Negative / Urobili: Negative mg/dL   Blood: x / Protein: 100 mg/dL / Nitrite: Negative   Leuk Esterase: Negative / RBC: 11-25 /HPF / WBC 0-2   Sq Epi: x / Non Sq Epi: Occasional / Bacteria: Occasional            RADIOLOGY & ADDITIONAL STUDIES:    as above
a verbal sign out was given to Dr. Diaz. I updated her on patients current status, hospital course, plan of care, and prognosis with all questions answered in detail. she will accept patient on her service.

## 2019-04-14 NOTE — DISCHARGE NOTE PROVIDER - CARE PROVIDERS DIRECT ADDRESSES
,DirectAddress_Unknown,DirectAddress_Unknown,DirectAddress_Unknown,[email or web address]

## 2019-04-14 NOTE — DISCHARGE NOTE PROVIDER - NSDCPNSUBOBJ_GEN_ALL_CORE
HPI:  63 year old male with PMH of ESRD secondary Polycystic kidney disease, s/p bilateral nephrectomy,  had  transplant which has failed x 2, Cardiomyopathy,   systolic CHF s/p AICD, HTN, adrenal insufficiency (on chronic prednisone), hypoparathyroidism, h/o cryptococcal meningitis, h/o c. diff infection  has had multiple episodes sepsis Klebsiella du eto Cholangitis, s/p recent ERCP ans stone extraction  and sphincterotomy at [place], He arrived to ED  with fever to 102, epigastric abdominal pain was unable to tolerate dialysis due to borderline BP .     In ED: was hypotensive despite IV bolus, u/s showed gall stones, ct showes cbp 12 mm but no dilated intrahepatic ducts, normal lipase and amylase. Pt was admitted to ICU for further Tx and monitoring. Started on IV abxs. BP improved, Pt did not require pressors. Hemodynamically stable, was transferred to [facility] floor. Was evaluated by ID and Abxs adjusted. Also was seen by GI and diet advanced          INTERVAL HPI/ OVERNIGHT EVENTS:   feeling better today.  no cp, sob. no n/v/d        Vital Signs Last 24 Hrs    T(C): 36.6 (14 Apr 2019 11:52), Max: 36.7 (14 Apr 2019 05:31)    T(F): 97.8 (14 Apr 2019 11:52), Max: 98 (14 Apr 2019 05:31)    HR: 61 (14 Apr 2019 11:52) (60 - 66)    BP: 130/78 (14 Apr 2019 11:52) (130/78 - 145/97)    BP(mean): --    RR: 16 (14 Apr 2019 11:52) (16 - 18)    SpO2: 99% (14 Apr 2019 11:52) (98% - 100%)        GEN: lying in bed, NAD    HEENT:   NC/AT, pupils equal and reactive, EOMI    CV:  +S1, +S2, RRR    RESP:   lungs clear to auscultation bilaterally, no wheeze, rales, rhonchi     BREAST:  not examined    GI:  abdomen soft, non-tender, non-distended, normoactive BS    MSK:   normal muscle tone    EXT:  1+ edema     NEURO:  AAOX3, no focal neurological deficits    SKIN:  no rashes        	    63 year old male with PMH of ESRD secondary Polycystic kidney disease, s/p bilateral nephrectomy,  had  transplant which has failed x 2, Cardiomyopathy,   systolic CHF s/p AICD, HTN, adrenal insufficiency (on chronic prednisone), hypoparathyroidism, h/o cryptococcal meningitis, h/o c. diff infection  has had multiple episodes sepsis Klebsiella due to Cholangitis, s/p recent ERCP ans stone extraction  and sphincterotomy admitted to ICU for      Sepsis due to Cholangitis  with hypotension - resolving with steroid being tapered down to outpatient dose given hx of adrenal insufficiency.    ID eval noted with plan for IV cefazolin 2g with HD x 2 weeks as D/w DR Srivastava, Dr Aguilar informed     SX eval appreciated. HIDA was canceled. Plan for f/u with GI and Sx at [facility], might need repeat ERCP and or Sx     GI f/u appreciated, diet advanced, monitor if tolerates         2. ESRD on HD     C/w dialysis regular schedule as per Renal     restart sevelamer and cinacalcet         3. Systolic CHF, Cardiomyopathy, s/p AICD. HTN     no signs of volume overload, manage with HD     BB and losartan on hold due to low BP, reeval in am for possible restart         4. AFIB on coumadin    restarted now    INR TX,  d/c heparin SQ     Restart lower dose BB     C/w amio         total time spent 35 mins

## 2019-04-14 NOTE — DISCHARGE NOTE PROVIDER - CARE PROVIDER_API CALL
Dante Tejeda (MD)  Gastroenterology; Internal Medicine  [address] [place] [address]  Phone: [phone]  Fax: [phone]  Follow Up Time:     your doctor at [facility] for ERCP,   Phone: (   )    -  Fax: (   )    -  Follow Up Time:     Cesar Coello)  Cardiovascular Disease; Internal Medicine  [address] [place] [address]  Phone: [phone]  Fax: [phone]  Follow Up Time:     Adrian Aguilar)  Internal Medicine; Nephrology  [address] [place] [address]  Phone: [phone]  Fax: [phone]  Follow Up Time:

## 2019-04-14 NOTE — DISCHARGE NOTE PROVIDER - HOSPITAL COURSE
63 year old male with PMH of ESRD secondary Polycystic kidney disease, s/p bilateral nephrectomy,  had  transplant which has failed x 2, Cardiomyopathy,   systolic CHF s/p AICD, HTN, adrenal insufficiency (on chronic prednisone), hypoparathyroidism, h/o cryptococcal meningitis, h/o c. diff infection  has had multiple episodes sepsis Klebsiella due to Cholangitis, s/p recent ERCP ans stone extraction  and sphincterotomy admitted to ICU for      Sepsis due to Cholangitis  with hypotension - resolving with steroid being tapered down to outpatient dose given hx of adrenal insufficiency.    ID eval noted with plan for IV cefazolin 2g with HD x 2 weeks as D/w DR Srivastava, Dr Aguilar informed     SX eval appreciated. HIDA was canceled. Plan for f/u with GI and Sx at [facility], might need repeat ERCP and or Sx     GI f/u appreciated, diet advanced, monitor if tolerates         2. ESRD on HD     C/w dialysis regular schedule as per Renal     restart sevelamer and cinacalcet         3. Systolic CHF, Cardiomyopathy, s/p AICD. HTN     no signs of volume overload, manage with HD     BB and losartan on hold due to low BP, reeval in am for possible restart         4. AFIB on coumadin    restarted now    INR TX,  d/c heparin SQ     Restart lower dose BB     C/w amio         total time spent 35 mins

## 2019-04-14 NOTE — PROGRESS NOTE ADULT - ASSESSMENT
62 yo male with ESRD on HD ( PCKD), s.p failed transplant x 2, w recent admssion for Klebsiella bacteremia w sepsis . found to have pancreatic and GB stones, s.p recent ERCP and sphincterotomy .        ESRD on HD MWF   continued HD support, [facility] [place]   K protocol, renall dose meds    GI  -Clinically feels better  -Medicine/GI follow up  -Surgical follow up    Anemia of CKD   SACHIN at HD
62 yo male with ESRD on HD ( PCKD), s.p failed transplant x 2, w recent admssion for Klebsiella bacteremia w sepsis. found to have pancreatic and GB stones, s.p recent ERCP and sphincterotomy.    ESRD on HD MWF   continued [place] support, [facility] [place] with [place]    K protocol, renally dose meds    GI  -Clinically feels better  -Medicine/GI follow up  -Surgical follow up    Anemia of CKD   SACHIN at HD    D/c with staff  dr morales outpatient HD
62 yo male with PMH of CAD, ESRD on HD, polycystic kidney disease, systolic CHF s/p AICD, HTN, adrenal insufficiency (on chronic prednisone), hypoparathyroidism, h/o cryptococcal meningitis, h/o c. diff infection, prior sepsis with Klebsiella pneumoniae, pancreatic duct stone, chronic pancreatitis cardiomyopathy on coumadin, adrenal insufficiency was admitted on 4/10 for abdominal pain and fever. The patient had ERCP the week PTA with removal of stones and spincterotomy at [place], He developed fever to 102F and severe epigastric abdominal pain and came to the hospital. He received zosyn IV and vancomycin IV.    1. Febrile syndrome resolving. Possible sepsis. Probable acute cholangitis s/p recent ERCP/CBD stones removal. Possible acute cholecystitis. Gallstones. Prior CDAD. ESRD on HD.  -BC x 2 are negative to date  -patient is on clears; may advance diet  -on zosyn 3.375 gm IV q12h and vancomycin 125 mg PO q6h # 2  -tolerating abx well so far; no side effects noted  -change zosyn to cefazolin 2 gm IV qHD  -reason for abx use and side effects reviewed with patient; monitor BMP   - follow up cultures   -US imaging suggestive of chronic pancreatitis and gallstones  -would continue abx therapy with IV cefazolin given at HD for 2 more weeks  -continue vancomycin PO while on cefazolin  -f/u with surgery for possible cholecystectomy  -monitor temps  -f/u CBC  -supportive care  2. Other issues; CAD, ESRD on HD, polycystic kidney disease, systolic CHF s/p AICD, HTN, adrenal insufficiency (on chronic prednisone), hypoparathyroidism  - per medicine
62 yo male with acute abdominal pain and fever. Possible ruptured renal cyst vs resolved cholecystitis. Will advance diet.
62yo male admitted with fever, abdominal pain  pt much improved  advance diet  continue abx  no need for HIDA scan   d/w pt and ICU staff
63 year old male with PMH of ESRD secondary Polycystic kidney disease, s/p bilateral nephrectomy,  had  transplant which has failed x 2, Cardiomyopathy,   systolic CHF s/p AICD, HTN, adrenal insufficiency (on chronic prednisone), hypoparathyroidism, h/o cryptococcal meningitis, h/o c. diff infection  has had multiple episodes sepsis Klebsiella du eto Cholangitis, s/p recent ERCP ans stone extraction  and sphincterotomy admitted for:       1. Sepsis due to Cholangitis  with hypotension  No sepyic shock as per Intensivist, has also h/o adrenal insufficiency   hemodynamically stable now   S/p  IVF and stress dose steroids  Dis not require pressors   C/w steroid taper on 40mg WD now , pt on 5mg prednisone maintenance  F/u BCX: NGTD   S/p Zosyn   C/w IV Abxs, plan for IV cefazioline 2g with HD x 2 weeks as D/w DR Srivastava, Dr Paniagua informed   SX eval appreciated. HIDA was canceled. Plan for f/u with GI and Sx at [facility], might need repeat ERCP and or Sx   GI f/u appreciated, diet advanced, monitor if tolerates       2. ESRD on HD   C/w dialysis regular schedule as per Renal   restart sevelamer and cinacalcet       3. Systolic CHF, Cardiomyopathy, s/p AICD. HTN   no signs of volume overload, manage with HD   BB and losartan on hold due to low BP, reeval in am for possible restart     4. AFIB on coumadin  restarted now  INR TX,  d/c heparin SQ   Restart lower dose BB   C/w amio       5. DVT PPxs:  Tx INR     Dispo: C/w current care, monitor if tolerates diet, labs in am.  D/c planning if medically stable
63 year old male with PMH of ESRD secondary Polycystic kidney disease, s/p bilateral nephrectomy,  had  transplant which has failed x 2, Cardiomyopathy,   systolic CHF s/p AICD, HTN, adrenal insufficiency (on chronic prednisone), hypoparathyroidism, h/o cryptococcal meningitis, h/o c. diff infection  has had multiple episodes sepsis Klebsiella du eto Cholangitis, s/p recent ERCP ans stone extraction  and sphincterotomy admitted for:       1. Sepsis due to Cholangitis  with hypotension - resolving  No septic shock as per Intensivist, has also h/o adrenal insufficiency  continue to taper prednisone down to 20 mg and monitor BP if stable will taper further as outpt    S/p  IVF and stress dose steroids  Dis not require pressors  pt on 5mg prednisone maintenance  F/u BCX: NGTD   S/p Zosyn   C/w IV Abxs, plan for IV cefazioline 2g with HD x 2 weeks as D/w DR Srivastava, Dr Paniagua informed   SX eval appreciated. HIDA was canceled. Plan for f/u with GI and Sx at [facility], might need repeat ERCP and or Sx   GI f/u appreciated, diet advanced, monitor if tolerates       2. ESRD on HD   C/w dialysis regular schedule as per Renal   restart sevelamer and cinacalcet       3. Systolic CHF, Cardiomyopathy, s/p AICD. HTN   no signs of volume overload, manage with HD   BB and losartan on hold due to low BP, reeval in am for possible restart     4. AFIB on coumadin  restarted now  INR TX,  d/c heparin SQ   Restart lower dose BB   C/w amio       5. DVT PPxs:  Tx INR     Dispo: C/w current care, monitor if tolerates diet, labs in am.  D/c planning if medically stable kathy in AM
64 yo male with ESRD on HD ( PCKD), s.p failed transplant x 2, w recent admssion for Klebsiella bacteremia w sepsis . found to have pancreatic and GB stones, s.p recent ERCP and sphincterotomy this past week.  Presented with Fever and abd pains while on HD     Fevers w Abd pains sec to possible sepsis after recent ERCP   Chronic pancreatitis hx = but amylase, lipase normal    await cultures r/o recurrent Klebsiella  - neg thus far   may need IV abx at HD - per ID to decide on dose and duration     ID eval     GI eval for persistent GB sludge and stone in pancreatic duct  - may need repeat ERCP for full removal of stones vs surgery        ESRD on HD MWF   continued HD support    Anemia of CKD   SACHIN at HD    Thank you for the courtesy of this consult. We will follow this patient with you.   Management is subject to change if new information becomes available or patient condition changes.
63y old M with:  Abdominal pain - Possible Cholecystits  s/p ERCP and sphincterotomy  CHF  ESRD on HD  Adrenal Insufficiency  AF on Coumadin    Plan:  Cont Close monitoring  BP Stable  No Acute Resp issues  PO diet  Will DC HIDA scan  Cont IVF  Will cont IV Zosyn  Taper stress steroids  Monitor renal function  HD per renal   DVT prophylaxis - On Coumadin  Plan discussed with patient at bedside with all questions answered in detail

## 2019-04-17 NOTE — ED STATDOCS - CLINICAL SUMMARY MEDICAL DECISION MAKING FREE TEXT BOX
Will send pt to main ED for further evaluation. Will send pt to main ED for further evaluation. Will hold off on abx since pt took one dose this morning.

## 2019-04-17 NOTE — ED ADULT NURSE NOTE - OBJECTIVE STATEMENT
pt comes in for abdominal pain states epigastric that started last night. +Nausea. denies vomiting and diarrhea. Pt also notes he had a fever after dialysis today, last tylenol at 10am. Recent ERCP and admission to . Right Upper Arm fistula, placed don't use extremity on arm. wife at bedside

## 2019-04-17 NOTE — H&P ADULT - NSHPOUTPATIENTPROVIDERS_GEN_ALL_CORE
PCP/Cardiology; Dr. Mathew  Gastroenterology; Dr. Tejeda  Nephrology; Dr. SASHA Aguilar PCP/Cardiology; Dr. Mathew  Gastroenterology; Dr. Penny, Dr. Tejeda  Nephrology; Dr. Paniagua

## 2019-04-17 NOTE — ED STATDOCS - PROGRESS NOTE DETAILS
Stacie LYLE for attending Dr. Culver: 63 year old male with PMH of ESRD secondary Polycystic kidney disease, s/p bilateral nephrectomy, had transplant which has failed x 2, Cardiomyopathy, systolic CHF s/p AICD, HTN, adrenal insufficiency (on chronic prednisone), hypoparathyroidism, h/o cryptococcal meningitis, h/o c. diff infection has had multiple episodes sepsis Klebsiella due to Cholangitis, s/p recent ERCP ans stone extraction and sphincterotomy, recent admission and D/C x3 days ago for sepsis presents to the ED c/o LUQ abd pain and fever that started this morning. TMax 101F this afternoon. Denies N/V. Pt has taken Tylenol for fever. Pt has hx of gallbladder issues wand isolation due to Klebsiella. Pt took his regular prednisone today. PMD- Dr. Mathew. Will send pt to main ED for further evaluation. Stacie LYLE for attending Dr. Culver: 63 year old male with PMH of ESRD secondary Polycystic kidney disease, s/p bilateral nephrectomy, had transplant which has failed x 2, Cardiomyopathy, systolic CHF s/p AICD, HTN, adrenal insufficiency (on chronic prednisone), hypoparathyroidism, h/o cryptococcal meningitis, h/o c. diff infection has had multiple episodes sepsis Klebsiella due to Cholangitis, s/p recent ERCP ans stone extraction and sphincterotomy, recent admission and D/C x3 days ago for sepsis presents to the ED c/o LUQ abd pain and fever that started this morning. TMax 101F this afternoon. Denies N/V. Pt has taken Tylenol for fever. Pt has hx of gallbladder issues wand isolation due to Klebsiella. Pt took his regular prednisone today and dose of abx. PMD- Dr. Mathew. Will send pt to main ED for further evaluation.

## 2019-04-17 NOTE — ED PROVIDER NOTE - NS_ ATTENDINGSCRIBEDETAILS _ED_A_ED_FT
I, Juan Francisco Escamilla MD,  performed the initial face to face bedside interview with this patient regarding history of present illness, review of symptoms and relevant past medical, social and family history.  I completed an independent physical examination.    The history, relevant review of systems, past medical and surgical history, medical decision making, and physical examination was documented by the scribe in my presence and I attest to the accuracy of the documentation.

## 2019-04-17 NOTE — H&P ADULT - HISTORY OF PRESENT ILLNESS
62 y/o M PMHx significant for ESRD secondary to Polycystic kidney disease on HD via RUE on M/W/F, s/p bilateral nephrectomy, s/p failed transplant x 2, CAD, severe dilated cardiomyopathy, paroxysmal atrial flutter and VT on Amiodarone and Warfarin chronically, CHF (HFrEF), s/p AICD, hypertension, adrenal insufficiency (on chronic prednisone), hypoparathyroidism, h/o cryptococcal meningitis, h/o c. difficile colitis, has had multiple episodes of Klebsiella septicemia due to Cholangitis (was deemed not a surgical candidate for cholecystectomy in the past), s/p recent ERCP w/ papillotomy and stone extraction at [place], recent admission (discharged 4/14/2019) for management of sepsis due to cholangitis presents to the ED for further evaluation of recurrent severe LUQ abdominal pain associated w/ recurrent subjective fevers at home (Tmax 101'F). The patient denies any associated nausea, vomiting. Labs => Hgb/Hct 9.8/32.6, PLT 78, LA 1.8, HCO3 36, BUN/Cr 46/3.96, , Alb 2.5. Abdominal U/S => Mild ascites. Transplant kidney intact. Gallbladder is markedly distended. Cholelithiasis. Hepatic cysts. In the ED the patient was given Acetaminophen 650mg po x 1, Hydrocortisone 100mg IVP x 1, and NS x 500mL x 1. 62 y/o M PMHx significant for ESRD secondary to Polycystic kidney disease on HD via RUE on M/W/F, s/p bilateral nephrectomy, s/p failed transplant x 2, CAD, severe dilated cardiomyopathy, paroxysmal atrial flutter and VT on Amiodarone and Warfarin chronically, CHF (HFrEF), s/p AICD, hypertension, adrenal insufficiency (on chronic prednisone), hypoparathyroidism, h/o cryptococcal meningitis, h/o c. difficile colitis, has had multiple episodes of Klebsiella septicemia due to Cholangitis (was deemed not a surgical candidate for cholecystectomy in the past), s/p recent ERCP w/ papillotomy and stone extraction at [place] 2 weeks ago, recent admission (discharged 4/14/2019) for management of sepsis due to cholangitis presents to the ED for further evaluation of recurrent severe LUQ abdominal pain associated w/ recurrent subjective fevers at home (Tmax 101'F). The patient denies any associated nausea, vomiting. Labs => Hgb/Hct 9.8/32.6, PLT 78, LA 1.8, HCO3 36, BUN/Cr 46/3.96, , Alb 2.5. Abdominal U/S => Mild ascites. Transplant kidney intact. Gallbladder is markedly distended. Cholelithiasis. Hepatic cysts. In the ED the patient was given Acetaminophen 650mg po x 1, Hydrocortisone 100mg IVP x 1, and NS x 500mL x 1.

## 2019-04-17 NOTE — ED PROVIDER NOTE - OBJECTIVE STATEMENT
63 year old male with PMH of ESRD secondary Polycystic kidney disease, s/p bilateral nephrectomy, had transplant which has failed x 2, Cardiomyopathy, systolic CHF s/p AICD, HTN, adrenal insufficiency (on chronic prednisone), hypoparathyroidism, h/o cryptococcal meningitis, h/o c. diff infection has had multiple episodes sepsis Klebsiella due to Cholangitis, s/p recent ERCP and stone extraction and sphincterotomy, recent admission and D/C 3 days ago for sepsis presents to the ED c/o LUQ abd pain and fever today. Pt found to have a fever in the afternoon after dialysis treatment. TMax 101F. Reports abd pain is the same as his when he was recently in . Pt has taken Tylenol for fever. Pt has hx of gallbladder issues wand isolation due to Klebsiella. States he had his gallbladder "cleaned out" in [place] recently. Pt took his regular prednisone today and dose of abx. Took a laxative today, had 2-3 BM today. Denies N/V. PMD- Dr. Mathew.

## 2019-04-17 NOTE — ED ADULT NURSE NOTE - CHIEF COMPLAINT QUOTE
pt recently admitted to ICU at  and discharged on sunday. pt states he has abdominal pain and fever, no NVD, pt has hx of gallbladder issues wand isolation due to Klebciella. last admission pt was septic

## 2019-04-17 NOTE — H&P ADULT - ASSESSMENT
64 y/o M PMHx significant for ESRD secondary to Polycystic kidney disease on HD via RUE on M/W/F, s/p bilateral nephrectomy, s/p failed transplant x 2, CAD, severe dilated cardiomyopathy, paroxysmal atrial flutter and VT on Amiodarone and Warfarin chronically, CHF (HFrEF), s/p AICD, hypertension, adrenal insufficiency (on chronic prednisone), hypoparathyroidism, h/o cryptococcal meningitis, h/o c. difficile colitis, has had multiple episodes of Klebsiella septicemia due to Cholangitis (was deemed not a surgical candidate for cholecystectomy in the past), s/p recent ERCP w/ papillotomy and stone extraction at [place], recent admission (discharged 4/14/2019) for management of sepsis due to cholangitis presents to the ED for further evaluation of recurrent severe LUQ abdominal pain associated w/ recurrent subjective fevers at home (Tmax 101'F). The patient denies any associated nausea, vomiting. Labs => Hgb/Hct 9.8/32.6, PLT 78, LA 1.8, HCO3 36, BUN/Cr 46/3.96, , Alb 2.5. Abdominal U/S => Mild ascites. Transplant kidney intact. Gallbladder is markedly distended. Cholelithiasis. Hepatic cysts. In the ED the patient was given Acetaminophen 650mg po x 1, Hydrocortisone 100mg IVP x 1, and NS x 500mL x 1. 62 y/o M PMHx significant for ESRD secondary to Polycystic kidney disease on HD via RUE on M/W/F, s/p bilateral nephrectomy, s/p failed transplant x 2, CAD, severe dilated cardiomyopathy, paroxysmal atrial flutter and VT on Amiodarone and Warfarin chronically, CHF (HFrEF), s/p AICD, hypertension, adrenal insufficiency (on chronic prednisone), hypoparathyroidism, h/o cryptococcal meningitis, h/o c. difficile colitis, has had multiple episodes of Klebsiella septicemia due to Cholangitis (was deemed not a surgical candidate for cholecystectomy in the past), s/p recent ERCP w/ papillotomy and stone extraction at [place], recent admission (discharged 4/14/2019) for management of sepsis due to cholangitis presents to the ED for further evaluation of recurrent severe LUQ abdominal pain associated w/ recurrent subjective fevers at home (Tmax 101'F). The patient denies any associated nausea, vomiting. Labs => Hgb/Hct 9.8/32.6, PLT 78, LA 1.8, HCO3 36, BUN/Cr 46/3.96, , Alb 2.5. Abdominal U/S => Mild ascites. Transplant kidney intact. Gallbladder is markedly distended. Cholelithiasis. Hepatic cysts. In the ED the patient was given Acetaminophen 650mg po x 1, Hydrocortisone 100mg IVP x 1, and NS x 500mL x 1. 64 y/o M PMHx significant for ESRD secondary to Polycystic kidney disease on HD via RUE on M/W/F, s/p bilateral nephrectomy, s/p failed transplant x 2, CAD, severe dilated cardiomyopathy, paroxysmal atrial flutter and VT on Amiodarone and Warfarin chronically, CHF (HFrEF), s/p AICD, hypertension, adrenal insufficiency (on chronic prednisone), hypoparathyroidism, h/o cryptococcal meningitis, h/o c. difficile colitis, has had multiple episodes of Klebsiella septicemia due to Cholangitis (was deemed not a surgical candidate for cholecystectomy in the past), s/p recent ERCP w/ papillotomy and stone extraction at [place], recent admission (discharged 4/14/2019) for management of sepsis due to cholangitis presents to the ED for further evaluation of recurrent severe LUQ abdominal pain associated w/ recurrent subjective fevers at home (Tmax 101'F). The patient denies any associated nausea, vomiting. Labs => Hgb/Hct 9.8/32.6, PLT 78, LA 1.8, HCO3 36, BUN/Cr 46/3.96, , Alb 2.5. Abdominal U/S => Mild ascites. Transplant kidney intact. Gallbladder is markedly distended. Cholelithiasis. Hepatic cysts. In the ED the patient was given Acetaminophen 650mg po x 1, Hydrocortisone 100mg IVP x 1, and NS x 500mL x 1.    #Abdominal Pain  ~admit to Medicine  ~Ddx includes recurrent cholecystitis vs cholangitis  ~f/u PAN C+S  ~given IV abx prior to arrival (in HD)  ~f/u w/ further ID recommendations  ~f/u w/ Gastroenterology   ~f/u w/ Surgery in the am  ~consider IR for cholecystostomy as patient was deemed not a surgical candidate    #Hx of recurrent c. difficile colitis  ~cont. suppressive therapy w/ Vancomycin PO    #Adrenal Insufficiency  ~given stress dose Hydrocortisone in the ED  ~cont. Hydrocortisone for now  ~f/u w/ Endocrinology in the am    #paroxysmal Atrial flutter  ~INR currently supratherapeutic  ~will hold Coumadin for now; please reassess and re-dose accordingly   ~cont. Amiodarone (takes 100mg po daily)  ~cont. Metoprolol    #ESRD  ~f/u w/ Nephrology in the am  ~HD days => M/W/F  ~cont. Renvela  ~cont. Sensipar    #Vte ppx  IMPROVE VTE Risk Score is 1  [  ] Previous VTE                                                  3  [  ] Thrombophilia                                               2  [  ] Lower limb paralysis                                      2        (unable to hold up >15 seconds)    [  ] Current Cancer                                              2         (within 6 months)  [  ] Immobilization > 24 hrs                                1  [  ] ICU/CCU stay > 24 hours                              1  [X] Age > 60                                                      1  ~cont. VKA therapy as above

## 2019-04-18 NOTE — DIETITIAN INITIAL EVALUATION ADULT. - ORAL INTAKE PTA
~3 days PTA pt reports having abd pain which limited his intake however since reports since being receiving prednisone he has had a great appetite and feels very hungry./fair

## 2019-04-18 NOTE — CONSULT NOTE ADULT - SUBJECTIVE AND OBJECTIVE BOX
62 y/o M PMHx significant for ESRD secondary to Polycystic kidney disease on HD via RUE on M/W/F, s/p bilateral nephrectomy, s/p failed transplant x 2, CAD, severe dilated cardiomyopathy EF 20%, paroxysmal atrial flutter and VT on Amiodarone and Warfarin chronically, CHF (HFrEF), s/p AICD, adrenal insufficiency (on chronic prednisone x years) - never had formal workup., hypoparathyroidism, h/o cryptococcal meningitis, h/o c. difficile colitis, has had multiple episodes of Klebsiella septicemia due to Cholangitis (was deemed not a surgical candidate for cholecystectomy in the past), s/p recent ERCP w/ papillotomy and stone extraction at [place] 2 weeks ago, recent admission (discharged 4/14/2019) for management of sepsis due to cholangitis presents to the ED for further evaluation of recurrent severe LUQ abdominal pain associated w/ recurrent subjective fevers at home (Tmax 101'F). The patient denies any associated nausea, vomiting.  Abdominal U/S => Mild ascites. Transplant kidney intact. Gallbladder is markedly distended. Cholelithiasis. Hepatic cysts. In the ED the patient was given Acetaminophen 650mg po x 1, Hydrocortisone 100mg IVP x 1, and NS x 500mL x 1.  Renal eval called for ESRD and continuing HD mgt     today   pt feelnig better - states sx resolved after hydrocortisone yest  pt states he feels like he was on rapid taper on recent admit and is on chronic pred 5mg daily   no sob or cp or abd pains now    PAST MEDICAL & SURGICAL HISTORY:  Oliguria  Arm swelling: left arm  AV fistula: right UE  Leg pain, anterior, right: right anterior thigh at graft donor site  Irregular heart beat  AICD (automatic cardioverter/defibrillator) present  Dialysis patient  Adrenal insufficiency  Hypoparathyroidism  HFrEF (heart failure with reduced ejection fraction)  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  S/P colonoscopy: last done 2016  AICD (automatic cardioverter/defibrillator) present: 2013  H/O hernia repair  A-V fistula: 1995 left UE  right arm 2007,  H/O kidney transplant: 1985, 1995, 1997    Home Medications:  amiodarone 200 mg oral tablet: 0.5 tab(s) orally once a day (17 Apr 2019 22:44)  ceFAZolin 2 g intravenous injection: 2 gram(s) intravenous Monday, Wednesday, and Friday (during dialysis) (17 Apr 2019 22:44)  cholecalciferol 1000 intl units oral capsule: 1 cap(s) orally 3 times a week (17 Apr 2019 22:44)  Endocet 5/325 oral tablet: 1 tab(s) orally every 6 hours, As Needed (17 Apr 2019 22:44)  losartan 25 mg oral tablet: 1 tab(s) orally once a day (17 Apr 2019 22:44)  Metoprolol Tartrate 25 mg oral tablet: 1 tab(s) orally once a day (17 Apr 2019 22:44)  pantoprazole 40 mg oral delayed release tablet: 1 tab(s) orally 2 times a day (17 Apr 2019 22:44)  predniSONE 5 mg oral tablet daily   Renvela 800 mg oral tablet: 3 tab(s) orally 3 times a day (with meals) (17 Apr 2019 22:44)  Sensipar 30 mg oral tablet: 1 tab(s) orally 4 times a week    ***Monday, Wednesday, Friday, and Saturday*** (17 Apr 2019 22:44)  ursodiol 300 mg oral capsule: 1 cap(s) orally 2 times a day (17 Apr 2019 22:44)    MEDICATIONS  (STANDING):  amiodarone    Tablet 100 milliGRAM(s) Oral daily  ceFAZolin   IVPB 2000 milliGRAM(s) IV Intermittent <User Schedule>  cholecalciferol 1000 Unit(s) Oral <User Schedule>  cinacalcet 30 milliGRAM(s) Oral <User Schedule>  hydrocortisone sodium succinate Injectable 50 milliGRAM(s) IV Push every 8 hours  losartan 25 milliGRAM(s) Oral daily  metoprolol tartrate 25 milliGRAM(s) Oral daily  pantoprazole    Tablet 40 milliGRAM(s) Oral two times a day  sevelamer carbonate 2400 milliGRAM(s) Oral three times a day with meals  ursodiol Capsule 300 milliGRAM(s) Oral two times a day  vancomycin    Solution 125 milliGRAM(s) Oral every 6 hours      Allergies    No Known Allergies    Intolerances    SOCIAL HISTORY:  Denies ETOh,Smoking,     FAMILY HISTORY:  Family history of kidney disease (Mother)  Family history of colon cancer (Sibling)      REVIEW OF SYSTEMS:    CONSTITUTIONAL: No weakness, fevers or chills  EYES/ENT: No visual changes;  No vertigo or throat pain   NECK: No pain or stiffness  RESPIRATORY: No cough, wheezing, hemoptysis; No shortness of breath  CARDIOVASCULAR: No chest pain or palpitations  GASTROINTESTINAL: No abdominal or epigastric pain. No nausea, vomiting, or hematemesis; No diarrhea or constipation. No melena or hematochezia.  GENITOURINARY: No dysuria, frequency or hematuria  NEUROLOGICAL: No numbness or weakness  SKIN: No itching, burning, rashes, or lesions   All other review of systems is negative unless indicated above.    VITAL:  Vital Signs Last 24 Hrs  T(C): 36.4 (18 Apr 2019 11:28), Max: 37.9 (17 Apr 2019 18:13)  T(F): 97.5 (18 Apr 2019 11:28), Max: 100.3 (17 Apr 2019 18:13)  HR: 66 (18 Apr 2019 11:28) (61 - 85)  BP: 105/86 (18 Apr 2019 11:28) (87/46 - 143/86)  BP(mean): --  RR: 17 (18 Apr 2019 11:28) (11 - 18)  SpO2: 100% (18 Apr 2019 11:28) (91% - 100%)    I and O's:    04-18 @ 07:01  -  04-18 @ 13:59  --------------------------------------------------------  IN: 0 mL / OUT: 225 mL / NET: -225 mL      Height (cm): 72 (04-17 @ 18:03)  Weight (kg): 63.5 (04-17 @ 18:03)  BMI (kg/m2): 122.5 (04-17 @ 18:03)  BSA (m2): 0.93 (04-17 @ 18:03)    PHYSICAL EXAM:    Constitutional: NAD  HEENT: PERRLA, EOMI,  MMM  Neck: No LAD, No JVD  Respiratory: CTAB  Cardiovascular: S1 and S2  Gastrointestinal: BS+, soft, NT/ND  Extremities: No peripheral edema  Neurological: A/O x 3, no focal deficits  Psychiatric: Normal mood, normal affect  : No Amaya  Skin: No rashes  Access: Not applicable    LABS:                        9.7    7.79  )-----------( 78       ( 18 Apr 2019 06:09 )             32.1     04-18    140  |  99  |  57<H>  ----------------------------<  157<H>  4.6   |  30  |  4.35<H>    Ca    8.2<L>      18 Apr 2019 06:09  Mg     2.1     04-18    TPro  5.7<L>  /  Alb Lipase, Serum: 34 U/L (04.11.19 @ 06:07)     2.4<L>  /  TBili  0.8  /  DBili  x   /  AST  13<L>  /  ALT  <6<L>  /  AlkPhos  72  04-18      Urine Studies:          RADIOLOGY & ADDITIONAL STUDIES:

## 2019-04-18 NOTE — PROGRESS NOTE ADULT - ASSESSMENT
62 yo male with multiple medical issues, now with fever and abdominal pain after taper of steroids. Given recent presentation, and rapid improvement after steroids, agree that Addisonian crisis may explain symptoms. Would monitor and advance diet for now.

## 2019-04-18 NOTE — PROGRESS NOTE ADULT - SUBJECTIVE AND OBJECTIVE BOX
CC:Patient is a 63y old  Male who presents with a chief complaint of Abdominal pain and fevers (18 Apr 2019 11:06)      Subjective:  Pt seen and examined at bedside with chaperone. Pt is AAOx3, pt in no acute distress. Pt denied c/o fever, chills, chest pain, SOB, abd pain, N/V/D, extremity pain or dysfunction, hemoptysis, hematemesis, hematuria, hematochexia, headache, diplopia, vertigo, dizzyness.     ROS:  as abovementioned otherwise negative ROS    Vital Signs Last 24 Hrs  T(C): 36.4 (18 Apr 2019 11:28), Max: 37.9 (17 Apr 2019 18:13)  T(F): 97.5 (18 Apr 2019 11:28), Max: 100.3 (17 Apr 2019 18:13)  HR: 66 (18 Apr 2019 11:28) (61 - 85)  BP: 105/86 (18 Apr 2019 11:28) (87/46 - 143/86)  BP(mean): --  RR: 17 (18 Apr 2019 11:28) (11 - 18)  SpO2: 100% (18 Apr 2019 11:28) (91% - 100%)    Labs:                         9.7    7.79  )-----------( 78       ( 18 Apr 2019 06:09 )             32.1     CBC Full  -  ( 18 Apr 2019 06:09 )  WBC Count : 7.79 K/uL  RBC Count : 3.29 M/uL  Hemoglobin : 9.7 g/dL  Hematocrit : 32.1 %  Platelet Count - Automated : 78 K/uL  Mean Cell Volume : 97.6 fl  Mean Cell Hemoglobin : 29.5 pg  Mean Cell Hemoglobin Concentration : 30.2 gm/dL  Auto Neutrophil # : 6.35 K/uL  Auto Lymphocyte # : 0.52 K/uL  Auto Monocyte # : 0.85 K/uL  Auto Eosinophil # : 0.00 K/uL  Auto Basophil # : 0.00 K/uL  Auto Neutrophil % : 81.5 %  Auto Lymphocyte % : 6.7 %  Auto Monocyte % : 10.9 %  Auto Eosinophil % : 0.0 %  Auto Basophil % : 0.0 %    04-18    140  |  99  |  57<H>  ----------------------------<  157<H>  4.6   |  30  |  4.35<H>    Ca    8.2<L>      18 Apr 2019 06:09  Mg     2.1     04-18    TPro  5.7<L>  /  Alb  2.4<L>  /  TBili  0.8  /  DBili  x   /  AST  13<L>  /  ALT  <6<L>  /  AlkPhos  72  04-18    LIVER FUNCTIONS - ( 18 Apr 2019 06:09 )  Alb: 2.4 g/dL / Pro: 5.7 gm/dL / ALK PHOS: 72 U/L / ALT: <6 U/L / AST: 13 U/L / GGT: x           PT/INR - ( 18 Apr 2019 06:09 )   PT: 38.6 sec;   INR: 3.35 ratio         PTT - ( 18 Apr 2019 06:09 )  PTT:37.6 sec      Meds:  acetaminophen   Tablet .. 650 milliGRAM(s) Oral every 6 hours PRN  amiodarone    Tablet 100 milliGRAM(s) Oral daily  ceFAZolin   IVPB 2000 milliGRAM(s) IV Intermittent <User Schedule>  cholecalciferol 1000 Unit(s) Oral <User Schedule>  cinacalcet 30 milliGRAM(s) Oral <User Schedule>  hydrocortisone sodium succinate Injectable 50 milliGRAM(s) IV Push every 8 hours  losartan 25 milliGRAM(s) Oral daily  metoprolol tartrate 25 milliGRAM(s) Oral daily  ondansetron Injectable 4 milliGRAM(s) IV Push every 6 hours PRN  oxyCODONE    5 mG/acetaminophen 325 mG 1 Tablet(s) Oral every 6 hours PRN  pantoprazole    Tablet 40 milliGRAM(s) Oral two times a day  sevelamer carbonate 2400 milliGRAM(s) Oral three times a day with meals  ursodiol Capsule 300 milliGRAM(s) Oral two times a day  vancomycin    Solution 125 milliGRAM(s) Oral every 6 hours      Radiology:      Physical exam:  Pt is aaox3  Pt in no acute distress  Resp: CTAB  CVS: S1S2(+)  ABD: bowel sounds (+), soft, non distended, no rebound, no guarding, no rigidity, no skin changes to exam. No tenderness to exam  EXT: no calf tenderness or edema to exam b/l, on VTE prophylaxis  Skin: no skin changes to exam, no jaundice or icteric sclera b/l

## 2019-04-18 NOTE — CONSULT NOTE ADULT - SUBJECTIVE AND OBJECTIVE BOX
Patient is a 63y old  Male who presents with a chief complaint of Abdominal pain and fevers (17 Apr 2019 23:01)    HPI:  62 yo male with PMH of CAD, ESRD on HD, polycystic kidney disease, systolic CHF s/p AICD, HTN, adrenal insufficiency (on chronic prednisone), hypoparathyroidism, h/o cryptococcal meningitis, h/o c.diff infection, prior sepsis with Klebsiella pneumoniae, pancreatic duct stone, chronic pancreatitis cardiomyopathy on coumadin, adrenal insufficiency was admitted on 4/10 for abdominal pain and fever. He has had multiple episodes of Klebsiella septicemia due to cholangitis (was deemed not a surgical candidate for cholecystectomy), s/p recent ERCP w/ papillotomy and stone extraction at [place] 2-3 weeks PTA, recent admission (discharged 4/14/2019) for probable sepsis and cholangitis (placed on cefazolin 2gm IV qHD) was admitted on 4/17 for recurrent severe LUQ abdominal pain associated w/ recurrent fever at home (Tmax 101'F). In the ED the patient was given Acetaminophen 650mg po x 1, Hydrocortisone 100mg IVP x 1, and NS x 500mL x 1.       PMH: as above  PSH: as above  Meds: per reconciliation sheet, noted below  MEDICATIONS  (STANDING):  amiodarone    Tablet 100 milliGRAM(s) Oral daily  ceFAZolin   IVPB 2000 milliGRAM(s) IV Intermittent <User Schedule>  cholecalciferol 1000 Unit(s) Oral <User Schedule>  cinacalcet 30 milliGRAM(s) Oral <User Schedule>  hydrocortisone sodium succinate Injectable 50 milliGRAM(s) IV Push every 8 hours  losartan 25 milliGRAM(s) Oral daily  metoprolol tartrate 25 milliGRAM(s) Oral daily  pantoprazole    Tablet 40 milliGRAM(s) Oral two times a day  sevelamer carbonate 2400 milliGRAM(s) Oral three times a day with meals  ursodiol Capsule 300 milliGRAM(s) Oral two times a day  vancomycin    Solution 125 milliGRAM(s) Oral every 6 hours    MEDICATIONS  (PRN):  acetaminophen   Tablet .. 650 milliGRAM(s) Oral every 6 hours PRN Temp greater or equal to 38C (100.4F), Mild Pain (1 - 3)  ondansetron Injectable 4 milliGRAM(s) IV Push every 6 hours PRN Nausea  oxyCODONE    5 mG/acetaminophen 325 mG 1 Tablet(s) Oral every 6 hours PRN Moderate Pain (4 - 6)    Allergies    No Known Allergies    Intolerances      Social: no smoking, no alcohol, no illegal drugs; no recent travel, no exposure to TB  FAMILY HISTORY:  Family history of kidney disease (Mother)  Family history of colon cancer (Sibling)    no history of premature cardiovascular disease in first degree relatives    ROS: the patient denies HA, no seizures, no dizziness, no sore throat, no nasal congestion, no blurry vision, no CP, no palpitations, no SOB, no cough, has abdominal pain, no diarrhea, no N/V, no dysuria, no leg pain, no claudication, no rash, no joint aches, no rectal pain or bleeding, no night sweats  All other systems reviewed and are negative    Vital Signs Last 24 Hrs  T(C): 36.6 (18 Apr 2019 04:51), Max: 37.9 (17 Apr 2019 18:13)  T(F): 97.9 (18 Apr 2019 04:51), Max: 100.3 (17 Apr 2019 18:13)  HR: 61 (18 Apr 2019 04:51) (61 - 85)  BP: 98/69 (18 Apr 2019 04:51) (87/46 - 143/86)  BP(mean): --  RR: 16 (18 Apr 2019 04:51) (11 - 18)  SpO2: 96% (18 Apr 2019 04:51) (91% - 98%)  Daily Height in cm: 72 (17 Apr 2019 18:03)    Daily     PE:    Constitutional: frail looking  HEENT: NC/AT, EOMI, PERRLA, conjunctivae clear; ears and nose atraumatic; pharynx benign  Neck: supple; thyroid not palpable  Back: no tenderness  Respiratory: respiratory effort normal; clear to auscultation  Cardiovascular: S1S2 regular, no murmurs  Abdomen: soft, has RUQ tender, not distended, positive BS; no liver or spleen organomegaly  Genitourinary: no suprapubic tenderness  Lymphatic: no LN palpable  Musculoskeletal: no muscle tenderness, no joint swelling or tenderness  Extremities: no pedal edema  Neurological/ Psychiatric: AxOx3, judgement and insight normal; moving all extremities  Skin: no rashes; no palpable lesions    Labs: all available labs reviewed                        9.7    7.79  )-----------( 78       ( 18 Apr 2019 06:09 )             32.1     04-18    140  |  99  |  57<H>  ----------------------------<  157<H>  4.6   |  30  |  4.35<H>    Ca    8.2<L>      18 Apr 2019 06:09  Mg     2.1     04-18    TPro  5.7<L>  /  Alb  2.4<L>  /  TBili  0.8  /  DBili  x   /  AST  13<L>  /  ALT  <6<L>  /  AlkPhos  72  04-18     LIVER FUNCTIONS - ( 18 Apr 2019 06:09 )  Alb: 2.4 g/dL / Pro: 5.7 gm/dL / ALK PHOS: 72 U/L / ALT: <6 U/L / AST: 13 U/L / GGT: x                 Radiology: all available radiological tests reviewed    Advanced directives addressed: full resuscitation

## 2019-04-18 NOTE — PROVIDER CONTACT NOTE (CRITICAL VALUE NOTIFICATION) - TEST AND RESULT REPORTED:
blood culture from 4/17 at 1946; growth in aerobic bottle, gram negative rods. proteus sp will not be reported as part of bcid until further notice.

## 2019-04-18 NOTE — PROGRESS NOTE ADULT - SUBJECTIVE AND OBJECTIVE BOX
Patient is a 63y old  Male who presents with a chief complaint of Abdominal pain and fevers (18 Apr 2019 09:50)      HPI:  64 y/o M PMHx significant for ESRD secondary to Polycystic kidney disease on HD via RUE on M/W/F, s/p bilateral nephrectomy, s/p failed transplant x 2, CAD, severe dilated cardiomyopathy, paroxysmal atrial flutter and VT on Amiodarone and Warfarin chronically, CHF (HFrEF), s/p AICD, hypertension, adrenal insufficiency (on chronic prednisone), hypoparathyroidism, h/o cryptococcal meningitis, h/o c. difficile colitis, has had multiple episodes of Klebsiella septicemia due to Cholangitis (was deemed not a surgical candidate for cholecystectomy in the past), s/p recent ERCP w/ papillotomy and stone extraction at [place] 2 weeks ago, recent admission (discharged 4/14/2019) for management of sepsis due to cholangitis presents to the ED for further evaluation of recurrent severe LUQ abdominal pain associated w/ recurrent subjective fevers at home (Tmax 101'F). The patient denies any associated nausea, vomiting. Labs => Hgb/Hct 9.8/32.6, PLT 78, LA 1.8, HCO3 36, BUN/Cr 46/3.96, , Alb 2.5. Abdominal U/S => Mild ascites. Transplant kidney intact. Gallbladder is markedly distended. Cholelithiasis. Hepatic cysts. In the ED the patient was given Acetaminophen 650mg po x 1, Hydrocortisone 100mg IVP x 1, and NS x 500mL x 1. (17 Apr 2019 23:01)    Patient feels significantly improved after dose of steroids. No complaints of pain. Hungry.       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  S/P colonoscopy: last done 2016  AICD (automatic cardioverter/defibrillator) present: 2013  H/O hernia repair  A-V fistula: 1995 left UE  right arm 2007,  H/O kidney transplant: 1985, 1995, 1997      MEDICATIONS  (STANDING):  amiodarone    Tablet 100 milliGRAM(s) Oral daily  ceFAZolin   IVPB 2000 milliGRAM(s) IV Intermittent <User Schedule>  cholecalciferol 1000 Unit(s) Oral <User Schedule>  cinacalcet 30 milliGRAM(s) Oral <User Schedule>  hydrocortisone sodium succinate Injectable 50 milliGRAM(s) IV Push every 8 hours  losartan 25 milliGRAM(s) Oral daily  metoprolol tartrate 25 milliGRAM(s) Oral daily  pantoprazole    Tablet 40 milliGRAM(s) Oral two times a day  sevelamer carbonate 2400 milliGRAM(s) Oral three times a day with meals  ursodiol Capsule 300 milliGRAM(s) Oral two times a day  vancomycin    Solution 125 milliGRAM(s) Oral every 6 hours    MEDICATIONS  (PRN):  acetaminophen   Tablet .. 650 milliGRAM(s) Oral every 6 hours PRN Temp greater or equal to 38C (100.4F), Mild Pain (1 - 3)  ondansetron Injectable 4 milliGRAM(s) IV Push every 6 hours PRN Nausea  oxyCODONE    5 mG/acetaminophen 325 mG 1 Tablet(s) Oral every 6 hours PRN Moderate Pain (4 - 6)      Allergies    No Known Allergies    Intolerances        Vital Signs Last 24 Hrs  T(C): 36.6 (18 Apr 2019 04:51), Max: 37.9 (17 Apr 2019 18:13)  T(F): 97.9 (18 Apr 2019 04:51), Max: 100.3 (17 Apr 2019 18:13)  HR: 61 (18 Apr 2019 04:51) (61 - 85)  BP: 98/69 (18 Apr 2019 04:51) (87/46 - 143/86)  BP(mean): --  RR: 16 (18 Apr 2019 04:51) (11 - 18)  SpO2: 96% (18 Apr 2019 04:51) (91% - 98%)    PHYSICAL EXAM:    Respiratory: CTAB  Cardiovascular: S1 and S2, RRR, no M/G/R  Gastrointestinal: BS+, soft, NT/ND  LABS:                        9.7    7.79  )-----------( 78       ( 18 Apr 2019 06:09 )             32.1     04-18    140  |  99  |  57<H>  ----------------------------<  157<H>  4.6   |  30  |  4.35<H>    Ca    8.2<L>      18 Apr 2019 06:09  Mg     2.1     04-18    TPro  5.7<L>  /  Alb  2.4<L>  /  TBili  0.8  /  DBili  x   /  AST  13<L>  /  ALT  <6<L>  /  AlkPhos  72  04-18    PT/INR - ( 18 Apr 2019 06:09 )   PT: 38.6 sec;   INR: 3.35 ratio         PTT - ( 18 Apr 2019 06:09 )  PTT:37.6 sec  LIVER FUNCTIONS - ( 18 Apr 2019 06:09 )  Alb: 2.4 g/dL / Pro: 5.7 gm/dL / ALK PHOS: 72 U/L / ALT: <6 U/L / AST: 13 U/L / GGT: x             RADIOLOGY & ADDITIONAL STUDIES:

## 2019-04-18 NOTE — PROGRESS NOTE ADULT - ASSESSMENT
62 y/o M PMHx significant for ESRD secondary to Polycystic kidney disease on HD via RUE on M/W/F, s/p bilateral nephrectomy, s/p failed transplant x 2, CAD, severe dilated cardiomyopathy, paroxysmal atrial flutter and VT on Amiodarone and Warfarin chronically, CHF (HFrEF), s/p AICD, hypertension, adrenal insufficiency (on chronic prednisone), hypoparathyroidism, h/o cryptococcal meningitis, h/o c. difficile colitis, has had multiple episodes of Klebsiella septicemia due to Cholangitis (was deemed not a surgical candidate for cholecystectomy in the past), s/p recent ERCP w/ papillotomy and stone extraction at [place], recent admission (discharged 4/14/2019) for management of sepsis due to cholangitis presents to the ED for further evaluation of recurrent severe LUQ abdominal pain associated w/ recurrent subjective fevers at home (Tmax 101'F).  Pt found to have sepsis with GNR.      1.  Sepsis/ Bacteremia:    Blood cx with GNR.    Concern for recurrent Klebsiella sepsis.  Hx of recurrent in the past.    F/u blood cx result.    Appreciate ID, GI, Surgical input.    Abd US with dilated GB.    Check HIDA to r/o acute cholecystitis.    If positive-- would favor perc choley drain with IR as patient not surgical candidate.    Cont Ancef.    Also given Cefepime 1g IV x1 and Gentamicin 150 mg IV x1 until cultures result.      2.  Abdominal Pain:    As above.    Concern for hepatobiliary source of bactermia.    Abd pain overall improved.    LFTs normal.      3.  PCKD s/p nephrectomy/ failed transplant x2 on HD:    Cont HD as scheduled.      4.  Hx of Dilated Cardiomyopathy/ AFIB/ VT:    Cont amio, toprol, losartan.    Check ECHO-- nothing in system.    Hold coumadin with high INR and ? surgical intervention.      5.  Hx of recurrent c. difficile colitis  ~cont. suppressive therapy w/ Vancomycin PO    6.  Adrenal Insufficiency:    ~given stress dose Hydrocortisone in the ED  ~cont. Hydrocortisone for now (50 q8).

## 2019-04-18 NOTE — CONSULT NOTE ADULT - ASSESSMENT
64 yo male with PMH of CAD, ESRD on HD, polycystic kidney disease, systolic CHF s/p AICD, HTN, adrenal insufficiency (on chronic prednisone), hypoparathyroidism, h/o cryptococcal meningitis, h/o c.diff infection, prior sepsis with Klebsiella pneumoniae, pancreatic duct stone, chronic pancreatitis cardiomyopathy on coumadin, adrenal insufficiency was admitted on 4/10 for abdominal pain and fever. He has had multiple episodes of Klebsiella septicemia due to cholangitis (was deemed not a surgical candidate for cholecystectomy), s/p recent ERCP w/ papillotomy and stone extraction at [place] 2-3 weeks PTA, recent admission (discharged 4/14/2019) for probable sepsis and cholangitis (placed on cefazolin 2gm IV qHD) was admitted on 4/17 for recurrent severe LUQ abdominal pain associated w/ recurrent fever at home (Tmax 101'F). In the ED the patient was given Acetaminophen 650mg po x 1, Hydrocortisone 100mg IVP x 1, and NS x 500mL x 1.     1. Febrile syndrome and abdominal pain. Possible recurrent sepsis. Probable acute cholangitis/cholecystitis s/p recent ERCP/CBD stones removal. Gallstones. Prior CDAD. ESRD on HD.  -obtain BC x 2  -continue cefazolin 2 gm IV qHD and vancomycin 125 mg PO q6h   -reason for abx use and side effects reviewed with patient; monitor BMP   - follow up cultures   -US imaging suggestive showes distended GB  -GI evaluation  -? possible cholecystotomy tube placement  -monitor temps  -f/u CBC  -supportive care  2. Other issues; CAD, ESRD on HD, polycystic kidney disease, systolic CHF s/p AICD, HTN, adrenal insufficiency (on chronic prednisone), hypoparathyroidism  - per medicine 64 yo male with PMH of CAD, ESRD on HD, polycystic kidney disease, systolic CHF s/p AICD, HTN, adrenal insufficiency (on chronic prednisone), hypoparathyroidism, h/o cryptococcal meningitis, h/o c.diff infection, prior sepsis with Klebsiella pneumoniae, pancreatic duct stone, chronic pancreatitis cardiomyopathy on coumadin, adrenal insufficiency was admitted on 4/10 for abdominal pain and fever. He has had multiple episodes of Klebsiella septicemia due to cholangitis (was deemed not a surgical candidate for cholecystectomy), s/p recent ERCP w/ papillotomy and stone extraction at [place] 2-3 weeks PTA, recent admission (discharged 4/14/2019) for probable sepsis and cholangitis (placed on cefazolin 2gm IV qHD) was admitted on 4/17 for recurrent severe LUQ abdominal pain associated w/ recurrent fever at home (Tmax 101'F). In the ED the patient was given Acetaminophen 650mg po x 1, Hydrocortisone 100mg IVP x 1, and NS x 500mL x 1.     1. Febrile syndrome and abdominal pain. Possible recurrent sepsis. Probable acute cholangitis/cholecystitis s/p recent ERCP/CBD stones removal. Gallstones. Prior CDAD. ESRD on HD.  -hypotension resolved s/p steroids  -obtain BC x 2  -continue cefazolin 2 gm IV qHD and vancomycin 125 mg PO q6h   -reason for abx use and side effects reviewed with patient; monitor BMP   - follow up cultures   -US imaging suggestive shows distended GB  -GI evaluation  -keep on steroids slow solitario with maintenance   -monitor temps  -f/u CBC  -supportive care  2. Other issues; CAD, ESRD on HD, polycystic kidney disease, systolic CHF s/p AICD, HTN, adrenal insufficiency (on chronic prednisone), hypoparathyroidism  - per medicine

## 2019-04-18 NOTE — DIETITIAN INITIAL EVALUATION ADULT. - PHYSICAL APPEARANCE
underweight/BMI 18.9; NFPE significant for muscle wasting: severe:deltoid, thigh, patella moderate:  clavicle, interosseous; mild: temporal; fat depletion:severe: ribs; moderate: triceps

## 2019-04-18 NOTE — DIETITIAN INITIAL EVALUATION ADULT. - OTHER INFO
Pt seen as consult for assessment. Pt is a 64 yo M w PMH ESRD 2/2 Polycystic kidney disease on HD via RUE on M/W/F, s/p bilateral nephrectomy, s/p failed transplant x 2, CAD, severe dilated cardiomyopathy, paroxysmal atrial flutter and VT on Amiodarone and Warfarin chronically, CHF (HFrEF), s/p AICD, hypertension, adrenal insufficiency (on chronic prednisone), hypoparathyroidism, h/o cryptococcal meningitis, h/o c. difficile colitis, has had multiple episodes of Klebsiella septicemia due to Cholangitis (was deemed not a surgical candidate for cholecystectomy in the past), s/p recent ERCP w/ papillotomy and stone extraction at [place] 2 weeks ago, recent admission (discharged 4/14/2019) for management of sepsis due to cholangitis presents to the ED for further evaluation of recurrent severe LUQ abdominal pain associated w/ recurrent subjective fevers at home. Upon observation pt appears underwt and NFPE significant for muscle wasting: severe:deltoid, thigh, patella moderate:  clavicle, interosseous; mild: temporal; fat depletion:severe: ribs; moderate: triceps. Pt c/o decreased appetite ~3 days PTA however states that once he was in ED and received solumederol his appetite has been great. Pt has documented wt loss of 6# over the past  4 months which is not clinically significant however unintentional. Based on above pt meets criteria for severe malnutrition in the context of chronic illness. Surgery consulted to r/o acute tamara; noted HIDA to R/O acute tamara, if positive , considering CBD status and Cirrhosis , CHF, EF 20 %  would recommend IR per cholecystostomy; pt not surgical candidate. No noted skin breakdown; nestor 21. Labs noted: K WNL; no current PO4+ 4/14 WNL; encourage PO4+ w meals.  Recommendations: 1. c/w renal diet 2. Add gelatein 1x/day 2. Encourage intake at each meal w adequate protein. 3. Add nephrovite 4. daily wt.

## 2019-04-18 NOTE — DIETITIAN INITIAL EVALUATION ADULT. - PERTINENT MEDS FT
MEDICATIONS  (STANDING):  amiodarone    Tablet 100 milliGRAM(s) Oral daily  ceFAZolin   IVPB 2000 milliGRAM(s) IV Intermittent <User Schedule>  cefepime  Injectable. 1000 milliGRAM(s) IV Push once  cholecalciferol 1000 Unit(s) Oral <User Schedule>  cinacalcet 30 milliGRAM(s) Oral <User Schedule>  hydrocortisone sodium succinate Injectable 50 milliGRAM(s) IV Push every 8 hours  losartan 25 milliGRAM(s) Oral daily  metoprolol tartrate 25 milliGRAM(s) Oral daily  pantoprazole    Tablet 40 milliGRAM(s) Oral two times a day  sevelamer carbonate 2400 milliGRAM(s) Oral three times a day with meals  ursodiol Capsule 300 milliGRAM(s) Oral two times a day  vancomycin    Solution 125 milliGRAM(s) Oral every 6 hours    MEDICATIONS  (PRN):  acetaminophen   Tablet .. 650 milliGRAM(s) Oral every 6 hours PRN Temp greater or equal to 38C (100.4F), Mild Pain (1 - 3)  ondansetron Injectable 4 milliGRAM(s) IV Push every 6 hours PRN Nausea  oxyCODONE    5 mG/acetaminophen 325 mG 1 Tablet(s) Oral every 6 hours PRN Moderate Pain (4 - 6)

## 2019-04-18 NOTE — PROGRESS NOTE ADULT - SUBJECTIVE AND OBJECTIVE BOX
62 y/o M PMHx significant for ESRD secondary to Polycystic kidney disease on HD via RUE on M/W/F, s/p bilateral nephrectomy, s/p failed transplant x 2, CAD, severe dilated cardiomyopathy, paroxysmal atrial flutter and VT on Amiodarone and Warfarin chronically, CHF (HFrEF), s/p AICD, hypertension, adrenal insufficiency (on chronic prednisone), hypoparathyroidism, h/o cryptococcal meningitis, h/o c. difficile colitis, has had multiple episodes of Klebsiella septicemia due to Cholangitis (was deemed not a surgical candidate for cholecystectomy in the past), s/p recent ERCP w/ papillotomy and stone extraction at [place] 2 weeks ago, recent admission (discharged 2019) for management of sepsis due to cholangitis presents to the ED for further evaluation of recurrent severe LUQ abdominal pain associated w/ recurrent subjective fevers at home (Tmax 101'F). The patient denies any associated nausea, vomiting.     Labs => Hgb/Hct 9.8/32.6, PLT 78, LA 1.8, HCO3 36, BUN/Cr 46/3.96, , Alb 2.5. Abdominal U/S => Mild ascites. Transplant kidney intact. Gallbladder is markedly distended. Cholelithiasis. Hepatic cysts. In the ED the patient was given Acetaminophen 650mg po x 1, Hydrocortisone 100mg IVP x 1, and NS x 500mL x 1.     :  Pt seen.  Just got report of blood cultures positive for GNR.  Ate lunch earlier-- had some subjective fever/ 99 after.  No abd pain.  No diarrhea.      Patient seen and examined at bedside earlier today,     Review of system- Rest of the review of system are negative except mentioned in HPI    OBJECTIVE:   T(C): 37.2 (19 @ 17:13), Max: 37.2 (19 @ 17:13)  HR: 82 (19 @ 17:13) (61 - 85)  BP: 114/77 (19 @ 17:49) (86/54 - 143/86)  RR: 18 (19 @ 17:13) (11 - 18)  SpO2: 97% (19 @ 17:13) (91% - 100%)  Wt(kg): --  Daily     Daily Weight in k.5 (2019 16:22)    PHYSICAL EXAM:  GENERAL: NAD  NERVOUS SYSTEM:  Alert & Oriented X3, non- focal exam, Motor Strength 5/5 B/L upper and lower extremities; DTRs 2+ intact and symmetric  HEAD:  Atraumatic, Normocephalic  EYES: EOMI, PERRLA, conjunctiva and sclera clear  HEENT: Moist mucous membranes  NECK: Supple, No JVD  CHEST/LUNG: Clear to auscultation bilaterally; No rales, no rhonchi, no wheezing, or rubs  HEART: Regular rate and rhythm; No murmurs, rubs, or gallops  ABDOMEN: Soft, Nontender, Nondistended; Bowel sounds present  GENITOURINARY- Voiding, no suprapubic tenderness  EXTREMITIES:  2+ Peripheral Pulses, No clubbing, cyanosis, or edema  MUSCULOSKELETAL:- No muscle tenderness, Muscle tone normal, No joint tenderness, no Joint swelling, Joint range of motion-normal  SKIN-no rash, no lesion    LABS:      140  |  99  |  57<H>  ----------------------------<  157<H>  4.6   |  30  |  4.35<H>    Ca    8.2<L>      2019 06:09  Mg     2.1         TPro  5.7<L>  /  Alb  2.4<L>  /  TBili  0.8  /  DBili  x   /  AST  13<L>  /  ALT  <6<L>  /  AlkPhos  72                 9.7    7.79  )-----------( 78       ( 2019 06:09 )             32.1     LIVER FUNCTIONS - ( 2019 06:09 )  Alb: 2.4 g/dL / Pro: 5.7 gm/dL / ALK PHOS: 72 U/L / ALT: <6 U/L / AST: 13 U/L / GGT: x             PT/INR - ( 2019 06:09 )   PT: 38.6 sec;   INR: 3.35 ratio    PTT - ( 2019 06:09 )  PTT:37.6 sec    MEDICATIONS  (STANDING):  amiodarone    Tablet 100 milliGRAM(s) Oral daily  ceFAZolin   IVPB 2000 milliGRAM(s) IV Intermittent <User Schedule>  cholecalciferol 1000 Unit(s) Oral <User Schedule>  cinacalcet 30 milliGRAM(s) Oral <User Schedule>  epoetin amee Injectable 47400 Unit(s) IV Push <User Schedule>  gentamicin   IVPB 150 milliGRAM(s) IV Intermittent once  hydrocortisone sodium succinate Injectable 50 milliGRAM(s) IV Push every 8 hours  losartan 25 milliGRAM(s) Oral daily  metoprolol tartrate 25 milliGRAM(s) Oral daily  pantoprazole    Tablet 40 milliGRAM(s) Oral two times a day  sevelamer carbonate 2400 milliGRAM(s) Oral three times a day with meals  ursodiol Capsule 300 milliGRAM(s) Oral two times a day  vancomycin    Solution 125 milliGRAM(s) Oral every 6 hours    MEDICATIONS  (PRN):  acetaminophen   Tablet .. 650 milliGRAM(s) Oral every 6 hours PRN Temp greater or equal to 38C (100.4F), Mild Pain (1 - 3)  ondansetron Injectable 4 milliGRAM(s) IV Push every 6 hours PRN Nausea  oxyCODONE    5 mG/acetaminophen 325 mG 1 Tablet(s) Oral every 6 hours PRN Moderate Pain (4 - 6)

## 2019-04-18 NOTE — PROGRESS NOTE ADULT - ASSESSMENT
A/P:  ESRD secondary to Polycystic kidney disease on HD via RUE on M/W/F, s/p bilateral nephrectomy, s/p failed transplant x 2, CAD, severe dilated cardiomyopathy, paroxysmal atrial flutter and VT on Amiodarone and Warfarin chronically, CHF (HFrEF), s/p AICD, hypertension, adrenal insufficiency (on chronic prednisone), hypoparathyroidism, h/o cryptococcal meningitis, h/o c. difficile colitis, has had multiple episodes of Klebsiella septicemia due to Cholangitis (was deemed not a surgical candidate for cholecystectomy in the past), s/p recent ERCP w/ papillotomy and stone extraction at [place] 2 weeks ago  Surgery consulted to r/O Acute tamara  IV hydration  IV antibiotics, ID on consult  DVT /GI prophylaxis  Serial abd exam,   Advise HIDA to R/O acute tamara, if positive , considering CBD status and Cirrhosis , CHF, EF 20 %  would recommend IR per cholecystostomy  Pt is not a surgical candidate  Medical management per primary service

## 2019-04-18 NOTE — CONSULT NOTE ADULT - ASSESSMENT
64 yo male w extensive pmhx as above DCM, s.p renal transplant failure x 2,. hx of PCKD, and now ESRD on HD MWF  presenting multiple recent admission for Klebsiella sepsis, cholangitis, and now recurring abd pains w neg blood cultures recent admission and presenting within a week for same sx despite being in IV abx - ancef at HD     ESRD on HD  - continue HD MWF schedule   - SACHIN at HD     HTN/DCM  - on losartan and metoprolol - monitor BP and adjust if BP drops    Adrenal Inuff-  w fevers and abd pains - ? addisonian event vs GI  related ??   - await endocrine evaluation for further workup/input and and ideal steroid dosing    - IV steroids per Medicine/Endocrine   - concerning about distended GB on CT - agree with Gen Surgery - HIDA scan and further intervention based on this    - await blood cultures ( was on IV abx past week)     d/w Della Srivastava and Simona    Thank you for the courtesy of this consult. We will follow this patient with you.   Management is subject to change if new information becomes available or patient condition changes.

## 2019-04-18 NOTE — DIETITIAN INITIAL EVALUATION ADULT. - PERTINENT LABORATORY DATA
04-18 Na140 mmol/L Glu 157 mg/dL<H> K+ 4.6 mmol/L Cr  4.35 mg/dL<H> BUN 57 mg/dL<H> Phos n/a   Alb 2.4 g/dL<L> PAB n/a

## 2019-04-18 NOTE — ED ADULT NURSE REASSESSMENT NOTE - NS ED NURSE REASSESS COMMENT FT1
pt sleeping. vs stable. wife at bedside got pillow and blanket for her. awaiting hospitalist
unable to get 2nd set of blood cultures. pt is hard stick and left arm is only arm available. notified dr boyer. okay to discontinue 2nd set
rounded on pt. BP 98/57, as per Dr. Moyer, no further intervention required. pt being admitted to [facility]. pending bed placement. will continue to monitor
pt received from previous RN Zakia. AOX3, resting comfortably. vss. denies pain. pending admission orders, poc reviewed with pt and spouse. call bell in reach. will continue to monitor

## 2019-04-19 NOTE — PROGRESS NOTE ADULT - ASSESSMENT
62 y/o M PMHx significant for ESRD secondary to Polycystic kidney disease on HD via RUE on M/W/F, s/p bilateral nephrectomy, s/p failed transplant x 2, CAD, severe dilated cardiomyopathy, paroxysmal atrial flutter and VT on Amiodarone and Warfarin chronically, CHF (HFrEF), s/p AICD, hypertension, adrenal insufficiency (on chronic prednisone), hypoparathyroidism, h/o cryptococcal meningitis, h/o c. difficile colitis, has had multiple episodes of Klebsiella septicemia due to Cholangitis, s/p recent ERCP w/ papillotomy and stone extraction at [place], recent admission (discharged 4/14/2019) for management of sepsis due to cholangitis presents to the ED for further evaluation of recurrent severe LUQ abdominal pain associated w/ recurrent subjective fevers at home (Tmax 101'F).  Patient found to have E.Coli bacteremia and HIDA scan positive for acute cholecystitis.      1.  Sepsis/ Cholangitis/ E.coli bacteremia:    Blood cx with E.coli.    Repeat cultures in am.    ID f/u.    S/p Gentamicin, Cefepime, Ancef 4/18.    F/u to deteremine course of ABX.    Hx of recurrent klebsiella sepsis in the past.    HIDA positive.  Acute cholecystitis.    Initial discussion for IR placement of perc chol drain; however, will consult cardiology to weigh risks/ benefits with cholecystectomy as patient will likely need lifelong drain if gallbladder is not removed.    GI / surgical f/u.      2.  Abdominal Pain:    As above.  Related to cholangitis/ cholecystitis.    PRN pain meds.      3.  PCKD s/p nephrectomy/ failed transplant x2 on HD:    Cont HD as scheduled.      4.  Hx of Dilated Cardiomyopathy/ AFIB/ VT:    Cont amio, toprol, losartan.    ECHO with EF 20-25%.    Cont to hold coumadin for IR versus OR next week.    INR 2-3.      5.  Hx of recurrent c. difficile colitis  ~cont. suppressive therapy w/ Vancomycin PO    6.  Adrenal Insufficiency:    ~given stress dose Hydrocortisone in the ED  ~cont. Hydrocortisone for now (50 q8).    Endo f/u. 64 y/o M PMHx significant for ESRD secondary to Polycystic kidney disease on HD via RUE on M/W/F, s/p bilateral nephrectomy, s/p failed transplant x 2, CAD, severe dilated cardiomyopathy, paroxysmal atrial flutter and VT on Amiodarone and Warfarin chronically, CHF (HFrEF), s/p AICD, hypertension, adrenal insufficiency (on chronic prednisone), hypoparathyroidism, h/o cryptococcal meningitis, h/o c. difficile colitis, has had multiple episodes of Klebsiella septicemia due to Cholangitis, s/p recent ERCP w/ papillotomy and stone extraction at [place], recent admission (discharged 4/14/2019) for management of sepsis due to cholangitis presents to the ED for further evaluation of recurrent severe LUQ abdominal pain associated w/ recurrent subjective fevers at home (Tmax 101'F).  Patient found to have E.Coli bacteremia and HIDA scan positive for acute cholecystitis.      1.  Sepsis/ Cholangitis/ Cholecystitis/ E.coli bacteremia:    Blood cx with E.coli.    Repeat cultures in am.    ID f/u.    S/p Gentamicin, Cefepime, Ancef 4/18.    F/u to deteremine course of ABX.    Hx of recurrent klebsiella sepsis in the past.    HIDA positive.  Acute cholecystitis.    Initial discussion for IR placement of perc chol drain; however, will consult cardiology to weigh risks/ benefits with cholecystectomy as patient will likely need lifelong drain if gallbladder is not removed.    GI / surgical f/u.      2.  Abdominal Pain:    As above.  Related to cholangitis/ cholecystitis.    PRN pain meds.      3.  PCKD s/p nephrectomy/ failed transplant x2 on HD:    Cont HD as scheduled.      4.  Hx of Dilated Cardiomyopathy/ AFIB/ VT:    Cont amio, toprol, losartan.    ECHO with EF 20-25%.    Cont to hold coumadin for IR versus OR next week.    INR 2-3.      5.  Hx of recurrent c. difficile colitis  ~cont. suppressive therapy w/ Vancomycin PO    6.  Adrenal Insufficiency:    ~given stress dose Hydrocortisone in the ED  ~cont. Hydrocortisone for now (50 q8).    Endo f/u.

## 2019-04-19 NOTE — PROGRESS NOTE ADULT - SUBJECTIVE AND OBJECTIVE BOX
Date of service: 04-19-19 @ 09:33    Lying in bed in NAD  Reported with bacteremia  Received cefepime and gentamicin IV  Has low grade fever    ROS: denies dizziness, no HA, no SOB or cough, no abdominal pain, no diarrhea or constipation; no dysuria, no legs pain, no rashes    MEDICATIONS  (STANDING):  amiodarone    Tablet 100 milliGRAM(s) Oral daily  cholecalciferol 1000 Unit(s) Oral <User Schedule>  cinacalcet 30 milliGRAM(s) Oral <User Schedule>  epoetin amee Injectable 08447 Unit(s) IV Push <User Schedule>  hydrocortisone sodium succinate Injectable 50 milliGRAM(s) IV Push every 8 hours  losartan 25 milliGRAM(s) Oral daily  metoprolol tartrate 25 milliGRAM(s) Oral daily  pantoprazole    Tablet 40 milliGRAM(s) Oral two times a day  sevelamer carbonate 2400 milliGRAM(s) Oral three times a day with meals  ursodiol Capsule 300 milliGRAM(s) Oral two times a day  vancomycin    Solution 125 milliGRAM(s) Oral every 6 hours      Vital Signs Last 24 Hrs  T(C): 36.3 (19 Apr 2019 04:30), Max: 37.2 (18 Apr 2019 17:13)  T(F): 97.4 (19 Apr 2019 04:30), Max: 99 (18 Apr 2019 17:13)  HR: 57 (19 Apr 2019 04:30) (57 - 82)  BP: 131/99 (19 Apr 2019 04:30) (86/54 - 131/99)  BP(mean): --  RR: 18 (19 Apr 2019 04:30) (17 - 18)  SpO2: 98% (19 Apr 2019 04:30) (97% - 100%)    Physical Exam:      Constitutional: frail looking  HEENT: NC/AT, EOMI, PERRLA, conjunctivae clear  Neck: supple; thyroid not palpable  Back: no tenderness  Respiratory: respiratory effort normal; clear to auscultation  Cardiovascular: S1S2 regular, no murmurs  Abdomen: soft, has RUQ tender, not distended, positive BS  Genitourinary: no suprapubic tenderness  Lymphatic: no LN palpable  Musculoskeletal: no muscle tenderness, no joint swelling or tenderness  Extremities: no pedal edema  Neurological/ Psychiatric: AxOx3, moving all extremities  Skin: no rashes; no palpable lesions    Labs: reviewed                        9.7    7.79  )-----------( 78       ( 18 Apr 2019 06:09 )             32.1     04-18    140  |  99  |  57<H>  ----------------------------<  157<H>  4.6   |  30  |  4.35<H>    Ca    8.2<L>      18 Apr 2019 06:09  Mg     2.1     04-18    TPro  5.7<L>  /  Alb  2.4<L>  /  TBili  0.8  /  DBili  x   /  AST  13<L>  /  ALT  <6<L>  /  AlkPhos  72  04-18     LIVER FUNCTIONS - ( 18 Apr 2019 06:09 )  Alb: 2.4 g/dL / Pro: 5.7 gm/dL / ALK PHOS: 72 U/L / ALT: <6 U/L / AST: 13 U/L / GGT: x             Culture - Blood (collected 17 Apr 2019 19:46)  Source: .Blood None  Gram Stain (18 Apr 2019 14:33):    Growth in aerobic bottle: Gram Negative Rods  Preliminary Report (18 Apr 2019 14:34):    Growth in aerobic bottle: Gram Negative Rods  Organism: Blood Culture PCR (18 Apr 2019 16:49)  Organism: Blood Culture PCR (18 Apr 2019 16:49)      -  Escherichia coli: Detec      Method Type: PCR    Culture - Blood (collected 10 Apr 2019 16:29)  Source: .Blood None  Final Report (15 Apr 2019 23:00):    No growth at 5 days.    Culture - Blood (collected 10 Apr 2019 16:28)  Source: .Blood None  Final Report (15 Apr 2019 23:00):    No growth at 5 days.        Radiology: all available radiological tests reviewed    Advanced directives addressed: full resuscitation

## 2019-04-19 NOTE — PROGRESS NOTE ADULT - ASSESSMENT
64 yo male with PMH of CAD, ESRD on HD, polycystic kidney disease, systolic CHF s/p AICD, HTN, adrenal insufficiency (on chronic prednisone), hypoparathyroidism, h/o cryptococcal meningitis, h/o c.diff infection, prior sepsis with Klebsiella pneumoniae, pancreatic duct stone, chronic pancreatitis cardiomyopathy on coumadin, adrenal insufficiency was admitted on 4/10 for abdominal pain and fever. He has had multiple episodes of Klebsiella septicemia due to cholangitis (was deemed not a surgical candidate for cholecystectomy), s/p recent ERCP w/ papillotomy and stone extraction at [place] 2-3 weeks PTA, recent admission (discharged 4/14/2019) for probable sepsis and cholangitis (placed on cefazolin 2gm IV qHD) was admitted on 4/17 for recurrent severe LUQ abdominal pain associated w/ recurrent fever at home (Tmax 101'F). In the ED the patient was given Acetaminophen 650mg po x 1, Hydrocortisone 100mg IVP x 1, and NS x 500mL x 1.     1. Febrile syndrome and abdominal pain improving. Sepsis wit E.coli. Probable acute cholangitis/cholecystitis s/p recent ERCP/CBD stones removal. Gallstones. Prior CDAD. ESRD on HD.  -hypotension resolved s/p steroids  -obtain repeat BC x 2  -s/p cefazolin 2 gm IV qHD   -on vancomycin 125 mg PO q6h   -given gentamicin 150 mg IV x 1 yesterday PM  -reason for abx use and side effects reviewed with patient; monitor BMP   - follow up cultures   -US imaging suggestive shows distended GB  -GI evaluation: will kathy need GB drainage  -f/u BC and reconsider abx in AM based on culture report  -monitor temps  -f/u CBC  -supportive care  2. Other issues; CAD, ESRD on HD, polycystic kidney disease, systolic CHF s/p AICD, HTN, adrenal insufficiency (on chronic prednisone), hypoparathyroidism  - per medicine

## 2019-04-19 NOTE — CONSULT NOTE ADULT - ASSESSMENT
64 yo male with h/o adrenal insufficiency, ESRD secondary to Polycystic kidney disease on HD via RUE on M/W/F, s/p bilateral nephrectomy, s/p failed transplant x 2, CAD, severe dilated cardiomyopathy EF 20%, paroxysmal atrial flutter and VT on Amiodarone and Warfarin chronically, CHF (HFrEF), s/p AICD, adrenal insufficiency, hypoparathyroidism, h/o cryptococcal meningitis, h/o c. difficile colitis, has had multiple episodes of Klebsiella septicemia due to Cholangitis (was deemed not a surgical candidate for cholecystectomy in the past), s/p recent ERCP w/ papillotomy and stone extraction at [place] 2 weeks ago, recent admission (discharged 4/14/2019) for management of sepsis due to cholangitis presents to the ED for further evaluation of recurrent severe LUQ abdominal pain associated w/ recurrent subjective fevers at home (Tmax 101'F).    1.  Adrenal Insufficiency        --has baseline AI 62 yo male with h/o adrenal insufficiency, ESRD secondary to Polycystic kidney disease on HD via RUE on M/W/F, s/p bilateral nephrectomy, s/p failed transplant x 2, CAD, severe dilated cardiomyopathy EF 20%, paroxysmal atrial flutter and VT on Amiodarone and Warfarin chronically, CHF (HFrEF), s/p AICD, adrenal insufficiency, hypoparathyroidism, h/o cryptococcal meningitis, h/o c. difficile colitis, has had multiple episodes of Klebsiella septicemia due to Cholangitis (was deemed not a surgical candidate for cholecystectomy in the past), s/p recent ERCP w/ papillotomy and stone extraction at [place] 2 weeks ago, recent admission (discharged 4/14/2019) for management of sepsis due to cholangitis a/w sepsis/ bacteremia.     1.  Adrenal Insufficiency        --has baseline AI from chronic glucocorticoid use and is well versed in sick day management which was reviewed with him today        --now on stress dose GC's and is clinically stable.  I would be in no rush to taper this down as he is bacteremic and likely facing further procedures this admission.  If he continues to remain stable in the ensuing days, would start weaning down hydrocortisone from 50 mg q8h to 50 mg q12h, 25 mg q12h, etc.  His baseline prednisone dose is roughly equal to hydrocortisone 30 mg daily.  Glucocorticoids should be weaned down to his maintenance dose and not to off.

## 2019-04-19 NOTE — PROGRESS NOTE ADULT - ASSESSMENT
64 yo male with multiple medical issues, now with recurrent bacteremia despite adequate drainage of CBD. Suspect chronic cholecystitis as etiology (although HIDA scan done three weeks ago was negative). For HIDA scan this AM. If positive, would proceed with percutaneous drain of gallbladder, as this is patient's third documented episode of sepsis.

## 2019-04-19 NOTE — CONSULT NOTE ADULT - SUBJECTIVE AND OBJECTIVE BOX
64 yo male with h/o adrenal insufficiency, ESRD secondary to Polycystic kidney disease on HD via RUE on M/W/F, s/p bilateral nephrectomy, s/p failed transplant x 2, CAD, severe dilated cardiomyopathy EF 20%, paroxysmal atrial flutter and VT on Amiodarone and Warfarin chronically, CHF (HFrEF), s/p AICD, adrenal insufficiency, hypoparathyroidism, h/o cryptococcal meningitis, h/o c. difficile colitis, has had multiple episodes of Klebsiella septicemia due to Cholangitis (was deemed not a surgical candidate for cholecystectomy in the past), s/p recent ERCP w/ papillotomy and stone extraction at [place] 2 weeks ago, recent admission (discharged 4/14/2019) for management of sepsis due to cholangitis presents to the ED for further evaluation of recurrent severe LUQ abdominal pain associated w/ recurrent subjective fevers at home (Tmax 101'F). The patient denies any associated nausea, vomiting.  Abdominal U/S => Mild ascites. Transplant kidney intact. Gallbladder is markedly distended. Cholelithiasis. Hepatic cysts. In the ED the patient was given Acetaminophen 650mg po x 1, Hydrocortisone 100mg IVP x 1, and NS x 500mL x 1.  Asked to aid in management of AI.     Pt states was started on prednisone 5 mg daily 20+ years ago following a kidney transplant.  Prednisone was abruptly D/C'd after years of prednisone which led to an adrenal crisis.  He has since maintained on prednisone 5 mg daily for several; years.  He is well versed on sick day management and has received stress doses of GC's for procedures, hospitalizations etc.  Thus far this admission he has been maintained on hydrocortisone 50 mg q8h and feels remarkable better.  Ate a light meal yesterday which led to some nausea.  Currently denies abdominal pain, nausea, vomiting or dizziness.     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  S/P colonoscopy: last done 2016  AICD (automatic cardioverter/defibrillator) present: 2013  H/O hernia repair  A-V fistula: 1995 left UE  right arm 2007,  H/O kidney transplant: 1985, 1995, 1997    MEDICATIONS  (STANDING):  amiodarone    Tablet 100 milliGRAM(s) Oral daily  ceFAZolin   IVPB 2000 milliGRAM(s) IV Intermittent <User Schedule>  cholecalciferol 1000 Unit(s) Oral <User Schedule>  cinacalcet 30 milliGRAM(s) Oral <User Schedule>  epoetin amee Injectable 17362 Unit(s) IV Push <User Schedule>  hydrocortisone sodium succinate Injectable 50 milliGRAM(s) IV Push every 8 hours  losartan 25 milliGRAM(s) Oral daily  metoprolol tartrate 25 milliGRAM(s) Oral daily  pantoprazole    Tablet 40 milliGRAM(s) Oral two times a day  sevelamer carbonate 2400 milliGRAM(s) Oral three times a day with meals  ursodiol Capsule 300 milliGRAM(s) Oral two times a day  vancomycin    Solution 125 milliGRAM(s) Oral every 6 hours  MEDICATIONS  (PRN):  acetaminophen   Tablet .. 650 milliGRAM(s) Oral every 6 hours PRN Temp greater or equal to 38C (100.4F), Mild Pain (1 - 3)  ondansetron Injectable 4 milliGRAM(s) IV Push every 6 hours PRN Nausea  oxyCODONE    5 mG/acetaminophen 325 mG 1 Tablet(s) Oral every 6 hours PRN Moderate Pain (4 - 6)    Allergies: No Known Allergies  SOCIAL HISTORY: Denies ETOh,Smoking,   FAMILY HISTORY:  Family history of kidney disease (Mother)  Family history of colon cancer (Sibling)  ROS: per HPI, others negative     PE:   Vital Signs Last 24 Hrs  T(C): 36.3 (19 Apr 2019 04:30), Max: 37.2 (18 Apr 2019 17:13)  T(F): 97.4 (19 Apr 2019 04:30), Max: 99 (18 Apr 2019 17:13)  HR: 57 (19 Apr 2019 04:30) (57 - 82)  BP: 131/99 (19 Apr 2019 04:30) (86/54 - 131/99)  BP(mean): --  RR: 18 (19 Apr 2019 04:30) (17 - 18)  SpO2: 98% (19 Apr 2019 04:30) (97% - 100%)  pleasant.  NAD.  Looks well given clinical circumstances, No TM. S1+S2. CTAB. Soft. NT. mild LE edema B/L 62 yo male with h/o adrenal insufficiency, ESRD secondary to Polycystic kidney disease on HD via RUE on M/W/F, s/p bilateral nephrectomy, s/p failed transplant x 2, CAD, severe dilated cardiomyopathy EF 20%, paroxysmal atrial flutter and VT on Amiodarone and Warfarin chronically, CHF (HFrEF), s/p AICD, adrenal insufficiency, hypoparathyroidism, h/o cryptococcal meningitis, h/o c. difficile colitis, has had multiple episodes of Klebsiella septicemia due to Cholangitis (was deemed not a surgical candidate for cholecystectomy in the past), s/p recent ERCP w/ papillotomy and stone extraction at [place] 2 weeks ago, recent admission (discharged 4/14/2019) for management of sepsis due to cholangitis presents to the ED for further evaluation of recurrent severe LUQ abdominal pain associated w/ recurrent subjective fevers at home (Tmax 101'F). The patient denies any associated nausea, vomiting.  Abdominal U/S => Mild ascites. Transplant kidney intact. Gallbladder is markedly distended. Cholelithiasis. Hepatic cysts. In the ED the patient was given Acetaminophen 650mg po x 1, Hydrocortisone 100mg IVP x 1, and NS x 500mL x 1.  Asked to aid in management of AI.     Pt states was started on prednisone 5 mg daily 20+ years ago following a kidney transplant.  Prednisone was abruptly D/C'd after years of prednisone which led to an adrenal crisis.  He has since maintained on prednisone 5 mg daily for several years.  He is well versed on sick day management and has received stress doses of GC's for procedures, hospitalizations etc.  Thus far this admission he has been maintained on hydrocortisone 50 mg q8h and feels remarkable better.  Ate a light meal yesterday which led to some nausea.  Currently denies abdominal pain, nausea, vomiting or dizziness.     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  S/P colonoscopy: last done 2016  AICD (automatic cardioverter/defibrillator) present: 2013  H/O hernia repair  A-V fistula: 1995 left UE  right arm 2007,  H/O kidney transplant: 1985, 1995, 1997    MEDICATIONS  (STANDING):  amiodarone    Tablet 100 milliGRAM(s) Oral daily  ceFAZolin   IVPB 2000 milliGRAM(s) IV Intermittent <User Schedule>  cholecalciferol 1000 Unit(s) Oral <User Schedule>  cinacalcet 30 milliGRAM(s) Oral <User Schedule>  epoetin maee Injectable 27031 Unit(s) IV Push <User Schedule>  hydrocortisone sodium succinate Injectable 50 milliGRAM(s) IV Push every 8 hours  losartan 25 milliGRAM(s) Oral daily  metoprolol tartrate 25 milliGRAM(s) Oral daily  pantoprazole    Tablet 40 milliGRAM(s) Oral two times a day  sevelamer carbonate 2400 milliGRAM(s) Oral three times a day with meals  ursodiol Capsule 300 milliGRAM(s) Oral two times a day  vancomycin    Solution 125 milliGRAM(s) Oral every 6 hours  MEDICATIONS  (PRN):  acetaminophen   Tablet .. 650 milliGRAM(s) Oral every 6 hours PRN Temp greater or equal to 38C (100.4F), Mild Pain (1 - 3)  ondansetron Injectable 4 milliGRAM(s) IV Push every 6 hours PRN Nausea  oxyCODONE    5 mG/acetaminophen 325 mG 1 Tablet(s) Oral every 6 hours PRN Moderate Pain (4 - 6)    Allergies: No Known Allergies  SOCIAL HISTORY: Denies ETOh,Smoking,   FAMILY HISTORY:  Family history of kidney disease (Mother)  Family history of colon cancer (Sibling)  ROS: per HPI, others negative     PE:   Vital Signs Last 24 Hrs  T(C): 36.3 (19 Apr 2019 04:30), Max: 37.2 (18 Apr 2019 17:13)  T(F): 97.4 (19 Apr 2019 04:30), Max: 99 (18 Apr 2019 17:13)  HR: 57 (19 Apr 2019 04:30) (57 - 82)  BP: 131/99 (19 Apr 2019 04:30) (86/54 - 131/99)  BP(mean): --  RR: 18 (19 Apr 2019 04:30) (17 - 18)  SpO2: 98% (19 Apr 2019 04:30) (97% - 100%)  pleasant.  NAD.  Looks well given clinical circumstances, No TM. S1+S2. CTAB. Soft. NT. mild LE edema B/L

## 2019-04-19 NOTE — PROGRESS NOTE ADULT - SUBJECTIVE AND OBJECTIVE BOX
64 y/o M PMHx significant for ESRD secondary to Polycystic kidney disease on HD via RUE on M/W/F, s/p bilateral nephrectomy, s/p failed transplant x 2, CAD, severe dilated cardiomyopathy, paroxysmal atrial flutter and VT on Amiodarone and Warfarin chronically, CHF (HFrEF), s/p AICD, hypertension, adrenal insufficiency (on chronic prednisone), hypoparathyroidism, h/o cryptococcal meningitis, h/o c. difficile colitis, has had multiple episodes of Klebsiella septicemia due to Cholangitis, s/p recent ERCP w/ papillotomy and stone extraction at [place] 2 weeks ago, recent admission (discharged 4/14/2019) for management of sepsis due to cholangitis presents to the ED for further evaluation of recurrent severe LUQ abdominal pain associated w/ recurrent subjective fevers at home (Tmax 101'F).     Patient found to have E.Coli bacteremia and HIDA scan positive for acute cholecystitis.      4/18:  Pt seen.  Just got report of blood cultures positive for GNR.  Ate lunch earlier-- had some subjective fever/ 99 after.  No abd pain.  No diarrhea.    4/19:  Pt seen on HD.  Having some pain in abd RUQ radiating to right shoulder ever since HIDA this afternoon.  No other complaints.      Review of system- Rest of the review of system are negative except mentioned in HPI    OBJECTIVE:   Vital Signs Last 24 Hrs  T(C): 36.9 (19 Apr 2019 17:22), Max: 37 (18 Apr 2019 21:59)  T(F): 98.4 (19 Apr 2019 17:22), Max: 98.6 (18 Apr 2019 21:59)  HR: 62 (19 Apr 2019 17:22) (46 - 75)  BP: 95/53 (19 Apr 2019 17:22) (91/62 - 131/99)  BP(mean): --  RR: 17 (19 Apr 2019 17:22) (17 - 18)  SpO2: 98% (19 Apr 2019 17:22) (98% - 99%)    PHYSICAL EXAM:  GENERAL: NAD  NERVOUS SYSTEM:  Alert & Oriented X3, non- focal exam, Motor Strength 5/5 B/L upper and lower extremities; DTRs 2+ intact and symmetric  HEAD:  Atraumatic, Normocephalic  EYES: EOMI, PERRLA, conjunctiva and sclera clear  HEENT: Moist mucous membranes  NECK: Supple, No JVD  CHEST/LUNG: Clear to auscultation bilaterally; No rales, no rhonchi, no wheezing, or rubs  HEART: Regular rate and rhythm; No murmurs, rubs, or gallops  ABDOMEN: Soft, Nontender, Nondistended; Bowel sounds present  GENITOURINARY- Voiding, no suprapubic tenderness  EXTREMITIES:  2+ Peripheral Pulses, No clubbing, cyanosis, or edema  MUSCULOSKELETAL:- No muscle tenderness, Muscle tone normal, No joint tenderness, no Joint swelling, Joint range of motion-normal  SKIN-no rash, no lesion    LABS:  04-19    139  |  98  |  84<H>  ----------------------------<  227<H>  4.4   |  24  |  5.88<H>    Ca    7.8<L>      19 Apr 2019 13:15  Phos  5.3     04-19  Mg     2.1     04-18    TPro  x   /  Alb  2.5<L>  /  TBili  x   /  DBili  x   /  AST  x   /  ALT  x   /  AlkPhos  x   04-19               10.2   4.40  )-----------( 78       ( 19 Apr 2019 13:15 )             33.7     LIVER FUNCTIONS - ( 19 Apr 2019 13:15 )  Alb: 2.5 g/dL / Pro: x     / ALK PHOS: x     / ALT: x     / AST: x     / GGT: x             PT/INR - ( 19 Apr 2019 13:30 )   PT: 28.4 sec;   INR: 2.49 ratio    PTT - ( 19 Apr 2019 13:30 )  PTT:34.2 sec    MEDICATIONS  (STANDING):  amiodarone    Tablet 100 milliGRAM(s) Oral daily  cholecalciferol 1000 Unit(s) Oral <User Schedule>  cinacalcet 30 milliGRAM(s) Oral <User Schedule>  epoetin amee Injectable 50062 Unit(s) IV Push <User Schedule>  hydrocortisone sodium succinate Injectable 50 milliGRAM(s) IV Push every 8 hours  losartan 25 milliGRAM(s) Oral daily  metoprolol tartrate 25 milliGRAM(s) Oral daily  pantoprazole    Tablet 40 milliGRAM(s) Oral two times a day  sevelamer carbonate 2400 milliGRAM(s) Oral three times a day with meals  ursodiol Capsule 300 milliGRAM(s) Oral two times a day  vancomycin    Solution 125 milliGRAM(s) Oral every 6 hours    MEDICATIONS  (PRN):  acetaminophen   Tablet .. 650 milliGRAM(s) Oral every 6 hours PRN Temp greater or equal to 38C (100.4F), Mild Pain (1 - 3)  ondansetron Injectable 4 milliGRAM(s) IV Push every 6 hours PRN Nausea  oxyCODONE    5 mG/acetaminophen 325 mG 1 Tablet(s) Oral every 6 hours PRN Moderate Pain (4 - 6)    < from: [facility]at[facility] (04.19.19 @ 12:00) >    IMPRESSION: Abnormal hepatobiliary scan.    Nonvisualization of the gallbladder, new finding since the previous study   dated 3/19/2019.  In the proper clinical setting, this finding is   consistent with acute cholecystitis.     Heterogeneous tracer distribution in the liver likely related to   polycystic disease.      Dr. Quintero was notified of these results by Dr. Guy via telephone on   4/19/2019 at approximately 12:55 PM, with read back.    < end of copied text >

## 2019-04-19 NOTE — PROGRESS NOTE ADULT - SUBJECTIVE AND OBJECTIVE BOX
Patient is a 63y old  Male who presents with a chief complaint of Abdominal pain and fevers (19 Apr 2019 07:07)      HPI:  64 y/o M PMHx significant for ESRD secondary to Polycystic kidney disease on HD via RUE on M/W/F, s/p bilateral nephrectomy, s/p failed transplant x 2, CAD, severe dilated cardiomyopathy, paroxysmal atrial flutter and VT on Amiodarone and Warfarin chronically, CHF (HFrEF), s/p AICD, hypertension, adrenal insufficiency (on chronic prednisone), hypoparathyroidism, h/o cryptococcal meningitis, h/o c. difficile colitis, has had multiple episodes of Klebsiella septicemia due to Cholangitis (was deemed not a surgical candidate for cholecystectomy in the past), s/p recent ERCP w/ papillotomy and stone extraction at [place] 2 weeks ago, recent admission (discharged 4/14/2019) for management of sepsis due to cholangitis presents to the ED for further evaluation of recurrent severe LUQ abdominal pain associated w/ recurrent subjective fevers at home (Tmax 101'F). The patient denies any associated nausea, vomiting. Labs => Hgb/Hct 9.8/32.6, PLT 78, LA 1.8, HCO3 36, BUN/Cr 46/3.96, , Alb 2.5. Abdominal U/S => Mild ascites. Transplant kidney intact. Gallbladder is markedly distended. Cholelithiasis. Hepatic cysts. In the ED the patient was given Acetaminophen 650mg po x 1, Hydrocortisone 100mg IVP x 1, and NS x 500mL x 1. (17 Apr 2019 23:01)    Patient currently feels well. No complaints of pain. Low grade fever overnight. BC now growing GNR from admission.      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  S/P colonoscopy: last done 2016  AICD (automatic cardioverter/defibrillator) present: 2013  H/O hernia repair  A-V fistula: 1995 left UE  right arm 2007,  H/O kidney transplant: 1985, 1995, 1997      MEDICATIONS  (STANDING):  amiodarone    Tablet 100 milliGRAM(s) Oral daily  ceFAZolin   IVPB 2000 milliGRAM(s) IV Intermittent <User Schedule>  cholecalciferol 1000 Unit(s) Oral <User Schedule>  cinacalcet 30 milliGRAM(s) Oral <User Schedule>  epoetin amee Injectable 33688 Unit(s) IV Push <User Schedule>  hydrocortisone sodium succinate Injectable 50 milliGRAM(s) IV Push every 8 hours  losartan 25 milliGRAM(s) Oral daily  metoprolol tartrate 25 milliGRAM(s) Oral daily  pantoprazole    Tablet 40 milliGRAM(s) Oral two times a day  sevelamer carbonate 2400 milliGRAM(s) Oral three times a day with meals  ursodiol Capsule 300 milliGRAM(s) Oral two times a day  vancomycin    Solution 125 milliGRAM(s) Oral every 6 hours    MEDICATIONS  (PRN):  acetaminophen   Tablet .. 650 milliGRAM(s) Oral every 6 hours PRN Temp greater or equal to 38C (100.4F), Mild Pain (1 - 3)  ondansetron Injectable 4 milliGRAM(s) IV Push every 6 hours PRN Nausea  oxyCODONE    5 mG/acetaminophen 325 mG 1 Tablet(s) Oral every 6 hours PRN Moderate Pain (4 - 6)      Allergies    No Known Allergies    Intolerances        Vital Signs Last 24 Hrs  T(C): 36.3 (19 Apr 2019 04:30), Max: 37.2 (18 Apr 2019 17:13)  T(F): 97.4 (19 Apr 2019 04:30), Max: 99 (18 Apr 2019 17:13)  HR: 57 (19 Apr 2019 04:30) (57 - 82)  BP: 131/99 (19 Apr 2019 04:30) (86/54 - 131/99)  BP(mean): --  RR: 18 (19 Apr 2019 04:30) (17 - 18)  SpO2: 98% (19 Apr 2019 04:30) (97% - 100%)    PHYSICAL EXAM:    Constitutional: NAD, well-developed  Respiratory: CTAB  Cardiovascular: S1 and S2, RRR, no M/G/R  Gastrointestinal: BS+, soft, NT/ND    LABS:                        9.7    7.79  )-----------( 78       ( 18 Apr 2019 06:09 )             32.1     04-18    140  |  99  |  57<H>  ----------------------------<  157<H>  4.6   |  30  |  4.35<H>    Ca    8.2<L>      18 Apr 2019 06:09  Mg     2.1     04-18    TPro  5.7<L>  /  Alb  2.4<L>  /  TBili  0.8  /  DBili  x   /  AST  13<L>  /  ALT  <6<L>  /  AlkPhos  72  04-18    PT/INR - ( 18 Apr 2019 06:09 )   PT: 38.6 sec;   INR: 3.35 ratio         PTT - ( 18 Apr 2019 06:09 )  PTT:37.6 sec  LIVER FUNCTIONS - ( 18 Apr 2019 06:09 )  Alb: 2.4 g/dL / Pro: 5.7 gm/dL / ALK PHOS: 72 U/L / ALT: <6 U/L / AST: 13 U/L / GGT: x             RADIOLOGY & ADDITIONAL STUDIES:

## 2019-04-20 NOTE — PROGRESS NOTE ADULT - SUBJECTIVE AND OBJECTIVE BOX
Patient is a 63y old  Male who presents with a chief complaint of Abdominal pain and fevers (20 Apr 2019 08:15)      HPI:  62 y/o M PMHx significant for ESRD secondary to Polycystic kidney disease on HD via RUE on M/W/F, s/p bilateral nephrectomy, s/p failed transplant x 2, CAD, severe dilated cardiomyopathy, paroxysmal atrial flutter and VT on Amiodarone and Warfarin chronically, CHF (HFrEF), s/p AICD, hypertension, adrenal insufficiency (on chronic prednisone), hypoparathyroidism, h/o cryptococcal meningitis, h/o c. difficile colitis, has had multiple episodes of Klebsiella septicemia due to Cholangitis (was deemed not a surgical candidate for cholecystectomy in the past), s/p recent ERCP w/ papillotomy and stone extraction at [place] 2 weeks ago, recent admission (discharged 4/14/2019) for management of sepsis due to cholangitis presents to the ED for further evaluation of recurrent severe LUQ abdominal pain associated w/ recurrent subjective fevers at home (Tmax 101'F). The patient denies any associated nausea, vomiting. Labs => Hgb/Hct 9.8/32.6, PLT 78, LA 1.8, HCO3 36, BUN/Cr 46/3.96, , Alb 2.5. Abdominal U/S => Mild ascites. Transplant kidney intact. Gallbladder is markedly distended. Cholelithiasis. Hepatic cysts. In the ED the patient was given Acetaminophen 650mg po x 1, Hydrocortisone 100mg IVP x 1, and NS x 500mL x 1. (17 Apr 2019 23:01)      HIDA scan now positive with BC positive. Patient currently feels well.       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  S/P colonoscopy: last done 2016  AICD (automatic cardioverter/defibrillator) present: 2013  H/O hernia repair  A-V fistula: 1995 left UE  right arm 2007,  H/O kidney transplant: 1985, 1995, 1997      MEDICATIONS  (STANDING):  amiodarone    Tablet 100 milliGRAM(s) Oral daily  cholecalciferol 1000 Unit(s) Oral <User Schedule>  cinacalcet 30 milliGRAM(s) Oral <User Schedule>  epoetin amee Injectable 33419 Unit(s) IV Push <User Schedule>  hydrocortisone sodium succinate Injectable 50 milliGRAM(s) IV Push every 8 hours  losartan 25 milliGRAM(s) Oral daily  metoprolol tartrate 25 milliGRAM(s) Oral daily  pantoprazole    Tablet 40 milliGRAM(s) Oral two times a day  sevelamer carbonate 2400 milliGRAM(s) Oral three times a day with meals  ursodiol Capsule 300 milliGRAM(s) Oral two times a day  vancomycin    Solution 125 milliGRAM(s) Oral every 6 hours    MEDICATIONS  (PRN):  acetaminophen   Tablet .. 650 milliGRAM(s) Oral every 6 hours PRN Temp greater or equal to 38C (100.4F), Mild Pain (1 - 3)  ondansetron Injectable 4 milliGRAM(s) IV Push every 6 hours PRN Nausea  oxyCODONE    5 mG/acetaminophen 325 mG 1 Tablet(s) Oral every 6 hours PRN Moderate Pain (4 - 6)      Allergies    No Known Allergies    Intolerances          Vital Signs Last 24 Hrs  T(C): 36.5 (20 Apr 2019 05:49), Max: 36.9 (19 Apr 2019 17:22)  T(F): 97.7 (20 Apr 2019 05:49), Max: 98.4 (19 Apr 2019 17:22)  HR: 65 (20 Apr 2019 05:49) (46 - 67)  BP: 102/73 (20 Apr 2019 05:49) (94/59 - 113/65)  BP(mean): --  RR: 18 (20 Apr 2019 05:49) (17 - 18)  SpO2: 96% (20 Apr 2019 05:49) (96% - 98%)    Respiratory: CTAB  Cardiovascular: S1 and S2, RRR, no M/G/R  Gastrointestinal: BS+, soft, NT/ND    LABS:                        10.2   4.40  )-----------( 78       ( 19 Apr 2019 13:15 )             33.7     04-19    139  |  98  |  84<H>  ----------------------------<  227<H>  4.4   |  24  |  5.88<H>    Ca    7.8<L>      19 Apr 2019 13:15  Phos  5.3     04-19    TPro  x   /  Alb  2.5<L>  /  TBili  x   /  DBili  x   /  AST  x   /  ALT  x   /  AlkPhos  x   04-19    PT/INR - ( 20 Apr 2019 05:30 )   PT: 27.3 sec;   INR: 2.39 ratio         PTT - ( 20 Apr 2019 05:30 )  PTT:32.7 sec  LIVER FUNCTIONS - ( 19 Apr 2019 13:15 )  Alb: 2.5 g/dL / Pro: x     / ALK PHOS: x     / ALT: x     / AST: x     / GGT: x             RADIOLOGY & ADDITIONAL STUDIES:

## 2019-04-20 NOTE — PROGRESS NOTE ADULT - SUBJECTIVE AND OBJECTIVE BOX
Date of service: 04-20-19 @ 11:33    Pt seen and examined  Feels well, no complaints  Reported with bacteremia  Afebrile    ROS: denies dizziness, no HA, no SOB or cough, no abdominal pain, no diarrhea or constipation; no dysuria, no legs pain, no rashes      MEDICATIONS  (STANDING):  amiodarone    Tablet 100 milliGRAM(s) Oral daily  cholecalciferol 1000 Unit(s) Oral <User Schedule>  cinacalcet 30 milliGRAM(s) Oral <User Schedule>  epoetin amee Injectable 60099 Unit(s) IV Push User Schedule  hydrocortisone sodium succinate Injectable 50 milliGRAM(s) IV Push every 8 hours  losartan 25 milliGRAM(s) Oral daily  meropenem  IVPB      meropenem  IVPB 500 milliGRAM(s) IV Intermittent every 24 hours  metoprolol tartrate 25 milliGRAM(s) Oral daily  pantoprazole    Tablet 40 milliGRAM(s) Oral two times a day  sevelamer carbonate 2400 milliGRAM(s) Oral three times a day with meals  ursodiol Capsule 300 milliGRAM(s) Oral two times a day  vancomycin    Solution 125 milliGRAM(s) Oral every 6 hours      Vital Signs Last 24 Hrs  T(C): 36.8 (21 Apr 2019 04:57), Max: 36.9 (20 Apr 2019 11:15)  T(F): 98.2 (21 Apr 2019 04:57), Max: 98.4 (20 Apr 2019 11:15)  HR: 70 (21 Apr 2019 04:57) (65 - 70)  BP: 136/90 (21 Apr 2019 04:57) (121/70 - 136/90)  BP(mean): --  RR: 18 (21 Apr 2019 04:57) (18 - 18)  SpO2: 99% (21 Apr 2019 04:57) (98% - 100%)      Physical Exam:  Constitutional: frail looking  HEENT: NC/AT, EOMI, PERRLA, conjunctivae clear  Neck: supple; thyroid not palpable  Back: no tenderness  Respiratory: respiratory effort normal; clear to auscultation  Cardiovascular: S1S2 regular, no murmurs  Abdomen: soft, has RUQ tender, not distended, positive BS  Genitourinary: no suprapubic tenderness  Lymphatic: no LN palpable  Musculoskeletal: no muscle tenderness, no joint swelling or tenderness  Extremities: no pedal edema  Neurological/ Psychiatric: AxOx3, moving all extremities  Skin: no rashes; no palpable lesions    Labs: reviewed                                    10.2   4.40  )-----------( 78       ( 19 Apr 2019 13:15 )             33.7     04-19    139  |  98  |  84<H>  ----------------------------<  227<H>  4.4   |  24  |  5.88<H>    Ca    7.8<L>      19 Apr 2019 13:15  Phos  5.3     04-19    TPro  x   /  Alb  2.5<L>  /  TBili  x   /  DBili  x   /  AST  x   /  ALT  x   /  AlkPhos  x   04-19         Culture - Blood (04.17.19 @ 19:46)    -  Escherichia coli: Detec    -  Tobramycin: S <=2    -  Piperacillin/Tazobactam: R <=8    -  Trimethoprim/Sulfamethoxazole: S <=0.5/9.5    -  Meropenem: S <=1    -  Levofloxacin: S <=1    -  Imipenem: S <=1    -  Gentamicin: S 2    -  Ertapenem: S <=0.5    -  Ciprofloxacin: S 1    -  Ceftriaxone: R >32 Enterobacter, Citrobacter, and Serratia may develop resistance during prolonged therapy    -  Cefoxitin: I 16    -  Cefepime: R >16    -  Cefazolin: R >16    -  Aztreonam: R >16    -  Ampicillin: R >16 These ampicillin results predict results for amoxicillin    -  Ampicillin/Sulbactam: R >16/8    -  Amikacin: S <=8    Gram Stain:   Growth in aerobic bottle: Gram Negative Rods    Specimen Source: .Blood None    Organism: Blood Culture PCR    Organism: Escherichia coli ESBL    Culture Results:   Growth in aerobic bottle: Escherichia coli ESBL  "Due to technical problems, Proteus sp. will Not be reported as part of  the BCID panel until further notice"  ***Blood Panel PCR results on this specimen are available  approximately 3 hours after the Gram stain result.***  Gram stain, PCR, and/or culture results may not always  correspond due to difference in methodologies.  ************************************************************  This PCR assay was performed using [employer].  The following targets are tested for: Enterococcus,  vancomycin resistant enterococci, Listeria monocytogenes,  coagulase negative staphylococci, S. aureus,  methicillin resistant S. aureus, Streptococcus agalactiae  (Group B), S. pneumoniae, S. pyogenes (Group A),  Acinetobacter baumannii, Enterobacter cloacae, E. coli,  Klebsiella oxytoca, K. pneumoniae, Proteus sp.,  Serratia marcescens, Haemophilus influenzae,  Neisseria meningitidis, Pseudomonas aeruginosa, Candida  albicans, C. glabrata, C krusei, C parapsilosis,  C. tropicalis and the KPC resistance gene.    Organism Identification: Blood Culture PCR  Escherichia coli ESBL    Method Type: PCR    Method Type: NAEL        Culture - Blood (collected 10 Apr 2019 16:29)  Source: .Blood None  Final Report (15 Apr 2019 23:00):    No growth at 5 days.    Culture - Blood (collected 10 Apr 2019 16:28)  Source: .Blood None  Final Report (15 Apr 2019 23:00):    No growth at 5 days.        Radiology: all available radiological tests reviewed    Advanced directives addressed: full resuscitation

## 2019-04-20 NOTE — CONSULT NOTE ADULT - SUBJECTIVE AND OBJECTIVE BOX
Patient is a 63y old  Male who presents with a chief complaint of Abdominal pain and fevers (19 Apr 2019 18:41)      HPI:  62 y/o M PMHx significant for ESRD secondary to Polycystic kidney disease on HD via RUE on M/W/F, s/p bilateral nephrectomy, s/p failed transplant x 2, CAD, severe dilated cardiomyopathy, paroxysmal atrial flutter and VT on Amiodarone and Warfarin chronically, CHF (HFrEF), s/p AICD, hypertension, adrenal insufficiency (on chronic prednisone), hypoparathyroidism, h/o cryptococcal meningitis, h/o c. difficile colitis, has had multiple episodes of Klebsiella septicemia due to Cholangitis (was deemed not a surgical candidate for cholecystectomy in the past), s/p recent ERCP w/ papillotomy and stone extraction at [place] 2 weeks ago, recent admission (discharged 4/14/2019) for management of sepsis due to cholangitis presents to the ED for further evaluation of recurrent severe LUQ abdominal pain associated w/ recurrent subjective fevers at home (Tmax 101'F). The patient denies any associated nausea, vomiting. Labs => Hgb/Hct 9.8/32.6, PLT 78, LA 1.8, HCO3 36, BUN/Cr 46/3.96, , Alb 2.5. Abdominal U/S => Mild ascites. Transplant kidney intact. Gallbladder is markedly distended. Cholelithiasis. Hepatic cysts. In the ED the patient was given Acetaminophen 650mg po x 1, Hydrocortisone 100mg IVP x 1, and NS x 500mL x 1. (17 Apr 2019 23:01)  4/20 Cardiology: 63 year old male with pckd,esrd, dcm wih ef 25% VT, cad admitted with recurrent sepsis due to cholangities. from cardiac view patient has been feeling  well with out chest pain dyspnea or palpitations. no pnd orthopnea or le edema. patient also has afib/flutter. patients abdominal pain is improved    PAST MEDICAL & SURGICAL HISTORY:  Oliguria  Arm swelling: left arm  AV fistula: right UE  Leg pain, anterior, right: right anterior thigh at graft donor site  Irregular heart beat  AICD (automatic cardioverter/defibrillator) present  Dialysis patient  Adrenal insufficiency  Hypoparathyroidism  HFrEF (heart failure with reduced ejection fraction)  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  S/P colonoscopy: last done 2016  AICD (automatic cardioverter/defibrillator) present: 2013  H/O hernia repair  A-V fistula: 1995 left UE  right arm 2007,  H/O kidney transplant: 1985, 1995, 1997      PREVIOUS DIAGNOSTIC TESTING:      ECHO  FINDINGS:    STRESS  FINDINGS:    CATHETERIZATION  FINDINGS:    MEDICATIONS  (STANDING):  amiodarone    Tablet 100 milliGRAM(s) Oral daily  cholecalciferol 1000 Unit(s) Oral <User Schedule>  cinacalcet 30 milliGRAM(s) Oral <User Schedule>  epoetin amee Injectable 65714 Unit(s) IV Push <User Schedule>  hydrocortisone sodium succinate Injectable 50 milliGRAM(s) IV Push every 8 hours  losartan 25 milliGRAM(s) Oral daily  metoprolol tartrate 25 milliGRAM(s) Oral daily  pantoprazole    Tablet 40 milliGRAM(s) Oral two times a day  sevelamer carbonate 2400 milliGRAM(s) Oral three times a day with meals  ursodiol Capsule 300 milliGRAM(s) Oral two times a day  vancomycin    Solution 125 milliGRAM(s) Oral every 6 hours    MEDICATIONS  (PRN):  acetaminophen   Tablet .. 650 milliGRAM(s) Oral every 6 hours PRN Temp greater or equal to 38C (100.4F), Mild Pain (1 - 3)  ondansetron Injectable 4 milliGRAM(s) IV Push every 6 hours PRN Nausea  oxyCODONE    5 mG/acetaminophen 325 mG 1 Tablet(s) Oral every 6 hours PRN Moderate Pain (4 - 6)      FAMILY HISTORY:  Family history of kidney disease (Mother)  Family history of colon cancer (Sibling)      SOCIAL HISTORY:  ***    REVIEW OF SYSTEM:  Pertinent items are noted in HPI.  Constitutional  Eyes:    Ears, nose, mouth,   throat, and face:    Neck:   Respiratory:   and wheezing  Cardiovascular:    Gastrointestinal:  Genitourinary:     Hematologic/lymphatic:   Musculoskeletal:   Neurological:   Behavioral/Psych:        Vital Signs Last 24 Hrs  T(C): 36.5 (20 Apr 2019 05:49), Max: 36.9 (19 Apr 2019 17:22)  T(F): 97.7 (20 Apr 2019 05:49), Max: 98.4 (19 Apr 2019 17:22)  HR: 65 (20 Apr 2019 05:49) (46 - 67)  BP: 102/73 (20 Apr 2019 05:49) (94/59 - 113/65)  BP(mean): --  RR: 18 (20 Apr 2019 05:49) (17 - 18)  SpO2: 96% (20 Apr 2019 05:49) (96% - 98%)    I&O's Summary    PHYSICAL EXAM  General Appearance: cooperative, no acute distress,   HEENT: PERRL, conjunctiva clear    Neck: Supple, , no adenopathy, thyroid: not enlarged, no carotid bruit or JVD  Back: Symmetric, no  tenderness,no soft tissue tenderness  Lungs: Clear to auscultation bilaterally,no adventitious breath sounds, normal   expiratory phase  Heart: Regular rate and rhythm, S1, S2 normal,   Abdomen: Soft, non-tender, bowel sounds active ,   Extremities: 1+ edema bilat  Skin: Skin color, texture normal, no rashes   Neurologic: Alert and oriented X3 , cranial nerves intact, sensory and motor normal,        INTERPRETATION OF TELEMETRY:    ECG:        LABS:                          10.2   4.40  )-----------( 78       ( 19 Apr 2019 13:15 )             33.7     04-19    139  |  98  |  84<H>  ----------------------------<  227<H>  4.4   |  24  |  5.88<H>    Ca    7.8<L>      19 Apr 2019 13:15  Phos  5.3     04-19    TPro  x   /  Alb  2.5<L>  /  TBili  x   /  DBili  x   /  AST  x   /  ALT  x   /  AlkPhos  x   04-19            PT/INR - ( 20 Apr 2019 05:30 )   PT: 27.3 sec;   INR: 2.39 ratio         PTT - ( 20 Apr 2019 05:30 )  PTT:32.7 sec          RADIOLOGY & ADDITIONAL STUDIES:    IMPRESSION:    PLAN:

## 2019-04-20 NOTE — PROGRESS NOTE ADULT - ASSESSMENT
62 yo male with PMH of CAD, ESRD on HD, polycystic kidney disease, systolic CHF s/p AICD, HTN, adrenal insufficiency (on chronic prednisone), hypoparathyroidism, h/o cryptococcal meningitis, h/o c.diff infection, prior sepsis with Klebsiella pneumoniae, pancreatic duct stone, chronic pancreatitis cardiomyopathy on coumadin, adrenal insufficiency was admitted on 4/10 for abdominal pain and fever. He has had multiple episodes of Klebsiella septicemia due to cholangitis (was deemed not a surgical candidate for cholecystectomy), s/p recent ERCP w/ papillotomy and stone extraction at [place] 2-3 weeks PTA, recent admission (discharged 4/14/2019) for probable sepsis and cholangitis (placed on cefazolin 2gm IV qHD) was admitted on 4/17 for recurrent severe LUQ abdominal pain associated w/ recurrent fever at home (Tmax 101'F). In the ED the patient was given Acetaminophen 650mg po x 1, Hydrocortisone 100mg IVP x 1, and NS x 500mL x 1.     1. Febrile syndrome and abdominal pain improving. Sepsis with ESBL E.coli. Probable acute cholangitis/cholecystitis s/p recent ERCP/CBD stones removal. Gallstones. Prior CDAD. ESRD on HD.  - hypotension resolved s/p steroids  - blood cx growing ESBL Ecoli  - start meropenem [id number] (after hd on dialysis days)  - s/p cefazolin 2 gm IV qHD   - on vancomycin 125 mg PO q6h   - given gentamicin 150 mg IV x 1 4/19  - reason for abx use and side effects reviewed with patient; monitor BMP   - US imaging suggestive shows distended GB  - GI/surgical eval noted, plan for probable per cholecystostomy drainage  - monitor temps  - f/u CBC  -supportive care    2. Other issues; CAD, ESRD on HD, polycystic kidney disease, systolic CHF s/p AICD, HTN, adrenal insufficiency (on chronic prednisone), hypoparathyroidism  - per medicine

## 2019-04-20 NOTE — PROGRESS NOTE ADULT - ASSESSMENT
64 yo male with multiple medical issues, now with bacteremia and cholecystitis. Patient needs either cholcystostomy tube or surgery. Awaiting Surgical/Cardiology evaluation.

## 2019-04-20 NOTE — PROGRESS NOTE ADULT - SUBJECTIVE AND OBJECTIVE BOX
62 y/o M PMHx significant for ESRD secondary to Polycystic kidney disease on HD via RUE on M/W/F, s/p bilateral nephrectomy, s/p failed transplant x 2, CAD, severe dilated cardiomyopathy EF 20%, paroxysmal atrial flutter and VT on Amiodarone and Warfarin chronically, CHF (HFrEF), s/p AICD, adrenal insufficiency (on chronic prednisone x years) - never had formal workup., hypoparathyroidism, h/o cryptococcal meningitis, h/o c. difficile colitis, has had multiple episodes of Klebsiella septicemia due to Cholangitis (was deemed not a surgical candidate for cholecystectomy in the past), s/p recent ERCP w/ papillotomy and stone extraction at [facility] 2 weeks ago, recent admission (discharged 4/14/2019) for management of sepsis due to cholangitis presents to the ED for further evaluation of recurrent severe LUQ abdominal pain associated w/ recurrent subjective fevers at home (Tmax 101'F). The patient denies any associated nausea, vomiting.  Abdominal U/S => Mild ascites. Transplant kidney intact. Gallbladder is markedly distended. Cholelithiasis. Hepatic cysts. In the ED the patient was given Acetaminophen 650mg po x 1, Hydrocortisone 100mg IVP x 1, and NS x 500mL x 1.  Renal eval called for ESRD and continuing HD mgt     today   pt feelnig better - states sx resolved after hydrocortisone yest  pt states he feels like he was on rapid taper on recent admit and is on chronic pred 5mg daily   no sob or cp or abd pains now    4/20  HIDA +, with + B/C  on abx  surgery input noted, cardiology eval pending  recc cholecystostomy  d/w Pt and family    PAST MEDICAL & SURGICAL HISTORY:  Oliguria  Arm swelling: left arm  AV fistula: right UE  Leg pain, anterior, right: right anterior thigh at graft donor site  Irregular heart beat  AICD (automatic cardioverter/defibrillator) present  Dialysis patient  Adrenal insufficiency  Hypoparathyroidism  HFrEF (heart failure with reduced ejection fraction)  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  S/P colonoscopy: last done 2016  AICD (automatic cardioverter/defibrillator) present: 2013  H/O hernia repair  A-V fistula: 1995 left UE  right arm 2007,  H/O kidney transplant: 1985, 1995, 1997      MEDICATIONS  (STANDING):  amiodarone    Tablet 100 milliGRAM(s) Oral daily  cholecalciferol 1000 Unit(s) Oral <User Schedule>  cinacalcet 30 milliGRAM(s) Oral <User Schedule>  epoetin amee Injectable 83432 Unit(s) IV Push <User Schedule>  hydrocortisone sodium succinate Injectable 50 milliGRAM(s) IV Push every 8 hours  losartan 25 milliGRAM(s) Oral daily  meropenem  IVPB      metoprolol tartrate 25 milliGRAM(s) Oral daily  pantoprazole    Tablet 40 milliGRAM(s) Oral two times a day  sevelamer carbonate 2400 milliGRAM(s) Oral three times a day with meals  ursodiol Capsule 300 milliGRAM(s) Oral two times a day  vancomycin    Solution 125 milliGRAM(s) Oral every 6 hours    MEDICATIONS  (PRN):  acetaminophen   Tablet .. 650 milliGRAM(s) Oral every 6 hours PRN Temp greater or equal to 38C (100.4F), Mild Pain (1 - 3)  ondansetron Injectable 4 milliGRAM(s) IV Push every 6 hours PRN Nausea  oxyCODONE    5 mG/acetaminophen 325 mG 1 Tablet(s) Oral every 6 hours PRN Moderate Pain (4 - 6)      Allergies    No Known Allergies    Intolerances    SOCIAL HISTORY:  Denies ETOh,Smoking,     FAMILY HISTORY:  Family history of kidney disease (Mother)  Family history of colon cancer (Sibling)      REVIEW OF SYSTEMS:    CONSTITUTIONAL: No weakness, fevers or chills  EYES/ENT: No visual changes;  No vertigo or throat pain   NECK: No pain or stiffness  RESPIRATORY: No cough, wheezing, hemoptysis; No shortness of breath  CARDIOVASCULAR: No chest pain or palpitations  GASTROINTESTINAL: No abdominal or epigastric pain. No nausea, vomiting, or hematemesis; No diarrhea or constipation. No melena or hematochezia.  GENITOURINARY: No dysuria, frequency or hematuria  NEUROLOGICAL: No numbness or weakness  SKIN: No itching, burning, rashes, or lesions   All other review of systems is negative unless indicated above.    Vital Signs Last 24 Hrs  T(C): 36.9 (20 Apr 2019 11:15), Max: 36.9 (19 Apr 2019 17:22)  T(F): 98.4 (20 Apr 2019 11:15), Max: 98.4 (19 Apr 2019 17:22)  HR: 65 (20 Apr 2019 11:15) (46 - 65)  BP: 121/70 (20 Apr 2019 11:15) (94/59 - 121/70)  BP(mean): --  RR: 18 (20 Apr 2019 11:15) (17 - 18)  SpO2: 98% (20 Apr 2019 11:15) (96% - 98%)  I and O's:      PHYSICAL EXAM:    Constitutional: NAD  HEENT: PERRLA, EOMI,  MMM  Neck: No LAD, No JVD  Respiratory: CTAB  Cardiovascular: S1 and S2  Gastrointestinal: BS+, soft, NT/ND  Extremities: No peripheral edema  Neurological: A/O x 3, no focal deficits  Psychiatric: Normal mood, normal affect  : No Amaya  Skin: No rashes  Access: Not applicable    LABS:             04-19    139  |  98  |  84<H>  ----------------------------<  227<H>  4.4   |  24  |  5.88<H>    Ca    7.8<L>      19 Apr 2019 13:15  Phos  5.3     04-19    TPro  x   /  Alb  2.5<L>  /  TBili  x   /  DBili  x   /  AST  x   /  ALT  x   /  AlkPhos  x   04-19                            10.2   4.40  )-----------( 78       ( 19 Apr 2019 13:15 )             33.7

## 2019-04-20 NOTE — PROGRESS NOTE ADULT - ASSESSMENT
62 yo male w extensive pmhx as above DCM, s.p renal transplant failure x 2,. hx of PCKD, and now ESRD on HD MWF  presenting multiple recent admission for Klebsiella sepsis, cholangitis, and now recurring abd pains w neg blood cultures recent admission and presenting within a week for same sx despite being in IV abx - ancef at HD     ESRD on HD  - continue HD MWF schedule   - SACHIN at HD     HTN/DCM  - on losartan and metoprolol - monitor BP and adjust if BP drops    Adrenal Inuff-  w fevers and abd pains - ? addisonian event vs GI  related ??   - await endocrine evaluation for further workup/input and and ideal steroid dosing    - IV steroids per Medicine/Endocrine   - concerning about distended GB on CT - agree with Gen Surgery - HIDA scan and further intervention based on this    - await blood cultures ( was on IV abx past week)     d/w Della Srivastava and Simona    Thank you for the courtesy of this consult. We will follow this patient with you.   Management is subject to change if new information becomes available or patient condition changes.    4/20  HIDA +, with + B/C  on abx  surgery input noted, cardiology eval pending  recc cholecystostomy  d/w Pt and family

## 2019-04-20 NOTE — CONSULT NOTE ADULT - REASON FOR ADMISSION
Abdominal pain and fevers

## 2019-04-20 NOTE — PROGRESS NOTE ADULT - SUBJECTIVE AND OBJECTIVE BOX
64 y/o M PMHx significant for ESRD secondary to Polycystic kidney disease on HD via RUE on M/W/F, s/p bilateral nephrectomy, s/p failed transplant x 2, CAD, severe dilated cardiomyopathy, paroxysmal atrial flutter and VT on Amiodarone and Warfarin chronically, CHF (HFrEF), s/p AICD, hypertension, adrenal insufficiency (on chronic prednisone), hypoparathyroidism, h/o cryptococcal meningitis, h/o c. difficile colitis, has had multiple episodes of Klebsiella septicemia due to Cholangitis, s/p recent ERCP w/ papillotomy and stone extraction at [place] 2 weeks ago, recent admission (discharged 4/14/2019) for management of sepsis due to cholangitis presents to the ED for further evaluation of recurrent severe LUQ abdominal pain associated w/ recurrent subjective fevers at home (Tmax 101'F).           1.  Sepsis/ Cholangitis/ Cholecystitis/ E.coli bacteremia:    Blood cx with E.coli.    Repeat cultures in am.    ID f/u.    S/p Gentamicin, Cefepime, Ancef 4/18.    F/u to deteremine course of ABX.    Hx of recurrent klebsiella sepsis in the past.    HIDA positive.  Acute cholecystitis.    Initial discussion for IR placement of perc chol drain; however, will consult cardiology to weigh risks/ benefits with cholecystectomy as patient will likely need lifelong drain if gallbladder is not removed.    GI / surgical f/u.      2.  Abdominal Pain:    As above.  Related to cholangitis/ cholecystitis.    PRN pain meds.      3.  PCKD s/p nephrectomy/ failed transplant x2 on HD:    Cont HD as scheduled.      4.  Hx of Dilated Cardiomyopathy/ AFIB/ VT:    Cont amio, toprol, losartan.    ECHO with EF 20-25%.    Cont to hold coumadin for IR versus OR next week.    INR 2-3.      5.  Hx of recurrent c. difficile colitis  ~cont. suppressive therapy w/ Vancomycin PO    6.  Adrenal Insufficiency:    ~given stress dose Hydrocortisone in the ED  ~cont. Hydrocortisone for now (50 q8).    Endo f/u. CHIEF COMPLAINT/Diagnosis: sepsis/ cholycystitis/ ecoli bacteremia    SUBJECTIVE: no complaints    REVIEW OF SYSTEMS:    CONSTITUTIONAL: No weakness, fevers or chills  EYES/ENT: No visual changes;  No vertigo or throat pain   NECK: No pain or stiffness  RESPIRATORY: No cough, wheezing, hemoptysis; No shortness of breath  CARDIOVASCULAR: No chest pain or palpitations  GASTROINTESTINAL: No abdominal or epigastric pain. No nausea, vomiting, or hematemesis; No diarrhea or constipation. No melena or hematochezia.  GENITOURINARY: No dysuria, frequency or hematuria  NEUROLOGICAL: No numbness or weakness  SKIN: No itching, burning, rashes, or lesions   All other review of systems is negative unless indicated above    Vital Signs Last 24 Hrs  T(C): 36.9 (20 Apr 2019 11:15), Max: 36.9 (19 Apr 2019 17:22)  T(F): 98.4 (20 Apr 2019 11:15), Max: 98.4 (19 Apr 2019 17:22)  HR: 65 (20 Apr 2019 11:15) (48 - 65)  BP: 121/70 (20 Apr 2019 11:15) (95/53 - 121/70)  BP(mean): --  RR: 18 (20 Apr 2019 11:15) (17 - 18)  SpO2: 98% (20 Apr 2019 11:15) (96% - 98%)    I&O's Summary    20 Apr 2019 07:01  -  20 Apr 2019 15:35  --------------------------------------------------------  IN: 100 mL / OUT: 0 mL / NET: 100 mL        CAPILLARY BLOOD GLUCOSE          PHYSICAL EXAM:    Constitutional: NAD, awake and alert, well-developed  HEENT: PERR, EOMI, Normal Hearing, MMM  Neck: Soft and supple, No LAD, No JVD  Respiratory: Breath sounds are clear bilaterally, No wheezing, rales or rhonchi  Cardiovascular: S1 and S2, regular rate and rhythm, no Murmurs, gallops or rubs  Gastrointestinal: Bowel Sounds present, soft, nontender, nondistended, no guarding, no rebound  Extremities: No peripheral edema  Vascular: 2+ peripheral pulses  Neurological: A/O x 3, no focal deficits  Musculoskeletal: 5/5 strength b/l upper and lower extremities  Skin: No rashes    MEDICATIONS:  MEDICATIONS  (STANDING):  amiodarone    Tablet 100 milliGRAM(s) Oral daily  cholecalciferol 1000 Unit(s) Oral <User Schedule>  cinacalcet 30 milliGRAM(s) Oral <User Schedule>  epoetin amee Injectable 85906 Unit(s) IV Push <User Schedule>  hydrocortisone sodium succinate Injectable 50 milliGRAM(s) IV Push every 8 hours  losartan 25 milliGRAM(s) Oral daily  meropenem  IVPB      metoprolol tartrate 25 milliGRAM(s) Oral daily  pantoprazole    Tablet 40 milliGRAM(s) Oral two times a day  sevelamer carbonate 2400 milliGRAM(s) Oral three times a day with meals  ursodiol Capsule 300 milliGRAM(s) Oral two times a day  vancomycin    Solution 125 milliGRAM(s) Oral every 6 hours      LABS: All Labs Reviewed:                        10.2   4.40  )-----------( 78       ( 19 Apr 2019 13:15 )             33.7     04-19    139  |  98  |  84<H>  ----------------------------<  227<H>  4.4   |  24  |  5.88<H>    Ca    7.8<L>      19 Apr 2019 13:15  Phos  5.3     04-19    TPro  x   /  Alb  2.5<L>  /  TBili  x   /  DBili  x   /  AST  x   /  ALT  x   /  AlkPhos  x   04-19    PT/INR - ( 20 Apr 2019 05:30 )   PT: 27.3 sec;   INR: 2.39 ratio         PTT - ( 20 Apr 2019 05:30 )  PTT:32.7 sec      Blood Culture: 04-17 @ 19:46  Organism Blood Culture PCR  Gram Stain Blood -- Gram Stain   Growth in aerobic bottle: Gram Negative Rods  Specimen Source .Blood None  Culture-Blood --    04-17 @ 07:20  Organism --  Gram Stain Blood -- Gram Stain --  Specimen Source .Urine Clean Catch (Midstream)  Culture-Blood --        Assessment and Plan:    64 y/o M PMHx significant for ESRD secondary to Polycystic kidney disease on HD via RUE on M/W/F, s/p bilateral nephrectomy, s/p failed transplant x 2, CAD, severe dilated cardiomyopathy, paroxysmal atrial flutter and VT on Amiodarone and Warfarin chronically, CHF (HFrEF), s/p AICD, hypertension, adrenal insufficiency (on chronic prednisone), hypoparathyroidism, h/o cryptococcal meningitis, h/o c. difficile colitis, has had multiple episodes of Klebsiella septicemia due to Cholangitis, s/p recent ERCP w/ papillotomy and stone extraction at [place] 2 weeks ago, recent admission (discharged 4/14/2019) for management of sepsis due to cholangitis presents to the ED for further evaluation of recurrent severe LUQ abdominal pain associated w/ recurrent subjective fevers at home (Tmax 101'F).     1.  Sepsis/Cholecystitis/ E.coli bacteremia:    Blood cx with E.coli.    Cholangitis is questionable, TBilis are normal and patient is not spiking any temps.   Repeat cultures in am.    ID consult apprecaited >> c/w meropenem w/ oral vanco for c.diff prophylaxis (patient has hx)  poor surgical candidate, will need IR guided perc drain    2.  Abdominal Pain:    As above.  Related to cholangitis/ cholecystitis.    PRN pain meds.      3.  PCKD s/p nephrectomy/ failed transplant x2 on HD:    Cont HD as scheduled.      4.  Hx of Dilated Cardiomyopathy/ AFIB/ VT:    Cont amio, toprol, losartan.    ECHO with EF 20-25%.    Cont to hold coumadin for IR versus OR next week.    INR 2-3.      5.  Hx of recurrent c. difficile colitis  ~cont. suppressive therapy w/ Vancomycin PO    6.  Adrenal Insufficiency:    ~given stress dose Hydrocortisone in the ED  ~cont. Hydrocortisone for now (50 q8).    Endo f/u.

## 2019-04-20 NOTE — CONSULT NOTE ADULT - ASSESSMENT
1. Recuurent cholangitis s/p recent ercp  2. HFrEF currently well compensated. some le edema noted today but no dyspnea  3. CAD with severe lv dysfunction  4. Atrial and ventricular arryhtmias/ICD, stable    Plan; cont amio, losartan,  HD per renal, possible fluid removal due to edema  abx per id  no contraindication to percutaneous gb draninage if clinically indicated  will follow

## 2019-04-21 NOTE — PROGRESS NOTE ADULT - SUBJECTIVE AND OBJECTIVE BOX
CHIEF COMPLAINT/Diagnosis: cholycystitis/ abdominal pain    SUBJECTIVE: no complaints    REVIEW OF SYSTEMS:    CONSTITUTIONAL: No weakness, fevers or chills  EYES/ENT: No visual changes;  No vertigo or throat pain   NECK: No pain or stiffness  RESPIRATORY: No cough, wheezing, hemoptysis; No shortness of breath  CARDIOVASCULAR: No chest pain or palpitations  GASTROINTESTINAL: No abdominal or epigastric pain. No nausea, vomiting, or hematemesis; No diarrhea or constipation. No melena or hematochezia.  GENITOURINARY: No dysuria, frequency or hematuria  NEUROLOGICAL: No numbness or weakness  SKIN: No itching, burning, rashes, or lesions   All other review of systems is negative unless indicated above    Vital Signs Last 24 Hrs  T(C): 36.8 (21 Apr 2019 04:57), Max: 36.9 (20 Apr 2019 11:15)  T(F): 98.2 (21 Apr 2019 04:57), Max: 98.4 (20 Apr 2019 11:15)  HR: 70 (21 Apr 2019 04:57) (65 - 70)  BP: 136/90 (21 Apr 2019 04:57) (121/70 - 136/90)  BP(mean): --  RR: 18 (21 Apr 2019 04:57) (18 - 18)  SpO2: 99% (21 Apr 2019 04:57) (98% - 100%)    I&O's Summary    20 Apr 2019 07:01  -  21 Apr 2019 07:00  --------------------------------------------------------  IN: 100 mL / OUT: 0 mL / NET: 100 mL        CAPILLARY BLOOD GLUCOSE      POCT Blood Glucose.: 194 mg/dL (21 Apr 2019 06:43)  POCT Blood Glucose.: 204 mg/dL (20 Apr 2019 21:13)      PHYSICAL EXAM:    Constitutional: NAD, awake and alert, well-developed  HEENT: PERR, EOMI, Normal Hearing, MMM  Neck: Soft and supple, No LAD, No JVD  Respiratory: Breath sounds are clear bilaterally, No wheezing, rales or rhonchi  Cardiovascular: S1 and S2, regular rate and rhythm, no Murmurs, gallops or rubs  Gastrointestinal: Bowel Sounds present, soft, nontender, nondistended, no guarding, no rebound  Extremities: No peripheral edema  Vascular: 2+ peripheral pulses  Neurological: A/O x 3, no focal deficits  Musculoskeletal: 5/5 strength b/l upper and lower extremities  Skin: No rashes    MEDICATIONS:  MEDICATIONS  (STANDING):  amiodarone    Tablet 100 milliGRAM(s) Oral daily  cholecalciferol 1000 Unit(s) Oral <User Schedule>  cinacalcet 30 milliGRAM(s) Oral <User Schedule>  epoetin amee Injectable 41772 Unit(s) IV Push <User Schedule>  hydrocortisone sodium succinate Injectable 50 milliGRAM(s) IV Push every 8 hours  losartan 25 milliGRAM(s) Oral daily  meropenem  IVPB      meropenem  IVPB 500 milliGRAM(s) IV Intermittent every 24 hours  metoprolol tartrate 25 milliGRAM(s) Oral daily  pantoprazole    Tablet 40 milliGRAM(s) Oral two times a day  sevelamer carbonate 2400 milliGRAM(s) Oral three times a day with meals  ursodiol Capsule 300 milliGRAM(s) Oral two times a day  vancomycin    Solution 125 milliGRAM(s) Oral every 6 hours      LABS: All Labs Reviewed:                        10.2   4.40  )-----------( 78       ( 19 Apr 2019 13:15 )             33.7     04-19    139  |  98  |  84<H>  ----------------------------<  227<H>  4.4   |  24  |  5.88<H>    Ca    7.8<L>      19 Apr 2019 13:15  Phos  5.3     04-19    TPro  x   /  Alb  2.5<L>  /  TBili  x   /  DBili  x   /  AST  x   /  ALT  x   /  AlkPhos  x   04-19    PT/INR - ( 21 Apr 2019 07:46 )   PT: 22.5 sec;   INR: 1.98 ratio         PTT - ( 21 Apr 2019 07:46 )  PTT:30.9 sec      Blood Culture: 04-17 @ 19:46  Organism Blood Culture PCR  Gram Stain Blood -- Gram Stain   Growth in aerobic bottle: Gram Negative Rods  Specimen Source .Blood None  Culture-Blood --    04-17 @ 07:20  Organism --  Gram Stain Blood -- Gram Stain --  Specimen Source .Urine Clean Catch (Midstream)  Culture-Blood --          Assessment and Plan:    62 y/o M PMHx significant for ESRD secondary to Polycystic kidney disease on HD via RUE on M/W/F, s/p bilateral nephrectomy, s/p failed transplant x 2, CAD, severe dilated cardiomyopathy, paroxysmal atrial flutter and VT on Amiodarone and Warfarin chronically, CHF (HFrEF), s/p AICD, hypertension, adrenal insufficiency (on chronic prednisone), hypoparathyroidism, h/o cryptococcal meningitis, h/o c. difficile colitis, has had multiple episodes of Klebsiella septicemia due to Cholangitis, s/p recent ERCP w/ papillotomy and stone extraction at [place] 2 weeks ago, recent admission (discharged 4/14/2019) for management of sepsis due to cholangitis presents to the ED for further evaluation of recurrent severe LUQ abdominal pain associated w/ recurrent subjective fevers at home (Tmax 101'F).     1.  Sepsis/Cholecystitis/ E.coli bacteremia:    Blood cx with E.coli.    Cholangitis is questionable, TBilis are normal and patient is not spiking any temps.   Repeat cultures in am.    ID consult apprecaited >> c/w meropenem w/ oral vanco for c.diff prophylaxis (patient has hx)  poor surgical candidate, will need IR guided perc drain    2.  Abdominal Pain:    As above.  Related to cholangitis/ cholecystitis.    PRN pain meds.      3.  PCKD s/p nephrectomy/ failed transplant x2 on HD:    Cont HD as scheduled.      4.  Hx of Dilated Cardiomyopathy/ AFIB/ VT:    Cont amio, toprol, losartan.    ECHO with EF 20-25%.    Cont to hold coumadin for IR versus OR next week.    INR 2-3.      5.  Hx of recurrent c. difficile colitis  ~cont. suppressive therapy w/ Vancomycin PO    6.  Adrenal Insufficiency:    ~given stress dose Hydrocortisone in the ED  ~cont. Hydrocortisone for now (50 q8).    Endo f/u. CHIEF COMPLAINT/Diagnosis: cholycystitis/ abdominal pain    SUBJECTIVE: no complaints    REVIEW OF SYSTEMS:    CONSTITUTIONAL: No weakness, fevers or chills  EYES/ENT: No visual changes;  No vertigo or throat pain   NECK: No pain or stiffness  RESPIRATORY: No cough, wheezing, hemoptysis; No shortness of breath  CARDIOVASCULAR: No chest pain or palpitations  GASTROINTESTINAL: No abdominal or epigastric pain. No nausea, vomiting, or hematemesis; No diarrhea or constipation. No melena or hematochezia.  GENITOURINARY: No dysuria, frequency or hematuria  NEUROLOGICAL: No numbness or weakness  SKIN: No itching, burning, rashes, or lesions   All other review of systems is negative unless indicated above    Vital Signs Last 24 Hrs  T(C): 36.8 (21 Apr 2019 04:57), Max: 36.9 (20 Apr 2019 11:15)  T(F): 98.2 (21 Apr 2019 04:57), Max: 98.4 (20 Apr 2019 11:15)  HR: 70 (21 Apr 2019 04:57) (65 - 70)  BP: 136/90 (21 Apr 2019 04:57) (121/70 - 136/90)  BP(mean): --  RR: 18 (21 Apr 2019 04:57) (18 - 18)  SpO2: 99% (21 Apr 2019 04:57) (98% - 100%)    I&O's Summary    20 Apr 2019 07:01  -  21 Apr 2019 07:00  --------------------------------------------------------  IN: 100 mL / OUT: 0 mL / NET: 100 mL        CAPILLARY BLOOD GLUCOSE      POCT Blood Glucose.: 194 mg/dL (21 Apr 2019 06:43)  POCT Blood Glucose.: 204 mg/dL (20 Apr 2019 21:13)      PHYSICAL EXAM:    Constitutional: NAD, awake and alert, well-developed  HEENT: PERR, EOMI, Normal Hearing, MMM  Neck: Soft and supple, No LAD, No JVD  Respiratory: Breath sounds are clear bilaterally, No wheezing, rales or rhonchi  Cardiovascular: S1 and S2, regular rate and rhythm, no Murmurs, gallops or rubs  Gastrointestinal: Bowel Sounds present, soft, nontender, nondistended, no guarding, no rebound  Extremities: No peripheral edema  Vascular: 2+ peripheral pulses  Neurological: A/O x 3, no focal deficits  Musculoskeletal: 5/5 strength b/l upper and lower extremities  Skin: No rashes    MEDICATIONS:  MEDICATIONS  (STANDING):  amiodarone    Tablet 100 milliGRAM(s) Oral daily  cholecalciferol 1000 Unit(s) Oral <User Schedule>  cinacalcet 30 milliGRAM(s) Oral <User Schedule>  epoetin amee Injectable 17446 Unit(s) IV Push <User Schedule>  hydrocortisone sodium succinate Injectable 50 milliGRAM(s) IV Push every 8 hours  losartan 25 milliGRAM(s) Oral daily  meropenem  IVPB      meropenem  IVPB 500 milliGRAM(s) IV Intermittent every 24 hours  metoprolol tartrate 25 milliGRAM(s) Oral daily  pantoprazole    Tablet 40 milliGRAM(s) Oral two times a day  sevelamer carbonate 2400 milliGRAM(s) Oral three times a day with meals  ursodiol Capsule 300 milliGRAM(s) Oral two times a day  vancomycin    Solution 125 milliGRAM(s) Oral every 6 hours      LABS: All Labs Reviewed:                        10.2   4.40  )-----------( 78       ( 19 Apr 2019 13:15 )             33.7     04-19    139  |  98  |  84<H>  ----------------------------<  227<H>  4.4   |  24  |  5.88<H>    Ca    7.8<L>      19 Apr 2019 13:15  Phos  5.3     04-19    TPro  x   /  Alb  2.5<L>  /  TBili  x   /  DBili  x   /  AST  x   /  ALT  x   /  AlkPhos  x   04-19    PT/INR - ( 21 Apr 2019 07:46 )   PT: 22.5 sec;   INR: 1.98 ratio         PTT - ( 21 Apr 2019 07:46 )  PTT:30.9 sec      Blood Culture: 04-17 @ 19:46  Organism Blood Culture PCR  Gram Stain Blood -- Gram Stain   Growth in aerobic bottle: Gram Negative Rods  Specimen Source .Blood None  Culture-Blood --    04-17 @ 07:20  Organism --  Gram Stain Blood -- Gram Stain --  Specimen Source .Urine Clean Catch (Midstream)  Culture-Blood --          Assessment and Plan:    64 y/o M PMHx significant for ESRD secondary to Polycystic kidney disease on HD via RUE on M/W/F, s/p bilateral nephrectomy, s/p failed transplant x 2, CAD, severe dilated cardiomyopathy, paroxysmal atrial flutter and VT on Amiodarone and Warfarin chronically, CHF (HFrEF), s/p AICD, hypertension, adrenal insufficiency (on chronic prednisone), hypoparathyroidism, h/o cryptococcal meningitis, h/o c. difficile colitis, has had multiple episodes of Klebsiella septicemia due to Cholangitis, s/p recent ERCP w/ papillotomy and stone extraction at [place] 2 weeks ago, recent admission (discharged 4/14/2019) for management of sepsis due to cholangitis presents to the ED for further evaluation of recurrent severe LUQ abdominal pain associated w/ recurrent subjective fevers at home (Tmax 101'F).     1.  Sepsis/Cholecystitis/ E.coli bacteremia:    Blood cx with E.coli.    Cholangitis is questionable, TBilis are normal and patient is not spiking any temps.   Repeat cultures in am.    ID consult apprecaited >> c/w meropenem w/ oral vanco for c.diff prophylaxis (patient has hx)  symptoms completely resolved, no more abd pain; no feves, livers enzyms normal. kathy will not need drain at this time. Likely will benefit from cholycystectomy in the future with cardiac clearance    2.  Abdominal Pain:    As above.  Related to cholangitis/ cholecystitis.    PRN pain meds.      3.  PCKD s/p nephrectomy/ failed transplant x2 on HD:    Cont HD as scheduled.      4.  Hx of Dilated Cardiomyopathy/ AFIB/ VT:    Cont amio, toprol, losartan.    ECHO with EF 20-25%.    Cont to hold coumadin for IR versus OR next week.    INR 2-3.      5.  Hx of recurrent c. difficile colitis  ~cont. suppressive therapy w/ Vancomycin PO    6.  Adrenal Insufficiency:    ~given stress dose Hydrocortisone in the ED  ~cont. Hydrocortisone for now (50 q8).    Endo f/u.

## 2019-04-21 NOTE — PROGRESS NOTE ADULT - SUBJECTIVE AND OBJECTIVE BOX
Patient is a 63y old  Male who presents with a chief complaint of Abdominal pain and fevers (20 Apr 2019 13:41)      HPI:  64 y/o M PMHx significant for ESRD secondary to Polycystic kidney disease on HD via RUE on M/W/F, s/p bilateral nephrectomy, s/p failed transplant x 2, CAD, severe dilated cardiomyopathy, paroxysmal atrial flutter and VT on Amiodarone and Warfarin chronically, CHF (HFrEF), s/p AICD, hypertension, adrenal insufficiency (on chronic prednisone), hypoparathyroidism, h/o cryptococcal meningitis, h/o c. difficile colitis, has had multiple episodes of Klebsiella septicemia due to Cholangitis (was deemed not a surgical candidate for cholecystectomy in the past), s/p recent ERCP w/ papillotomy and stone extraction at [place] 2 weeks ago, recent admission (discharged 4/14/2019) for management of sepsis due to cholangitis presents to the ED for further evaluation of recurrent severe LUQ abdominal pain associated w/ recurrent subjective fevers at home (Tmax 101'F). The patient denies any associated nausea, vomiting. Labs => Hgb/Hct 9.8/32.6, PLT 78, LA 1.8, HCO3 36, BUN/Cr 46/3.96, , Alb 2.5. Abdominal U/S => Mild ascites. Transplant kidney intact. Gallbladder is markedly distended. Cholelithiasis. Hepatic cysts. In the ED the patient was given Acetaminophen 650mg po x 1, Hydrocortisone 100mg IVP x 1, and NS x 500mL x 1. (17 Apr 2019 23:01)    4/20 Cardiology: 63 year old male with pckd,esrd, dcm wih ef 25% VT, cad admitted with recurrent sepsis due to cholangities. from cardiac view patient has been feeling  well with out chest pain dyspnea or palpitations. no pnd orthopnea or le edema. patient also has afib/flutter. patients abdominal pain is improved  4/21 no complaints. oob to chair  PAST MEDICAL & SURGICAL HISTORY:  Oliguria  Arm swelling: left arm  AV fistula: right UE  Leg pain, anterior, right: right anterior thigh at graft donor site  Irregular heart beat  AICD (automatic cardioverter/defibrillator) present  Dialysis patient  Adrenal insufficiency  Hypoparathyroidism  HFrEF (heart failure with reduced ejection fraction)  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  S/P colonoscopy: last done 2016  AICD (automatic cardioverter/defibrillator) present: 2013  H/O hernia repair  A-V fistula: 1995 left UE  right arm 2007,  H/O kidney transplant: 1985, 1995, 1997        MEDICATIONS  (STANDING):  amiodarone    Tablet 100 milliGRAM(s) Oral daily  cholecalciferol 1000 Unit(s) Oral <User Schedule>  cinacalcet 30 milliGRAM(s) Oral <User Schedule>  epoetin amee Injectable 80269 Unit(s) IV Push <User Schedule>  hydrocortisone sodium succinate Injectable 50 milliGRAM(s) IV Push every 8 hours  losartan 25 milliGRAM(s) Oral daily  meropenem  IVPB      meropenem  IVPB 500 milliGRAM(s) IV Intermittent every 24 hours  metoprolol tartrate 25 milliGRAM(s) Oral daily  pantoprazole    Tablet 40 milliGRAM(s) Oral two times a day  sevelamer carbonate 2400 milliGRAM(s) Oral three times a day with meals  ursodiol Capsule 300 milliGRAM(s) Oral two times a day  vancomycin    Solution 125 milliGRAM(s) Oral every 6 hours    MEDICATIONS  (PRN):  acetaminophen   Tablet .. 650 milliGRAM(s) Oral every 6 hours PRN Temp greater or equal to 38C (100.4F), Mild Pain (1 - 3)  ondansetron Injectable 4 milliGRAM(s) IV Push every 6 hours PRN Nausea  oxyCODONE    5 mG/acetaminophen 325 mG 1 Tablet(s) Oral every 6 hours PRN Moderate Pain (4 - 6)          Vital Signs Last 24 Hrs  T(C): 36.8 (21 Apr 2019 04:57), Max: 36.9 (20 Apr 2019 11:15)  T(F): 98.2 (21 Apr 2019 04:57), Max: 98.4 (20 Apr 2019 11:15)  HR: 70 (21 Apr 2019 04:57) (65 - 70)  BP: 136/90 (21 Apr 2019 04:57) (121/70 - 136/90)  BP(mean): --  RR: 18 (21 Apr 2019 04:57) (18 - 18)  SpO2: 99% (21 Apr 2019 04:57) (98% - 100%)    I&O's Summary    20 Apr 2019 07:01  -  21 Apr 2019 07:00  --------------------------------------------------------  IN: 100 mL / OUT: 0 mL / NET: 100 mL        PHYSICAL EXAM  General Appearance: cooperative, no acute distress,   HEENT: PERRL, conjunctiva clear    Neck: Supple, , no adenopathy, thyroid: not enlarged, no carotid bruit or JVD  Back: Symmetric, no  tenderness,no soft tissue tenderness  Lungs: Clear to auscultation bilaterally,no adventitious breath sounds, normal   expiratory phase  Heart: Regular rate and rhythm, S1, S2 normal,   Abdomen: Soft, non-tender, bowel sounds active ,   Extremities: 1+ edema bilat        INTERPRETATION OF TELEMETRY:    ECG:        LABS:                          10.2   4.40  )-----------( 78       ( 19 Apr 2019 13:15 )             33.7     04-19    139  |  98  |  84<H>  ----------------------------<  227<H>  4.4   |  24  |  5.88<H>    Ca    7.8<L>      19 Apr 2019 13:15  Phos  5.3     04-19    TPro  x   /  Alb  2.5<L>  /  TBili  x   /  DBili  x   /  AST  x   /  ALT  x   /  AlkPhos  x   04-19            PT/INR - ( 21 Apr 2019 07:46 )   PT: 22.5 sec;   INR: 1.98 ratio         PTT - ( 21 Apr 2019 07:46 )  PTT:30.9 sec          RADIOLOGY & ADDITIONAL STUDIES:

## 2019-04-21 NOTE — PROGRESS NOTE ADULT - SUBJECTIVE AND OBJECTIVE BOX
Patient is a 63y old  Male who presents with a chief complaint of Abdominal pain and fevers (21 Apr 2019 09:01)      HPI:  62 y/o M PMHx significant for ESRD secondary to Polycystic kidney disease on HD via RUE on M/W/F, s/p bilateral nephrectomy, s/p failed transplant x 2, CAD, severe dilated cardiomyopathy, paroxysmal atrial flutter and VT on Amiodarone and Warfarin chronically, CHF (HFrEF), s/p AICD, hypertension, adrenal insufficiency (on chronic prednisone), hypoparathyroidism, h/o cryptococcal meningitis, h/o c. difficile colitis, has had multiple episodes of Klebsiella septicemia due to Cholangitis (was deemed not a surgical candidate for cholecystectomy in the past), s/p recent ERCP w/ papillotomy and stone extraction at [place] 2 weeks ago, recent admission (discharged 4/14/2019) for management of sepsis due to cholangitis presents to the ED for further evaluation of recurrent severe LUQ abdominal pain associated w/ recurrent subjective fevers at home (Tmax 101'F). The patient denies any associated nausea, vomiting. Labs => Hgb/Hct 9.8/32.6, PLT 78, LA 1.8, HCO3 36, BUN/Cr 46/3.96, , Alb 2.5. Abdominal U/S => Mild ascites. Transplant kidney intact. Gallbladder is markedly distended. Cholelithiasis. Hepatic cysts. In the ED the patient was given Acetaminophen 650mg po x 1, Hydrocortisone 100mg IVP x 1, and NS x 500mL x 1. (17 Apr 2019 23:01)    Patient is comfortable with no pain or fevers. Awaiting percutaneous drain in AM.       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  S/P colonoscopy: last done 2016  AICD (automatic cardioverter/defibrillator) present: 2013  H/O hernia repair  A-V fistula: 1995 left UE  right arm 2007,  H/O kidney transplant: 1985, 1995, 1997      MEDICATIONS  (STANDING):  amiodarone    Tablet 100 milliGRAM(s) Oral daily  cholecalciferol 1000 Unit(s) Oral <User Schedule>  cinacalcet 30 milliGRAM(s) Oral <User Schedule>  epoetin amee Injectable 39751 Unit(s) IV Push <User Schedule>  hydrocortisone sodium succinate Injectable 50 milliGRAM(s) IV Push every 8 hours  losartan 25 milliGRAM(s) Oral daily  meropenem  IVPB      meropenem  IVPB 500 milliGRAM(s) IV Intermittent every 24 hours  metoprolol tartrate 25 milliGRAM(s) Oral daily  pantoprazole    Tablet 40 milliGRAM(s) Oral two times a day  sevelamer carbonate 2400 milliGRAM(s) Oral three times a day with meals  ursodiol Capsule 300 milliGRAM(s) Oral two times a day  vancomycin    Solution 125 milliGRAM(s) Oral every 6 hours    MEDICATIONS  (PRN):  acetaminophen   Tablet .. 650 milliGRAM(s) Oral every 6 hours PRN Temp greater or equal to 38C (100.4F), Mild Pain (1 - 3)  ondansetron Injectable 4 milliGRAM(s) IV Push every 6 hours PRN Nausea  oxyCODONE    5 mG/acetaminophen 325 mG 1 Tablet(s) Oral every 6 hours PRN Moderate Pain (4 - 6)      Allergies    No Known Allergies    Intolerances          Vital Signs Last 24 Hrs  T(C): 36.8 (21 Apr 2019 04:57), Max: 36.9 (20 Apr 2019 11:15)  T(F): 98.2 (21 Apr 2019 04:57), Max: 98.4 (20 Apr 2019 11:15)  HR: 70 (21 Apr 2019 04:57) (65 - 70)  BP: 136/90 (21 Apr 2019 04:57) (121/70 - 136/90)  BP(mean): --  RR: 18 (21 Apr 2019 04:57) (18 - 18)  SpO2: 99% (21 Apr 2019 04:57) (98% - 100%)          Respiratory: CTAB  Cardiovascular: S1 and S2, RRR, no M/G/R  Gastrointestinal: BS+, soft, NT/ND    LABS:                        10.2   4.40  )-----------( 78       ( 19 Apr 2019 13:15 )             33.7     04-19    139  |  98  |  84<H>  ----------------------------<  227<H>  4.4   |  24  |  5.88<H>    Ca    7.8<L>      19 Apr 2019 13:15  Phos  5.3     04-19    TPro  x   /  Alb  2.5<L>  /  TBili  x   /  DBili  x   /  AST  x   /  ALT  x   /  AlkPhos  x   04-19    PT/INR - ( 21 Apr 2019 07:46 )   PT: 22.5 sec;   INR: 1.98 ratio         PTT - ( 21 Apr 2019 07:46 )  PTT:30.9 sec  LIVER FUNCTIONS - ( 19 Apr 2019 13:15 )  Alb: 2.5 g/dL / Pro: x     / ALK PHOS: x     / ALT: x     / AST: x     / GGT: x             RADIOLOGY & ADDITIONAL STUDIES:

## 2019-04-21 NOTE — PROGRESS NOTE ADULT - ASSESSMENT
1. Recuurent cholangitis s/p recent ercp  2. HFrEF currently well compensated. some le edema noted today but no dyspnea  3. CAD with severe lv dysfunction  4. Atrial and ventricular arryhtmias/ICD, stable    Plan; cont amio, losartan,  HD per renal, possible fluid removal due to edema  abx per id  no contraindication to percutaneous gb draninage if clinically indicated

## 2019-04-22 NOTE — PROGRESS NOTE ADULT - ASSESSMENT
64 yo male w extensive pmhx as above DCM, s.p renal transplant failure x 2,. hx of PCKD, and now ESRD on HD MWF  presenting multiple recent admission for Klebsiella sepsis, cholangitis, and now recurring abd pains w neg blood cultures recent admission and presenting within a week for same sx despite being in IV abx - ancef at HD     ESRD on HD  - continue HD MWF schedule   - SACHIN at HD     HTN/DCM  - on losartan and metoprolol - monitor BP and adjust if BP drops    Adrenal Inuff-    - steroid schedule per Endocrine     GNR bacteremia recurrent   distended GB on CT   ++ HIDA scan and further intervention based on this - awaiting surgical planning for chol this week 4/25  and cardio clearance     Thank you for the courtesy of this consult. We will follow this patient with you.   Management is subject to change if new information becomes available or patient condition changes.    4/20  HIDA +, with + B/C  on abx  surgery input noted, cardiology eval pending  recc cholecystostomy  d/w Pt and family

## 2019-04-22 NOTE — PROGRESS NOTE ADULT - SUBJECTIVE AND OBJECTIVE BOX
HCT dose was reduced yesterday from 50 mg q8h to 40 mg q12h.  He feels well and at baseline.  Has some LE edema B/L but is due for HD today.  Denies dizziness, abdominal pain, nausea or vomiting.  He states he may be discharged later today.     MEDICATIONS  (STANDING):  amiodarone    Tablet 100 milliGRAM(s) Oral daily  cholecalciferol 1000 Unit(s) Oral <User Schedule>  cinacalcet 30 milliGRAM(s) Oral <User Schedule>  epoetin amee Injectable 04343 Unit(s) IV Push <User Schedule>  heparin  Injectable 5000 Unit(s) SubCutaneous every 8 hours  losartan 25 milliGRAM(s) Oral daily  meropenem  IVPB      meropenem  IVPB 500 milliGRAM(s) IV Intermittent every 24 hours  metoprolol tartrate 25 milliGRAM(s) Oral daily  pantoprazole    Tablet 40 milliGRAM(s) Oral two times a day  sevelamer carbonate 2400 milliGRAM(s) Oral three times a day with meals  ursodiol Capsule 300 milliGRAM(s) Oral two times a day  vancomycin    Solution 125 milliGRAM(s) Oral every 6 hours  MEDICATIONS  (PRN):  acetaminophen   Tablet .. 650 milliGRAM(s) Oral every 6 hours PRN Temp greater or equal to 38C (100.4F), Mild Pain (1 - 3)  ondansetron Injectable 4 milliGRAM(s) IV Push every 6 hours PRN Nausea  oxyCODONE    5 mG/acetaminophen 325 mG 1 Tablet(s) Oral every 6 hours PRN Moderate Pain (4 - 6)  ROS: per HPI, others negative     PE:  Vital Signs Last 24 Hrs  T(C): 36.3 (22 Apr 2019 05:04), Max: 36.9 (21 Apr 2019 11:31)  T(F): 97.4 (22 Apr 2019 05:04), Max: 98.5 (21 Apr 2019 11:31)  HR: 68 (22 Apr 2019 05:04) (60 - 68)  BP: 126/82 (22 Apr 2019 05:04) (116/74 - 134/88)  BP(mean): --  RR: 18 (22 Apr 2019 05:04) (17 - 18)  SpO2: 99% (22 Apr 2019 05:04) (99% - 100%)  pleasant. NAD. AAOx3. No TM. S1+S2. CTAB. Soft. NT. mild LE edema B/L

## 2019-04-22 NOTE — PROGRESS NOTE ADULT - SUBJECTIVE AND OBJECTIVE BOX
64 y/o M PMHx significant for ESRD secondary to Polycystic kidney disease on HD via RUE on M/W/F, s/p bilateral nephrectomy, s/p failed transplant x 2, CAD, severe dilated cardiomyopathy EF 20%, paroxysmal atrial flutter and VT on Amiodarone and Warfarin chronically, CHF (HFrEF), s/p AICD, adrenal insufficiency (on chronic prednisone x years) - never had formal workup., hypoparathyroidism, h/o cryptococcal meningitis, h/o c. difficile colitis, has had multiple episodes of Klebsiella septicemia due to Cholangitis (was deemed not a surgical candidate for cholecystectomy in the past), s/p recent ERCP w/ papillotomy and stone extraction at [place] 2 weeks ago, recent admission (discharged 4/14/2019) for management of sepsis due to cholangitis presents to the ED for further evaluation of recurrent severe LUQ abdominal pain associated w/ recurrent subjective fevers at home (Tmax 101'F). The patient denies any associated nausea, vomiting.  Abdominal U/S => Mild ascites. Transplant kidney intact. Gallbladder is markedly distended. Cholelithiasis. Hepatic cysts. In the ED the patient was given Acetaminophen 650mg po x 1, Hydrocortisone 100mg IVP x 1, and NS x 500mL x 1.  Renal eval called for ESRD and continuing HD mgt     today   seen on HD   no sob or abd pains    HIDA + , BCX = Gram Stain:   Growth in aerobic bottle: Gram Negative Rods (04.17.19 @ 19:46)   awaiting cardio clearance  and surgical input for definitive treatment due to recurrent admission for sepsis sec to GB       PAST MEDICAL & SURGICAL HISTORY:  Oliguria  Arm swelling: left arm  AV fistula: right UE  Leg pain, anterior, right: right anterior thigh at graft donor site  Irregular heart beat  AICD (automatic cardioverter/defibrillator) present  Dialysis patient  Adrenal insufficiency  Hypoparathyroidism  HFrEF (heart failure with reduced ejection fraction)  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  S/P colonoscopy: last done 2016  AICD (automatic cardioverter/defibrillator) present: 2013  H/O hernia repair  A-V fistula: 1995 left UE  right arm 2007,  H/O kidney transplant: 1985, 1995, 1997      MEDICATIONS  (STANDING):  amiodarone    Tablet 100 milliGRAM(s) Oral daily  cholecalciferol 1000 Unit(s) Oral <User Schedule>  cinacalcet 30 milliGRAM(s) Oral <User Schedule>  epoetin amee Injectable 06075 Unit(s) IV Push <User Schedule>  heparin  Injectable 5000 Unit(s) SubCutaneous every 8 hours  hydrocortisone sodium succinate Injectable 30 milliGRAM(s) IV Push two times a day  losartan 25 milliGRAM(s) Oral daily  meropenem  IVPB      meropenem  IVPB 500 milliGRAM(s) IV Intermittent every 24 hours  metoprolol tartrate 25 milliGRAM(s) Oral daily  pantoprazole    Tablet 40 milliGRAM(s) Oral two times a day  sevelamer carbonate 2400 milliGRAM(s) Oral three times a day with meals  ursodiol Capsule 300 milliGRAM(s) Oral two times a day  vancomycin    Solution 125 milliGRAM(s) Oral every 6 hours    MEDICATIONS  (PRN):  acetaminophen   Tablet .. 650 milliGRAM(s) Oral every 6 hours PRN Temp greater or equal to 38C (100.4F), Mild Pain (1 - 3)  ondansetron Injectable 4 milliGRAM(s) IV Push every 6 hours PRN Nausea  oxyCODONE    5 mG/acetaminophen 325 mG 1 Tablet(s) Oral every 6 hours PRN Moderate Pain (4 - 6)      Allergies    No Known Allergies    Intolerances    SOCIAL HISTORY:  Denies ETOh,Smoking,     FAMILY HISTORY:  Family history of kidney disease (Mother)  Family history of colon cancer (Sibling)      REVIEW OF SYSTEMS:    CONSTITUTIONAL: No weakness, fevers or chills  EYES/ENT: No visual changes;  No vertigo or throat pain   NECK: No pain or stiffness  RESPIRATORY: No cough, wheezing, hemoptysis; No shortness of breath  CARDIOVASCULAR: No chest pain or palpitations  GASTROINTESTINAL: No abdominal or epigastric pain. No nausea, vomiting, or hematemesis; No diarrhea or constipation. No melena or hematochezia.  GENITOURINARY: No dysuria, frequency or hematuria  NEUROLOGICAL: No numbness or weakness  SKIN: No itching, burning, rashes, or lesions   All other review of systems is negative unless indicated above.    Vital Signs Last 24 Hrs  T(C): 36.6 (22 Apr 2019 13:35), Max: 36.8 (22 Apr 2019 12:17)  T(F): 97.8 (22 Apr 2019 13:35), Max: 98.2 (22 Apr 2019 12:17)  HR: 71 (22 Apr 2019 13:35) (55 - 80)  BP: 124/86 (22 Apr 2019 13:35) (96/67 - 134/88)  BP(mean): --  RR: 17 (22 Apr 2019 13:35) (15 - 18)  SpO2: 98% (22 Apr 2019 13:35) (98% - 100%)        PHYSICAL EXAM:    Constitutional: NAD  HEENT: PERRLA, EOMI,  MMM  Neck: No LAD, No JVD  Respiratory: CTAB  Cardiovascular: S1 and S2  Gastrointestinal: BS+, soft, NT/ND  Extremities: No peripheral edema  Neurological: A/O x 3, no focal deficits  Psychiatric: Normal mood, normal affect  : No Amaya  Skin: No rashes  Access:AVF++     LABS:      133    |  96     |  106    ----------------------------<  179       22 Apr 2019 08:15  4.1     |  24     |  6.28     137    |  97     |  89     ----------------------------<  252       21 Apr 2019 13:32  4.3     |  24     |  5.31     139    |  98     |  84     ----------------------------<  227       19 Apr 2019 13:15  4.4     |  24     |  5.88     Ca    7.8        22 Apr 2019 08:15  Ca    7.7        21 Apr 2019 13:32    Phos  4.2       22 Apr 2019 08:15  Phos  5.3       19 Apr 2019 13:15      TPro  x      /  Alb  2.3    /  TBili  x      /        22 Apr 2019 08:15  DBili  x      /  AST  x      /  ALT  x      /  AlkPhos  x        TPro  6.3    /  Alb  2.4    /  TBili  0.6    /        21 Apr 2019 13:32  DBili  x      /  AST  5      /  ALT  <6     /  AlkPhos  76                               9.3    8.17  )-----------( 76       ( 22 Apr 2019 08:15 )             30.1                         9.1    6.82  )-----------( 70       ( 21 Apr 2019 13:32 )             30.6

## 2019-04-22 NOTE — PROGRESS NOTE ADULT - SUBJECTIVE AND OBJECTIVE BOX
Patient is a 63y old  Male who presents with a chief complaint of Abdominal pain and fevers (22 Apr 2019 07:22)      Followup HPI:  4/20 Cardiology: 63 year old male with pckd,esrd, dcm wih ef 25% VT, cad admitted with recurrent sepsis due to cholangities. from cardiac view patient has been feeling  well with out chest pain dyspnea or palpitations. no pnd orthopnea or le edema. patient also has afib/flutter. patients abdominal pain is improved  4/21 no complaints. oob to chair  4/22- no complaints. He states he prefers cholecystectomy to percutaneous GB drainage.       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  S/P colonoscopy: last done 2016  AICD (automatic cardioverter/defibrillator) present: 2013  H/O hernia repair  A-V fistula: 1995 left UE  right arm 2007,  H/O kidney transplant: 1985, 1995, 1997      Review of symptoms:  Negative except for as noted in today's HPI.      MEDICATIONS  (STANDING):  amiodarone    Tablet 100 milliGRAM(s) Oral daily  cholecalciferol 1000 Unit(s) Oral <User Schedule>  cinacalcet 30 milliGRAM(s) Oral <User Schedule>  epoetin amee Injectable 22368 Unit(s) IV Push <User Schedule>  heparin  Injectable 5000 Unit(s) SubCutaneous every 8 hours  losartan 25 milliGRAM(s) Oral daily  meropenem  IVPB      meropenem  IVPB 500 milliGRAM(s) IV Intermittent every 24 hours  metoprolol tartrate 25 milliGRAM(s) Oral daily  pantoprazole    Tablet 40 milliGRAM(s) Oral two times a day  sevelamer carbonate 2400 milliGRAM(s) Oral three times a day with meals  ursodiol Capsule 300 milliGRAM(s) Oral two times a day  vancomycin    Solution 125 milliGRAM(s) Oral every 6 hours    MEDICATIONS  (PRN):  acetaminophen   Tablet .. 650 milliGRAM(s) Oral every 6 hours PRN Temp greater or equal to 38C (100.4F), Mild Pain (1 - 3)  ondansetron Injectable 4 milliGRAM(s) IV Push every 6 hours PRN Nausea  oxyCODONE    5 mG/acetaminophen 325 mG 1 Tablet(s) Oral every 6 hours PRN Moderate Pain (4 - 6)          Vital Signs Last 24 Hrs  T(C): 36.3 (22 Apr 2019 05:04), Max: 36.9 (21 Apr 2019 11:31)  T(F): 97.4 (22 Apr 2019 05:04), Max: 98.5 (21 Apr 2019 11:31)  HR: 68 (22 Apr 2019 05:04) (60 - 68)  BP: 126/82 (22 Apr 2019 05:04) (116/74 - 134/88)  BP(mean): --  RR: 18 (22 Apr 2019 05:04) (17 - 18)  SpO2: 99% (22 Apr 2019 05:04) (99% - 100%)    I&O's Summary    21 Apr 2019 07:01  -  22 Apr 2019 07:00  --------------------------------------------------------  IN: 100 mL / OUT: 0 mL / NET: 100 mL        PHYSICAL EXAM  General Appearance: comfortable  HEENT:   Neck:   Lungs: scant bibasilar crackles  Heart: S1 and S2 normal. 1/6 systolic murmur LSB  Abdomen: multiple scars. soft and nontender  Extremities: 1+ edema both legs  Neurologic:       INTERPRETATION OF TELEMETRY:    ECG:    LABS:                          9.1    6.82  )-----------( 70       ( 21 Apr 2019 13:32 )             30.6     04-21    137  |  97  |  89<H>  ----------------------------<  252<H>  4.3   |  24  |  5.31<H>    Ca    7.7<L>      21 Apr 2019 13:32    TPro  6.3  /  Alb  2.4<L>  /  TBili  0.6  /  DBili  x   /  AST  5<L>  /  ALT  <6<L>  /  AlkPhos  76  04-21            PT/INR - ( 21 Apr 2019 07:46 )   PT: 22.5 sec;   INR: 1.98 ratio         PTT - ( 21 Apr 2019 07:46 )  PTT:30.9 sec          RADIOLOGY & ADDITIONAL STUDIES:    IMPRESSION:  1. Recurrent cholangitis s/p recent ERCP. Patient prefers cholecsystectomy to percutaneous GB drainage.   2. HFrEF  EF 20-25%. Compensated.   3. CAD , stable. No angina.   4. Atrial and ventricular arryhtmias/ICD, stable  5.ESRD.  PLAN:  Continue amiodarone, losartan, metoprolol, anticoagulation for stroke prevention. Continue antibiotics per ID. Cholecystectomy may be considered. Suggest surgical consultation to help formulate a plan.

## 2019-04-22 NOTE — PROGRESS NOTE ADULT - SUBJECTIVE AND OBJECTIVE BOX
Date of service: 04-22-19 @ 13:36    Pt seen and examined  feeling well at HD no abd pain  afebrile    ROS: denies dizziness, no HA, no SOB or cough, no diarrhea or constipation; no dysuria, no legs pain, no rashes      MEDICATIONS  (STANDING):  amiodarone    Tablet 100 milliGRAM(s) Oral daily  cholecalciferol 1000 Unit(s) Oral <User Schedule>  cinacalcet 30 milliGRAM(s) Oral <User Schedule>  epoetin amee Injectable 06770 Unit(s) IV Push <User Schedule>  heparin  Injectable 5000 Unit(s) SubCutaneous every 8 hours  hydrocortisone sodium succinate Injectable 30 milliGRAM(s) IV Push two times a day  losartan 25 milliGRAM(s) Oral daily  meropenem  IVPB      meropenem  IVPB 500 milliGRAM(s) IV Intermittent every 24 hours  metoprolol tartrate 25 milliGRAM(s) Oral daily  pantoprazole    Tablet 40 milliGRAM(s) Oral two times a day  sevelamer carbonate 2400 milliGRAM(s) Oral three times a day with meals  ursodiol Capsule 300 milliGRAM(s) Oral two times a day  vancomycin    Solution 125 milliGRAM(s) Oral every 6 hours      Vital Signs Last 24 Hrs  T(C): 36.8 (22 Apr 2019 12:17), Max: 36.8 (22 Apr 2019 12:17)  T(F): 98.2 (22 Apr 2019 12:17), Max: 98.2 (22 Apr 2019 12:17)  HR: 64 (22 Apr 2019 12:17) (55 - 80)  BP: 106/62 (22 Apr 2019 12:17) (96/67 - 134/88)  BP(mean): --  RR: 16 (22 Apr 2019 12:17) (15 - 18)  SpO2: 99% (22 Apr 2019 05:04) (99% - 100%)      Physical Exam:  Constitutional: frail looking  HEENT: NC/AT, EOMI, PERRLA, conjunctivae clear  Neck: supple; thyroid not palpable  Back: no tenderness  Respiratory: respiratory effort normal; clear to auscultation  Cardiovascular: S1S2 regular, no murmurs  Abdomen: soft, has RUQ tender, not distended, positive BS  Genitourinary: no suprapubic tenderness  Lymphatic: no LN palpable  Musculoskeletal: no muscle tenderness, no joint swelling or tenderness  Extremities: no pedal edema  Neurological/ Psychiatric: AxOx3, moving all extremities  Skin: no rashes; no palpable lesions    Labs: reviewed                                   9.3    8.17  )-----------( 76       ( 22 Apr 2019 08:15 )             30.1     04-22    133<L>  |  96  |  106<H>  ----------------------------<  179<H>  4.1   |  24  |  6.28<H>    Ca    7.8<L>      22 Apr 2019 08:15  Phos  4.2     04-22    TPro  x   /  Alb  2.3<L>  /  TBili  x   /  DBili  x   /  AST  x   /  ALT  x   /  AlkPhos  x   04-22           Culture - Blood (04.17.19 @ 19:46)    -  Escherichia coli: Detec    -  Tobramycin: S <=2    -  Piperacillin/Tazobactam: R <=8    -  Trimethoprim/Sulfamethoxazole: S <=0.5/9.5    -  Meropenem: S <=1    -  Levofloxacin: S <=1    -  Imipenem: S <=1    -  Gentamicin: S 2    -  Ertapenem: S <=0.5    -  Ciprofloxacin: S 1    -  Ceftriaxone: R >32 Enterobacter, Citrobacter, and Serratia may develop resistance during prolonged therapy    -  Cefoxitin: I 16    -  Cefepime: R >16    -  Cefazolin: R >16    -  Aztreonam: R >16    -  Ampicillin: R >16 These ampicillin results predict results for amoxicillin    -  Ampicillin/Sulbactam: R >16/8    -  Amikacin: S <=8    Gram Stain:   Growth in aerobic bottle: Gram Negative Rods    Specimen Source: .Blood None    Organism: Blood Culture PCR    Organism: Escherichia coli ESBL    Culture Results:   Growth in aerobic bottle: Escherichia coli ESBL  "Due to technical problems, Proteus sp. will Not be reported as part of  the BCID panel until further notice"  ***Blood Panel PCR results on this specimen are available  approximately 3 hours after the Gram stain result.***  Gram stain, PCR, and/or culture results may not always  correspond due to difference in methodologies.  ************************************************************  This PCR assay was performed using [employer].  The following targets are tested for: Enterococcus,  vancomycin resistant enterococci, Listeria monocytogenes,  coagulase negative staphylococci, S. aureus,  methicillin resistant S. aureus, Streptococcus agalactiae  (Group B), S. pneumoniae, S. pyogenes (Group A),  Acinetobacter baumannii, Enterobacter cloacae, E. coli,  Klebsiella oxytoca, K. pneumoniae, Proteus sp.,  Serratia marcescens, Haemophilus influenzae,  Neisseria meningitidis, Pseudomonas aeruginosa, Candida  albicans, C. glabrata, C krusei, C parapsilosis,  C. tropicalis and the KPC resistance gene.    Organism Identification: Blood Culture PCR  Escherichia coli ESBL    Method Type: PCR    Method Type: NAEL        Culture - Blood (collected 10 Apr 2019 16:29)  Source: .Blood None  Final Report (15 Apr 2019 23:00):    No growth at 5 days.    Culture - Blood (collected 10 Apr 2019 16:28)  Source: .Blood None  Final Report (15 Apr 2019 23:00):    No growth at 5 days.    Culture - Blood (04.17.19 @ 19:46)    -  Trimethoprim/Sulfamethoxazole: S <=0.5/9.5    -  Escherichia coli: Detec    -  Tobramycin: S <=2    -  Piperacillin/Tazobactam: R <=8    -  Meropenem: S <=1    -  Levofloxacin: S <=1    -  Imipenem: S <=1    -  Gentamicin: S 2    -  Ertapenem: S <=0.5    -  Ciprofloxacin: S 1    -  Ceftriaxone: R >32 Enterobacter, Citrobacter, and Serratia may develop resistance during prolonged therapy    -  Cefoxitin: I 16    -  Cefepime: R >16    -  Cefazolin: R >16    -  Aztreonam: R >16    -  Ampicillin: R >16 These ampicillin results predict results for amoxicillin    -  Ampicillin/Sulbactam: R >16/8    -  Amikacin: S <=8    Gram Stain:   Growth in aerobic bottle: Gram Negative Rods    Specimen Source: .Blood None    Organism: Blood Culture PCR    Organism: Escherichia coli ESBL    Culture Results:   Growth in aerobic bottle: Escherichia coli ESBL  "Due to technical problems, Proteus sp. will Not be reported as part of  the BCID panel until further notice"  ***Blood Panel PCR results on this specimen are available  approximately 3 hours after the Gram stain result.***  Gram stain, PCR, and/or culture results may not always  correspond due to difference in methodologies.  ************************************************************  This PCR assay was performed using [employer].  The following targets are tested for: Enterococcus,  vancomycin resistant enterococci, Listeria monocytogenes,  coagulase negative staphylococci, S. aureus,  methicillin resistant S. aureus, Streptococcus agalactiae  (Group B), S. pneumoniae, S. pyogenes (Group A),  Acinetobacter baumannii, Enterobacter cloacae, E. coli,  Klebsiella oxytoca, K. pneumoniae, Proteus sp.,  Serratia marcescens, Haemophilus influenzae,  Neisseria meningitidis, Pseudomonas aeruginosa, Candida  albicans, C. glabrata, C krusei, C parapsilosis,  C. tropicalis and the KPC resistance gene.    Organism Identification: Blood Culture PCR  Escherichia coli ESBL    Method Type: PCR    Method Type: NAEL        Radiology: all available radiological tests reviewed    Advanced directives addressed: full resuscitation

## 2019-04-22 NOTE — PROGRESS NOTE ADULT - ASSESSMENT
64 yo male with PMH of CAD, ESRD on HD, polycystic kidney disease, systolic CHF s/p AICD, HTN, adrenal insufficiency (on chronic prednisone), hypoparathyroidism, h/o cryptococcal meningitis, h/o c.diff infection, prior sepsis with Klebsiella pneumoniae, pancreatic duct stone, chronic pancreatitis cardiomyopathy on coumadin, adrenal insufficiency was admitted on 4/10 for abdominal pain and fever. He has had multiple episodes of Klebsiella septicemia due to cholangitis (was deemed not a surgical candidate for cholecystectomy), s/p recent ERCP w/ papillotomy and stone extraction at [place] 2-3 weeks PTA, recent admission (discharged 4/14/2019) for probable sepsis and cholangitis (placed on cefazolin 2gm IV qHD) was admitted on 4/17 for recurrent severe LUQ abdominal pain associated w/ recurrent fever at home (Tmax 101'F). In the ED the patient was given Acetaminophen 650mg po x 1, Hydrocortisone 100mg IVP x 1, and NS x 500mL x 1.     1. Febrile syndrome and abdominal pain improving. Sepsis with ESBL E.coli. Probable acute cholangitis/cholecystitis s/p recent ERCP/CBD stones removal. Gallstones. Prior CDAD. ESRD on HD.  - slowly improving   - blood cx growing ESBL Ecoli  - on meropenem [id number] (after hd on dialysis days) #3  - continue with abx coverage   - s/p cefazolin 2 gm IV qHD   - on vancomycin 125 mg PO q6h for prophylaxis   - given gentamicin 150 mg IV x 1 4/19  - reason for abx use and side effects reviewed with patient; monitor BMP   - US imaging suggestive shows distended GB  - GI/surgical eval noted, plan for cholecystectomy  - monitor temps  - f/u CBC  -supportive care    2. Other issues; CAD, ESRD on HD, polycystic kidney disease, systolic CHF s/p AICD, HTN, adrenal insufficiency (on chronic prednisone), hypoparathyroidism  - per medicine

## 2019-04-22 NOTE — PROGRESS NOTE ADULT - ASSESSMENT
64 yo male with h/o adrenal insufficiency, ESRD secondary to Polycystic kidney disease on HD via RUE on M/W/F, s/p bilateral nephrectomy, s/p failed transplant x 2, CAD, severe dilated cardiomyopathy EF 20%, paroxysmal atrial flutter and VT on Amiodarone and Warfarin chronically, CHF (HFrEF), s/p AICD, adrenal insufficiency, hypoparathyroidism, h/o cryptococcal meningitis, h/o c. difficile colitis, has had multiple episodes of Klebsiella septicemia due to Cholangitis (was deemed not a surgical candidate for cholecystectomy in the past), s/p recent ERCP w/ papillotomy and stone extraction at [place] 2 weeks ago, recent admission (discharged 4/14/2019) for management of sepsis due to cholangitis a/w sepsis/ bacteremia.     Adrenal Insufficiency        --has baseline AI from chronic glucocorticoid use and is well versed in sick day management which was reviewed with him today        --was initially started on stress dose GC's which was decreased yesterday and is clinically stable       --if he is discharged today, would convert to prednisone (his home glucocorticoid) & taper as follows:            --prednisone 15 mg daily x 2 days             --prednisone 10 mg daily x 4 days             --prednisone 7.5 mg daily x 4 days             --prednisone 5 mg daily indefinitely thereafter        --Glucocorticoids should not be weaned off       --the above taper was reviewed with him and he states this should keep him stable (he is well versed on GC use and how his body responds in the setting of stressors)

## 2019-04-22 NOTE — PROGRESS NOTE ADULT - SUBJECTIVE AND OBJECTIVE BOX
CHIEF COMPLAINT/Diagnosis: sepsis/ cholycystitis/ ESBL    SUBJECTIVE: no complaints    REVIEW OF SYSTEMS:    CONSTITUTIONAL: No weakness, fevers or chills  EYES/ENT: No visual changes;  No vertigo or throat pain   NECK: No pain or stiffness  RESPIRATORY: No cough, wheezing, hemoptysis; No shortness of breath  CARDIOVASCULAR: No chest pain or palpitations  GASTROINTESTINAL: No abdominal or epigastric pain. No nausea, vomiting, or hematemesis; No diarrhea or constipation. No melena or hematochezia.  GENITOURINARY: No dysuria, frequency or hematuria  NEUROLOGICAL: No numbness or weakness  SKIN: No itching, burning, rashes, or lesions   All other review of systems is negative unless indicated above    Vital Signs Last 24 Hrs  T(C): 36 (22 Apr 2019 08:44), Max: 36.9 (21 Apr 2019 11:31)  T(F): 96.8 (22 Apr 2019 08:44), Max: 98.5 (21 Apr 2019 11:31)  HR: 59 (22 Apr 2019 09:16) (59 - 80)  BP: 111/69 (22 Apr 2019 09:16) (111/69 - 134/88)  BP(mean): --  RR: 16 (22 Apr 2019 08:55) (16 - 18)  SpO2: 99% (22 Apr 2019 05:04) (99% - 100%)    I&O's Summary    21 Apr 2019 07:01  -  22 Apr 2019 07:00  --------------------------------------------------------  IN: 100 mL / OUT: 0 mL / NET: 100 mL        CAPILLARY BLOOD GLUCOSE          PHYSICAL EXAM:    Constitutional: NAD, awake and alert, well-developed  HEENT: PERR, EOMI, Normal Hearing, MMM  Neck: Soft and supple, No LAD, No JVD  Respiratory: Breath sounds are clear bilaterally, No wheezing, rales or rhonchi  Cardiovascular: S1 and S2, regular rate and rhythm, no Murmurs, gallops or rubs  Gastrointestinal: Bowel Sounds present, soft, nontender, nondistended, no guarding, no rebound  Extremities: No peripheral edema  Vascular: 2+ peripheral pulses  Neurological: A/O x 3, no focal deficits  Musculoskeletal: 5/5 strength b/l upper and lower extremities  Skin: No rashes    MEDICATIONS:  MEDICATIONS  (STANDING):  amiodarone    Tablet 100 milliGRAM(s) Oral daily  cholecalciferol 1000 Unit(s) Oral <User Schedule>  cinacalcet 30 milliGRAM(s) Oral <User Schedule>  epoetin amee Injectable 33775 Unit(s) IV Push <User Schedule>  heparin  Injectable 5000 Unit(s) SubCutaneous every 8 hours  losartan 25 milliGRAM(s) Oral daily  meropenem  IVPB      meropenem  IVPB 500 milliGRAM(s) IV Intermittent every 24 hours  metoprolol tartrate 25 milliGRAM(s) Oral daily  pantoprazole    Tablet 40 milliGRAM(s) Oral two times a day  sevelamer carbonate 2400 milliGRAM(s) Oral three times a day with meals  ursodiol Capsule 300 milliGRAM(s) Oral two times a day  vancomycin    Solution 125 milliGRAM(s) Oral every 6 hours      LABS: All Labs Reviewed:                        9.3    8.17  )-----------( 76       ( 22 Apr 2019 08:15 )             30.1     04-22    133<L>  |  96  |  106<H>  ----------------------------<  179<H>  4.1   |  24  |  6.28<H>    Ca    7.8<L>      22 Apr 2019 08:15  Phos  4.2     04-22    TPro  x   /  Alb  2.3<L>  /  TBili  x   /  DBili  x   /  AST  x   /  ALT  x   /  AlkPhos  x   04-22    PT/INR - ( 21 Apr 2019 07:46 )   PT: 22.5 sec;   INR: 1.98 ratio         PTT - ( 21 Apr 2019 07:46 )  PTT:30.9 sec      Blood Culture: 04-17 @ 19:46  Organism Blood Culture PCR  Gram Stain Blood -- Gram Stain   Growth in aerobic bottle: Gram Negative Rods  Specimen Source .Blood None  Culture-Blood --        Assessment and Plan:    62 y/o M PMHx significant for ESRD secondary to Polycystic kidney disease on HD via RUE on M/W/F, s/p bilateral nephrectomy, s/p failed transplant x 2, CAD, severe dilated cardiomyopathy, paroxysmal atrial flutter and VT on Amiodarone and Warfarin chronically, CHF (HFrEF), s/p AICD, hypertension, adrenal insufficiency (on chronic prednisone), hypoparathyroidism, h/o cryptococcal meningitis, h/o c. difficile colitis, has had multiple episodes of Klebsiella septicemia due to Cholangitis, s/p recent ERCP w/ papillotomy and stone extraction at [place] 2 weeks ago, recent admission (discharged 4/14/2019) for management of sepsis due to cholangitis presents to the ED for further evaluation of recurrent severe LUQ abdominal pain associated w/ recurrent subjective fevers at home (Tmax 101'F).     1.  Sepsis/Cholecystitis/ E.coli bacteremia:    -Cholangitis is questionable and not likley, TBilis are normal and patient is not spiking any temps.    -ID consult apprecaited >> c/w meropenem w/ oral vanco for c.diff prophylaxis (patient has hx; no acitive infection)  -4/17 >> ESBL ecoli in blood  -Repeat blood cultures done today  -Plan: symptoms completely resolved, no more abd pain; no feves, livers enzyms normal, however patient has recurrent chyolycystitis causing frequent hospitilization and need for frequent abx, further predisposing patient to ESBl and c.diff.  Best solution for patient is to remove gallbladder. Discuss with patient, he does not want IR drain placed. He does not want to live with drain, He prefers to go to OR and have cholycystectomy  -Case d/w surgery Dr. modi , whom [facility] paitent on OR schedule for Thursday 4/25, npo after midnight 4/24  -Discussed with Dr. Coello, whom will give cardiac clearnace.     3.  PCKD s/p nephrectomy/ failed transplant x2 on HD:    Cont HD as scheduled.      4.  Hx of Dilated Cardiomyopathy/ AFIB/ VT:    Cont amio, toprol, losartan.    ECHO with EF 20-25%.    Cont to hold coumadin for IR versus OR next week.    INR 2-3.      5.  Hx of recurrent c. difficile colitis  ~cont. suppressive therapy w/ Vancomycin PO    6.  Adrenal Insufficiency:    ~given stress dose Hydrocortisone in the ED  -started taper of hydrocortisone on 4/21 >>> taper more today to 30mg q12 h  Endo f/u.

## 2019-04-23 NOTE — PROGRESS NOTE ADULT - ASSESSMENT
62 yo male w extensive pmhx as above DCM, s.p renal transplant failure x 2,. hx of PCKD, and now ESRD on HD MWF  presenting multiple recent admission for Klebsiella sepsis, cholangitis, and now recurring abd pains w neg blood cultures recent admission and presenting within a week for same sx despite being in IV abx - ancef at HD     ESRD on HD  - continue HD MWF schedule   - SACHIN at HD     GNR bacteremia ESBL E Coli w persistent fevers despite IV abx   likely GB source  ++ HIDA scan    awaiting surgical planning     HTN/DCM  - on losartan and metoprolol - monitor BP and adjust if BP drops    Adrenal Inuff-    - steroid schedule per Endocrine     Thank you for the courtesy of this consult. We will follow this patient with you.   Management is subject to change if new information becomes available or patient condition changes.

## 2019-04-23 NOTE — PROGRESS NOTE ADULT - SUBJECTIVE AND OBJECTIVE BOX
62 y/o M PMHx significant for ESRD secondary to Polycystic kidney disease on HD via RUE on M/W/F, s/p bilateral nephrectomy, s/p failed transplant x 2, CAD, severe dilated cardiomyopathy EF 20%, paroxysmal atrial flutter and VT on Amiodarone and Warfarin chronically, CHF (HFrEF), s/p AICD, adrenal insufficiency (on chronic prednisone x years) - never had formal workup., hypoparathyroidism, h/o cryptococcal meningitis, h/o c. difficile colitis, has had multiple episodes of Klebsiella septicemia due to Cholangitis (was deemed not a surgical candidate for cholecystectomy in the past), s/p recent ERCP w/ papillotomy and stone extraction at [place] 2 weeks ago, recent admission (discharged 4/14/2019) for management of sepsis due to cholangitis presents to the ED for further evaluation of recurrent severe LUQ abdominal pain associated w/ recurrent subjective fevers at home (Tmax 101'F). The patient denies any associated nausea, vomiting.  Abdominal U/S => Mild ascites. Transplant kidney intact. Gallbladder is markedly distended. Cholelithiasis. Hepatic cysts. In the ED the patient was given Acetaminophen 650mg po x 1, Hydrocortisone 100mg IVP x 1, and NS x 500mL x 1.  Renal eval called for ESRD and continuing HD mgt     today   yest post HD had abd pains and fever 102  pt awaiting definitive treatment due to recurrent admission for sepsis sec to GB   + HIDA     PAST MEDICAL & SURGICAL HISTORY:  AV fistula: right UE  Leg pain, anterior, right: right anterior thigh at graft donor site  Irregular heart beat  AICD (automatic cardioverter/defibrillator) present  Dialysis patient  Adrenal insufficiency  Hypoparathyroidism  HFrEF (heart failure with reduced ejection fraction)  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  S/P colonoscopy: last done 2016  AICD (automatic cardioverter/defibrillator) present: 2013  H/O hernia repair  A-V fistula: 1995 left UE  right arm 2007,  H/O kidney transplant: 1985, 1995, 1997      MEDICATIONS  (STANDING):  amiodarone    Tablet 100 milliGRAM(s) Oral daily  cholecalciferol 1000 Unit(s) Oral <User Schedule>  cinacalcet 30 milliGRAM(s) Oral <User Schedule>  epoetin amee Injectable 40141 Unit(s) IV Push <User Schedule>  heparin  Injectable 5000 Unit(s) SubCutaneous every 8 hours  hydrocortisone sodium succinate Injectable 30 milliGRAM(s) IV Push two times a day  losartan 25 milliGRAM(s) Oral daily  meropenem  IVPB      meropenem  IVPB 500 milliGRAM(s) IV Intermittent every 24 hours  metoprolol tartrate 25 milliGRAM(s) Oral daily  pantoprazole    Tablet 40 milliGRAM(s) Oral two times a day  phytonadione  IVPB 5 milliGRAM(s) IV Intermittent once  sevelamer carbonate 2400 milliGRAM(s) Oral three times a day with meals  ursodiol Capsule 300 milliGRAM(s) Oral two times a day  vancomycin    Solution 125 milliGRAM(s) Oral every 6 hours    MEDICATIONS  (PRN):  acetaminophen   Tablet .. 650 milliGRAM(s) Oral every 6 hours PRN Temp greater or equal to 38C (100.4F), Mild Pain (1 - 3)  ondansetron Injectable 4 milliGRAM(s) IV Push every 6 hours PRN Nausea  oxyCODONE    5 mG/acetaminophen 325 mG 1 Tablet(s) Oral every 6 hours PRN Moderate Pain (4 - 6)      Allergies    No Known Allergies    Intolerances    SOCIAL HISTORY:  Denies ETOh,Smoking,     FAMILY HISTORY:  Family history of kidney disease (Mother)  Family history of colon cancer (Sibling)      REVIEW OF SYSTEMS:    CONSTITUTIONAL: No weakness, fevers or chills  EYES/ENT: No visual changes;  No vertigo or throat pain   NECK: No pain or stiffness  RESPIRATORY: No cough, wheezing, hemoptysis; No shortness of breath  CARDIOVASCULAR: No chest pain or palpitations  GASTROINTESTINAL: No abdominal or epigastric pain. No nausea, vomiting, or hematemesis; No diarrhea or constipation. No melena or hematochezia.  GENITOURINARY: No dysuria, frequency or hematuria  NEUROLOGICAL: No numbness or weakness  SKIN: No itching, burning, rashes, or lesions   All other review of systems is negative unless indicated above.    Vital Signs Last 24 Hrs  T(C): 36.4 (23 Apr 2019 11:25), Max: 39.3 (22 Apr 2019 21:16)  T(F): 97.5 (23 Apr 2019 11:25), Max: 102.7 (22 Apr 2019 21:16)  HR: 65 (23 Apr 2019 11:25) (65 - 96)  BP: 118/74 (23 Apr 2019 11:25) (103/68 - 139/92)  BP(mean): 92 (23 Apr 2019 04:37) (92 - 92)  RR: 16 (23 Apr 2019 11:25) (16 - 17)  SpO2: 99% (23 Apr 2019 11:25) (95% - 99%)      PHYSICAL EXAM:    Constitutional: NAD  HEENT:, EOMI,  MMM  Neck: No LAD, No JVD  Respiratory: CTAB  Cardiovascular: S1 and S2  Gastrointestinal: BS+, soft, NT/ND  Extremities: No peripheral edema  Neurological: A/O x 3, no focal deficits  Psychiatric: Normal mood, normal affect  : No Amaya  Skin: No rashes  Access:AVF++     LABS:      133    |  96     |  106    ----------------------------<  179       22 Apr 2019 08:15  4.1     |  24     |  6.28     137    |  97     |  89     ----------------------------<  252       21 Apr 2019 13:32  4.3     |  24     |  5.31     139    |  98     |  84     ----------------------------<  227       19 Apr 2019 13:15  4.4     |  24     |  5.88     Ca    7.8        22 Apr 2019 08:15  Ca    7.7        21 Apr 2019 13:32    Phos  4.2       22 Apr 2019 08:15  Phos  5.3       19 Apr 2019 13:15      TPro  x      /  Alb  2.3    /  TBili  x      /        22 Apr 2019 08:15  DBili  x      /  AST  x      /  ALT  x      /  AlkPhos  x        TPro  6.3    /  Alb  2.4    /  TBili  0.6    /        21 Apr 2019 13:32  DBili  x      /  AST  5      /  ALT  <6     /  AlkPhos  76                               9.3    8.17  )-----------( 76       ( 22 Apr 2019 08:15 )             30.1                         9.1    6.82  )-----------( 70       ( 21 Apr 2019 13:32 )             30.6

## 2019-04-23 NOTE — PROVIDER CONTACT NOTE (OTHER) - DATE AND TIME:
18-Apr-2019 09:17
18-Apr-2019 10:32
18-Apr-2019 01:26
18-Apr-2019 01:27
19-Apr-2019 17:23
22-Apr-2019 22:50
23-Apr-2019 21:20

## 2019-04-23 NOTE — PROGRESS NOTE ADULT - SUBJECTIVE AND OBJECTIVE BOX
CC:Patient is a 63y old  Male who presents with a chief complaint of Abdominal pain and fevers (23 Apr 2019 12:39)      Subjective:  Pt seen and examined at bedside with chaperone. Pt is AAOx3, pt in no acute distress. Pt denied c/o fever, chills, chest pain, SOB, abd pain, N/V/D, extremity pain or dysfunction, hemoptysis, hematemesis, hematuria, hematochexia, headache, diplopia, vertigo, dizzyness. Pt tolerating diet, (+) oob, (+) bowel function    ROS:  as abovementioned otherwise negative ROS    Vital Signs Last 24 Hrs  T(C): 36.4 (23 Apr 2019 11:25), Max: 39.3 (22 Apr 2019 21:16)  T(F): 97.5 (23 Apr 2019 11:25), Max: 102.7 (22 Apr 2019 21:16)  HR: 65 (23 Apr 2019 11:25) (65 - 96)  BP: 118/74 (23 Apr 2019 11:25) (103/68 - 139/92)  BP(mean): 92 (23 Apr 2019 04:37) (92 - 92)  RR: 16 (23 Apr 2019 11:25) (16 - 17)  SpO2: 99% (23 Apr 2019 11:25) (95% - 99%)    Labs:                                9.3    8.17  )-----------( 76       ( 22 Apr 2019 08:15 )             30.1     CBC Full  -  ( 22 Apr 2019 08:15 )  WBC Count : 8.17 K/uL  RBC Count : 3.18 M/uL  Hemoglobin : 9.3 g/dL  Hematocrit : 30.1 %  Platelet Count - Automated : 76 K/uL  Mean Cell Volume : 94.7 fl  Mean Cell Hemoglobin : 29.2 pg  Mean Cell Hemoglobin Concentration : 30.9 gm/dL  Auto Neutrophil # : 7.09 K/uL  Auto Lymphocyte # : 0.36 K/uL  Auto Monocyte # : 0.67 K/uL  Auto Eosinophil # : 0.01 K/uL  Auto Basophil # : 0.01 K/uL  Auto Neutrophil % : 86.8 %  Auto Lymphocyte % : 4.4 %  Auto Monocyte % : 8.2 %  Auto Eosinophil % : 0.1 %  Auto Basophil % : 0.1 %    04-22    133<L>  |  96  |  106<H>  ----------------------------<  179<H>  4.1   |  24  |  6.28<H>    Ca    7.8<L>      22 Apr 2019 08:15  Phos  4.2     04-22    TPro  x   /  Alb  2.3<L>  /  TBili  x   /  DBili  x   /  AST  x   /  ALT  x   /  AlkPhos  x   04-22    LIVER FUNCTIONS - ( 22 Apr 2019 08:15 )  Alb: 2.3 g/dL / Pro: x     / ALK PHOS: x     / ALT: x     / AST: x     / GGT: x           PT/INR - ( 23 Apr 2019 07:32 )   PT: 24.0 sec;   INR: 2.11 ratio               Meds:  acetaminophen   Tablet .. 650 milliGRAM(s) Oral every 6 hours PRN  amiodarone    Tablet 100 milliGRAM(s) Oral daily  cholecalciferol 1000 Unit(s) Oral <User Schedule>  cinacalcet 30 milliGRAM(s) Oral <User Schedule>  epoetin amee Injectable 82762 Unit(s) IV Push <User Schedule>  heparin  Injectable 5000 Unit(s) SubCutaneous every 8 hours  hydrocortisone sodium succinate Injectable 30 milliGRAM(s) IV Push two times a day  losartan 25 milliGRAM(s) Oral daily  meropenem  IVPB      meropenem  IVPB 500 milliGRAM(s) IV Intermittent every 24 hours  metoprolol tartrate 25 milliGRAM(s) Oral daily  ondansetron Injectable 4 milliGRAM(s) IV Push every 6 hours PRN  oxyCODONE    5 mG/acetaminophen 325 mG 1 Tablet(s) Oral every 6 hours PRN  pantoprazole    Tablet 40 milliGRAM(s) Oral two times a day  sevelamer carbonate 2400 milliGRAM(s) Oral three times a day with meals  ursodiol Capsule 300 milliGRAM(s) Oral two times a day  vancomycin    Solution 125 milliGRAM(s) Oral every 6 hours      Radiology:  < from: [facility] Hepatobiliary Imaging (04.19.19 @ 12:00) >  EXAM:  [facility] HEPATOBILIARY IMG                            PROCEDURE DATE:  04/19/2019          INTERPRETATION:  RADIOPHARMACEUTICAL: 3.3 mCi Tc-99m-Mebrofenin, I.V.    CLINICAL INFORMATION: 63 year old male with right upper quadrant   abdominal pain, distended gallbladder and cholelithiasis on sonogram,   referred to evaluate for acute cholecystitis    TECHNIQUE: Dynamic imaging of the anterior abdomen was performed for 90   minutes following injection of radiotracer. Static images of the abdomen   in the anterior, right lateral and right anterior oblique views were   obtained at 2 hours post tracer injection    COMPARISON: Previous hepatobiliary scan dated 3/19/2019    FINDINGS: There is prompt, heterogeneous uptake of radiotracer by the   hepatocytes. Activity is first seen in the bowel at 30 minutes. The   gallbladder is not seen at any time during the study. There is good   clearance of activity from the liver by the end of the study.    IMPRESSION: Abnormal hepatobiliary scan.    Nonvisualization of the gallbladder, new finding since the previous study   dated 3/19/2019.  In the proper clinical setting, this finding is   consistent with acute cholecystitis.     Heterogeneous tracer distribution in the liver likely related to   polycystic disease.      Dr. Quintero was notified of these results by Dr. Guy via telephone on   4/19/2019 at approximately 12:55 PM, with read back.                BRIANA GUY   This document has been electronically signed. Apr 19 2019 12:59PM    < end of copied text >  < from: CT Abdomen and Pelvis No Cont (04.10.19 @ 12:52) >  EXAM:  CT ABDOMEN AND PELVIS                            PROCEDURE DATE:  04/10/2019          INTERPRETATION:  CT ABDOMEN AND PELVIS    CLINICAL INFORMATION: Upper abdominal pain after procedure. ERCP      COMPARISON: Same day abdominal ultrasound, CT abdomen and pelvis   12/21/2018    PROCEDURE:   CT of the Abdomen and Pelvis was performed without intravenous contrast.   Oral contrast: None.  Sagittal and coronal reformats were performed.    FINDINGS:    LOWER CHEST: Clear.    LIVER: Polycysticliver disease.  BILE DUCTS: Nondilated.  GALLBLADDER: Gallstones.  SPLEEN: Stable mild splenomegaly.  PANCREAS: Parenchymal calcifications suggestive of chronic pancreatitis.   Decreased diffuse main duct dilatation but persistent large intraductal   calculus.  ADRENALS: Not well seen.  KIDNEYS/URETERS: Native kidneys not seen. Right lower quadrant renal   allograft shows stable cortical thinning. No hydronephrosis or   perinephric collection.    BLADDER: Bladder diverticula.  REPRODUCTIVE ORGANS: Unremarkable prostate and seminal vesicles.    BOWEL: No bowel-related abnormality. Specifically, no bowel obstruction.  PERITONEUM: Small ascites. No free air.  VESSELS:  Aortoiliac atherosclerosis without aneurysm.  RETROPERITONEUM: No adenopathy.   ABDOMINAL WALL: Normal.  BONES: No acute bony abnormality.    IMPRESSION:     No free air or evidence of bowel perforation.    Gallstones within a distended gallbladder.    Polycystic liver disease.    Chronic pancreatitis with decreased diffuse dilatation of the main   pancreatic duct and persistent large intraductal calculus.    Small ascites.                DAWSON CARY   This document has been electronically signed. Apr 10 2019  2:27PM        < end of copied text >      Physical exam:  Pt is aaox3  Pt in no acute distress  Resp: CTAB  CVS: S1S2(+)  ABD: bowel sounds (+), soft, non distended, no rebound, no guarding, no rigidity, no skin changes to exam. No tenderness to exam  EXT: no calf tenderness or edema to exam b/l, on VTE prophylaxis  Skin: no skin changes to exam, no jaundice or icteric sclera b/l

## 2019-04-23 NOTE — PROVIDER CONTACT NOTE (OTHER) - NAME OF MD/NP/PA/DO NOTIFIED:
JOSEFA SPOKE WITH NASIM FROM ANSWERING SERVICE.
LIANET, SPOKE WITH MD AND AWARE OF CONSULT.
Dr Srivastava
Dr. Coello
MD Agustin
MD Allen
dr. modi
dr. quezada

## 2019-04-23 NOTE — PROGRESS NOTE ADULT - SUBJECTIVE AND OBJECTIVE BOX
CHIEF COMPLAINT/Diagnosis: cholycystitis/esbl    SUBJECTIVE: no complaints    REVIEW OF SYSTEMS:    CONSTITUTIONAL: No weakness, fevers or chills  EYES/ENT: No visual changes;  No vertigo or throat pain   NECK: No pain or stiffness  RESPIRATORY: No cough, wheezing, hemoptysis; No shortness of breath  CARDIOVASCULAR: No chest pain or palpitations  GASTROINTESTINAL: No abdominal or epigastric pain. No nausea, vomiting, or hematemesis; No diarrhea or constipation. No melena or hematochezia.  GENITOURINARY: No dysuria, frequency or hematuria  NEUROLOGICAL: No numbness or weakness  SKIN: No itching, burning, rashes, or lesions   All other review of systems is negative unless indicated above    Vital Signs Last 24 Hrs  T(C): 36.4 (23 Apr 2019 11:25), Max: 39.3 (22 Apr 2019 21:16)  T(F): 97.5 (23 Apr 2019 11:25), Max: 102.7 (22 Apr 2019 21:16)  HR: 65 (23 Apr 2019 11:25) (65 - 96)  BP: 118/74 (23 Apr 2019 11:25) (103/68 - 139/92)  BP(mean): 92 (23 Apr 2019 04:37) (92 - 92)  RR: 16 (23 Apr 2019 11:25) (16 - 17)  SpO2: 99% (23 Apr 2019 11:25) (95% - 99%)    I&O's Summary      CAPILLARY BLOOD GLUCOSE          PHYSICAL EXAM:    Constitutional: NAD, awake and alert, well-developed  HEENT: PERR, EOMI, Normal Hearing, MMM  Neck: Soft and supple, No LAD, No JVD  Respiratory: Breath sounds are clear bilaterally, No wheezing, rales or rhonchi  Cardiovascular: S1 and S2, regular rate and rhythm, no Murmurs, gallops or rubs  Gastrointestinal: Bowel Sounds present, soft, nontender, nondistended, no guarding, no rebound  Extremities: No peripheral edema  Vascular: 2+ peripheral pulses  Neurological: A/O x 3, no focal deficits  Musculoskeletal: 5/5 strength b/l upper and lower extremities  Skin: No rashes    MEDICATIONS:  MEDICATIONS  (STANDING):  amiodarone    Tablet 100 milliGRAM(s) Oral daily  cholecalciferol 1000 Unit(s) Oral <User Schedule>  cinacalcet 30 milliGRAM(s) Oral <User Schedule>  epoetin amee Injectable 83677 Unit(s) IV Push <User Schedule>  hydrocortisone sodium succinate Injectable 30 milliGRAM(s) IV Push two times a day  losartan 25 milliGRAM(s) Oral daily  meropenem  IVPB      meropenem  IVPB 500 milliGRAM(s) IV Intermittent every 24 hours  metoprolol tartrate 25 milliGRAM(s) Oral daily  pantoprazole    Tablet 40 milliGRAM(s) Oral two times a day  sevelamer carbonate 2400 milliGRAM(s) Oral three times a day with meals  ursodiol Capsule 300 milliGRAM(s) Oral two times a day  vancomycin    Solution 125 milliGRAM(s) Oral every 6 hours      LABS: All Labs Reviewed:                        9.3    8.17  )-----------( 76       ( 22 Apr 2019 08:15 )             30.1     04-22    133<L>  |  96  |  106<H>  ----------------------------<  179<H>  4.1   |  24  |  6.28<H>    Ca    7.8<L>      22 Apr 2019 08:15  Phos  4.2     04-22    TPro  x   /  Alb  2.3<L>  /  TBili  x   /  DBili  x   /  AST  x   /  ALT  x   /  AlkPhos  x   04-22    PT/INR - ( 23 Apr 2019 07:32 )   PT: 24.0 sec;   INR: 2.11 ratio               Blood Culture: 04-22 @ 08:15  Organism --  Gram Stain Blood -- Gram Stain --  Specimen Source .Blood Blood  Culture-Blood --            Assessment and Plan:    62 y/o M PMHx significant for ESRD secondary to Polycystic kidney disease on HD via RUE on M/W/F, s/p bilateral nephrectomy, s/p failed transplant x 2, CAD, severe dilated cardiomyopathy, paroxysmal atrial flutter and VT on Amiodarone and Warfarin chronically, CHF (HFrEF), s/p AICD, hypertension, adrenal insufficiency (on chronic prednisone), hypoparathyroidism, h/o cryptococcal meningitis, h/o c. difficile colitis, has had multiple episodes of Klebsiella septicemia due to Cholangitis, s/p recent ERCP w/ papillotomy and stone extraction at [facility] 2 weeks ago, recent admission (discharged 4/14/2019) for management of sepsis due to cholangitis presents to the ED for further evaluation of recurrent severe LUQ abdominal pain associated w/ recurrent subjective fevers at home (Tmax 101'F).     1.  Sepsis/Cholecystitis/ E.coli bacteremia:    -Cholangitis is questionable and not likley, TBilis are normal and patient is not spiking any temps.    -ID consult apprecaited >> c/w meropenem w/ oral vanco for c.diff prophylaxis (patient has hx; no acitive infection)  -4/17 >> ESBL ecoli in blood  -Repeat blood cultures done today  -Plan: symptoms completely resolved, no more abd pain; no feves, livers enzyms normal, however patient has recurrent chyolycystitis causing frequent hospitilization and need for frequent abx, further predisposing patient to ESBl and c.diff.  Best solution for patient is to remove gallbladder. Discuss with patient, he does not want IR drain placed. He does not want to live with drain, He prefers to go to OR and have cholycystectomy  -surgical evaluation >> high risk w/ complicated anatomy, recco IR drain  -IR consult placed  -IV vitamin K to be given today >> INR in ma  -anticipated  dc tomamrrow      3.  PCKD s/p nephrectomy/ failed transplant x2 on HD:    Cont HD as scheduled.      4.  Hx of Dilated Cardiomyopathy/ AFIB/ VT:    Cont amio, toprol, losartan.    ECHO with EF 20-25%.    Cont to hold coumadin for IR versus OR next week.    INR 2-3.      5.  Hx of recurrent c. difficile colitis  ~cont. suppressive therapy w/ Vancomycin PO    6.  Adrenal Insufficiency:    ~given stress dose Hydrocortisone in the ED  -started taper of hydrocortisone on 4/21 >>> taper more today to 30mg q12 h  Endo f/u.

## 2019-04-23 NOTE — PROGRESS NOTE ADULT - SUBJECTIVE AND OBJECTIVE BOX
Patient is a 63y old  Male who presents with a chief complaint of Abdominal pain and fevers (23 Apr 2019 13:40)      HPI:  pt feeling ok today  minimal ruq pain  fever overnight    PAST MEDICAL & SURGICAL HISTORY:  Oliguria  Arm swelling: left arm  AV fistula: right UE  Leg pain, anterior, right: right anterior thigh at graft donor site  Irregular heart beat  AICD (automatic cardioverter/defibrillator) present  Dialysis patient  Adrenal insufficiency  Hypoparathyroidism  HFrEF (heart failure with reduced ejection fraction)  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  S/P colonoscopy: last done 2016  AICD (automatic cardioverter/defibrillator) present: 2013  H/O hernia repair  A-V fistula: 1995 left UE  right arm 2007,  H/O kidney transplant: 1985, 1995, 1997    MEDICATIONS  (STANDING):  amiodarone    Tablet 100 milliGRAM(s) Oral daily  cholecalciferol 1000 Unit(s) Oral <User Schedule>  cinacalcet 30 milliGRAM(s) Oral <User Schedule>  epoetin amee Injectable 14357 Unit(s) IV Push <User Schedule>  heparin  Injectable 5000 Unit(s) SubCutaneous every 8 hours  hydrocortisone sodium succinate Injectable 30 milliGRAM(s) IV Push two times a day  losartan 25 milliGRAM(s) Oral daily  meropenem  IVPB      meropenem  IVPB 500 milliGRAM(s) IV Intermittent every 24 hours  metoprolol tartrate 25 milliGRAM(s) Oral daily  pantoprazole    Tablet 40 milliGRAM(s) Oral two times a day  sevelamer carbonate 2400 milliGRAM(s) Oral three times a day with meals  ursodiol Capsule 300 milliGRAM(s) Oral two times a day  vancomycin    Solution 125 milliGRAM(s) Oral every 6 hours    MEDICATIONS  (PRN):  acetaminophen   Tablet .. 650 milliGRAM(s) Oral every 6 hours PRN Temp greater or equal to 38C (100.4F), Mild Pain (1 - 3)  ondansetron Injectable 4 milliGRAM(s) IV Push every 6 hours PRN Nausea  oxyCODONE    5 mG/acetaminophen 325 mG 1 Tablet(s) Oral every 6 hours PRN Moderate Pain (4 - 6)    Allergies  No Known Allergies    REVIEW OF SYSTEMS:    CONSTITUTIONAL: as above  RESPIRATORY: No cough, wheezing, hemoptysis; No shortness of breath  CARDIOVASCULAR: No chest pain or palpitations  GASTROINTESTINAL: as above  All other review of systems is negative unless indicated above.    Vital Signs Last 24 Hrs  T(C): 36.4 (23 Apr 2019 11:25), Max: 39.3 (22 Apr 2019 21:16)  T(F): 97.5 (23 Apr 2019 11:25), Max: 102.7 (22 Apr 2019 21:16)  HR: 65 (23 Apr 2019 11:25) (65 - 96)  BP: 118/74 (23 Apr 2019 11:25) (103/68 - 139/92)  BP(mean): 92 (23 Apr 2019 04:37) (92 - 92)  RR: 16 (23 Apr 2019 11:25) (16 - 17)  SpO2: 99% (23 Apr 2019 11:25) (95% - 99%)    PHYSICAL EXAM:    Constitutional: NAD  Respiratory: CTA  Cardiovascular: S1 and S2  Gastrointestinal: BS+, soft, NT/ND      LABS:                        9.3    8.17  )-----------( 76       ( 22 Apr 2019 08:15 )             30.1     04-22    133<L>  |  96  |  106<H>  ----------------------------<  179<H>  4.1   |  24  |  6.28<H>    Ca    7.8<L>      22 Apr 2019 08:15  Phos  4.2     04-22    TPro  x   /  Alb  2.3<L>  /  TBili  x   /  DBili  x   /  AST  x   /  ALT  x   /  AlkPhos  x   04-22    PT/INR - ( 23 Apr 2019 07:32 )   PT: 24.0 sec;   INR: 2.11 ratio           LIVER FUNCTIONS - ( 22 Apr 2019 08:15 )  Alb: 2.3 g/dL / Pro: x     / ALK PHOS: x     / ALT: x     / AST: x     / GGT: x             RADIOLOGY & ADDITIONAL STUDIES:

## 2019-04-23 NOTE — PROGRESS NOTE ADULT - SUBJECTIVE AND OBJECTIVE BOX
Date of service: 04-23-19 @ 10:57      Patient has been having intermittent fevers; no diarrhea, has mild right upper quadrant pain  Sitting in chair, no other specific complaints      ROS: no  chills; denies dizziness, no HA, no SOB or cough, no diarrhea or constipation; no dysuria, no urinary frequency, no legs pain, no rashes    MEDICATIONS  (STANDING):  amiodarone    Tablet 100 milliGRAM(s) Oral daily  cholecalciferol 1000 Unit(s) Oral <User Schedule>  cinacalcet 30 milliGRAM(s) Oral <User Schedule>  epoetin amee Injectable 68332 Unit(s) IV Push <User Schedule>  heparin  Injectable 5000 Unit(s) SubCutaneous every 8 hours  hydrocortisone sodium succinate Injectable 30 milliGRAM(s) IV Push two times a day  losartan 25 milliGRAM(s) Oral daily  meropenem  IVPB      meropenem  IVPB 500 milliGRAM(s) IV Intermittent every 24 hours  metoprolol tartrate 25 milliGRAM(s) Oral daily  pantoprazole    Tablet 40 milliGRAM(s) Oral two times a day  sevelamer carbonate 2400 milliGRAM(s) Oral three times a day with meals  ursodiol Capsule 300 milliGRAM(s) Oral two times a day  vancomycin    Solution 125 milliGRAM(s) Oral every 6 hours    MEDICATIONS  (PRN):  acetaminophen   Tablet .. 650 milliGRAM(s) Oral every 6 hours PRN Temp greater or equal to 38C (100.4F), Mild Pain (1 - 3)  ondansetron Injectable 4 milliGRAM(s) IV Push every 6 hours PRN Nausea  oxyCODONE    5 mG/acetaminophen 325 mG 1 Tablet(s) Oral every 6 hours PRN Moderate Pain (4 - 6)      Vital Signs Last 24 Hrs  T(C): 36.8 (23 Apr 2019 04:37), Max: 39.3 (22 Apr 2019 21:16)  T(F): 98.3 (23 Apr 2019 04:37), Max: 102.7 (22 Apr 2019 21:16)  HR: 72 (23 Apr 2019 04:37) (60 - 96)  BP: 122/83 (23 Apr 2019 04:37) (98/49 - 139/92)  BP(mean): 92 (23 Apr 2019 04:37) (92 - 92)  RR: 17 (23 Apr 2019 04:37) (16 - 17)  SpO2: 99% (23 Apr 2019 04:37) (95% - 99%)    Physical Exam:          Physical Exam:  Constitutional: frail looking  HEENT: NC/AT, EOMI, PERRLA, conjunctivae clear  Neck: supple; thyroid not palpable  Back: no tenderness  Respiratory: respiratory effort normal; clear to auscultation  Cardiovascular: S1S2 regular, no murmurs  Abdomen: soft, has RUQ tender, not distended, positive BS  Genitourinary: no suprapubic tenderness  Musculoskeletal: no muscle tenderness, no joint swelling or tenderness  Extremities: no pedal edema  Neurological/ Psychiatric: AxOx3, moving all extremities  Skin: no rashes; no palpable lesions    Labs: reviewed                    Labs:                        9.3    8.17  )-----------( 76       ( 22 Apr 2019 08:15 )             30.1     04-22    133<L>  |  96  |  106<H>  ----------------------------<  179<H>  4.1   |  24  |  6.28<H>    Ca    7.8<L>      22 Apr 2019 08:15  Phos  4.2     04-22    TPro  x   /  Alb  2.3<L>  /  TBili  x   /  DBili  x   /  AST  x   /  ALT  x   /  AlkPhos  x   04-22           Cultures:       Culture - Blood (collected 04-17-19 @ 19:46)  Source: .Blood None  Gram Stain (04-18-19 @ 14:33):    Growth in aerobic bottle: Gram Negative Rods  Final Report (04-20-19 @ 11:38):    Growth in aerobic bottle: Escherichia coli ESBL    "Due to technical problems, Proteus sp. will Not be reported as part of    the BCID panel until further notice"    ***Blood Panel PCR results on this specimen are available    approximately 3 hours after the Gram stain result.***    Gram stain, PCR, and/or culture results may not always    correspond due to difference in methodologies.    ************************************************************    This PCR assay was performed using [employer].    The following targets are tested for: Enterococcus,    vancomycin resistant enterococci, Listeria monocytogenes,    coagulase negative staphylococci, S. aureus,    methicillin resistant S. aureus, Streptococcus agalactiae    (Group B), S. pneumoniae, S. pyogenes (Group A),    Acinetobacter baumannii, Enterobacter cloacae, E. coli,    Klebsiella oxytoca, K. pneumoniae, Proteus sp.,    Serratia marcescens, Haemophilus influenzae,    Neisseria meningitidis, Pseudomonas aeruginosa, Candida    albicans, C. glabrata, C krusei, C parapsilosis,    C. tropicalis and the KPC resistance gene.  Organism: Blood Culture PCR  Escherichia coli ESBL (04-20-19 @ 11:38)  Organism: Escherichia coli ESBL (04-20-19 @ 11:38)      -  Amikacin: S <=8      -  Ampicillin: R >16 These ampicillin results predict results for amoxicillin      -  Ampicillin/Sulbactam: R >16/8      -  Aztreonam: R >16      -  Cefazolin: R >16      -  Cefepime: R >16      -  Cefoxitin: I 16      -  Ceftriaxone: R >32 Enterobacter, Citrobacter, and Serratia may develop resistance during prolonged therapy      -  Ciprofloxacin: S 1      -  Ertapenem: S <=0.5      -  Gentamicin: S 2      -  Imipenem: S <=1      -  Levofloxacin: S <=1      -  Meropenem: S <=1      -  Piperacillin/Tazobactam: R <=8      -  Tobramycin: S <=2      -  Trimethoprim/Sulfamethoxazole: S <=0.5/9.5      Method Type: NAEL  Organism: Blood Culture PCR (04-20-19 @ 11:38)      -  Escherichia coli: Detec      Method Type: PCR    Culture - Urine (collected 04-17-19 @ 07:20)  Source: .Urine Clean Catch (Midstream)  Final Report (04-20-19 @ 11:15):    No growth          Radiology: all available radiological tests reviewed    Advanced directives addressed: full resuscitation

## 2019-04-23 NOTE — PROGRESS NOTE ADULT - ASSESSMENT
62 yo male with PMH of CAD, ESRD on HD, polycystic kidney disease, systolic CHF s/p AICD, HTN, adrenal insufficiency (on chronic prednisone), hypoparathyroidism, h/o cryptococcal meningitis, h/o c.diff infection, prior sepsis with Klebsiella pneumoniae, pancreatic duct stone, chronic pancreatitis cardiomyopathy on coumadin, adrenal insufficiency was admitted on 4/10 for abdominal pain and fever. He has had multiple episodes of Klebsiella septicemia due to cholangitis (was deemed not a surgical candidate for cholecystectomy), s/p recent ERCP w/ papillotomy and stone extraction at [facility] 2-3 weeks PTA, recent admission (discharged 4/14/2019) for probable sepsis and cholangitis (placed on cefazolin 2gm IV qHD) was admitted on 4/17 for recurrent severe LUQ abdominal pain associated w/ recurrent fever at home (Tmax 101'F). In the ED the patient was given Acetaminophen 650mg po x 1, Hydrocortisone 100mg IVP x 1, and NS x 500mL x 1.     1. Febrile syndrome and abdominal pain improving. Sepsis with ESBL E.coli. Probable acute cholangitis/cholecystitis s/p recent ERCP/CBD stones removal. Gallstones. Prior CDAD. ESRD on HD.  - slowly improving   - blood cx growing ESBL Ecoli  - repeat blood cultures are pending  - on meropenem [id number] (after hd on dialysis days) #4  - continue with abx coverage   - s/p cefazolin 2 gm IV qHD   - on vancomycin 125 mg PO q6h for prophylaxis   - given gentamicin 150 mg IV x 1 4/19  - reason for abx use and side effects reviewed with patient; monitor BMP   - US imaging suggestive shows distended GB  - GI/surgical eval noted, plan for cholecystectomy  - monitor temps  - f/u CBC  -supportive care    2. Other issues; CAD, ESRD on HD, polycystic kidney disease, systolic CHF s/p AICD, HTN, adrenal insufficiency (on chronic prednisone), hypoparathyroidism  - per medicine

## 2019-04-23 NOTE — PROGRESS NOTE ADULT - ASSESSMENT
1. Recurrent cholangitis s/p recent ERCP. Patient prefers cholecsystectomy to percutaneous GB drainage.   2. HFrEF  EF 20-25%. Compensated.   3. CAD , stable. No angina.   4. Atrial and ventricular arryhtmias/ICD, stable  5.ESRD.  PLAN:  Continue amiodarone, losartan, metoprolol, anticoagulation for stroke prevention. Continue antibiotics per Id  await surgical eval to consider cholycstectomy

## 2019-04-23 NOTE — PROGRESS NOTE ADULT - SUBJECTIVE AND OBJECTIVE BOX
Patient is a 63y old  Male who presents with a chief complaint of Abdominal pain and fevers (22 Apr 2019 16:39)      HPI:  62 y/o M PMHx significant for ESRD secondary to Polycystic kidney disease on HD via RUE on M/W/F, s/p bilateral nephrectomy, s/p failed transplant x 2, CAD, severe dilated cardiomyopathy, paroxysmal atrial flutter and VT on Amiodarone and Warfarin chronically, CHF (HFrEF), s/p AICD, hypertension, adrenal insufficiency (on chronic prednisone), hypoparathyroidism, h/o cryptococcal meningitis, h/o c. difficile colitis, has had multiple episodes of Klebsiella septicemia due to Cholangitis (was deemed not a surgical candidate for cholecystectomy in the past), s/p recent ERCP w/ papillotomy and stone extraction at [place] 2 weeks ago, recent admission (discharged 4/14/2019) for management of sepsis due to cholangitis presents to the ED for further evaluation of recurrent severe LUQ abdominal pain associated w/ recurrent subjective fevers at home (Tmax 101'F). The patient denies any associated nausea, vomiting. Labs => Hgb/Hct 9.8/32.6, PLT 78, LA 1.8, HCO3 36, BUN/Cr 46/3.96, , Alb 2.5. Abdominal U/S => Mild ascites. Transplant kidney intact. Gallbladder is markedly distended. Cholelithiasis. Hepatic cysts. In the ED the patient was given Acetaminophen 650mg po x 1, Hydrocortisone 100mg IVP x 1, and NS x 500mL x 1. (17 Apr 2019 23:01)  4/20 Cardiology: 63 year old male with pckd,esrd, dcm wih ef 25% VT, cad admitted with recurrent sepsis due to cholangities. from cardiac view patient has been feeling  well with out chest pain dyspnea or palpitations. no pnd orthopnea or le edema. patient also has afib/flutter. patients abdominal pain is improved  4/21 no complaints. oob to chair  4/22- no complaints. He states he prefers cholecystectomy to percutaneous GB drainage.   4/23 Temp to 102 last night with mild abd pain. better today    PAST MEDICAL & SURGICAL HISTORY:  Oliguria  Arm swelling: left arm  AV fistula: right UE  Leg pain, anterior, right: right anterior thigh at graft donor site  Irregular heart beat  AICD (automatic cardioverter/defibrillator) present  Dialysis patient  Adrenal insufficiency  Hypoparathyroidism  HFrEF (heart failure with reduced ejection fraction)  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  S/P colonoscopy: last done 2016  AICD (automatic cardioverter/defibrillator) present: 2013  H/O hernia repair  A-V fistula: 1995 left UE  right arm 2007,  H/O kidney transplant: 1985, 1995, 1997        MEDICATIONS  (STANDING):  amiodarone    Tablet 100 milliGRAM(s) Oral daily  cholecalciferol 1000 Unit(s) Oral <User Schedule>  cinacalcet 30 milliGRAM(s) Oral <User Schedule>  epoetin amee Injectable 76377 Unit(s) IV Push <User Schedule>  heparin  Injectable 5000 Unit(s) SubCutaneous every 8 hours  hydrocortisone sodium succinate Injectable 30 milliGRAM(s) IV Push two times a day  losartan 25 milliGRAM(s) Oral daily  meropenem  IVPB      meropenem  IVPB 500 milliGRAM(s) IV Intermittent every 24 hours  metoprolol tartrate 25 milliGRAM(s) Oral daily  pantoprazole    Tablet 40 milliGRAM(s) Oral two times a day  sevelamer carbonate 2400 milliGRAM(s) Oral three times a day with meals  ursodiol Capsule 300 milliGRAM(s) Oral two times a day  vancomycin    Solution 125 milliGRAM(s) Oral every 6 hours    MEDICATIONS  (PRN):  acetaminophen   Tablet .. 650 milliGRAM(s) Oral every 6 hours PRN Temp greater or equal to 38C (100.4F), Mild Pain (1 - 3)  ondansetron Injectable 4 milliGRAM(s) IV Push every 6 hours PRN Nausea  oxyCODONE    5 mG/acetaminophen 325 mG 1 Tablet(s) Oral every 6 hours PRN Moderate Pain (4 - 6)          Vital Signs Last 24 Hrs  T(C): 36.8 (23 Apr 2019 04:37), Max: 39.3 (22 Apr 2019 21:16)  T(F): 98.3 (23 Apr 2019 04:37), Max: 102.7 (22 Apr 2019 21:16)  HR: 72 (23 Apr 2019 04:37) (55 - 96)  BP: 122/83 (23 Apr 2019 04:37) (96/67 - 139/92)  BP(mean): 92 (23 Apr 2019 04:37) (92 - 92)  RR: 17 (23 Apr 2019 04:37) (15 - 17)  SpO2: 99% (23 Apr 2019 04:37) (95% - 99%)    I&O's Summary      PHYSICAL EXAM  General Appearance: comfortable  HEENT:   Neck:   Lungs: clear  Heart: S1 and S2 normal. 1/6 systolic murmur LSB  Abdomen: multiple scars. soft and nontender  Extremities: 1+ edema both legs  Neurologic:         INTERPRETATION OF TELEMETRY:    ECG:        LABS:                          9.3    8.17  )-----------( 76       ( 22 Apr 2019 08:15 )             30.1     04-22    133<L>  |  96  |  106<H>  ----------------------------<  179<H>  4.1   |  24  |  6.28<H>    Ca    7.8<L>      22 Apr 2019 08:15  Phos  4.2     04-22    TPro  x   /  Alb  2.3<L>  /  TBili  x   /  DBili  x   /  AST  x   /  ALT  x   /  AlkPhos  x   04-22            PT/INR - ( 22 Apr 2019 08:15 )   PT: 23.4 sec;   INR: 2.06 ratio         PTT - ( 21 Apr 2019 07:46 )  PTT:30.9 sec          RADIOLOGY & ADDITIONAL STUDIES:

## 2019-04-23 NOTE — PROGRESS NOTE ADULT - ASSESSMENT
A/P:  ESRD secondary to Polycystic liver disease, Polycystic kidney disease on HD via RUE on M/W/F, s/p bilateral nephrectomy, s/p failed transplant x 2, CAD, severe dilated cardiomyopathy, paroxysmal atrial flutter and VT on Amiodarone and Warfarin chronically, CHF (HFrEF), s/p AICD, hypertension, adrenal insufficiency (on chronic prednisone), hypoparathyroidism, h/o cryptococcal meningitis, h/o c. difficile colitis, has had multiple episodes of Klebsiella septicemia due to Cholangitis (was deemed not a surgical candidate for cholecystectomy in the past), s/p recent ERCP w/ papillotomy and stone extraction at [place] 2 weeks ago  IV hydration  IV antibiotics, ID on consult  DVT /GI prophylaxis  Serial abd exams  Advise IR for percutaneous cholecystostomy drainage  Pt will require evaluation/intervention at tertiary care center for definitive management of gallbladder pathology, [place], for specialist service to manage advanced liver pathology (i.e. transplant service)  Pt is a poor surgical candidate at [facility] at this time  Medical management per primary service A/P:  ESRD secondary to Polycystic liver disease, Polycystic kidney disease on HD via RUE on M/W/F, s/p bilateral nephrectomy, s/p failed transplant x 2, CAD, severe dilated cardiomyopathy, paroxysmal atrial flutter and VT on Amiodarone and Warfarin chronically, CHF (HFrEF), s/p AICD, hypertension, adrenal insufficiency (on chronic prednisone), hypoparathyroidism, h/o cryptococcal meningitis, h/o c. difficile colitis, has had multiple episodes of Klebsiella septicemia due to Cholangitis (was deemed not a surgical candidate for cholecystectomy in the past), s/p recent ERCP w/ papillotomy and stone extraction at [place] 2 weeks ago  IV hydration  IV antibiotics, ID on consult  DVT /GI prophylaxis  Serial abd exams  Advise IR for percutaneous cholecystostomy drainage  Pt will require outpatient evaluation/intervention at tertiary care center for definitive management of gallbladder pathology, [place], for specialist service to manage advanced liver pathology (i.e. transplant service)  Pt is a poor surgical candidate at [facility] at this time  Medical management per primary service

## 2019-04-23 NOTE — PROGRESS NOTE ADULT - ASSESSMENT
64yo male with multiple medical issues  with cholecystitis, fevers    surgery being considered but now IR evaluation pending for drain  d/w pt, wife, Dr Huynh

## 2019-04-24 PROBLEM — M10.9 GOUT: Status: ACTIVE | Noted: 2019-01-01

## 2019-04-24 PROBLEM — I10 HYPERTENSION: Status: ACTIVE | Noted: 2019-01-01

## 2019-04-24 PROBLEM — K57.32 DIVERTICULITIS OF COLON: Status: ACTIVE | Noted: 2019-01-01

## 2019-04-24 PROBLEM — Z87.448 HISTORY OF KIDNEY DISEASE: Status: RESOLVED | Noted: 2019-01-01 | Resolved: 2019-01-01

## 2019-04-24 PROBLEM — Z87.448 HISTORY OF RENAL FAILURE: Status: RESOLVED | Noted: 2019-01-01 | Resolved: 2019-01-01

## 2019-04-24 NOTE — PROGRESS NOTE ADULT - ASSESSMENT
64 yo male with recurrent cholecystitis and bacteremia. Awaiting cholecystostomy tube later today. Patient agrees and is willing to proceed.

## 2019-04-24 NOTE — PROGRESS NOTE ADULT - ASSESSMENT
62 yo male with PMH of CAD, ESRD on HD, polycystic kidney disease, systolic CHF s/p AICD, HTN, adrenal insufficiency (on chronic prednisone), hypoparathyroidism, h/o cryptococcal meningitis, h/o c.diff infection, prior sepsis with Klebsiella pneumoniae, pancreatic duct stone, chronic pancreatitis cardiomyopathy on coumadin, adrenal insufficiency was admitted on 4/10 for abdominal pain and fever. He has had multiple episodes of Klebsiella septicemia due to cholangitis (was deemed not a surgical candidate for cholecystectomy), s/p recent ERCP w/ papillotomy and stone extraction at [place] 2-3 weeks PTA, recent admission (discharged 4/14/2019) for probable sepsis and cholangitis (placed on cefazolin 2gm IV qHD) was admitted on 4/17 for recurrent severe LUQ abdominal pain associated w/ recurrent fever at home (Tmax 101'F). In the ED the patient was given Acetaminophen 650mg po x 1, Hydrocortisone 100mg IVP x 1, and NS x 500mL x 1.     1. Febrile syndrome and abdominal pain improving. Sepsis with ESBL E.coli. Probable acute cholangitis/cholecystitis s/p recent ERCP/CBD stones removal. Gallstones. Prior CDAD. ESRD on HD.  - slowly improving   - blood cx growing ESBL Ecoli  - repeat blood cultures 4/22 no growth  - on meropenem [id number] (after hd on dialysis days) #5  - continue with abx coverage   - s/p cefazolin 2 gm IV qHD   - on vancomycin 125 mg PO q6h for prophylaxis   - given gentamicin 150 mg IV x 1 4/19  - reason for abx use and side effects reviewed with patient; monitor BMP   - US imaging suggestive shows distended GB  - GI/surgical eval noted, plan for per cholecystostomy   - on dc plan for midline and IV merrem vs invanz 14 day course  - monitor temps  - f/u CBC  -supportive care    2. Other issues; CAD, ESRD on HD, polycystic kidney disease, systolic CHF s/p AICD, HTN, adrenal insufficiency (on chronic prednisone), hypoparathyroidism  - per medicine

## 2019-04-24 NOTE — PROGRESS NOTE ADULT - SUBJECTIVE AND OBJECTIVE BOX
Patient is a 63y old  Male who presents with a chief complaint of Abdominal pain and fevers (24 Apr 2019 07:37)      HPI:  64 y/o M PMHx significant for ESRD secondary to Polycystic kidney disease on HD via RUE on M/W/F, s/p bilateral nephrectomy, s/p failed transplant x 2, CAD, severe dilated cardiomyopathy, paroxysmal atrial flutter and VT on Amiodarone and Warfarin chronically, CHF (HFrEF), s/p AICD, hypertension, adrenal insufficiency (on chronic prednisone), hypoparathyroidism, h/o cryptococcal meningitis, h/o c. difficile colitis, has had multiple episodes of Klebsiella septicemia due to Cholangitis (was deemed not a surgical candidate for cholecystectomy in the past), s/p recent ERCP w/ papillotomy and stone extraction at [place] 2 weeks ago, recent admission (discharged 4/14/2019) for management of sepsis due to cholangitis presents to the ED for further evaluation of recurrent severe LUQ abdominal pain associated w/ recurrent subjective fevers at home (Tmax 101'F). The patient denies any associated nausea, vomiting. Labs => Hgb/Hct 9.8/32.6, PLT 78, LA 1.8, HCO3 36, BUN/Cr 46/3.96, , Alb 2.5. Abdominal U/S => Mild ascites. Transplant kidney intact. Gallbladder is markedly distended. Cholelithiasis. Hepatic cysts. In the ED the patient was given Acetaminophen 650mg po x 1, Hydrocortisone 100mg IVP x 1, and NS x 500mL x 1. (17 Apr 2019 23:01)    Patient resting comfortably undergoing dialysis. No complaints of pain currently.       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  S/P colonoscopy: last done 2016  AICD (automatic cardioverter/defibrillator) present: 2013  H/O hernia repair  A-V fistula: 1995 left UE  right arm 2007,  H/O kidney transplant: 1985, 1995, 1997      MEDICATIONS  (STANDING):  amiodarone    Tablet 100 milliGRAM(s) Oral daily  cholecalciferol 1000 Unit(s) Oral <User Schedule>  cinacalcet 30 milliGRAM(s) Oral <User Schedule>  epoetin amee Injectable 93535 Unit(s) IV Push <User Schedule>  losartan 25 milliGRAM(s) Oral daily  meropenem  IVPB      meropenem  IVPB 500 milliGRAM(s) IV Intermittent every 24 hours  metoprolol tartrate 25 milliGRAM(s) Oral daily  pantoprazole    Tablet 40 milliGRAM(s) Oral two times a day  sevelamer carbonate 2400 milliGRAM(s) Oral three times a day with meals  ursodiol Capsule 300 milliGRAM(s) Oral two times a day  vancomycin    Solution 125 milliGRAM(s) Oral every 6 hours    MEDICATIONS  (PRN):  acetaminophen   Tablet .. 650 milliGRAM(s) Oral every 6 hours PRN Temp greater or equal to 38C (100.4F), Mild Pain (1 - 3)  ondansetron Injectable 4 milliGRAM(s) IV Push every 6 hours PRN Nausea  oxyCODONE    5 mG/acetaminophen 325 mG 1 Tablet(s) Oral every 6 hours PRN Moderate Pain (4 - 6)      Allergies    No Known Allergies    Intolerances        Vital Signs Last 24 Hrs  T(C): 35.7 (24 Apr 2019 07:15), Max: 37.2 (23 Apr 2019 23:17)  T(F): 96.2 (24 Apr 2019 07:15), Max: 98.9 (23 Apr 2019 23:17)  HR: 56 (24 Apr 2019 08:57) (54 - 74)  BP: 98/57 (24 Apr 2019 08:57) (98/57 - 144/96)  BP(mean): --  RR: 16 (24 Apr 2019 08:39) (15 - 16)  SpO2: 99% (24 Apr 2019 05:41) (97% - 100%)    PHYSICAL EXAM:    Respiratory: CTAB  Cardiovascular: S1 and S2, RRR, no M/G/R  Gastrointestinal: BS+, soft, NT/ND    LABS:                        9.2    6.56  )-----------( 82       ( 24 Apr 2019 07:15 )             29.6     04-24    136  |  98  |  100<H>  ----------------------------<  163<H>  4.0   |  25  |  5.75<H>    Ca    8.0<L>      24 Apr 2019 07:15  Phos  4.9     04-24    TPro  x   /  Alb  2.2<L>  /  TBili  x   /  DBili  x   /  AST  x   /  ALT  x   /  AlkPhos  x   04-24    PT/INR - ( 24 Apr 2019 07:15 )   PT: 16.7 sec;   INR: 1.49 ratio         PTT - ( 24 Apr 2019 07:15 )  PTT:29.8 sec  LIVER FUNCTIONS - ( 24 Apr 2019 07:15 )  Alb: 2.2 g/dL / Pro: x     / ALK PHOS: x     / ALT: x     / AST: x     / GGT: x             RADIOLOGY & ADDITIONAL STUDIES:

## 2019-04-24 NOTE — BRIEF OPERATIVE NOTE - OPERATION/FINDINGS
1) Large GB with wall thickening and numerous stones  2) Access under US with 17G guide needle, transhepatic technique  3) Dark bile draining  4) No cystic duct noted on contrast injection

## 2019-04-24 NOTE — PROGRESS NOTE ADULT - SUBJECTIVE AND OBJECTIVE BOX
CHIEF COMPLAINT/Diagnosis: cholycystitis, acute and recurrent    SUBJECTIVE: no complaints; waiting for IR for per drain    REVIEW OF SYSTEMS:    CONSTITUTIONAL: No weakness, fevers or chills  EYES/ENT: No visual changes;  No vertigo or throat pain   NECK: No pain or stiffness  RESPIRATORY: No cough, wheezing, hemoptysis; No shortness of breath  CARDIOVASCULAR: No chest pain or palpitations  GASTROINTESTINAL: No abdominal or epigastric pain. No nausea, vomiting, or hematemesis; No diarrhea or constipation. No melena or hematochezia.  GENITOURINARY: No dysuria, frequency or hematuria  NEUROLOGICAL: No numbness or weakness  SKIN: No itching, burning, rashes, or lesions   All other review of systems is negative unless indicated above    Vital Signs Last 24 Hrs  T(C): 36.2 (24 Apr 2019 15:30), Max: 37.2 (23 Apr 2019 23:17)  T(F): 97.1 (24 Apr 2019 15:30), Max: 98.9 (23 Apr 2019 23:17)  HR: 71 (24 Apr 2019 15:30) (54 - 74)  BP: 107/73 (24 Apr 2019 15:30) (82/55 - 144/96)  BP(mean): --  RR: 12 (24 Apr 2019 15:30) (12 - 16)  SpO2: 100% (24 Apr 2019 15:30) (97% - 100%)    I&O's Summary    24 Apr 2019 07:01  -  24 Apr 2019 15:47  --------------------------------------------------------  IN: 50 mL / OUT: 20 mL / NET: 30 mL        CAPILLARY BLOOD GLUCOSE          PHYSICAL EXAM:    Constitutional: NAD, awake and alert, well-developed  HEENT: PERR, EOMI, Normal Hearing, MMM  Neck: Soft and supple, No LAD, No JVD  Respiratory: Breath sounds are clear bilaterally, No wheezing, rales or rhonchi  Cardiovascular: S1 and S2, regular rate and rhythm, no Murmurs, gallops or rubs  Gastrointestinal: Bowel Sounds present, soft, nontender, nondistended, no guarding, no rebound  Extremities: No peripheral edema  Vascular: 2+ peripheral pulses  Neurological: A/O x 3, no focal deficits  Musculoskeletal: 5/5 strength b/l upper and lower extremities  Skin: No rashes    MEDICATIONS:  MEDICATIONS  (STANDING):  amiodarone    Tablet 100 milliGRAM(s) Oral daily  cholecalciferol 1000 Unit(s) Oral <User Schedule>  cinacalcet 30 milliGRAM(s) Oral <User Schedule>  epoetin amee Injectable 77434 Unit(s) IV Push <User Schedule>  losartan 25 milliGRAM(s) Oral daily  meropenem  IVPB      meropenem  IVPB 500 milliGRAM(s) IV Intermittent every 24 hours  metoprolol tartrate 25 milliGRAM(s) Oral daily  pantoprazole    Tablet 40 milliGRAM(s) Oral two times a day  predniSONE   Tablet 20 milliGRAM(s) Oral two times a day  sevelamer carbonate 2400 milliGRAM(s) Oral three times a day with meals  sodium chloride 0.9%. 1000 milliLiter(s) (50 mL/Hr) IV Continuous <Continuous>  ursodiol Capsule 300 milliGRAM(s) Oral two times a day  vancomycin    Solution 125 milliGRAM(s) Oral every 6 hours      LABS: All Labs Reviewed:                        9.2    6.56  )-----------( 82       ( 24 Apr 2019 07:15 )             29.6     04-24    136  |  98  |  100<H>  ----------------------------<  163<H>  4.0   |  25  |  5.75<H>    Ca    8.0<L>      24 Apr 2019 07:15  Phos  4.9     04-24    TPro  x   /  Alb  2.2<L>  /  TBili  x   /  DBili  x   /  AST  x   /  ALT  x   /  AlkPhos  x   04-24    PT/INR - ( 24 Apr 2019 07:15 )   PT: 16.7 sec;   INR: 1.49 ratio         PTT - ( 24 Apr 2019 07:15 )  PTT:29.8 sec      Blood Culture: 04-22 @ 10:45  Organism --  Gram Stain Blood -- Gram Stain --  Specimen Source .Blood None  Culture-Blood --    04-22 @ 08:15  Organism --  Gram Stain Blood -- Gram Stain --  Specimen Source .Blood Blood  Culture-Blood --        Assessment and Plan:    64 y/o M PMHx significant for ESRD secondary to Polycystic kidney disease on HD via RUE on M/W/F, s/p bilateral nephrectomy, s/p failed transplant x 2, CAD, severe dilated cardiomyopathy, paroxysmal atrial flutter and VT on Amiodarone and Warfarin chronically, CHF (HFrEF), s/p AICD, hypertension, adrenal insufficiency (on chronic prednisone), hypoparathyroidism, h/o cryptococcal meningitis, h/o c. difficile colitis, has had multiple episodes of Klebsiella septicemia due to Cholangitis, s/p recent ERCP w/ papillotomy and stone extraction at [place] 2 weeks ago, recent admission (discharged 4/14/2019) for management of sepsis due to cholangitis presents to the ED for further evaluation of recurrent severe LUQ abdominal pain associated w/ recurrent subjective fevers at home (Tmax 101'F).     1.  Sepsis/Cholecystitis/ E.coli bacteremia:    -Cholangitis is questionable and not likley, TBilis are normal and patient is not spiking any temps.    -ID consult apprecaited >> c/w meropenem w/ oral vanco for c.diff prophylaxis (patient has hx; no acitive infection)  -4/17 >> ESBL ecoli in blood  -Repeat blood cultures done today  -Plan: symptoms completely resolved, no more abd pain; no feves, livers enzyms normal, however patient has recurrent chyolycystitis causing frequent hospitilization and need for frequent abx, further predisposing patient to ESBl and c.diff.  Best solution for patient is to remove gallbladder. Discuss with patient, he does not want IR drain placed. He does not want to live with drain, He prefers to go to OR and have cholycystectomy  -surgical evaluation >> high risk w/ complicated anatomy, recco IR drain  -IR consult placed  -IV vitamin K to be given today >> INR in ma  -anticipated  dc tomamrrow AM       3.  PCKD s/p nephrectomy/ failed transplant x2 on HD:    Cont HD as scheduled.      4.  Hx of Dilated Cardiomyopathy/ AFIB/ VT:    Cont amio, toprol, losartan.    ECHO with EF 20-25%.    Cont to hold coumadin for IR versus OR next week.    INR 2-3.      5.  Hx of recurrent c. difficile colitis  ~cont. suppressive therapy w/ Vancomycin PO    6.  Adrenal Insufficiency:    ~given stress dose Hydrocortisone in the ED  -started taper of hydrocortisone on 4/21 >>> taper more today to 30mg q12 h  Endo f/u.

## 2019-04-24 NOTE — PROGRESS NOTE ADULT - SUBJECTIVE AND OBJECTIVE BOX
Patient is a 63y old  Male who presents with a chief complaint of Abdominal pain and fevers (23 Apr 2019 16:17)      HPI:  64 y/o M PMHx significant for ESRD secondary to Polycystic kidney disease on HD via RUE on M/W/F, s/p bilateral nephrectomy, s/p failed transplant x 2, CAD, severe dilated cardiomyopathy, paroxysmal atrial flutter and VT on Amiodarone and Warfarin chronically, CHF (HFrEF), s/p AICD, hypertension, adrenal insufficiency (on chronic prednisone), hypoparathyroidism, h/o cryptococcal meningitis, h/o c. difficile colitis, has had multiple episodes of Klebsiella septicemia due to Cholangitis (was deemed not a surgical candidate for cholecystectomy in the past), s/p recent ERCP w/ papillotomy and stone extraction at [facility] 2 weeks ago, recent admission (discharged 4/14/2019) for management of sepsis due to cholangitis presents to the ED for further evaluation of recurrent severe LUQ abdominal pain associated w/ recurrent subjective fevers at home (Tmax 101'F). The patient denies any associated nausea, vomiting. Labs => Hgb/Hct 9.8/32.6, PLT 78, LA 1.8, HCO3 36, BUN/Cr 46/3.96, , Alb 2.5. Abdominal U/S => Mild ascites. Transplant kidney intact. Gallbladder is markedly distended. Cholelithiasis. Hepatic cysts. In the ED the patient was given Acetaminophen 650mg po x 1, Hydrocortisone 100mg IVP x 1, and NS x 500mL x 1. (17 Apr 2019 23:01)    4/20 Cardiology: 63 year old male with pckd,esrd, dcm wih ef 25% VT, cad admitted with recurrent sepsis due to cholangities. from cardiac view patient has been feeling  well with out chest pain dyspnea or palpitations. no pnd orthopnea or le edema. patient also has afib/flutter. patients abdominal pain is improved  4/21 no complaints. oob to chair  4/22- no complaints. He states he prefers cholecystectomy to percutaneous GB drainage.   4/23 Temp to 102 last night with mild abd pain. better today  4/24 on bedside hd, no complaints  PAST MEDICAL & SURGICAL HISTORY:  Oliguria  Arm swelling: left arm  AV fistula: right UE  Leg pain, anterior, right: right anterior thigh at graft donor site  Irregular heart beat  AICD (automatic cardioverter/defibrillator) present  Dialysis patient  Adrenal insufficiency  Hypoparathyroidism  HFrEF (heart failure with reduced ejection fraction)  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  S/P colonoscopy: last done 2016  AICD (automatic cardioverter/defibrillator) present: 2013  H/O hernia repair  A-V fistula: 1995 left UE  right arm 2007,  H/O kidney transplant: 1985, 1995, 1997        MEDICATIONS  (STANDING):  amiodarone    Tablet 100 milliGRAM(s) Oral daily  cholecalciferol 1000 Unit(s) Oral <User Schedule>  cinacalcet 30 milliGRAM(s) Oral <User Schedule>  epoetin amee Injectable 59343 Unit(s) IV Push <User Schedule>  losartan 25 milliGRAM(s) Oral daily  meropenem  IVPB      meropenem  IVPB 500 milliGRAM(s) IV Intermittent every 24 hours  metoprolol tartrate 25 milliGRAM(s) Oral daily  pantoprazole    Tablet 40 milliGRAM(s) Oral two times a day  sevelamer carbonate 2400 milliGRAM(s) Oral three times a day with meals  ursodiol Capsule 300 milliGRAM(s) Oral two times a day  vancomycin    Solution 125 milliGRAM(s) Oral every 6 hours    MEDICATIONS  (PRN):  acetaminophen   Tablet .. 650 milliGRAM(s) Oral every 6 hours PRN Temp greater or equal to 38C (100.4F), Mild Pain (1 - 3)  ondansetron Injectable 4 milliGRAM(s) IV Push every 6 hours PRN Nausea  oxyCODONE    5 mG/acetaminophen 325 mG 1 Tablet(s) Oral every 6 hours PRN Moderate Pain (4 - 6)          Vital Signs Last 24 Hrs  T(C): 36.6 (24 Apr 2019 05:41), Max: 37.2 (23 Apr 2019 23:17)  T(F): 97.9 (24 Apr 2019 05:41), Max: 98.9 (23 Apr 2019 23:17)  HR: 69 (24 Apr 2019 05:41) (65 - 74)  BP: 142/87 (24 Apr 2019 05:41) (118/74 - 144/96)  BP(mean): --  RR: 16 (23 Apr 2019 23:17) (16 - 16)  SpO2: 99% (24 Apr 2019 05:41) (97% - 100%)    I&O's Summary      PHYSICAL EXAM  General Appearance: comfortable  HEENT:   Neck:   Lungs: clear  Heart: S1 and S2 normal. 1/6 systolic murmur LSB  Abdomen: multiple scars. soft and nontender  Extremities: no edema  Neurologic:       INTERPRETATION OF TELEMETRY:    ECG:        LABS:                          9.3    8.17  )-----------( 76       ( 22 Apr 2019 08:15 )             30.1     04-22    133<L>  |  96  |  106<H>  ----------------------------<  179<H>  4.1   |  24  |  6.28<H>    Ca    7.8<L>      22 Apr 2019 08:15  Phos  4.2     04-22    TPro  x   /  Alb  2.3<L>  /  TBili  x   /  DBili  x   /  AST  x   /  ALT  x   /  AlkPhos  x   04-22            PT/INR - ( 23 Apr 2019 07:32 )   PT: 24.0 sec;   INR: 2.11 ratio                   RADIOLOGY & ADDITIONAL STUDIES:

## 2019-04-24 NOTE — PROGRESS NOTE ADULT - SUBJECTIVE AND OBJECTIVE BOX
Date of service: 04-24-19 @ 13:21      pt seen and examined  intermittent fevers and abd pain  plan for per tamara today per IR  no current abd pain      ROS: no  chills; denies dizziness, no HA, no SOB or cough, no diarrhea or constipation; no dysuria, no urinary frequency, no legs pain, no rashes      MEDICATIONS  (STANDING):  amiodarone    Tablet 100 milliGRAM(s) Oral daily  cholecalciferol 1000 Unit(s) Oral <User Schedule>  cinacalcet 30 milliGRAM(s) Oral <User Schedule>  epoetin amee Injectable 15677 Unit(s) IV Push <User Schedule>  losartan 25 milliGRAM(s) Oral daily  meropenem  IVPB      meropenem  IVPB 500 milliGRAM(s) IV Intermittent every 24 hours  metoprolol tartrate 25 milliGRAM(s) Oral daily  pantoprazole    Tablet 40 milliGRAM(s) Oral two times a day  sevelamer carbonate 2400 milliGRAM(s) Oral three times a day with meals  ursodiol Capsule 300 milliGRAM(s) Oral two times a day  vancomycin    Solution 125 milliGRAM(s) Oral every 6 hours      Vital Signs Last 24 Hrs  T(C): 36.8 (24 Apr 2019 11:31), Max: 37.2 (23 Apr 2019 23:17)  T(F): 98.2 (24 Apr 2019 11:31), Max: 98.9 (23 Apr 2019 23:17)  HR: 60 (24 Apr 2019 11:31) (54 - 74)  BP: 113/53 (24 Apr 2019 11:31) (82/55 - 144/96)  BP(mean): --  RR: 16 (24 Apr 2019 11:31) (15 - 16)  SpO2: 99% (24 Apr 2019 05:41) (97% - 100%)      Physical Exam:  Constitutional: frail looking  HEENT: NC/AT, EOMI, PERRLA, conjunctivae clear  Neck: supple; thyroid not palpable  Back: no tenderness  Respiratory: respiratory effort normal; clear to auscultation  Cardiovascular: S1S2 regular, no murmurs  Abdomen: soft, has RUQ tender, not distended, positive BS  Genitourinary: no suprapubic tenderness  Musculoskeletal: no muscle tenderness, no joint swelling or tenderness  Extremities: no pedal edema  Neurological/ Psychiatric: AxOx3, moving all extremities  Skin: no rashes; no palpable lesions    Labs: reviewed                                   9.2    6.56  )-----------( 82       ( 24 Apr 2019 07:15 )             29.6     04-24    136  |  98  |  100<H>  ----------------------------<  163<H>  4.0   |  25  |  5.75<H>    Ca    8.0<L>      24 Apr 2019 07:15  Phos  4.9     04-24    TPro  x   /  Alb  2.2<L>  /  TBili  x   /  DBili  x   /  AST  x   /  ALT  x   /  AlkPhos  x   04-24        Cultures:     Culture - Blood (collected 04-17-19 @ 19:46)  Source: .Blood None  Gram Stain (04-18-19 @ 14:33):    Growth in aerobic bottle: Gram Negative Rods  Final Report (04-20-19 @ 11:38):    Growth in aerobic bottle: Escherichia coli ESBL    "Due to technical problems, Proteus sp. will Not be reported as part of    the BCID panel until further notice"    ***Blood Panel PCR results on this specimen are available    approximately 3 hours after the Gram stain result.***    Gram stain, PCR, and/or culture results may not always    correspond due to difference in methodologies.    ************************************************************    This PCR assay was performed using [employer].    The following targets are tested for: Enterococcus,    vancomycin resistant enterococci, Listeria monocytogenes,    coagulase negative staphylococci, S. aureus,    methicillin resistant S. aureus, Streptococcus agalactiae    (Group B), S. pneumoniae, S. pyogenes (Group A),    Acinetobacter baumannii, Enterobacter cloacae, E. coli,    Klebsiella oxytoca, K. pneumoniae, Proteus sp.,    Serratia marcescens, Haemophilus influenzae,    Neisseria meningitidis, Pseudomonas aeruginosa, Candida    albicans, C. glabrata, C krusei, C parapsilosis,    C. tropicalis and the KPC resistance gene.  Organism: Blood Culture PCR  Escherichia coli ESBL (04-20-19 @ 11:38)  Organism: Escherichia coli ESBL (04-20-19 @ 11:38)      -  Amikacin: S <=8      -  Ampicillin: R >16 These ampicillin results predict results for amoxicillin      -  Ampicillin/Sulbactam: R >16/8      -  Aztreonam: R >16      -  Cefazolin: R >16      -  Cefepime: R >16      -  Cefoxitin: I 16      -  Ceftriaxone: R >32 Enterobacter, Citrobacter, and Serratia may develop resistance during prolonged therapy      -  Ciprofloxacin: S 1      -  Ertapenem: S <=0.5      -  Gentamicin: S 2      -  Imipenem: S <=1      -  Levofloxacin: S <=1      -  Meropenem: S <=1      -  Piperacillin/Tazobactam: R <=8      -  Tobramycin: S <=2      -  Trimethoprim/Sulfamethoxazole: S <=0.5/9.5      Method Type: NAEL  Organism: Blood Culture PCR (04-20-19 @ 11:38)      -  Escherichia coli: Detec      Method Type: PCR    Culture - Urine (collected 04-17-19 @ 07:20)  Source: .Urine Clean Catch (Midstream)  Final Report (04-20-19 @ 11:15):    No growth          Radiology: all available radiological tests reviewed    Advanced directives addressed: full resuscitation

## 2019-04-24 NOTE — PROGRESS NOTE ADULT - ASSESSMENT
1. Recurrent cholangitis s/p recent ERCP. Patient prefers cholecsystectomy to percutaneous GB drainage.   2. HFrEF  EF 20-25%. Compensated.   3. CAD , stable. No angina.   4. Atrial and ventricular arryhtmias/ICD, stable  5.ESRD.  PLAN:  Continue amiodarone, losartan, metoprolol, anticoagulation for stroke prevention.   no contraindication to cholyscystotomy

## 2019-04-25 NOTE — ADVANCED PRACTICE NURSE CONSULT - ASSESSMENT
Pt awake and alert. Risks and benefits of midline catheter explained, verbal consent obtained. ARROW endurance extended dwell midline catheter 20g, 8cm, lot # [id number] placed in left basilic vein under ultrasound guidance and sterile precautions. Brisk blood return, flushes well with 20ml normal saline. OK to use.

## 2019-04-25 NOTE — PROGRESS NOTE ADULT - ASSESSMENT
64 yo male w extensive pmhx as above DCM, s.p renal transplant failure x 2,. hx of PCKD, and now ESRD on HD MWF  presenting multiple recent admission for Klebsiella sepsis, cholangitis, and now recurring abd pains w neg blood cultures recent admission and presenting within a week for same sx despite being in IV abx - ancef at HD     ESRD on HD  - continue HD MWF schedule   - SACHIN at HD     GNR bacteremia ESBL E Coli w persistent fevers despite IV abx    - meropenem daily per ID    - s/p GB drain in place   - lap chol in a few weeks  with [facility]ista per surgery team     HTN/DCM  - on losartan and metoprolol - monitor BP and adjust if BP drops    Adrenal Inuff-    - steroid schedule per Endocrine     Thank you for the courtesy of this consult. We will follow this patient with you.   Management is subject to change if new information becomes available or patient condition changes.

## 2019-04-25 NOTE — PROGRESS NOTE ADULT - SUBJECTIVE AND OBJECTIVE BOX
Date of service: 04-24-19 @ 13:21      pt seen and examined  intermittent fevers and abd pain  plan for per tamara today per IR  no current abd pain      ROS: no  chills; denies dizziness, no HA, no SOB or cough, no diarrhea or constipation; no dysuria, no urinary frequency, no legs pain, no rashes      MEDICATIONS  (STANDING):  amiodarone    Tablet 100 milliGRAM(s) Oral daily  cholecalciferol 1000 Unit(s) Oral <User Schedule>  cinacalcet 30 milliGRAM(s) Oral <User Schedule>  epoetin amee Injectable 00946 Unit(s) IV Push <User Schedule>  losartan 25 milliGRAM(s) Oral daily  meropenem  IVPB      meropenem  IVPB 500 milliGRAM(s) IV Intermittent every 24 hours  metoprolol tartrate 25 milliGRAM(s) Oral daily  pantoprazole    Tablet 40 milliGRAM(s) Oral two times a day  sevelamer carbonate 2400 milliGRAM(s) Oral three times a day with meals  ursodiol Capsule 300 milliGRAM(s) Oral two times a day  vancomycin    Solution 125 milliGRAM(s) Oral every 6 hours      Vital Signs Last 24 Hrs  T(C): 36.8 (24 Apr 2019 11:31), Max: 37.2 (23 Apr 2019 23:17)  T(F): 98.2 (24 Apr 2019 11:31), Max: 98.9 (23 Apr 2019 23:17)  HR: 60 (24 Apr 2019 11:31) (54 - 74)  BP: 113/53 (24 Apr 2019 11:31) (82/55 - 144/96)  BP(mean): --  RR: 16 (24 Apr 2019 11:31) (15 - 16)  SpO2: 99% (24 Apr 2019 05:41) (97% - 100%)      Physical Exam:  Constitutional: frail looking  HEENT: NC/AT, EOMI, PERRLA, conjunctivae clear  Neck: supple; thyroid not palpable  Back: no tenderness  Respiratory: respiratory effort normal; clear to auscultation  Cardiovascular: S1S2 regular, no murmurs  Abdomen: soft, has RUQ tender, not distended, positive BS  Genitourinary: no suprapubic tenderness  Musculoskeletal: no muscle tenderness, no joint swelling or tenderness  Extremities: no pedal edema  Neurological/ Psychiatric: AxOx3, moving all extremities  Skin: no rashes; no palpable lesions    Labs: reviewed                                   9.2    6.56  )-----------( 82       ( 24 Apr 2019 07:15 )             29.6     04-24    136  |  98  |  100<H>  ----------------------------<  163<H>  4.0   |  25  |  5.75<H>    Ca    8.0<L>      24 Apr 2019 07:15  Phos  4.9     04-24    TPro  x   /  Alb  2.2<L>  /  TBili  x   /  DBili  x   /  AST  x   /  ALT  x   /  AlkPhos  x   04-24        Cultures:     Culture - Blood (collected 04-17-19 @ 19:46)  Source: .Blood None  Gram Stain (04-18-19 @ 14:33):    Growth in aerobic bottle: Gram Negative Rods  Final Report (04-20-19 @ 11:38):    Growth in aerobic bottle: Escherichia coli ESBL    "Due to technical problems, Proteus sp. will Not be reported as part of    the BCID panel until further notice"    ***Blood Panel PCR results on this specimen are available    approximately 3 hours after the Gram stain result.***    Gram stain, PCR, and/or culture results may not always    correspond due to difference in methodologies.    ************************************************************    This PCR assay was performed using [employer].    The following targets are tested for: Enterococcus,    vancomycin resistant enterococci, Listeria monocytogenes,    coagulase negative staphylococci, S. aureus,    methicillin resistant S. aureus, Streptococcus agalactiae    (Group B), S. pneumoniae, S. pyogenes (Group A),    Acinetobacter baumannii, Enterobacter cloacae, E. coli,    Klebsiella oxytoca, K. pneumoniae, Proteus sp.,    Serratia marcescens, Haemophilus influenzae,    Neisseria meningitidis, Pseudomonas aeruginosa, Candida    albicans, C. glabrata, C krusei, C parapsilosis,    C. tropicalis and the KPC resistance gene.  Organism: Blood Culture PCR  Escherichia coli ESBL (04-20-19 @ 11:38)  Organism: Escherichia coli ESBL (04-20-19 @ 11:38)      -  Amikacin: S <=8      -  Ampicillin: R >16 These ampicillin results predict results for amoxicillin      -  Ampicillin/Sulbactam: R >16/8      -  Aztreonam: R >16      -  Cefazolin: R >16      -  Cefepime: R >16      -  Cefoxitin: I 16      -  Ceftriaxone: R >32 Enterobacter, Citrobacter, and Serratia may develop resistance during prolonged therapy      -  Ciprofloxacin: S 1      -  Ertapenem: S <=0.5      -  Gentamicin: S 2      -  Imipenem: S <=1      -  Levofloxacin: S <=1      -  Meropenem: S <=1      -  Piperacillin/Tazobactam: R <=8      -  Tobramycin: S <=2      -  Trimethoprim/Sulfamethoxazole: S <=0.5/9.5      Method Type: NAEL  Organism: Blood Culture PCR (04-20-19 @ 11:38)      -  Escherichia coli: Detec      Method Type: PCR    Culture - Urine (collected 04-17-19 @ 07:20)  Source: .Urine Clean Catch (Midstream)  Final Report (04-20-19 @ 11:15):    No growth          Radiology: all available radiological tests reviewed    Advanced directives addressed: full resuscitation Date of service: 04-25-19 @ 12:58    pt seen and examined  s/p per tamara 4/24   doing well   no current abd pain      ROS: no  chills; denies dizziness, no HA, no SOB or cough, no diarrhea or constipation; no dysuria, no urinary frequency, no legs pain, no rashes    MEDICATIONS  (STANDING):  amiodarone    Tablet 100 milliGRAM(s) Oral daily  cholecalciferol 1000 Unit(s) Oral <User Schedule>  cinacalcet 30 milliGRAM(s) Oral <User Schedule>  epoetin amee Injectable 28186 Unit(s) IV Push <User Schedule>  losartan 25 milliGRAM(s) Oral daily  meropenem  IVPB      meropenem  IVPB 500 milliGRAM(s) IV Intermittent every 24 hours  metoprolol tartrate 25 milliGRAM(s) Oral daily  pantoprazole    Tablet 40 milliGRAM(s) Oral two times a day  predniSONE   Tablet 20 milliGRAM(s) Oral two times a day  sevelamer carbonate 2400 milliGRAM(s) Oral three times a day with meals  ursodiol Capsule 300 milliGRAM(s) Oral two times a day  vancomycin    Solution 125 milliGRAM(s) Oral every 6 hours      Vital Signs Last 24 Hrs  T(C): 36.5 (25 Apr 2019 11:04), Max: 36.5 (24 Apr 2019 16:32)  T(F): 97.7 (25 Apr 2019 11:04), Max: 97.7 (24 Apr 2019 16:32)  HR: 60 (25 Apr 2019 11:04) (60 - 71)  BP: 121/81 (25 Apr 2019 11:04) (96/57 - 129/87)  BP(mean): --  RR: 18 (25 Apr 2019 11:04) (12 - 18)  SpO2: 100% (25 Apr 2019 11:04) (97% - 100%)            Physical Exam:  Constitutional: frail looking  HEENT: NC/AT, EOMI, PERRLA, conjunctivae clear  Neck: supple; thyroid not palpable  Back: no tenderness  Respiratory: respiratory effort normal; clear to auscultation  Cardiovascular: S1S2 regular, no murmurs  Abdomen: soft, nontender, not distended, positive BS drain in place  Genitourinary: no suprapubic tenderness  Musculoskeletal: no muscle tenderness, no joint swelling or tenderness  Extremities: no pedal edema  Neurological/ Psychiatric: AxOx3, moving all extremities  Skin: no rashes; no palpable lesions    Labs: reviewed                        10.1   5.73  )-----------( 90       ( 25 Apr 2019 08:41 )             33.2     04-25    128<L>  |  97  |  70<H>  ----------------------------<  189<H>  5.2   |  23  |  4.64<H>    Ca    7.8<L>      25 Apr 2019 07:34  Phos  4.9     04-24    TPro  5.8<L>  /  Alb  2.1<L>  /  TBili  0.9  /  DBili  x   /  AST  18  /  ALT  6<L>  /  AlkPhos  80  04-25        Cultures:   Culture - Blood (04.22.19 @ 10:45)    Specimen Source: .Blood None    Culture Results:   No growth to date.      Culture - Blood (collected 04-17-19 @ 19:46)  Source: .Blood None  Gram Stain (04-18-19 @ 14:33):    Growth in aerobic bottle: Gram Negative Rods  Final Report (04-20-19 @ 11:38):    Growth in aerobic bottle: Escherichia coli ESBL    "Due to technical problems, Proteus sp. will Not be reported as part of    the BCID panel until further notice"    ***Blood Panel PCR results on this specimen are available    approximately 3 hours after the Gram stain result.***    Gram stain, PCR, and/or culture results may not always    correspond due to difference in methodologies.    ************************************************************    This PCR assay was performed using [employer].    The following targets are tested for: Enterococcus,    vancomycin resistant enterococci, Listeria monocytogenes,    coagulase negative staphylococci, S. aureus,    methicillin resistant S. aureus, Streptococcus agalactiae    (Group B), S. pneumoniae, S. pyogenes (Group A),    Acinetobacter baumannii, Enterobacter cloacae, E. coli,    Klebsiella oxytoca, K. pneumoniae, Proteus sp.,    Serratia marcescens, Haemophilus influenzae,    Neisseria meningitidis, Pseudomonas aeruginosa, Candida    albicans, C. glabrata, C krusei, C parapsilosis,    C. tropicalis and the KPC resistance gene.  Organism: Blood Culture PCR  Escherichia coli ESBL (04-20-19 @ 11:38)  Organism: Escherichia coli ESBL (04-20-19 @ 11:38)      -  Amikacin: S <=8      -  Ampicillin: R >16 These ampicillin results predict results for amoxicillin      -  Ampicillin/Sulbactam: R >16/8      -  Aztreonam: R >16      -  Cefazolin: R >16      -  Cefepime: R >16      -  Cefoxitin: I 16      -  Ceftriaxone: R >32 Enterobacter, Citrobacter, and Serratia may develop resistance during prolonged therapy      -  Ciprofloxacin: S 1      -  Ertapenem: S <=0.5      -  Gentamicin: S 2      -  Imipenem: S <=1      -  Levofloxacin: S <=1      -  Meropenem: S <=1      -  Piperacillin/Tazobactam: R <=8      -  Tobramycin: S <=2      -  Trimethoprim/Sulfamethoxazole: S <=0.5/9.5      Method Type: NAEL  Organism: Blood Culture PCR (04-20-19 @ 11:38)      -  Escherichia coli: Detec      Method Type: PCR    Culture - Urine (collected 04-17-19 @ 07:20)  Source: .Urine Clean Catch (Midstream)  Final Report (04-20-19 @ 11:15):    No growth          Radiology: all available radiological tests reviewed    Advanced directives addressed: full resuscitation

## 2019-04-25 NOTE — PROGRESS NOTE ADULT - SUBJECTIVE AND OBJECTIVE BOX
Patient is a 63y old  Male who presents with a chief complaint of Abdominal pain and fevers (24 Apr 2019 15:46)      Followup HPI:  4/20 Cardiology: 63 year old male with pckd,esrd, dcm wih ef 25% VT, cad admitted with recurrent sepsis due to cholangities. from cardiac view patient has been feeling  well with out chest pain dyspnea or palpitations. no pnd orthopnea or le edema. patient also has afib/flutter. patients abdominal pain is improved  4/21 no complaints. oob to chair  4/22- no complaints. He states he prefers cholecystectomy to percutaneous GB drainage.   4/23 Temp to 102 last night with mild abd pain. better today  4/24 on bedside hd, no complaints  4/25- no complaints. Post percutaneous cholycystotomy 4/24.    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  S/P colonoscopy: last done 2016  AICD (automatic cardioverter/defibrillator) present: 2013  H/O hernia repair  A-V fistula: 1995 left UE  right arm 2007,  H/O kidney transplant: 1985, 1995, 1997      Review of symptoms:  Negative except for as noted in today's HPI.      MEDICATIONS  (STANDING):  amiodarone    Tablet 100 milliGRAM(s) Oral daily  cholecalciferol 1000 Unit(s) Oral <User Schedule>  cinacalcet 30 milliGRAM(s) Oral <User Schedule>  epoetin amee Injectable 47962 Unit(s) IV Push <User Schedule>  losartan 25 milliGRAM(s) Oral daily  meropenem  IVPB      meropenem  IVPB 500 milliGRAM(s) IV Intermittent every 24 hours  metoprolol tartrate 25 milliGRAM(s) Oral daily  pantoprazole    Tablet 40 milliGRAM(s) Oral two times a day  predniSONE   Tablet 20 milliGRAM(s) Oral two times a day  sevelamer carbonate 2400 milliGRAM(s) Oral three times a day with meals  ursodiol Capsule 300 milliGRAM(s) Oral two times a day  vancomycin    Solution 125 milliGRAM(s) Oral every 6 hours    MEDICATIONS  (PRN):  acetaminophen   Tablet .. 650 milliGRAM(s) Oral every 6 hours PRN Temp greater or equal to 38C (100.4F), Mild Pain (1 - 3)  ondansetron Injectable 4 milliGRAM(s) IV Push every 6 hours PRN Nausea  oxyCODONE    5 mG/acetaminophen 325 mG 1 Tablet(s) Oral every 6 hours PRN Moderate Pain (4 - 6)          Vital Signs Last 24 Hrs  T(C): 36.3 (25 Apr 2019 05:21), Max: 36.8 (24 Apr 2019 11:31)  T(F): 97.4 (25 Apr 2019 05:21), Max: 98.2 (24 Apr 2019 11:31)  HR: 68 (25 Apr 2019 05:21) (54 - 71)  BP: 129/87 (25 Apr 2019 05:21) (82/55 - 129/87)  BP(mean): --  RR: 16 (25 Apr 2019 05:21) (12 - 17)  SpO2: 97% (25 Apr 2019 05:21) (97% - 100%)    I&O's Summary    24 Apr 2019 07:01  -  25 Apr 2019 07:00  --------------------------------------------------------  IN: 50 mL / OUT: 175 mL / NET: -125 mL        PHYSICAL EXAM  General Appearance: well  HEENT:   Neck:   Lungs: clear  Heart: 1/6 systolic murmur LSB  Abdomen: drain RUQ, nontender  Extremities: 1+ edema both ankles  Neurologic:       INTERPRETATION OF TELEMETRY:    ECG:    LABS:                          9.2    6.56  )-----------( 82       ( 24 Apr 2019 07:15 )             29.6     04-25    128<L>  |  97  |  70<H>  ----------------------------<  189<H>  5.2   |  23  |  4.64<H>    Ca    7.8<L>      25 Apr 2019 07:34  Phos  4.9     04-24    TPro  5.8<L>  /  Alb  2.1<L>  /  TBili  0.9  /  DBili  x   /  AST  18  /  ALT  6<L>  /  AlkPhos  80  04-25            PT/INR - ( 24 Apr 2019 07:15 )   PT: 16.7 sec;   INR: 1.49 ratio         PTT - ( 24 Apr 2019 07:15 )  PTT:29.8 sec          RADIOLOGY & ADDITIONAL STUDIES:    IMPRESSION:  1. Recurrent cholangitis s/p recent ERCP. Chronic cholecystitis, stones. s/p cholecystotomy 4/24..   2. HFrEF  EF 20-25%. Compensated.   3. CAD , stable. No angina.   4. Atrial and ventricular arryhtmias/ICD, stable  5.ESRD.  PLAN:  Continue amiodarone, losartan, metoprolol, anticoagulation for stroke prevention.

## 2019-04-25 NOTE — CHART NOTE - NSCHARTNOTEFT_GEN_A_CORE
Assessment:   *pt s/p cholecystitis with drain feeling better. pending d/c with drain and possible surgery at [place].  *pt with good appetite/PO intake with 1 gelatein per day; likely meeting estimated nutr needs based on diet recall.  *wt appears stable.  continue to check daily wt's.  *BM (+) 4/23.  no edema noted.  *labs reviewed; potassium at higher end of normal limit with potassium restricted diet.  continue to monitor and encourage potassium restriction compliance.    Recommendations:  1) daily wt checks  2) encourage protein intake  3) monitor lytes/mins; encourage therapeutic diet compliance      Diet Prescription: Diet, Renal Restrictions:   For patients receiving Renal Replacement - No Protein Restr, No Conc K, No Conc Phos, Low Sodium  DASH/TLC {Sodium & Cholesterol Restricted} (DASH)  1500mL Fluid Restriction ([id number]) (04-22-19 @ 08:28)      Wt Hx:  65.7Kg (4/24)  Weight (kg): 63.5 (04-17-19 @ 18:03), 65.5 (04-11-19 @ 06:28)      Estimated Needs:   [x] no change since previous assessment  Estimated Energy Needs (30-35 calories/kg):  · Weight  (lbs) 139.9 lb  · Weight (kg) 63.5 kg  · From (30 jany/kg) 1905 To (35 calories/kg) 2222 Kcal    Other Calculation:  · Other Calculation  Ht. 72"     Wt.  63.5Kg        BMI 18.9      IBW  81 Kg      Pt is at    78%  IBW    Estimated Protein Needs (1.4-1.6 g/kg):  · Weight  (lbs) 139.9  · Weight (kg) 63.5 kg  · From (1.4 g/kg) 88.9 To (1.6 g/kg) 101.6 g protein    Nutrition Diagnostic #1:  · Nutrition Diagnostic Terminology #1: Nutrient  · Nutrient: Malnutrition; pt meets criteria for severe malnutrition in the context of chronic illness  · Etiology: inadequate PO itnake 2/2 hx of ESRD on HD, CHF, chronic pancreatitis  · Signs/Symptoms: severe muscle wasting, severe fat depletion  · Nutrition Intervention: Meals and Snack; Medical Food Supplements; Vitamin  · Meals and Snacks: General/healthful diet  · Medical and Food Supplements: Modified food; gelatein 1x/day  · Vitamin: nephrovite  · Goal/Expected Outcome: Pt will consume at least 80% of meals/supplements, no s/s malnutrition      Nutrition Diagnosis is [x] ongoing  [ ] resolved [ ] not applicable     New Nutrition Diagnosis: [x] not applicable         Pertinent Medications: MEDICATIONS  (STANDING):  amiodarone    Tablet 100 milliGRAM(s) Oral daily  cholecalciferol 1000 Unit(s) Oral <User Schedule>  cinacalcet 30 milliGRAM(s) Oral <User Schedule>  epoetin amee Injectable [id number] Unit(s) IV Push <User Schedule>  losartan 25 milliGRAM(s) Oral daily  meropenem  IVPB      meropenem  IVPB 500 milliGRAM(s) IV Intermittent every 24 hours  metoprolol tartrate 25 milliGRAM(s) Oral daily  pantoprazole    Tablet 40 milliGRAM(s) Oral two times a day  predniSONE   Tablet 20 milliGRAM(s) Oral two times a day  sevelamer carbonate 2400 milliGRAM(s) Oral three times a day with meals  ursodiol Capsule 300 milliGRAM(s) Oral two times a day  vancomycin    Solution 125 milliGRAM(s) Oral every 6 hours    MEDICATIONS  (PRN):  acetaminophen   Tablet .. 650 milliGRAM(s) Oral every 6 hours PRN Temp greater or equal to 38C (100.4F), Mild Pain (1 - 3)  ondansetron Injectable 4 milliGRAM(s) IV Push every 6 hours PRN Nausea  oxyCODONE    5 mG/acetaminophen 325 mG 1 Tablet(s) Oral every 6 hours PRN Moderate Pain (4 - 6)    Pertinent Labs: 04-25 Na128 mmol/L<L> Glu 189 mg/dL<H> K+ 5.2 mmol/L Cr  4.64 mg/dL<H> BUN 70 mg/dL<H> 04-24 Phos 4.9 mg/dL<H> 04-25 Alb 2.1 g/dL<L> 04-11 [id number] 5.3 %      Skin: nestor score = 21  no PU noted    Monitoring and Evaluation:   [x] PO intake/Nutr support infusion [ x ] Tolerance to Nutr [ x ] weights [ x ] labs[ x ] follow up per protocol  [ ] other:

## 2019-04-25 NOTE — PROGRESS NOTE ADULT - SUBJECTIVE AND OBJECTIVE BOX
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  S/P colonoscopy: last done 2016  AICD (automatic cardioverter/defibrillator) present: 2013  H/O hernia repair  A-V fistula: 1995 left UE  right arm 2007,  H/O kidney transplant: 1985, 1995, 1997    MEDICATIONS  (STANDING):  amiodarone    Tablet 100 milliGRAM(s) Oral daily  cholecalciferol 1000 Unit(s) Oral <User Schedule>  cinacalcet 30 milliGRAM(s) Oral <User Schedule>  epoetin amee Injectable 29071 Unit(s) IV Push <User Schedule>  losartan 25 milliGRAM(s) Oral daily  meropenem  IVPB      meropenem  IVPB 500 milliGRAM(s) IV Intermittent every 24 hours  metoprolol tartrate 25 milliGRAM(s) Oral daily  pantoprazole    Tablet 40 milliGRAM(s) Oral two times a day  predniSONE   Tablet 20 milliGRAM(s) Oral two times a day  sevelamer carbonate 2400 milliGRAM(s) Oral three times a day with meals  ursodiol Capsule 300 milliGRAM(s) Oral two times a day  vancomycin    Solution 125 milliGRAM(s) Oral every 6 hours    MEDICATIONS  (PRN):  acetaminophen   Tablet .. 650 milliGRAM(s) Oral every 6 hours PRN Temp greater or equal to 38C (100.4F), Mild Pain (1 - 3)  ondansetron Injectable 4 milliGRAM(s) IV Push every 6 hours PRN Nausea  oxyCODONE    5 mG/acetaminophen 325 mG 1 Tablet(s) Oral every 6 hours PRN Moderate Pain (4 - 6)    Allergies  No Known Allergies    REVIEW OF SYSTEMS:    CONSTITUTIONAL: No weakness, fevers or chills  RESPIRATORY: No cough, wheezing, hemoptysis; No shortness of breath  CARDIOVASCULAR: No chest pain or palpitations  GASTROINTESTINAL: as above  All other review of systems is negative unless indicated above.    Vital Signs Last 24 Hrs  T(C): 36.3 (25 Apr 2019 05:21), Max: 36.8 (24 Apr 2019 11:31)  T(F): 97.4 (25 Apr 2019 05:21), Max: 98.2 (24 Apr 2019 11:31)  HR: 68 (25 Apr 2019 05:21) (54 - 71)  BP: 129/87 (25 Apr 2019 05:21) (82/55 - 129/87)  BP(mean): --  RR: 16 (25 Apr 2019 05:21) (12 - 17)  SpO2: 97% (25 Apr 2019 05:21) (97% - 100%)    PHYSICAL EXAM:    Constitutional: NAD  M/G/R  Gastrointestinal: BS+, soft, drain in place      LABS:                        10.1   5.73  )-----------( 90       ( 25 Apr 2019 08:41 )             33.2     04-25    128<L>  |  97  |  70<H>  ----------------------------<  189<H>  5.2   |  23  |  4.64<H>    Ca    7.8<L>      25 Apr 2019 07:34  Phos  4.9     04-24    TPro  5.8<L>  /  Alb  2.1<L>  /  TBili  0.9  /  DBili  x   /  AST  18  /  ALT  6<L>  /  AlkPhos  80  04-25    PT/INR - ( 24 Apr 2019 07:15 )   PT: 16.7 sec;   INR: 1.49 ratio         PTT - ( 24 Apr 2019 07:15 )  PTT:29.8 sec  LIVER FUNCTIONS - ( 25 Apr 2019 07:34 )  Alb: 2.1 g/dL / Pro: 5.8 gm/dL / ALK PHOS: 80 U/L / ALT: 6 U/L / AST: 18 U/L / GGT: x             RADIOLOGY & ADDITIONAL STUDIES:

## 2019-04-25 NOTE — PROGRESS NOTE ADULT - ASSESSMENT
62 yo male with PMH of CAD, ESRD on HD, polycystic kidney disease, systolic CHF s/p AICD, HTN, adrenal insufficiency (on chronic prednisone), hypoparathyroidism, h/o cryptococcal meningitis, h/o c.diff infection, prior sepsis with Klebsiella pneumoniae, pancreatic duct stone, chronic pancreatitis cardiomyopathy on coumadin, adrenal insufficiency was admitted on 4/10 for abdominal pain and fever. He has had multiple episodes of Klebsiella septicemia due to cholangitis (was deemed not a surgical candidate for cholecystectomy), s/p recent ERCP w/ papillotomy and stone extraction at [facility] 2-3 weeks PTA, recent admission (discharged 4/14/2019) for probable sepsis and cholangitis (placed on cefazolin 2gm IV qHD) was admitted on 4/17 for recurrent severe LUQ abdominal pain associated w/ recurrent fever at home (Tmax 101'F). In the ED the patient was given Acetaminophen 650mg po x 1, Hydrocortisone 100mg IVP x 1, and NS x 500mL x 1.     1. Febrile syndrome and abdominal pain improving. Sepsis with ESBL E.coli. Probable acute cholangitis/cholecystitis s/p recent ERCP/CBD stones removal. Gallstones. Prior CDAD. ESRD on HD.  - slowly improving   - blood cx growing ESBL Ecoli  - repeat blood cultures 4/22 no growth  - on meropenem [id number] (after hd on dialysis days) #5  - continue with abx coverage   - s/p cefazolin 2 gm IV qHD   - on vancomycin 125 mg PO q6h for prophylaxis   - given gentamicin 150 mg IV x 1 4/19  - reason for abx use and side effects reviewed with patient; monitor BMP   - US imaging suggestive shows distended GB  - GI/surgical eval noted, plan for per cholecystostomy   - on dc plan for midline and IV merrem vs invanz 14 day course  - monitor temps  - f/u CBC  -supportive care    2. Other issues; CAD, ESRD on HD, polycystic kidney disease, systolic CHF s/p AICD, HTN, adrenal insufficiency (on chronic prednisone), hypoparathyroidism  - per medicine 64 yo male with PMH of CAD, ESRD on HD, polycystic kidney disease, systolic CHF s/p AICD, HTN, adrenal insufficiency (on chronic prednisone), hypoparathyroidism, h/o cryptococcal meningitis, h/o c.diff infection, prior sepsis with Klebsiella pneumoniae, pancreatic duct stone, chronic pancreatitis cardiomyopathy on coumadin, adrenal insufficiency was admitted on 4/10 for abdominal pain and fever. He has had multiple episodes of Klebsiella septicemia due to cholangitis (was deemed not a surgical candidate for cholecystectomy), s/p recent ERCP w/ papillotomy and stone extraction at [facility] 2-3 weeks PTA, recent admission (discharged 4/14/2019) for probable sepsis and cholangitis (placed on cefazolin 2gm IV qHD) was admitted on 4/17 for recurrent severe LUQ abdominal pain associated w/ recurrent fever at home (Tmax 101'F). In the ED the patient was given Acetaminophen 650mg po x 1, Hydrocortisone 100mg IVP x 1, and NS x 500mL x 1.     1. Febrile syndrome and abdominal pain improving. Sepsis with ESBL E.coli. Probable acute cholangitis/cholecystitis s/p recent ERCP/CBD stones removal. Gallstones. Prior CDAD. ESRD on HD.  - slowly improving   - blood cx growing ESBL Ecoli, repeat blood cultures 4/22 no growth  - on meropenem [id number] (after hd on dialysis days) #6  - continue with abx coverage   - US imaging suggestive shows distended GB  - GI/surgical eval noted, s/p perc cholecystostomy tube 4/24   - on dc plan for midline and IV merrem vs invanz 14 day course until 5/5   - on vancomycin 125 mg PO q6h for prophylaxis   - supportive care  - reason for abx use and side effects reviewed with patient; monitor BMP   - s/p cefazolin 2 gm IV qHD, given gentamicin 150 mg IV x 1 4/19    2. Other issues; CAD, ESRD on HD, polycystic kidney disease, systolic CHF s/p AICD, HTN, adrenal insufficiency (on chronic prednisone), hypoparathyroidism  - per medicine

## 2019-04-25 NOTE — PROGRESS NOTE ADULT - ASSESSMENT
62yo male with multiple medical issues  now s/p drain for cholecystitis - he feels much better today  plan is for d/c with drain and likely surgery at [facility] after consultation with surgeon there after d/c

## 2019-04-25 NOTE — PROGRESS NOTE ADULT - SUBJECTIVE AND OBJECTIVE BOX
CHIEF COMPLAINT/Diagnosis: recurrent cholycystitis/ s/p perc drain    SUBJECTIVE: no complaints    REVIEW OF SYSTEMS:    CONSTITUTIONAL: No weakness, fevers or chills  EYES/ENT: No visual changes;  No vertigo or throat pain   NECK: No pain or stiffness  RESPIRATORY: No cough, wheezing, hemoptysis; No shortness of breath  CARDIOVASCULAR: No chest pain or palpitations  GASTROINTESTINAL: No abdominal or epigastric pain. No nausea, vomiting, or hematemesis; No diarrhea or constipation. No melena or hematochezia.  GENITOURINARY: No dysuria, frequency or hematuria  NEUROLOGICAL: No numbness or weakness  SKIN: No itching, burning, rashes, or lesions   All other review of systems is negative unless indicated above    Vital Signs Last 24 Hrs  T(C): 36.5 (25 Apr 2019 11:04), Max: 36.5 (25 Apr 2019 11:04)  T(F): 97.7 (25 Apr 2019 11:04), Max: 97.7 (25 Apr 2019 11:04)  HR: 60 (25 Apr 2019 11:04) (60 - 68)  BP: 121/81 (25 Apr 2019 11:04) (121/81 - 129/87)  BP(mean): --  RR: 18 (25 Apr 2019 11:04) (16 - 18)  SpO2: 100% (25 Apr 2019 11:04) (97% - 100%)    I&O's Summary    24 Apr 2019 07:01  -  25 Apr 2019 07:00  --------------------------------------------------------  IN: 50 mL / OUT: 175 mL / NET: -125 mL        CAPILLARY BLOOD GLUCOSE          PHYSICAL EXAM:    Constitutional: NAD, awake and alert, well-developed  HEENT: PERR, EOMI, Normal Hearing, MMM  Neck: Soft and supple, No LAD, No JVD  Respiratory: Breath sounds are clear bilaterally, No wheezing, rales or rhonchi  Cardiovascular: S1 and S2, regular rate and rhythm, no Murmurs, gallops or rubs  Gastrointestinal: Bowel Sounds present, soft, nontender, nondistended, no guarding, no rebound  Extremities: No peripheral edema  Vascular: 2+ peripheral pulses  Neurological: A/O x 3, no focal deficits  Musculoskeletal: 5/5 strength b/l upper and lower extremities  Skin: No rashes    MEDICATIONS:  MEDICATIONS  (STANDING):  amiodarone    Tablet 100 milliGRAM(s) Oral daily  cholecalciferol 1000 Unit(s) Oral <User Schedule>  cinacalcet 30 milliGRAM(s) Oral <User Schedule>  epoetin amee Injectable 57503 Unit(s) IV Push <User Schedule>  losartan 25 milliGRAM(s) Oral daily  meropenem  IVPB      meropenem  IVPB 500 milliGRAM(s) IV Intermittent every 24 hours  metoprolol tartrate 25 milliGRAM(s) Oral daily  pantoprazole    Tablet 40 milliGRAM(s) Oral two times a day  predniSONE   Tablet 20 milliGRAM(s) Oral two times a day  sevelamer carbonate 2400 milliGRAM(s) Oral three times a day with meals  ursodiol Capsule 300 milliGRAM(s) Oral two times a day  vancomycin    Solution 125 milliGRAM(s) Oral every 6 hours      LABS: All Labs Reviewed:                        10.1   5.73  )-----------( 90       ( 25 Apr 2019 08:41 )             33.2     04-25    128<L>  |  97  |  70<H>  ----------------------------<  189<H>  5.2   |  23  |  4.64<H>    Ca    7.8<L>      25 Apr 2019 07:34  Phos  4.9     04-24    TPro  5.8<L>  /  Alb  2.1<L>  /  TBili  0.9  /  DBili  x   /  AST  18  /  ALT  6<L>  /  AlkPhos  80  04-25    PT/INR - ( 24 Apr 2019 07:15 )   PT: 16.7 sec;   INR: 1.49 ratio         PTT - ( 24 Apr 2019 07:15 )  PTT:29.8 sec      Blood Culture: 04-22 @ 10:45  Organism --  Gram Stain Blood -- Gram Stain --  Specimen Source .Blood None  Culture-Blood --    04-22 @ 08:15  Organism --  Gram Stain Blood -- Gram Stain --  Specimen Source .Blood Blood  Culture-Blood --        Assessment and Plan:    62 y/o M PMHx significant for ESRD secondary to Polycystic kidney disease on HD via RUE on M/W/F, s/p bilateral nephrectomy, s/p failed transplant x 2, CAD, severe dilated cardiomyopathy, paroxysmal atrial flutter and VT on Amiodarone and Warfarin chronically, CHF (HFrEF), s/p AICD, hypertension, adrenal insufficiency (on chronic prednisone), hypoparathyroidism, h/o cryptococcal meningitis, h/o c. difficile colitis, has had multiple episodes of Klebsiella septicemia due to Cholangitis, s/p recent ERCP w/ papillotomy and stone extraction at [facility] 2 weeks ago, recent admission (discharged 4/14/2019) for management of sepsis due to cholangitis presents to the ED for further evaluation of recurrent severe LUQ abdominal pain associated w/ recurrent subjective fevers at home (Tmax 101'F).     1.  Sepsis secondary to recurrent Cholecystitis w/ E.coli  ESBL bacteremia:    -Cholangitis is questionable and not likley, TBilis are normal and patient is not spiking any temps.    -ID consult apprecaited >> c/w meropenem w/ oral vanco for c.diff prophylaxis (patient has hx; no acitive infection)  -4/17 >> ESBL ecoli in blood  -Repeat blood cultures done are negative  -surgical evaluation >> high risk w/ complicated anatomy, recco IR drain >> patietn s/p perc drain 4/ 24  -patient will need 2 weeks of ESBL tx with iV invanz; midline today; discharge tomamrrow; social organzing abx for tommarow.     3.  PCKD s/p nephrectomy/ failed transplant x2 on HD:    Cont HD as scheduled.      4.  Hx of Dilated Cardiomyopathy/ AFIB/ VT:    Cont amio, toprol, losartan.    ECHO with EF 20-25%.    INR 2-3.      5.  Hx of recurrent c. difficile colitis  ~cont. suppressive therapy w/ Vancomycin PO    6.  Adrenal Insufficiency:    ~given stress dose Hydrocortisone in the ED  -started taper of hydrocortisone on 4/21 >>> taper more today to 30mg q12 h >> now tapered to oral  -follow the following taper for home: prednisone 15 mg daily x 2 days             --prednisone 10 mg daily x 4 days             --prednisone 7.5 mg daily x 4 days             --prednisone 5 mg daily indefinitely thereafter        --Glucocorticoids should not be weaned off

## 2019-04-25 NOTE — PROGRESS NOTE ADULT - SUBJECTIVE AND OBJECTIVE BOX
62 y/o M PMHx significant for ESRD secondary to Polycystic kidney disease on HD via RUE on M/W/F, s/p bilateral nephrectomy, s/p failed transplant x 2, CAD, severe dilated cardiomyopathy EF 20%, paroxysmal atrial flutter and VT on Amiodarone and Warfarin chronically, CHF (HFrEF), s/p AICD, adrenal insufficiency (on chronic prednisone x years) - never had formal workup., hypoparathyroidism, h/o cryptococcal meningitis, h/o c. difficile colitis, has had multiple episodes of Klebsiella septicemia due to Cholangitis (was deemed not a surgical candidate for cholecystectomy in the past), s/p recent ERCP w/ papillotomy and stone extraction at [facility] 2 weeks ago, recent admission (discharged 4/14/2019) for management of sepsis due to cholangitis presents to the ED for further evaluation of recurrent severe LUQ abdominal pain associated w/ recurrent subjective fevers at home (Tmax 101'F). The patient denies any associated nausea, vomiting.  Abdominal U/S => Mild ascites. Transplant kidney intact. Gallbladder is markedly distended. Cholelithiasis. Hepatic cysts. In the ED the patient was given Acetaminophen 650mg po x 1, Hydrocortisone 100mg IVP x 1, and NS x 500mL x 1.  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  S/P colonoscopy: last done 2016  AICD (automatic cardioverter/defibrillator) present: 2013  H/O hernia repair  A-V fistula: 1995 left UE  right arm 2007,  H/O kidney transplant: 1985, 1995, 1997    MEDICATIONS  (STANDING):  amiodarone    Tablet 100 milliGRAM(s) Oral daily  cholecalciferol 1000 Unit(s) Oral <User Schedule>  cinacalcet 30 milliGRAM(s) Oral <User Schedule>  epoetin amee Injectable 58568 Unit(s) IV Push <User Schedule>  losartan 25 milliGRAM(s) Oral daily  meropenem  IVPB      meropenem  IVPB 500 milliGRAM(s) IV Intermittent every 24 hours  metoprolol tartrate 25 milliGRAM(s) Oral daily  pantoprazole    Tablet 40 milliGRAM(s) Oral two times a day  predniSONE   Tablet 20 milliGRAM(s) Oral two times a day  sevelamer carbonate 2400 milliGRAM(s) Oral three times a day with meals  ursodiol Capsule 300 milliGRAM(s) Oral two times a day  vancomycin    Solution 125 milliGRAM(s) Oral every 6 hours    MEDICATIONS  (PRN):  acetaminophen   Tablet .. 650 milliGRAM(s) Oral every 6 hours PRN Temp greater or equal to 38C (100.4F), Mild Pain (1 - 3)  ondansetron Injectable 4 milliGRAM(s) IV Push every 6 hours PRN Nausea  oxyCODONE    5 mG/acetaminophen 325 mG 1 Tablet(s) Oral every 6 hours PRN Moderate Pain (4 - 6)    Allergies    No Known Allergies    Intolerances    SOCIAL HISTORY:  Denies ETOh,Smoking,     FAMILY HISTORY:  Family history of kidney disease (Mother)  Family history of colon cancer (Sibling)      REVIEW OF SYSTEMS:    CONSTITUTIONAL: No weakness, fevers or chills  EYES/ENT: No visual changes;  No vertigo or throat pain   NECK: No pain or stiffness  RESPIRATORY: No cough, wheezing, hemoptysis; No shortness of breath  CARDIOVASCULAR: No chest pain or palpitations  GASTROINTESTINAL: No abdominal or epigastric pain. No nausea, vomiting, or hematemesis; No diarrhea or constipation. No melena or hematochezia.  GENITOURINARY: No dysuria, frequency or hematuria  NEUROLOGICAL: No numbness or weakness  SKIN: No itching, burning, rashes, or lesions   All other review of systems is negative unless indicated above.    Vital Signs Last 24 Hrs  T(C): 36.5 (25 Apr 2019 11:04), Max: 36.5 (24 Apr 2019 16:32)  T(F): 97.7 (25 Apr 2019 11:04), Max: 97.7 (24 Apr 2019 16:32)  HR: 60 (25 Apr 2019 11:04) (60 - 71)  BP: 121/81 (25 Apr 2019 11:04) (96/57 - 129/87)  BP(mean): --  RR: 18 (25 Apr 2019 11:04) (12 - 18)  SpO2: 100% (25 Apr 2019 11:04) (97% - 100%)        PHYSICAL EXAM:    Constitutional: NAD  HEENT:, EOMI,  MMM  Neck: No LAD, No JVD  Respiratory: CTAB  Cardiovascular: S1 and S2  Gastrointestinal: BS+, soft, NT/ND  GB tube draining bile +  Extremities: No peripheral edema  Neurological: A/O x 3, no focal deficits  Psychiatric: Normal mood, normal affect  : No Amaya  Skin: No rashes  Access:AVF++     LABS:      128    |  97     |  70     ----------------------------<  189       25 Apr 2019 07:34  5.2     |  23     |  4.64     136    |  98     |  100    ----------------------------<  163       24 Apr 2019 07:15  4.0     |  25     |  5.75     133    |  96     |  106    ----------------------------<  179       22 Apr 2019 08:15  4.1     |  24     |  6.28     Ca    7.8        25 Apr 2019 07:34  Ca    8.0        24 Apr 2019 07:15    Phos  4.9       24 Apr 2019 07:15  Phos  4.2       22 Apr 2019 08:15                          10.1   5.73  )-----------( 90       ( 25 Apr 2019 08:41 )             33.2                         9.2    6.56  )-----------( 82       ( 24 Apr 2019 07:15 )             29.6

## 2019-04-26 NOTE — PROGRESS NOTE ADULT - SUBJECTIVE AND OBJECTIVE BOX
Patient is a 63y old  Male who presents with a chief complaint of Abdominal pain and fevers (25 Apr 2019 17:01)      Followup HPI:  4/20 Cardiology: 63 year old male with pckd,esrd, dcm wih ef 25% VT, cad admitted with recurrent sepsis due to cholangities. from cardiac view patient has been feeling  well with out chest pain dyspnea or palpitations. no pnd orthopnea or le edema. patient also has afib/flutter. patients abdominal pain is improved  4/21 no complaints. oob to chair  4/22- no complaints. He states he prefers cholecystectomy to percutaneous GB drainage.   4/23 Temp to 102 last night with mild abd pain. better today  4/24 on bedside hd, no complaints  4/25- no complaints. Post percutaneous cholecystotomy 4/24.  4/26 - no complaints.    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  S/P colonoscopy: last done 2016  AICD (automatic cardioverter/defibrillator) present: 2013  H/O hernia repair  A-V fistula: 1995 left UE  right arm 2007,  H/O kidney transplant: 1985, 1995, 1997      Review of symptoms:  Negative except for as noted in today's HPI.      MEDICATIONS  (STANDING):  amiodarone    Tablet 100 milliGRAM(s) Oral daily  cholecalciferol 1000 Unit(s) Oral <User Schedule>  cinacalcet 30 milliGRAM(s) Oral <User Schedule>  epoetin amee Injectable 51471 Unit(s) IV Push <User Schedule>  heparin  Injectable 5000 Unit(s) SubCutaneous every 8 hours  losartan 25 milliGRAM(s) Oral daily  meropenem  IVPB      meropenem  IVPB 500 milliGRAM(s) IV Intermittent every 24 hours  metoprolol tartrate 25 milliGRAM(s) Oral daily  pantoprazole    Tablet 40 milliGRAM(s) Oral two times a day  predniSONE   Tablet 20 milliGRAM(s) Oral two times a day  sevelamer carbonate 2400 milliGRAM(s) Oral three times a day with meals  ursodiol Capsule 300 milliGRAM(s) Oral two times a day  vancomycin    Solution 125 milliGRAM(s) Oral every 6 hours    MEDICATIONS  (PRN):  acetaminophen   Tablet .. 650 milliGRAM(s) Oral every 6 hours PRN Temp greater or equal to 38C (100.4F), Mild Pain (1 - 3)  ondansetron Injectable 4 milliGRAM(s) IV Push every 6 hours PRN Nausea          Vital Signs Last 24 Hrs  T(C): 36.1 (26 Apr 2019 07:48), Max: 36.8 (25 Apr 2019 17:04)  T(F): 97 (26 Apr 2019 07:48), Max: 98.2 (25 Apr 2019 17:04)  HR: 69 (26 Apr 2019 08:53) (54 - 70)  BP: 117/76 (26 Apr 2019 08:53) (117/76 - 155/99)  BP(mean): --  RR: 17 (26 Apr 2019 08:53) (16 - 18)  SpO2: 99% (26 Apr 2019 05:20) (99% - 100%)    I&O's Summary    25 Apr 2019 07:01  -  26 Apr 2019 07:00  --------------------------------------------------------  IN: 0 mL / OUT: 115 mL / NET: -115 mL        PHYSICAL EXAM  General Appearance: comfortable. Currently on dialysis.  HEENT:   Neck:   Lungs: clear  Heart: 1/6 systolic murmur LSB  Abdomen: nontender. RUQ drain  Extremities: 1+ edema both legs  Neurologic:       INTERPRETATION OF TELEMETRY:    ECG:    LABS:                          9.7    6.42  )-----------( 100      ( 26 Apr 2019 07:30 )             31.9     04-26    135  |  98  |  100<H>  ----------------------------<  267<H>  5.0   |  22  |  5.87<H>    Ca    7.9<L>      26 Apr 2019 07:30  Phos  5.0     04-26    TPro  x   /  Alb  2.3<L>  /  TBili  x   /  DBili  x   /  AST  x   /  ALT  x   /  AlkPhos  x   04-26            PT/INR - ( 26 Apr 2019 07:30 )   PT: 13.6 sec;   INR: 1.22 ratio         PTT - ( 26 Apr 2019 07:30 )  PTT:25.6 sec          RADIOLOGY & ADDITIONAL STUDIES:    IMPRESSION:  1. Recurrent cholangitis s/p recent ERCP. Chronic cholecystitis, stones. s/p cholecystotomy 4/24.. Doing well.   2. HFrEF  EF 20-25%. Compensated.   3. CAD , stable. No angina.   4. Atrial and ventricular arryhtmias/ICD, stable  5.ESRD.  PLAN:  Continue amiodarone, losartan, metoprolol, anticoagulation for stroke prevention.

## 2019-04-26 NOTE — DISCHARGE NOTE PROVIDER - CARE PROVIDER_API CALL
Westley cohen PCP,   Phone: (   )    -  Fax: (   )    -  Follow Up Time:     Alexander Penny)  Gastroenterology; Internal Medicine  [address] [place] [address]  Phone: [phone]  Fax: [phone]  Follow Up Time:

## 2019-04-26 NOTE — PROGRESS NOTE ADULT - REASON FOR ADMISSION
Abdominal pain and fevers

## 2019-04-26 NOTE — PROGRESS NOTE ADULT - NSHPATTENDINGPLANDISCUSS_GEN_ALL_CORE
patient, nursing,staff
patient, nursig, staff
patient, nursign,staff
patient, nursing, staff
patient, nursing,s taff
Dr. Quintero and wife by phone
Patient, nurse
patient, nursing, staff

## 2019-04-26 NOTE — DISCHARGE NOTE PROVIDER - HOSPITAL COURSE
HOSPITALIST PROGRESS NOTE:    SUBJECTIVE:    PCP:    Chief Complaint: Patient is a 63y old  Male who presents with a chief complaint of Abdominal pain and fevers (26 Apr 2019 08:56)            HPI:    64 y/o M PMHx significant for ESRD secondary to Polycystic kidney disease on HD via RUE on M/W/F, s/p bilateral nephrectomy, s/p failed transplant x 2, CAD, severe dilated cardiomyopathy, paroxysmal atrial flutter and VT on Amiodarone and Warfarin chronically, CHF (HFrEF), s/p AICD, hypertension, adrenal insufficiency (on chronic prednisone), hypoparathyroidism, h/o cryptococcal meningitis, h/o c. difficile colitis, has had multiple episodes of Klebsiella septicemia due to Cholangitis (was deemed not a surgical candidate for cholecystectomy in the past), s/p recent ERCP w/ papillotomy and stone extraction at [facility] 2 weeks ago, recent admission (discharged 4/14/2019) for management of sepsis due to cholangitis presents to the ED for further evaluation of recurrent severe LUQ abdominal pain associated w/ recurrent subjective fevers at home (Tmax 101'F). The patient denies any associated nausea, vomiting. Labs => Hgb/Hct 9.8/32.6, PLT 78, LA 1.8, HCO3 36, BUN/Cr 46/3.96, , Alb 2.5. Abdominal U/S => Mild ascites. Transplant kidney intact. Gallbladder is markedly distended. Cholelithiasis. Hepatic cysts. In the ED the patient was given Acetaminophen 650mg po x 1, Hydrocortisone 100mg IVP x 1, and NS x 500mL x 1. (17 Apr 2019 23:01)        4/26: Above Reviewed; Patient has no complaints. would like to go home             * Sepsis secondary to recurrent Cholecystitis w/ E.coli  ESBL bacteremia:      * GNR bacteremia ESBL E Coli    -Cholangitis is questionable and not likley, TBilis are normal and patient is not spiking any temps.      -ID consult apprecaited >> c/w meropenem w/ oral vanco for c.diff prophylaxis (patient has hx; no acitive infection)    -4/17 >> ESBL ecoli in blood    -Repeat blood cultures done are negative    -surgical evaluation >> high risk w/ complicated anatomy, recco IR drain >> patietn s/p perc drain 4/ 24    -patient will need 2 weeks of ESBL tx with iV invanz X 14 days till 5/5; S/P midline     -lap chol in a few weeks  with Jessee evangelista per surgery team        *PCKD s/p nephrectomy/ failed transplant x2 on HD:      Cont HD as scheduled.          *Hx of Dilated Cardiomyopathy/ AFIB/ VT:      Cont amio, toprol, losartan.      ECHO with EF 20-25%.      INR 2-3.          *Hx of recurrent c. difficile colitis    ~cont. suppressive therapy w/ Vancomycin PO till 5/5        *Adrenal Insufficiency:      ~given stress dose Hydrocortisone in the ED    -started taper of hydrocortisone on 4/21 >>> taper more today to 30mg q12 h >> now tapered to oral    -continue prednisone taper then 5 mg daily indefinitely thereafter     --Glucocorticoids should not be weaned off

## 2019-04-26 NOTE — PROGRESS NOTE ADULT - ASSESSMENT
* Sepsis secondary to recurrent Cholecystitis w/ E.coli  ESBL bacteremia:    * GNR bacteremia ESBL E Coli  -Cholangitis is questionable and not likley, TBilis are normal and patient is not spiking any temps.    -ID consult apprecaited >> c/w meropenem w/ oral vanco for c.diff prophylaxis (patient has hx; no acitive infection)  -4/17 >> ESBL ecoli in blood  -Repeat blood cultures done are negative  -surgical evaluation >> high risk w/ complicated anatomy, recco IR drain >> patietn s/p perc drain 4/ 24  -patient will need 2 weeks of ESBL tx with iV invanz X 14 days till 5/5; S/P midline   -lap chol in a few weeks  with Jessee evangelista per surgery team    *PCKD s/p nephrectomy/ failed transplant x2 on HD:    Cont HD as scheduled.      *Hx of Dilated Cardiomyopathy/ AFIB/ VT:    Cont amio, toprol, losartan.    ECHO with EF 20-25%.    INR 2-3.      *Hx of recurrent c. difficile colitis  ~cont. suppressive therapy w/ Vancomycin PO till 5/5    *Adrenal Insufficiency:    ~given stress dose Hydrocortisone in the ED  -started taper of hydrocortisone on 4/21 >>> taper more today to 30mg q12 h >> now tapered to oral  -continue prednisone taper then 5 mg daily indefinitely thereafter   --Glucocorticoids should not be weaned off

## 2019-04-26 NOTE — PROGRESS NOTE ADULT - SUBJECTIVE AND OBJECTIVE BOX
HOSPITALIST PROGRESS NOTE:  SUBJECTIVE:  PCP:  Chief Complaint: Patient is a 63y old  Male who presents with a chief complaint of Abdominal pain and fevers (26 Apr 2019 08:56)      HPI:  64 y/o M PMHx significant for ESRD secondary to Polycystic kidney disease on HD via RUE on M/W/F, s/p bilateral nephrectomy, s/p failed transplant x 2, CAD, severe dilated cardiomyopathy, paroxysmal atrial flutter and VT on Amiodarone and Warfarin chronically, CHF (HFrEF), s/p AICD, hypertension, adrenal insufficiency (on chronic prednisone), hypoparathyroidism, h/o cryptococcal meningitis, h/o c. difficile colitis, has had multiple episodes of Klebsiella septicemia due to Cholangitis (was deemed not a surgical candidate for cholecystectomy in the past), s/p recent ERCP w/ papillotomy and stone extraction at [facility] 2 weeks ago, recent admission (discharged 4/14/2019) for management of sepsis due to cholangitis presents to the ED for further evaluation of recurrent severe LUQ abdominal pain associated w/ recurrent subjective fevers at home (Tmax 101'F). The patient denies any associated nausea, vomiting. Labs => Hgb/Hct 9.8/32.6, PLT 78, LA 1.8, HCO3 36, BUN/Cr 46/3.96, , Alb 2.5. Abdominal U/S => Mild ascites. Transplant kidney intact. Gallbladder is markedly distended. Cholelithiasis. Hepatic cysts. In the ED the patient was given Acetaminophen 650mg po x 1, Hydrocortisone 100mg IVP x 1, and NS x 500mL x 1. (17 Apr 2019 23:01)    4/26: Above Reviewed; Patient has no complaints. would like to go home     Allergies:  No Known Allergies    REVIEW OF SYSTEMS:  See HPI. All other review of systems is negative unless indicated above.     OBJECTIVE  Physical Exam:  Vital Signs:    Vital Signs Last 24 Hrs  T(C): 37.1 (26 Apr 2019 12:15), Max: 37.1 (26 Apr 2019 12:15)  T(F): 98.7 (26 Apr 2019 12:15), Max: 98.7 (26 Apr 2019 12:15)  HR: 63 (26 Apr 2019 12:15) (50 - 70)  BP: 134/92 (26 Apr 2019 12:15) (93/66 - 155/99)  BP(mean): --  RR: 17 (26 Apr 2019 12:15) (16 - 18)  SpO2: 99% (26 Apr 2019 12:15) (99% - 100%)  I&O's Summary    25 Apr 2019 07:01  -  26 Apr 2019 07:00  --------------------------------------------------------  IN: 0 mL / OUT: 115 mL / NET: -115 mL      Constitutional: NAD, awake and alert, well-developed  Neurological: AAO x 3, no focal deficits  HEENT: PERRLA, EOMI, MMM  Neck: Soft and supple, No LAD, No JVD  Respiratory: Breath sounds are clear bilaterally, No wheezing, rales or rhonchi  Cardiovascular: S1 and S2, regular rate and rhythm; no Murmurs, gallops or rubs  Gastrointestinal: Bowel Sounds present, soft, nontender, nondistended, no guarding, no rebound tenderness +rUQ drain  Back: No CVA tenderness   Extremities: No peripheral edema  Vascular: 2+ peripheral pulses  Musculoskeletal: 5/5 strength b/l upper and lower extremities  Skin: No rashes  Breast: Deferred  Rectal: Deferred    MEDICATIONS  (STANDING):  amiodarone    Tablet 100 milliGRAM(s) Oral daily  cholecalciferol 1000 Unit(s) Oral <User Schedule>  cinacalcet 30 milliGRAM(s) Oral <User Schedule>  epoetin amee Injectable 11483 Unit(s) IV Push <User Schedule>  heparin  Injectable 5000 Unit(s) SubCutaneous every 8 hours  losartan 25 milliGRAM(s) Oral daily  meropenem  IVPB      meropenem  IVPB 500 milliGRAM(s) IV Intermittent every 24 hours  metoprolol tartrate 25 milliGRAM(s) Oral daily  pantoprazole    Tablet 40 milliGRAM(s) Oral two times a day  predniSONE   Tablet 20 milliGRAM(s) Oral two times a day  sevelamer carbonate 2400 milliGRAM(s) Oral three times a day with meals  ursodiol Capsule 300 milliGRAM(s) Oral two times a day  vancomycin    Solution 125 milliGRAM(s) Oral every 6 hours      LABS: All Labs Reviewed:                        9.7    6.42  )-----------( 100      ( 26 Apr 2019 07:30 )             31.9     04-26    135  |  98  |  100<H>  ----------------------------<  267<H>  5.0   |  22  |  5.87<H>    Ca    7.9<L>      26 Apr 2019 07:30  Phos  5.0     04-26    TPro  x   /  Alb  2.3<L>  /  TBili  x   /  DBili  x   /  AST  x   /  ALT  x   /  AlkPhos  x   04-26    PT/INR - ( 26 Apr 2019 07:30 )   PT: 13.6 sec;   INR: 1.22 ratio         PTT - ( 26 Apr 2019 07:30 )  PTT:25.6 sec      Blood Culture:   04-22 @ 10:45  Organism --  Gram Stain Blood -- Gram Stain --  Specimen Source .Blood None  Culture-Blood --    04-22 @ 08:15  Organism --  Gram Stain Blood -- Gram Stain --  Specimen Source .Blood Blood  Culture-Blood --        RADIOLOGY/EKG:    < from: Xray Chest 2 Views PA/Lat (04.17.19 @ 19:27) >      INTERPRETATION:  Frontal and lateral chest on April 17, 2019 at 7:18 PM.   Patient was discharged from the hospital on Sunday and now has abdominal   pain andfever. On last admission patient was septic.    Ultrasound shows distended gallbladder and gallstones. Patient also has a   transplanted kidney and mild ascites.    Heart is magnified by technique.    Left-sided defibrillator again noted.    Extensive stenting in the right axillary, subclavian, innominate area   again noted.    Linear atelectasis off right hilum again seen.    Amorphous calcifications around the liver again noted.    The chest is similar to April 10.      < end of copied text >

## 2019-04-26 NOTE — DISCHARGE NOTE PROVIDER - NSDCCPCAREPLAN_GEN_ALL_CORE_FT
PRINCIPAL DISCHARGE DIAGNOSIS  Diagnosis: Abdominal pain  Assessment and Plan of Treatment: *Sepsis secondary to recurrent Cholecystitis w/ E.coli  ESBL bacteremia:    -4/17 >> ESBL ecoli in blood  -Repeat blood cultures done are negative  -surgical evaluation >> high risk w/ complicated anatomy,  patietn s/p perc drain 4/ 24 by IR  -patient will need 2 weeks of ESBL tx with iV ABX X 14 days till 5/5; S/P midline   -lap chol in a few weeks  with Jessee evangelista per surgery team;FU with GI        SECONDARY DISCHARGE DIAGNOSES  Diagnosis: Adrenal insufficiency  Assessment and Plan of Treatment: continue prednisone taper and then stay on 5mg indefinitely    Diagnosis: Atrial fibrillation  Assessment and Plan of Treatment: continue coumadin monitor INR as outpatient on Monday

## 2019-04-26 NOTE — PROGRESS NOTE ADULT - ASSESSMENT
62 yo male w extensive pmhx as above DCM, s.p renal transplant failure x 2,. hx of PCKD, and now ESRD on HD MWF  presenting multiple recent admission for Klebsiella sepsis, cholangitis, and now recurring abd pains w neg blood cultures recent admission and presenting within a week for same sx despite being in IV abx - ancef at HD     ESRD on HD  - continue HD MWF schedule   - SACHIN at HD     GNR bacteremia ESBL E Coli w persistent fevers despite IV abx    - meropenem daily per ID via home picc line    - s/p GB drain in place   - lap chol in a few weeks  with Jessee evangelista per surgery team     HTN/DCM  - on losartan and metoprolol     Adrenal Inuff-    - steroid schedule per Endocrine     Thank you for the courtesy of this consult. We will follow this patient with you.   Management is subject to change if new information becomes available or patient condition changes.

## 2019-04-26 NOTE — DISCHARGE NOTE PROVIDER - PROVIDER TOKENS
FREE:[LAST:[Westley ibrahim PCP],PHONE:[(   )    -],FAX:[(   )    -]],PROVIDER:[TOKEN:[[id number]]]

## 2019-04-26 NOTE — PROGRESS NOTE ADULT - PROVIDER SPECIALTY LIST ADULT
Cardiology
Endocrinology
Gastroenterology
Hospitalist
Infectious Disease
Nephrology
Surgery
Hospitalist
Infectious Disease
Gastroenterology
Hospitalist

## 2019-04-26 NOTE — PROGRESS NOTE ADULT - SUBJECTIVE AND OBJECTIVE BOX
64 y/o M PMHx significant for ESRD secondary to Polycystic kidney disease on HD via RUE on M/W/F, s/p bilateral nephrectomy, s/p failed transplant x 2, CAD, severe dilated cardiomyopathy EF 20%, paroxysmal atrial flutter and VT on Amiodarone and Warfarin chronically, CHF (HFrEF), s/p AICD, adrenal insufficiency (on chronic prednisone x years) - never had formal workup., hypoparathyroidism, h/o cryptococcal meningitis, h/o c. difficile colitis, has had multiple episodes of Klebsiella septicemia due to Cholangitis (was deemed not a surgical candidate for cholecystectomy in the past), s/p recent ERCP w/ papillotomy and stone extraction at [facility] 2 weeks ago, recent admission (discharged 4/14/2019) for management of sepsis due to cholangitis presents to the ED for further evaluation of recurrent severe LUQ abdominal pain associated w/ recurrent subjective fevers at home (Tmax 101'F). The patient denies any associated nausea, vomiting.  Abdominal U/S => Mild ascites. Transplant kidney intact. Gallbladder is markedly distended. Cholelithiasis. Hepatic cysts. In the ED the patient was given Acetaminophen 650mg po x 1, Hydrocortisone 100mg IVP x 1, and NS x 500mL x 1.  Renal eval called for ESRD and continuing HD mgt     pt seen earlier today on HD ~ 11:30 am   s/p cholecystomy tube yest   no complaints today   draining bile from tube  [facility]    PAST MEDICAL & SURGICAL HISTORY:  AV fistula: right UE  Leg pain, anterior, right: right anterior thigh at graft donor site  Irregular heart beat  AICD (automatic cardioverter/defibrillator) present  Dialysis patient  Adrenal insufficiency  Hypoparathyroidism  HFrEF (heart failure with reduced ejection fraction)  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  S/P colonoscopy: last done 2016  AICD (automatic cardioverter/defibrillator) present: 2013  H/O hernia repair  A-V fistula: 1995 left UE  right arm 2007,  H/O kidney transplant: 1985, 1995, 1997    MEDICATIONS  (STANDING):  MEDICATIONS  (STANDING):  amiodarone    Tablet 100 milliGRAM(s) Oral daily  cholecalciferol 1000 Unit(s) Oral <User Schedule>  cinacalcet 30 milliGRAM(s) Oral <User Schedule>  epoetin amee Injectable 75140 Unit(s) IV Push <User Schedule>  heparin  Injectable 5000 Unit(s) SubCutaneous every 8 hours  losartan 25 milliGRAM(s) Oral daily  meropenem  IVPB      meropenem  IVPB 500 milliGRAM(s) IV Intermittent every 24 hours  metoprolol tartrate 25 milliGRAM(s) Oral daily  pantoprazole    Tablet 40 milliGRAM(s) Oral two times a day  predniSONE   Tablet 20 milliGRAM(s) Oral two times a day  sevelamer carbonate 2400 milliGRAM(s) Oral three times a day with meals  ursodiol Capsule 300 milliGRAM(s) Oral two times a day  vancomycin    Solution 125 milliGRAM(s) Oral every 6 hours    MEDICATIONS  (PRN):  acetaminophen   Tablet .. 650 milliGRAM(s) Oral every 6 hours PRN Temp greater or equal to 38C (100.4F), Mild Pain (1 - 3)  ondansetron Injectable 4 milliGRAM(s) IV Push every 6 hours PRN Nausea      Allergies    No Known Allergies    Intolerances    SOCIAL HISTORY:  Denies ETOh,Smoking,     FAMILY HISTORY:  Family history of kidney disease (Mother)  Family history of colon cancer (Sibling)      REVIEW OF SYSTEMS:    CONSTITUTIONAL: No weakness, fevers or chills  EYES/ENT: No visual changes;  No vertigo or throat pain   NECK: No pain or stiffness  RESPIRATORY: No cough, wheezing, hemoptysis; No shortness of breath  CARDIOVASCULAR: No chest pain or palpitations  GASTROINTESTINAL: No abdominal or epigastric pain. No nausea, vomiting, or hematemesis; No diarrhea or constipation. No melena or hematochezia.  GENITOURINARY: No dysuria, frequency or hematuria  NEUROLOGICAL: No numbness or weakness  SKIN: No itching, burning, rashes, or lesions   All other review of systems is negative unless indicated above.    ICU Vital Signs Last 24 Hrs  T(C): 37.1 (26 Apr 2019 12:15), Max: 37.1 (26 Apr 2019 12:15)  T(F): 98.7 (26 Apr 2019 12:15), Max: 98.7 (26 Apr 2019 12:15)  HR: 63 (26 Apr 2019 12:15) (50 - 70)  BP: 134/92 (26 Apr 2019 12:15) (93/66 - 155/99)  BP(mean): --  ABP: --  ABP(mean): --  RR: 17 (26 Apr 2019 12:15) (16 - 17)  SpO2: 99% (26 Apr 2019 12:15) (99% - 100%)          PHYSICAL EXAM:    Constitutional: NAD  HEENT:, EOMI,  MMM  Neck: No LAD, No JVD  Respiratory: CTAB  Cardiovascular: S1 and S2  Gastrointestinal: BS+, soft, NT/ND  GB tube draining bile +  Extremities: No peripheral edema  Neurological: A/O x 3, no focal deficits  Psychiatric: Normal mood, normal affect  : No Amaya  Skin: No rashes  Access:AVF++     LABS:      135    |  98     |  100    ----------------------------<  267       26 Apr 2019 07:30  5.0     |  22     |  5.87     128    |  97     |  70     ----------------------------<  189       25 Apr 2019 07:34  5.2     |  23     |  4.64     136    |  98     |  100    ----------------------------<  163       24 Apr 2019 07:15  4.0     |  25     |  5.75     Ca    7.9        26 Apr 2019 07:30  Ca    7.8        25 Apr 2019 07:34    Phos  5.0       26 Apr 2019 07:30  Phos  4.9       24 Apr 2019 07:15                          9.7    6.42  )-----------( 100      ( 26 Apr 2019 07:30 )             31.9                         10.1   5.73  )-----------( 90       ( 25 Apr 2019 08:41 )             33.2

## 2019-04-26 NOTE — DISCHARGE NOTE NURSING/CASE MANAGEMENT/SOCIAL WORK - NSDCDPATPORTLINK_GEN_ALL_CORE
You can access the [facility] Patient Portal, offered by [facility], by registering with the following website: [email or web address] for [facility]

## 2019-05-02 PROBLEM — K81.0 CHOLECYSTITIS, ACUTE: Status: ACTIVE | Noted: 2019-01-01

## 2019-05-02 NOTE — HISTORY OF PRESENT ILLNESS
[FreeTextEntry1] : Patient recently was admitted to [facility] for acute cholecystitis and had a cholecystotomy tube placed. He was placed on imipenem and is doing well he has some mild leakage around the cholecystotomy site. But this has decreased in amount. He denies fever he denies significant abdominal pain or increased abdominal girth. His appetite is fair denies constipation or diarrhea despite being on antibiotics

## 2019-05-02 NOTE — BRIEF OPERATIVE NOTE - OPERATION/FINDINGS
Pt sent for leakage around cholecystostomy tube.  Tube in good position. Dye study shows tube in GB with internal drainage.  US shows moderate ascites. Leakage around tube probably from ascites.  Purse string suture placed at tube site.

## 2019-05-02 NOTE — ASSESSMENT
[FreeTextEntry1] : Patient with recent cholecystotomy tube with some leakage continues on antibiotics he clinically improved from hospital will be referred to an interventional radiology today for assessment of tube placement and change to larger bore tube if necessary. After antibiotic course complete patient will be referred for cholecystectomy

## 2019-05-02 NOTE — PHYSICAL EXAM
[Sclera] : the sclera and conjunctiva were normal [General Appearance - In No Acute Distress] : in no acute distress [Auscultation Breath Sounds / Voice Sounds] : lungs were clear to auscultation bilaterally [] : no respiratory distress

## 2019-05-02 NOTE — CHART NOTE - NSCHARTNOTEFT_GEN_A_CORE
Vascular & Interventional Radiology Pre-Procedure Note    Procedure Name: ___Cholecystostomy tube evaluation, possible tube check and change____    HPI: 63y Male with ___leakage around cholecystostomy tube____    Allergies:   Medications (Abx/Cardiac/Anticoagulation/Blood Products)      Data:    T(C): --  HR: --  BP: --  RR: --  SpO2: --    Exam  General: ___wnl____  Chest: ___wnl____  Abdomen: ___ruq tamara tube____  Extremities: ___enl____          Imaging: ____chole tube in GB___    Plan:   -63y Male presents for ___Cholecystostomy tube evaluation and possible check/change____  -Risks/Benefits/alternatives explained with the patient and/or healthcare proxy and witnessed informed consent obtained.

## 2019-05-02 NOTE — BRIEF OPERATIVE NOTE - NSICDXBRIEFPROCEDURE_GEN_ALL_CORE_FT
PROCEDURES:  Injection of contrast into percutaneous cholecystostomy tube 02-May-2019 14:54:24  Darwin Calvillo

## 2019-05-31 ENCOUNTER — APPOINTMENT (OUTPATIENT)
Dept: ELECTROPHYSIOLOGY | Facility: CLINIC | Age: 64
End: 2019-05-31

## 2019-06-09 PROBLEM — I51.9 HEART DISEASE: Status: ACTIVE | Noted: 2019-01-01

## 2019-10-01 NOTE — DISCHARGE NOTE ADULT - IF YOU ARE A SMOKER, IT IS IMPORTANT FOR YOUR HEALTH TO STOP SMOKING. PLEASE BE AWARE THAT SECOND HAND SMOKE IS ALSO HARMFUL.
Spoke to pt he would like to be scheduled for Light therapy. Informed Dr. NITA NELSON  
Statement Selected

## 2019-10-25 NOTE — ED STATDOCS - RESPIRATORY, MLM
Detail Level: Generalized Detail Level: Simple Detail Level: Detailed breath sounds clear and equal bilaterally.

## 2019-11-15 NOTE — HEMODIALYSIS NURSING HISTORY - VISION (WITH CORRECTIVE LENSES IF THE PATIENT USUALLY WEARS THEM):
Normal vision: sees adequately in most situations; can see medication labels, newsprint independent/alert

## 2020-07-24 NOTE — ED ADULT NURSE NOTE - HARM RISK FACTORS
Medication/Dose: [facility] ASPIRIN ADULT LOW DOSE 81 MG chewable   Patient last seen by PCP: 7/13/2020  Next office visit with PCP: None  Last Lab:7/13/2020    Above information requires contacting patient: No    Prescription does not require PDMP check    Sent to provider to approve or deny        
no

## 2020-08-04 NOTE — ED ADULT TRIAGE NOTE - WEIGHT IN KG
[FreeTextEntry1] : ekg same as last time\par smart watch shows hr highest at 91 with no arrhythmia\par cardoo referral for workup\par symptoms appear to be a mix of left shoulder tendinitis and anxiety especially since symptoms worsen when she feels anxious and resolve with deep breathing\par educated on relaxation techniques, deep breathing exercises, reduction of stress in life where possible
77.1

## 2021-03-23 NOTE — DISCHARGE NOTE ADULT - AN EXTRA PILL TO ‘CATCH UP.’ NEVER TAKE A DOUBLE DOSE. NOTIFY YOUR DOCTOR THAT YOU MISSED A DOSE. TAKE WARFARIN/COUMADIN IN THE EVENING AT THE SAME TIME.
I faxed new order to [facility]
I placed a new order in the chart for MRI with and without contrast.  She has had previous lumbar surgery.
[facility] called to say that the MRI Lumbar needs to be with and without contrast.  Could you please place a new order?
Statement Selected

## 2021-07-08 NOTE — DISCHARGE NOTE PROVIDER - NSDCHHCONTACT_GEN_ALL_CORE_FT
Please follow all provided return precautions  Please call Dr Abdoulaye Hough for repeat evaluation as necessary, his information is provided  Thank you 
As certified below, I, or a nurse practitioner or physician assistant working with me, had a face-to-face encounter that meets the physician face-to-face encounter requirements.

## 2021-09-02 NOTE — H&P ADULT - NS NEC GEN PE MLT EXAM PC
Detail Level: Detailed Add 28078 Cpt? (Important Note: In 2017 The Use Of 25219 Is Being Tracked By Cms To Determine Future Global Period Reimbursement For Global Periods): no Wound Evaluated By (Optional): LAC Wound Diameter In Cm(Optional): 0 detailed exam

## 2022-01-05 NOTE — PROGRESS NOTE ADULT - SUBJECTIVE AND OBJECTIVE BOX
Caller: Chun Griggs    Relationship: Self    Best call back number: [phone]    Requested Prescriptions:   Requested Prescriptions     Pending Prescriptions Disp Refills   • diazePAM (VALIUM) 10 MG tablet 30 tablet 0     Sig: Take 1 tablet by mouth At Night As Needed for Anxiety.        Pharmacy where request should be sent: [id number] [place], [place] - [address] [place] [facility] [address] [facility] [phone] [facility] [phone]      Additional details provided by patient: PATIENT IS OUT OF THE MEDICATION     Does the patient have less than a 3 day supply:  [x] Yes  [] No    Jesús Crockett Rep   01/05/22 08:16 EST            Patient is a 61y old  Male who presents with a chief complaint of CC: Right Lateral Ankle Pain (17 Aug 2017 21:07)    HPI:  62 y/o male with past h/o HTN, PVD, PCKD, s/p Nephrectomy and 3x Renal Transplant (1989, 1995, 1997), ESRD on HD (M,W,F), CAD s/p Stent (2007), CHF (reported EF<20%) s/p AICD, Adrenal Insufficiency, Hx of Cryptococcal Meningitis (while on immunosuppresion therapy), Hx of CDiff, Hx of SVT was admitted on 8/17 for with right lateral ankle pain, Swelling and redness x 3 months that dramatically increased over past 24-48 hours. Initially wound was a scab with surrounding erythema. Patient follows with Dr. Quintero (Wound Care) at [facility] and underwent a wound debridement on 08/15/17. Wound culture and bone culture were obtained at the time of procedure. Patient was not treated with antibiotics and wife reports that prelim culture is NGTD.  Post-procedure patient, reports extension of the erythema up the lower extremity and down the foot, increasing pain, and swelling of the right ankle. Came to [facility] for further evaluation.    Mild left ankle pain  No fever or chills  No new complaints  Undergoing HD    MEDICATIONS  (STANDING):  predniSONE   Tablet 5 milliGRAM(s) Oral daily  amiodarone    Tablet 100 milliGRAM(s) Oral daily  carvedilol 6.25 milliGRAM(s) Oral at bedtime  cinacalcet 30 milliGRAM(s) Oral <User Schedule>  sevelamer hydrochloride 2400 milliGRAM(s) Oral three times a day with meals  carvedilol 3.125 milliGRAM(s) Oral daily  docusate sodium 100 milliGRAM(s) Oral three times a day  vancomycin    Solution 125 milliGRAM(s) Oral every 6 hours  cefTAZidime  IVPB 2 Gram(s) IV Intermittent <User Schedule>  vancomycin  IVPB 500 milliGRAM(s) IV Intermittent <User Schedule>  doxercalciferol Injectable 1 MICROGram(s) IV Push <User Schedule>  epoetin amee Injectable 34261 Unit(s) IV Push <User Schedule>  warfarin 1 milliGRAM(s) Oral daily    MEDICATIONS  (PRN):  acetaminophen   Tablet. 325 milliGRAM(s) Oral every 4 hours PRN Mild Pain (1 - 3)  ondansetron Injectable 4 milliGRAM(s) IV Push every 6 hours PRN Nausea  oxyCODONE    5 mG/acetaminophen 325 mG 1 Tablet(s) Oral every 4 hours PRN Moderate Pain (4 - 6)  HYDROmorphone  Injectable 0.5 milliGRAM(s) IV Push every 6 hours PRN Severe Pain (7 - 10)      Vital Signs Last 24 Hrs  T(C): 36.4 (23 Aug 2017 11:52), Max: 36.6 (22 Aug 2017 21:21)  T(F): 97.6 (23 Aug 2017 11:52), Max: 97.9 (22 Aug 2017 21:21)  HR: 62 (23 Aug 2017 11:52) (59 - 71)  BP: 111/48 (23 Aug 2017 11:52) (96/52 - 127/68)  BP(mean): --  RR: 16 (23 Aug 2017 11:52) (16 - 16)  SpO2: 95% (23 Aug 2017 05:27) (95% - 98%)    Physical Exam:      Constitutional: frail looking  HEENT: NC/AT, EOMI, PERRLA  Neck: supple  Back: no tenderness  Respiratory: clear  Cardiovascular: S1S2 regular, no murmurs  Abdomen: soft, not tender, not distended, positive BS  Genitourinary: deferred  Rectal: deferred  Musculoskeletal: no muscle tenderness, no joint swelling or tenderness  Extremities: right lateral ankle erythema, edema and warmth extending to right lateral foot an right lateral calf area - improving; small ulcer with scant discharge  Neurological: AxOx3, moving all extremities, no focal deficits  Skin: no rashes    Labs:                        8.9    7.1   )-----------( 110      ( 22 Aug 2017 08:31 )             26.7     08-22    138  |  100  |  37<H>  ----------------------------<  127<H>  3.8   |  30  |  4.36<H>    Ca    9.3      22 Aug 2017 08:31  Phos  5.0     08-21    TPro  x   /  Alb  2.5<L>  /  TBili  x   /  DBili  x   /  AST  x   /  ALT  x   /  AlkPhos  x   08-21       Vancomycin Level, Trough: 7.1 ug/mL (08-21 @ 16:10)      Cultures:                         11.1   10.0  )-----------( 116      ( 17 Aug 2017 17:27 )             34.6     08-17    138  |  101  |  48<H>  ----------------------------<  133<H>  4.3   |  26  |  4.75<H>    Ca    9.6      17 Aug 2017 17:27    TPro  7.2  /  Alb  3.5  /  TBili  0.8  /  DBili  x   /  AST  21  /  ALT  27  /  AlkPhos  94  08-17     LIVER FUNCTIONS - ( 17 Aug 2017 17:27 )  Alb: 3.5 g/dL / Pro: 7.2 gm/dL / ALK PHOS: 94 U/L / ALT: 27 U/L / AST: 21 U/L / GGT: x           Culture - Blood (08.17.17 @ 17:27)    Specimen Source: .Blood None    Culture Results:   No growth to date.          Radiology:    Advanced directives addressed: full resuscitation

## 2022-02-07 NOTE — ADVANCED PRACTICE NURSE CONSULT - REASON FOR CONSULT
Right ankle wound Price (Do Not Change): 0.00 Instructions: This plan will send the code FBSE to the PM system.  DO NOT or CHANGE the price. Detail Level: Simple

## 2022-02-28 NOTE — PATIENT PROFILE ADULT - NSPROPTRIGHTSUPPORTPERSON_GEN_A_NUR
CM met with patient due to concerns with medication costs and resources. Patient has been having issues with receiving his medication. Patient stated that he is currently unemployed has no income. Patient has no health insurance. CM encouraged patient to apply for Medicaid benefits. CM provided patient was CoverVA information where he can apply for health benefits as well SNAP Benefits. CM also provided patient with $4 [employer] Prescription drug list as well as [employer] [facility] Rx Card. Patient verbalized understanding of informatio . Patient waiting for [employer] Representative to obtain signature for [employer] to submit Medicaid Application.      RASHAD RodriguezN, RN, BSW   RN Care Manager
same name as above

## 2022-05-06 NOTE — ED ADULT TRIAGE NOTE - CHIEF COMPLAINT QUOTE
Pt sent from [facility] for abnormal INR >8. Recently in ICU diagnosed w/ klebsiella sepsis. Presents w/ epigastic pain radiating to Left shoulder 2/10. Patient/Caregiver provided printed discharge information.

## 2022-09-15 NOTE — H&P ADULT - ASSESSMENT
Negative sexually transmitted infection. What we were hoping to see.
62 yo male with PMH of CAD, ESRD on HD, polycystic kidney disease, systolic CHF s/p AICD, HTN, adrenal insufficiency (on chronic prednisone), hypoparathyroidism, h/o cryptococcal meningitis, h/o c. diff infection sent to ED from dialysis for lethargy.    #Possible sepsis secondary to unknown source, h/o recent klebsiella bacteremia  - admit to SD unit  - f/u RVP  - s/p vanco and cefepime in ED  - continue cefepime for now  - ID consult  - f/u blood cultures  - cautious with IV fluids given CHF and ESRD  - lactate 2.1, f/u repeat    #recurrent abd pain  - GI consult  - continue PPI BID    #h/o adrenal insufficiency  - s/o loading dose of Solu-cortef in ED, will continue Solu-cortef 50mg q8h for 3 doses  - restart prednisone after stress dose steroids    #Coagulopathy with elevated INR - likely secondary to nutritional deficiency of vitamin K  - hold coumadin  - monitor INR    #Hypotension - secondary to above  - hold antihypertensives (coreg) until BP stable    #ESRD on HD  - continue HD MWF  - nephro consult    #Chronic anemia  - monitor H/H    #DVT prophylaxis  - on coumadin with supra therapeutic INR    IMPROVE VTE Individual Risk Assessment    RISK                                                                Points    [  ] Previous VTE                                                  3    [  ] Thrombophilia                                               2    [  ] Lower limb paralysis                                      2        (unable to hold up >15 seconds)      [  ] Current Cancer                                              2         (within 6 months)    [ x ] Immobilization > 24 hrs                                1    [  ] ICU/CCU stay > 24 hours                              1    [ x ] Age > 60                                                      1    IMPROVE VTE Score ____2_____

## 2022-12-05 NOTE — ED ADULT NURSE NOTE - CAS EDN DISCHARGE INTERVENTIONS
IV intact Split-Thickness Skin Graft Text: The defect edges were debeveled with a #15 scalpel blade.  Given the location of the defect, shape of the defect and the proximity to free margins a split thickness skin graft was deemed most appropriate.  Using a sterile surgical marker, the primary defect shape was transferred to the donor site. The split thickness graft was then harvested.  The skin graft was then placed in the primary defect and oriented appropriately.

## 2022-12-25 NOTE — PROVIDER CONTACT NOTE (OTHER) - SITUATION
Md  made aware of consult
Called service spoke to Tran regarding consult.
Pt had elevated rectal temp 102.7
Pt had elevated temp and wife was called as per her request with the VS results. Pt wife requested Infectious Disease to be called due to elevated temp
md aware of consult
md aware of consult
Airway patent, Nasal mucosa clear. Mouth with normal mucosa. Throat has no vesicles, no oropharyngeal exudates and uvula is midline.

## 2023-03-30 NOTE — ED PROVIDER NOTE - NS ED MD DISPO DIVISION
-- DO NOT REPLY / DO NOT REPLY ALL --  -- Message is from Engagement Center Operations (ECO) --    Offered Waitlist if Available for the Visit Type? Yes    Caller is requesting an appointment - at a sooner time than what was available.      Caller wants sooner appointment - offered other approved options    Reason for Visit: Follow up     Is the patient currently scheduled? Yes.  Appointment date:  4/28/2023    Preferred time to be seen: anytime after 10am    Caller Information       Type Contact Phone/Fax    03/30/2023 02:28 PM CDT Phone (Incoming) Boogie Martin (Self) [phone] (H)          Alternative phone number: none    Can a detailed message be left? Yes    Message Turnaround:     [place]:    Please give this turnaround time to the caller:   \"This message will be sent to [state Provider's name]. The clinical team will fulfill your request as soon as they review your message.\"  
Pt RS from 4/28/23 to 4/20/23   
[facility]

## 2023-04-16 NOTE — CHART NOTE - NSCHARTNOTEFT_GEN_A_CORE
No Upon Nutritional Assessment by the Registered Dietitian your patient was determined to meet criteria / has evidence of the following diagnosis/diagnoses:          [ ]  Mild Protein Calorie Malnutrition        [ ]  Moderate Protein Calorie Malnutrition        [x ] Severe Protein Calorie Malnutrition        [ ] Unspecified Protein Calorie Malnutrition        [x ] Underweight / BMI <19        [ ] Morbid Obesity / BMI > 40      Findings:    Upon observation pt appears underwt and NFPE significant for muscle wasting: severe:deltoid, thigh, patella moderate:  clavicle, interosseous; mild: temporal; fat depletion:severe: ribs; moderate: triceps. Pt c/o decreased appetite ~3 days PTA however states that once he was in ED and received solumederol his appetite has been great. Pt has documented wt loss of 6# over the past  4 months which is not clinically significant however unintentional. Based on above pt meets criteria for severe malnutrition in the context of chronic illness.       Findings as based on:  •  Comprehensive nutrition assessment and consultation  •  Calorie counts (nutrient intake analysis)  •  Food acceptance and intake status from observations by staff  •  Follow up  •  Patient education  •  Intervention secondary to interdisciplinary rounds  •   concerns      Treatment:      1. c/w renal diet   2. Add gelatein 1x/day   3. Encourage intake at each meal w adequate protein.   4. Add nephrovite   5. daily wt.     The following diet has been recommended:      PROVIDER Section:     By signing this assessment you are acknowledging and agree with the diagnosis/diagnoses assigned by the Registered Dietitian    Comments:

## 2023-08-01 NOTE — ED PROVIDER NOTE - CLINICAL SUMMARY MEDICAL DECISION MAKING FREE TEXT BOX
63 M ESRD on dialysis presents to ED with abd pain, N/V, symptoms present over past day. Had ERCP one week ago, sent in by dialysis for sx. Exam with epigastric TTP. Concern for post ERCP pancreatitis, lower suspicion perf, lower suspicion ascending cholangitis. Will obtain labs, CT, pain control, reassess. No

## 2023-08-17 NOTE — PATIENT PROFILE ADULT - NSPROEDAABILITYLEARN_GEN_A_NUR
Get labs fasting  Followup with orthopedics  Tylenol or meloxicam as needed for pain  CT calcium score ordered [phone]  Echo ordered   F/u 1 year for physical   none

## 2023-10-19 NOTE — PHARMACOTHERAPY INTERVENTION NOTE - INTERVENTION TYPE MED REC
Med Rec - Admission Zoryve Pregnancy And Lactation Text: It is unknown if this medication can cause problems during pregnancy and breastfeeding.

## 2023-11-17 NOTE — ED ADULT NURSE NOTE - NSFALLRSKHARMRISK_ED_ALL_ED
Spoke with patient. Recommended she continue to take pantoprazole as previously prescribed if continues to have reflux symptoms.     Patient states continuing constipation. Currently taking dulcolax 2-3 tablets nightly. Sates it helps in morning- but throughout evening still feels constipated. Has been on dulcolax since June.    GI Diagnosis patient is followed for: diarrhea/constipation, reflux    Reason for call: Constipation  Onset - has had for \"almost whole life\"  Last BM: this AM  Associated symptoms - no vomiting, \"nausea every once in a while\". Yes abdominal cramping- cramping below belly button. Rates pain at 5/10, can go up to 7/10 when has to use bathroom urgently. Intermittent pain.  Bright red rectal bleeding?:  No, no blood in stool but sometimes rectum can become irritated with frequent bowel movements  Treatments tried - dulcolax ... states she has thrown up with miralax before, has not tried fiber  supplements.   Was treatment tried effective? Not entirely    Patient states she does not feel like is emptying her bowels fully when she has a BM.     Nurse review before phone call:  Last office visit: 5/30/23  Recommendations from last office note (synopsis): colonoscopy, peppermint oil tablets  Last EGD/Colonoscopy (date/results): 11/9/23 colonoscopy \"Normal colon biopsies, no evidence of microscopic colitis\"  Other pertinent labs or imaging (with results): patient had KUB on 2/10/23 for constipation \"Normal xray - no evidence of constipation\"        Sent to Dr. Hammer to advise.            yes

## 2023-11-20 NOTE — PROCEDURE NOTE - LACERATION #1 CAUSED BY
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

## 2024-10-08 NOTE — H&P ADULT - NSHPSOURCEINFORD_GEN_ALL_CORE
DISPLAY PLAN FREE TEXT DISPLAY PLAN FREE TEXT DISPLAY PLAN FREE TEXT DISPLAY PLAN FREE TEXT DISPLAY PLAN FREE TEXT Physician/Provider/Chart(s)/Patient/Spouse/Significant Other

## 2025-02-28 NOTE — DISCHARGE NOTE PROVIDER - NSTOBACCOUSAGEY/N_GEN_A_CS
H&P incorrectly filed as consult on 2/28/2025    Karon Lara DO  General Surgery PGY-2  2/28/25 3:45 PM    
No